# Patient Record
Sex: FEMALE | Race: WHITE | NOT HISPANIC OR LATINO | Employment: FULL TIME | URBAN - NONMETROPOLITAN AREA
[De-identification: names, ages, dates, MRNs, and addresses within clinical notes are randomized per-mention and may not be internally consistent; named-entity substitution may affect disease eponyms.]

---

## 2017-04-28 ENCOUNTER — HOSPITAL ENCOUNTER (EMERGENCY)
Facility: HOSPITAL | Age: 22
Discharge: HOME | End: 2017-04-28
Admitting: EMERGENCY MEDICINE
Payer: COMMERCIAL

## 2017-04-28 VITALS
RESPIRATION RATE: 18 BRPM | BODY MASS INDEX: 20.61 KG/M2 | HEART RATE: 79 BPM | OXYGEN SATURATION: 98 % | TEMPERATURE: 98 F | SYSTOLIC BLOOD PRESSURE: 113 MMHG | WEIGHT: 136 LBS | HEIGHT: 68 IN | DIASTOLIC BLOOD PRESSURE: 82 MMHG

## 2017-04-28 DIAGNOSIS — J06.9 URI (UPPER RESPIRATORY INFECTION): ICD-10-CM

## 2017-04-28 DIAGNOSIS — R19.7 DIARRHEA: ICD-10-CM

## 2017-04-28 DIAGNOSIS — J02.9 PHARYNGITIS: Primary | ICD-10-CM

## 2017-04-28 PROCEDURE — 99283 EMERGENCY DEPT VISIT LOW MDM: CPT

## 2017-04-28 RX ORDER — SERTRALINE HYDROCHLORIDE 25 MG/1
50 TABLET, FILM COATED ORAL DAILY
COMMUNITY
End: 2018-02-02

## 2017-04-28 RX ORDER — AMOXICILLIN 500 MG/1
500 TABLET, FILM COATED ORAL 2 TIMES DAILY
Qty: 14 TABLET | Refills: 0 | Status: SHIPPED | OUTPATIENT
Start: 2017-04-28 | End: 2017-05-05

## 2017-04-28 RX ORDER — GUAIFENESIN 600 MG
600 TABLET, EXTENDED RELEASE 12 HR ORAL 2 TIMES DAILY
Qty: 14 TABLET | Refills: 0 | Status: SHIPPED | OUTPATIENT
Start: 2017-04-28 | End: 2017-05-05

## 2017-05-14 ENCOUNTER — HOSPITAL ENCOUNTER (EMERGENCY)
Facility: HOSPITAL | Age: 22
End: 2017-05-15
Attending: EMERGENCY MEDICINE | Admitting: EMERGENCY MEDICINE
Payer: COMMERCIAL

## 2017-05-14 ENCOUNTER — APPOINTMENT (EMERGENCY)
Dept: RADIOLOGY | Facility: HOSPITAL | Age: 22
End: 2017-05-14
Payer: COMMERCIAL

## 2017-05-14 ENCOUNTER — GENERIC CONVERSION - ENCOUNTER (OUTPATIENT)
Dept: OTHER | Facility: OTHER | Age: 22
End: 2017-05-14

## 2017-05-14 ENCOUNTER — APPOINTMENT (EMERGENCY)
Dept: CT IMAGING | Facility: HOSPITAL | Age: 22
End: 2017-05-14
Payer: COMMERCIAL
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
## 2017-05-14 DIAGNOSIS — R20.2 PARESTHESIAS IN LEFT HAND: Primary | ICD-10-CM

## 2017-05-14 LAB
ANION GAP BLD CALC-SCNC: 21 MMOL/L (ref 4–13)
BUN BLD-MCNC: 14 MG/DL (ref 5–25)
CA-I BLD-SCNC: 1.16 MMOL/L (ref 1.12–1.32)
CHLORIDE BLD-SCNC: 104 MMOL/L (ref 100–108)
CREAT BLD-MCNC: 0.7 MG/DL (ref 0.6–1.3)
GFR SERPL CREATININE-BSD FRML MDRD: >60 ML/MIN/1.73SQ M
GLUCOSE SERPL-MCNC: 97 MG/DL (ref 65–140)
HCG UR QL: NEGATIVE
HCT VFR BLD CALC: 45 % (ref 34.8–46.1)
HGB BLDA-MCNC: 15.3 G/DL (ref 11.5–15.4)
PCO2 BLD: 24 MMOL/L (ref 21–32)
POTASSIUM BLD-SCNC: 3.7 MMOL/L (ref 3.5–5.3)
SODIUM BLD-SCNC: 143 MMOL/L (ref 136–145)
SPECIMEN SOURCE: ABNORMAL

## 2017-05-14 PROCEDURE — 72125 CT NECK SPINE W/O DYE: CPT

## 2017-05-14 PROCEDURE — 80047 BASIC METABLC PNL IONIZED CA: CPT

## 2017-05-14 PROCEDURE — 85014 HEMATOCRIT: CPT

## 2017-05-14 PROCEDURE — 96375 TX/PRO/DX INJ NEW DRUG ADDON: CPT

## 2017-05-14 PROCEDURE — 70450 CT HEAD/BRAIN W/O DYE: CPT

## 2017-05-14 PROCEDURE — 73110 X-RAY EXAM OF WRIST: CPT

## 2017-05-14 PROCEDURE — 74177 CT ABD & PELVIS W/CONTRAST: CPT

## 2017-05-14 PROCEDURE — 71260 CT THORAX DX C+: CPT

## 2017-05-14 PROCEDURE — 81025 URINE PREGNANCY TEST: CPT | Performed by: EMERGENCY MEDICINE

## 2017-05-14 PROCEDURE — 96374 THER/PROPH/DIAG INJ IV PUSH: CPT

## 2017-05-14 PROCEDURE — 73030 X-RAY EXAM OF SHOULDER: CPT

## 2017-05-14 RX ORDER — FENTANYL CITRATE 50 UG/ML
50 INJECTION, SOLUTION INTRAMUSCULAR; INTRAVENOUS ONCE
Status: COMPLETED | OUTPATIENT
Start: 2017-05-14 | End: 2017-05-14

## 2017-05-14 RX ORDER — GABAPENTIN 300 MG/1
300 CAPSULE ORAL ONCE
Status: COMPLETED | OUTPATIENT
Start: 2017-05-14 | End: 2017-05-14

## 2017-05-14 RX ORDER — KETOROLAC TROMETHAMINE 30 MG/ML
15 INJECTION, SOLUTION INTRAMUSCULAR; INTRAVENOUS ONCE
Status: COMPLETED | OUTPATIENT
Start: 2017-05-14 | End: 2017-05-14

## 2017-05-14 RX ADMIN — KETOROLAC TROMETHAMINE 15 MG: 30 INJECTION, SOLUTION INTRAMUSCULAR at 21:38

## 2017-05-14 RX ADMIN — IOHEXOL 100 ML: 350 INJECTION, SOLUTION INTRAVENOUS at 20:52

## 2017-05-14 RX ADMIN — DEXAMETHASONE SODIUM PHOSPHATE 10 MG: 10 INJECTION INTRAMUSCULAR; INTRAVENOUS at 23:22

## 2017-05-14 RX ADMIN — FENTANYL CITRATE 50 MCG: 50 INJECTION INTRAMUSCULAR; INTRAVENOUS at 21:16

## 2017-05-14 RX ADMIN — GABAPENTIN 300 MG: 300 CAPSULE ORAL at 23:35

## 2017-05-15 ENCOUNTER — HOSPITAL ENCOUNTER (OUTPATIENT)
Facility: HOSPITAL | Age: 22
Setting detail: OBSERVATION
Discharge: HOME | End: 2017-05-16
Attending: SURGERY | Admitting: SURGERY
Payer: COMMERCIAL

## 2017-05-15 ENCOUNTER — APPOINTMENT (OUTPATIENT)
Dept: RADIOLOGY | Facility: HOSPITAL | Age: 22
End: 2017-05-15
Payer: COMMERCIAL

## 2017-05-15 VITALS
HEIGHT: 68 IN | BODY MASS INDEX: 21.49 KG/M2 | HEART RATE: 67 BPM | WEIGHT: 141.8 LBS | DIASTOLIC BLOOD PRESSURE: 78 MMHG | RESPIRATION RATE: 16 BRPM | OXYGEN SATURATION: 98 % | TEMPERATURE: 97.9 F | SYSTOLIC BLOOD PRESSURE: 129 MMHG

## 2017-05-15 DIAGNOSIS — M79.602 PAIN OF LEFT UPPER EXTREMITY: Primary | ICD-10-CM
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
## 2017-05-15 LAB
PLATELET # BLD AUTO: 370 THOUSANDS/UL (ref 149–390)
PLATELET # BLD AUTO: 372 THOUSANDS/UL (ref 149–390)
PMV BLD AUTO: 9.5 FL (ref 8.9–12.7)
PMV BLD AUTO: 9.8 FL (ref 8.9–12.7)

## 2017-05-15 PROCEDURE — 72141 MRI NECK SPINE W/O DYE: CPT

## 2017-05-15 PROCEDURE — 97163 PT EVAL HIGH COMPLEX 45 MIN: CPT

## 2017-05-15 PROCEDURE — G8979 MOBILITY GOAL STATUS: HCPCS

## 2017-05-15 PROCEDURE — 85049 AUTOMATED PLATELET COUNT: CPT | Performed by: EMERGENCY MEDICINE

## 2017-05-15 PROCEDURE — 97166 OT EVAL MOD COMPLEX 45 MIN: CPT

## 2017-05-15 PROCEDURE — 96375 TX/PRO/DX INJ NEW DRUG ADDON: CPT

## 2017-05-15 PROCEDURE — 99285 EMERGENCY DEPT VISIT HI MDM: CPT

## 2017-05-15 PROCEDURE — G8980 MOBILITY D/C STATUS: HCPCS

## 2017-05-15 PROCEDURE — G8978 MOBILITY CURRENT STATUS: HCPCS

## 2017-05-15 PROCEDURE — G8987 SELF CARE CURRENT STATUS: HCPCS

## 2017-05-15 PROCEDURE — G8988 SELF CARE GOAL STATUS: HCPCS

## 2017-05-15 RX ORDER — OXYCODONE HYDROCHLORIDE 5 MG/1
5 TABLET ORAL ONCE
Status: COMPLETED | OUTPATIENT
Start: 2017-05-15 | End: 2017-05-15

## 2017-05-15 RX ORDER — METOCLOPRAMIDE 10 MG/1
10 TABLET ORAL
Status: DISCONTINUED | OUTPATIENT
Start: 2017-05-15 | End: 2017-05-16 | Stop reason: HOSPADM

## 2017-05-15 RX ORDER — LEVOTHYROXINE SODIUM 0.1 MG/1
100 TABLET ORAL
Status: DISCONTINUED | OUTPATIENT
Start: 2017-05-15 | End: 2017-05-16 | Stop reason: HOSPADM

## 2017-05-15 RX ORDER — ONDANSETRON 2 MG/ML
4 INJECTION INTRAMUSCULAR; INTRAVENOUS EVERY 6 HOURS PRN
Status: DISCONTINUED | OUTPATIENT
Start: 2017-05-15 | End: 2017-05-16 | Stop reason: HOSPADM

## 2017-05-15 RX ORDER — HEPARIN SODIUM 5000 [USP'U]/ML
5000 INJECTION, SOLUTION INTRAVENOUS; SUBCUTANEOUS EVERY 8 HOURS SCHEDULED
Status: DISCONTINUED | OUTPATIENT
Start: 2017-05-15 | End: 2017-05-16 | Stop reason: HOSPADM

## 2017-05-15 RX ORDER — MORPHINE SULFATE 4 MG/ML
4 INJECTION, SOLUTION INTRAMUSCULAR; INTRAVENOUS
Status: DISCONTINUED | OUTPATIENT
Start: 2017-05-15 | End: 2017-05-16 | Stop reason: HOSPADM

## 2017-05-15 RX ORDER — OXYCODONE HYDROCHLORIDE 10 MG/1
10 TABLET ORAL EVERY 4 HOURS PRN
Status: DISCONTINUED | OUTPATIENT
Start: 2017-05-15 | End: 2017-05-16 | Stop reason: HOSPADM

## 2017-05-15 RX ORDER — ACETAMINOPHEN 325 MG/1
975 TABLET ORAL EVERY 6 HOURS PRN
Status: DISCONTINUED | OUTPATIENT
Start: 2017-05-15 | End: 2017-05-16 | Stop reason: HOSPADM

## 2017-05-15 RX ORDER — FENTANYL CITRATE 50 UG/ML
50 INJECTION, SOLUTION INTRAMUSCULAR; INTRAVENOUS ONCE
Status: COMPLETED | OUTPATIENT
Start: 2017-05-15 | End: 2017-05-15

## 2017-05-15 RX ORDER — LORAZEPAM 0.5 MG/1
0.5 TABLET ORAL
Status: DISCONTINUED | OUTPATIENT
Start: 2017-05-15 | End: 2017-05-16 | Stop reason: HOSPADM

## 2017-05-15 RX ORDER — NICOTINE 21 MG/24HR
1 PATCH, TRANSDERMAL 24 HOURS TRANSDERMAL DAILY
Status: DISCONTINUED | OUTPATIENT
Start: 2017-05-15 | End: 2017-05-16 | Stop reason: HOSPADM

## 2017-05-15 RX ORDER — LORAZEPAM 0.5 MG/1
0.5 TABLET ORAL ONCE
Status: COMPLETED | OUTPATIENT
Start: 2017-05-15 | End: 2017-05-15

## 2017-05-15 RX ORDER — OXYCODONE HYDROCHLORIDE 5 MG/1
5 TABLET ORAL EVERY 4 HOURS PRN
Status: DISCONTINUED | OUTPATIENT
Start: 2017-05-15 | End: 2017-05-16 | Stop reason: HOSPADM

## 2017-05-15 RX ADMIN — ONDANSETRON 4 MG: 2 INJECTION INTRAMUSCULAR; INTRAVENOUS at 09:27

## 2017-05-15 RX ADMIN — OXYCODONE HYDROCHLORIDE 10 MG: 10 TABLET ORAL at 16:10

## 2017-05-15 RX ADMIN — OXYCODONE HYDROCHLORIDE 10 MG: 10 TABLET ORAL at 09:26

## 2017-05-15 RX ADMIN — METOCLOPRAMIDE 10 MG: 10 TABLET ORAL at 18:06

## 2017-05-15 RX ADMIN — ONDANSETRON 4 MG: 2 INJECTION INTRAMUSCULAR; INTRAVENOUS at 04:40

## 2017-05-15 RX ADMIN — MORPHINE SULFATE 4 MG: 4 INJECTION, SOLUTION INTRAMUSCULAR; INTRAVENOUS at 07:56

## 2017-05-15 RX ADMIN — MORPHINE SULFATE 4 MG: 4 INJECTION, SOLUTION INTRAMUSCULAR; INTRAVENOUS at 23:47

## 2017-05-15 RX ADMIN — LEVOTHYROXINE SODIUM 100 MCG: 100 TABLET ORAL at 05:46

## 2017-05-15 RX ADMIN — FENTANYL CITRATE 50 MCG: 50 INJECTION INTRAMUSCULAR; INTRAVENOUS at 00:32

## 2017-05-15 RX ADMIN — HEPARIN SODIUM 5000 UNITS: 5000 INJECTION, SOLUTION INTRAVENOUS; SUBCUTANEOUS at 16:11

## 2017-05-15 RX ADMIN — SERTRALINE HYDROCHLORIDE 50 MG: 50 TABLET ORAL at 09:25

## 2017-05-15 RX ADMIN — HEPARIN SODIUM 5000 UNITS: 5000 INJECTION, SOLUTION INTRAVENOUS; SUBCUTANEOUS at 05:47

## 2017-05-15 RX ADMIN — OXYCODONE HYDROCHLORIDE 5 MG: 5 TABLET ORAL at 20:54

## 2017-05-15 RX ADMIN — OXYCODONE HYDROCHLORIDE 5 MG: 5 TABLET ORAL at 02:17

## 2017-05-15 RX ADMIN — OXYCODONE HYDROCHLORIDE 10 MG: 10 TABLET ORAL at 04:39

## 2017-05-15 RX ADMIN — LORAZEPAM 0.5 MG: 0.5 TABLET ORAL at 12:24

## 2017-05-15 RX ADMIN — HEPARIN SODIUM 5000 UNITS: 5000 INJECTION, SOLUTION INTRAVENOUS; SUBCUTANEOUS at 21:17

## 2017-05-16 ENCOUNTER — APPOINTMENT (OUTPATIENT)
Dept: RADIOLOGY | Facility: HOSPITAL | Age: 22
End: 2017-05-16
Payer: COMMERCIAL

## 2017-05-16 VITALS
DIASTOLIC BLOOD PRESSURE: 50 MMHG | WEIGHT: 147 LBS | SYSTOLIC BLOOD PRESSURE: 93 MMHG | HEIGHT: 68 IN | OXYGEN SATURATION: 99 % | TEMPERATURE: 97.9 F | BODY MASS INDEX: 22.28 KG/M2 | RESPIRATION RATE: 16 BRPM | HEART RATE: 58 BPM

## 2017-05-16 PROCEDURE — 97532 HB COGNITIVE SKILLS DEVELOPMENT: CPT

## 2017-05-16 PROCEDURE — 97535 SELF CARE MNGMENT TRAINING: CPT

## 2017-05-16 RX ORDER — METHOCARBAMOL 500 MG/1
500 TABLET, FILM COATED ORAL 4 TIMES DAILY
Qty: 30 TABLET | Refills: 0 | Status: SHIPPED | OUTPATIENT
Start: 2017-05-16 | End: 2017-07-13 | Stop reason: ALTCHOICE

## 2017-05-16 RX ORDER — METHOCARBAMOL 500 MG/1
500 TABLET, FILM COATED ORAL EVERY 6 HOURS PRN
Status: DISCONTINUED | OUTPATIENT
Start: 2017-05-16 | End: 2017-05-16 | Stop reason: HOSPADM

## 2017-05-16 RX ADMIN — OXYCODONE HYDROCHLORIDE 10 MG: 10 TABLET ORAL at 06:16

## 2017-05-16 RX ADMIN — OXYCODONE HYDROCHLORIDE 10 MG: 10 TABLET ORAL at 12:01

## 2017-05-16 RX ADMIN — SERTRALINE HYDROCHLORIDE 50 MG: 50 TABLET ORAL at 09:45

## 2017-05-16 RX ADMIN — HEPARIN SODIUM 5000 UNITS: 5000 INJECTION, SOLUTION INTRAVENOUS; SUBCUTANEOUS at 06:14

## 2017-05-16 RX ADMIN — METOCLOPRAMIDE 10 MG: 10 TABLET ORAL at 06:15

## 2017-05-16 RX ADMIN — LEVOTHYROXINE SODIUM 100 MCG: 100 TABLET ORAL at 06:15

## 2017-05-16 RX ADMIN — METHOCARBAMOL 500 MG: 500 TABLET ORAL at 12:01

## 2017-05-16 RX ADMIN — HEPARIN SODIUM 5000 UNITS: 5000 INJECTION, SOLUTION INTRAVENOUS; SUBCUTANEOUS at 14:05

## 2017-05-16 RX ADMIN — METOCLOPRAMIDE 10 MG: 10 TABLET ORAL at 12:01

## 2017-05-30 ENCOUNTER — ALLSCRIPTS OFFICE VISIT (OUTPATIENT)
Dept: OTHER | Facility: OTHER | Age: 22
End: 2017-05-30

## 2017-06-19 ENCOUNTER — ALLSCRIPTS OFFICE VISIT (OUTPATIENT)
Dept: OTHER | Facility: OTHER | Age: 22
End: 2017-06-19

## 2017-07-13 ENCOUNTER — HOSPITAL ENCOUNTER (EMERGENCY)
Facility: HOSPITAL | Age: 22
Discharge: HOME | End: 2017-07-13

## 2017-07-13 VITALS
HEART RATE: 58 BPM | BODY MASS INDEX: 21.62 KG/M2 | HEIGHT: 69 IN | RESPIRATION RATE: 16 BRPM | SYSTOLIC BLOOD PRESSURE: 115 MMHG | DIASTOLIC BLOOD PRESSURE: 70 MMHG | OXYGEN SATURATION: 99 % | WEIGHT: 146 LBS | TEMPERATURE: 97.3 F

## 2017-07-13 DIAGNOSIS — T23.142A: ICD-10-CM

## 2017-07-13 DIAGNOSIS — T30.0 BURN DUE TO CONTACT WITH HOT WATER: ICD-10-CM
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
## 2017-07-13 DIAGNOSIS — T23.162A: Primary | ICD-10-CM

## 2017-07-13 PROCEDURE — 99283 EMERGENCY DEPT VISIT LOW MDM: CPT

## 2017-07-13 RX ORDER — IBUPROFEN 600 MG/1
600 TABLET ORAL ONCE
Status: COMPLETED | OUTPATIENT
Start: 2017-07-13 | End: 2017-07-13

## 2017-07-13 RX ORDER — IBUPROFEN 600 MG/1
600 TABLET ORAL 3 TIMES DAILY
Qty: 15 TABLET | Refills: 0 | Status: SHIPPED | OUTPATIENT
Start: 2017-07-13 | End: 2018-02-02

## 2017-07-13 RX ORDER — GAUZE BANDAGE 4"X3.5"
1 SPONGE TOPICAL DAILY
Qty: 5 EACH | Refills: 0 | Status: SHIPPED | OUTPATIENT
Start: 2017-07-13 | End: 2018-02-02

## 2017-07-13 RX ADMIN — IBUPROFEN 600 MG: 600 TABLET, FILM COATED ORAL at 10:27

## 2017-10-02 ENCOUNTER — GENERIC CONVERSION - ENCOUNTER (OUTPATIENT)
Dept: OTHER | Facility: OTHER | Age: 22
End: 2017-10-02

## 2017-10-02 ENCOUNTER — APPOINTMENT (OUTPATIENT)
Dept: LAB | Facility: HOSPITAL | Age: 22
End: 2017-10-02
Payer: COMMERCIAL

## 2017-10-02 ENCOUNTER — APPOINTMENT (OUTPATIENT)
Dept: FAMILY MEDICINE CLINIC | Facility: CLINIC | Age: 22
End: 2017-10-02
Payer: COMMERCIAL

## 2017-10-02 DIAGNOSIS — R05.9 COUGH: ICD-10-CM

## 2017-10-02 DIAGNOSIS — R19.7 DIARRHEA: ICD-10-CM

## 2017-10-02 DIAGNOSIS — R10.31 RIGHT LOWER QUADRANT PAIN: ICD-10-CM

## 2017-10-02 LAB
ALBUMIN SERPL BCP-MCNC: 4.7 G/DL (ref 3.5–5)
ALP SERPL-CCNC: 71 U/L (ref 46–116)
ALT SERPL W P-5'-P-CCNC: 21 U/L (ref 12–78)
ANION GAP SERPL CALCULATED.3IONS-SCNC: 4 MMOL/L (ref 4–13)
AST SERPL W P-5'-P-CCNC: 15 U/L (ref 5–45)
BACTERIA UR QL AUTO: NORMAL /HPF
BASOPHILS # BLD AUTO: 0.05 THOUSANDS/ΜL (ref 0–0.1)
BASOPHILS NFR BLD AUTO: 1 % (ref 0–1)
BILIRUB SERPL-MCNC: 0.31 MG/DL (ref 0.2–1)
BILIRUB UR QL STRIP: NEGATIVE
BUN SERPL-MCNC: 9 MG/DL (ref 5–25)
CALCIUM SERPL-MCNC: 9.9 MG/DL (ref 8.3–10.1)
CHLORIDE SERPL-SCNC: 105 MMOL/L (ref 100–108)
CLARITY UR: CLEAR
CO2 SERPL-SCNC: 27 MMOL/L (ref 21–32)
COLOR UR: YELLOW
CREAT SERPL-MCNC: 0.62 MG/DL (ref 0.6–1.3)
EOSINOPHIL # BLD AUTO: 0.36 THOUSAND/ΜL (ref 0–0.61)
EOSINOPHIL NFR BLD AUTO: 3 % (ref 0–6)
ERYTHROCYTE [DISTWIDTH] IN BLOOD BY AUTOMATED COUNT: 12.5 % (ref 11.6–15.1)
GFR SERPL CREATININE-BSD FRML MDRD: 129 ML/MIN/1.73SQ M
GLUCOSE P FAST SERPL-MCNC: 79 MG/DL (ref 65–99)
GLUCOSE UR STRIP-MCNC: NEGATIVE MG/DL
HCT VFR BLD AUTO: 44 % (ref 34.8–46.1)
HGB BLD-MCNC: 15 G/DL (ref 11.5–15.4)
HGB UR QL STRIP.AUTO: NEGATIVE
KETONES UR STRIP-MCNC: NEGATIVE MG/DL
LEUKOCYTE ESTERASE UR QL STRIP: ABNORMAL
LIPASE SERPL-CCNC: 125 U/L (ref 73–393)
LYMPHOCYTES # BLD AUTO: 3.03 THOUSANDS/ΜL (ref 0.6–4.47)
LYMPHOCYTES NFR BLD AUTO: 28 % (ref 14–44)
MCH RBC QN AUTO: 32.6 PG (ref 26.8–34.3)
MCHC RBC AUTO-ENTMCNC: 34.1 G/DL (ref 31.4–37.4)
MCV RBC AUTO: 96 FL (ref 82–98)
MONOCYTES # BLD AUTO: 0.97 THOUSAND/ΜL (ref 0.17–1.22)
MONOCYTES NFR BLD AUTO: 9 % (ref 4–12)
NEUTROPHILS # BLD AUTO: 6.22 THOUSANDS/ΜL (ref 1.85–7.62)
NEUTS SEG NFR BLD AUTO: 59 % (ref 43–75)
NITRITE UR QL STRIP: NEGATIVE
NON-SQ EPI CELLS URNS QL MICRO: NORMAL /HPF
NRBC BLD AUTO-RTO: 0 /100 WBCS
PH UR STRIP.AUTO: 7 [PH] (ref 4.5–8)
PLATELET # BLD AUTO: 363 THOUSANDS/UL (ref 149–390)
PMV BLD AUTO: 10 FL (ref 8.9–12.7)
POTASSIUM SERPL-SCNC: 4.4 MMOL/L (ref 3.5–5.3)
PROT SERPL-MCNC: 8.7 G/DL (ref 6.4–8.2)
PROT UR STRIP-MCNC: NEGATIVE MG/DL
RBC # BLD AUTO: 4.6 MILLION/UL (ref 3.81–5.12)
RBC #/AREA URNS AUTO: NORMAL /HPF
SODIUM SERPL-SCNC: 136 MMOL/L (ref 136–145)
SP GR UR STRIP.AUTO: 1 (ref 1–1.03)
UROBILINOGEN UR QL STRIP.AUTO: 0.2 E.U./DL
WBC # BLD AUTO: 10.66 THOUSAND/UL (ref 4.31–10.16)
WBC #/AREA URNS AUTO: NORMAL /HPF

## 2017-10-02 PROCEDURE — 81001 URINALYSIS AUTO W/SCOPE: CPT

## 2017-10-02 PROCEDURE — 83690 ASSAY OF LIPASE: CPT

## 2017-10-02 PROCEDURE — 36415 COLL VENOUS BLD VENIPUNCTURE: CPT

## 2017-10-02 PROCEDURE — T1015 CLINIC SERVICE: HCPCS | Performed by: FAMILY MEDICINE

## 2017-10-02 PROCEDURE — 80053 COMPREHEN METABOLIC PANEL: CPT

## 2017-10-02 PROCEDURE — 85025 COMPLETE CBC W/AUTO DIFF WBC: CPT

## 2017-10-20 ENCOUNTER — GENERIC CONVERSION - ENCOUNTER (OUTPATIENT)
Dept: OTHER | Facility: OTHER | Age: 22
End: 2017-10-20

## 2017-10-20 ENCOUNTER — APPOINTMENT (OUTPATIENT)
Dept: FAMILY MEDICINE CLINIC | Facility: CLINIC | Age: 22
End: 2017-10-20
Payer: COMMERCIAL

## 2017-10-20 PROCEDURE — T1015 CLINIC SERVICE: HCPCS | Performed by: FAMILY MEDICINE
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
## 2018-01-12 VITALS — SYSTOLIC BLOOD PRESSURE: 122 MMHG | RESPIRATION RATE: 18 BRPM | DIASTOLIC BLOOD PRESSURE: 64 MMHG | HEART RATE: 60 BPM

## 2018-01-16 NOTE — MISCELLANEOUS
Provider Comments  Provider Comments:   Jose Zhao did not show to her scheduled appointment on 06/19/2017  Our office attempted to contact her to reschedule  A message was left on her voicemail to contact our office        Signatures   Electronically signed by : Claudy Roberson DO; Jun 20 2017 12:46PM EST                       (Author)

## 2018-01-22 VITALS
RESPIRATION RATE: 20 BRPM | HEART RATE: 63 BPM | BODY MASS INDEX: 22.58 KG/M2 | OXYGEN SATURATION: 98 % | TEMPERATURE: 99.1 F | HEIGHT: 68 IN | WEIGHT: 149 LBS

## 2018-01-22 VITALS
BODY MASS INDEX: 22.28 KG/M2 | WEIGHT: 147 LBS | OXYGEN SATURATION: 98 % | SYSTOLIC BLOOD PRESSURE: 110 MMHG | HEART RATE: 97 BPM | RESPIRATION RATE: 20 BRPM | DIASTOLIC BLOOD PRESSURE: 62 MMHG | TEMPERATURE: 97.8 F | HEIGHT: 68 IN

## 2018-02-02 ENCOUNTER — HOSPITAL ENCOUNTER (EMERGENCY)
Facility: HOSPITAL | Age: 23
Discharge: HOME | End: 2018-02-02
Admitting: EMERGENCY MEDICINE
Payer: COMMERCIAL

## 2018-02-02 VITALS
OXYGEN SATURATION: 98 % | WEIGHT: 157.63 LBS | RESPIRATION RATE: 17 BRPM | DIASTOLIC BLOOD PRESSURE: 67 MMHG | HEART RATE: 87 BPM | TEMPERATURE: 98.3 F | BODY MASS INDEX: 23.97 KG/M2 | SYSTOLIC BLOOD PRESSURE: 126 MMHG

## 2018-02-02 DIAGNOSIS — J03.90 TONSILLITIS: Primary | ICD-10-CM

## 2018-02-02 LAB — S PYO AG THROAT QL: NEGATIVE

## 2018-02-02 PROCEDURE — 99283 EMERGENCY DEPT VISIT LOW MDM: CPT

## 2018-02-02 PROCEDURE — 87147 CULTURE TYPE IMMUNOLOGIC: CPT | Performed by: PHYSICIAN ASSISTANT

## 2018-02-02 PROCEDURE — 87070 CULTURE OTHR SPECIMN AEROBIC: CPT | Performed by: PHYSICIAN ASSISTANT

## 2018-02-02 PROCEDURE — 87430 STREP A AG IA: CPT | Performed by: PHYSICIAN ASSISTANT

## 2018-02-02 RX ORDER — AMOXICILLIN AND CLAVULANATE POTASSIUM 875; 125 MG/1; MG/1
1 TABLET, FILM COATED ORAL ONCE
Status: COMPLETED | OUTPATIENT
Start: 2018-02-02 | End: 2018-02-02

## 2018-02-02 RX ORDER — PREDNISONE 50 MG/1
50 TABLET ORAL DAILY
Qty: 5 TABLET | Refills: 0 | Status: SHIPPED | OUTPATIENT
Start: 2018-02-02 | End: 2018-12-31

## 2018-02-02 RX ORDER — IBUPROFEN 800 MG/1
800 TABLET ORAL ONCE
Status: COMPLETED | OUTPATIENT
Start: 2018-02-02 | End: 2018-02-02

## 2018-02-02 RX ORDER — AMOXICILLIN AND CLAVULANATE POTASSIUM 875; 125 MG/1; MG/1
1 TABLET, FILM COATED ORAL EVERY 12 HOURS SCHEDULED
Qty: 20 TABLET | Refills: 0 | Status: SHIPPED | OUTPATIENT
Start: 2018-02-02 | End: 2018-02-12

## 2018-02-02 RX ADMIN — PREDNISONE 50 MG: 20 TABLET ORAL at 15:51

## 2018-02-02 RX ADMIN — IBUPROFEN 800 MG: 800 TABLET, FILM COATED ORAL at 14:51

## 2018-02-02 RX ADMIN — AMOXICILLIN AND CLAVULANATE POTASSIUM 1 TABLET: 875; 125 TABLET, FILM COATED ORAL at 15:51

## 2018-02-02 NOTE — DISCHARGE INSTRUCTIONS
Tonsillitis, Ambulatory Care   GENERAL INFORMATION:   Tonsillitis  is an inflammation of the tonsils  Tonsils are 2 large lumps of tissue in the back of your throat  They help fight infection  Tonsillitis may be caused by a bacterial or a viral infection  Common symptoms include the following:   · Severe sore throat    · Red, swollen tonsils    · Painful swallowing    · Fever and chills    · Bad breath    · White spots on the tonsils  Seek immediate care  if you have trouble breathing because your tonsils are swollen  Treatment for tonsillitis  may include medicine to decrease throat pain  Antibiotic medicine may be given if your tonsillitis was caused by bacteria  You may also need surgery to remove your tonsils for chronic or recurrent tonsillitis  Prevent the spread of germs  by washing your hands often  Do not share food or drinks with anyone  Ask when you can return to work  Manage your symptoms:   · Drink plenty of liquids  to help prevent dehydration  Ask your healthcare provider how much you should drink  · Gargle with warm salt water  to help decrease throat pain  Mix 1 teaspoon of salt in 1 cup of warm water  Ask how often you should do this  Follow up with your healthcare provider as directed:  Write down your questions so you remember to ask them during your visits  CARE AGREEMENT:   You have the right to help plan your care  Learn about your health condition and how it may be treated  Discuss treatment options with your caregivers to decide what care you want to receive  You always have the right to refuse treatment  The above information is an  only  It is not intended as medical advice for individual conditions or treatments  Talk to your doctor, nurse or pharmacist before following any medical regimen to see if it is safe and effective for you    © 2014 [address] [employer] is for End User's use only and may not be sold, redistributed or otherwise used for commercial purposes  All illustrations and images included in CareNotes® are the copyrighted property of A D A M , [employer]  or [employer]

## 2018-02-02 NOTE — ED PROVIDER NOTES
History  Chief Complaint   Patient presents with    Flu Symptoms     fever 89f at home with sore throat coughing stiff neck vomiting body aches and diarrhea     Patient presents to the emergency department body aches chills  She denies cough  She does admit to some posterior neck pain  She also complains of some intermittent vomiting  She has been utilizing acetaminophen however has not had any since 0 900 hours this morning  Heart rate at bedside is 98 beats per minute with a temperature of 98 3° F  Blood pressure 123/73  There is no history of headache            None       Past Medical History:   Diagnosis Date    Depression     Disease of thyroid gland     Hypertension        Past Surgical History:   Procedure Laterality Date    DILATION AND CURETTAGE OF UTERUS         Family History   Problem Relation Age of Onset    Cancer Mother     Hypertension Mother     Cancer Father     Hypertension Father     Diabetes Father     Cancer Sister      I have reviewed and agree with the history as documented  Social History   Substance Use Topics    Smoking status: Current Every Day Smoker     Packs/day: 1 00    Smokeless tobacco: Never Used    Alcohol use No        Review of Systems   Constitutional: Positive for fever  Negative for activity change, appetite change, chills, diaphoresis, fatigue and unexpected weight change  HENT: Positive for sore throat  Negative for congestion, dental problem, drooling, ear discharge, ear pain, facial swelling, hearing loss, mouth sores, nosebleeds, postnasal drip, rhinorrhea, sinus pain, sinus pressure, sneezing, tinnitus, trouble swallowing and voice change  Eyes: Negative  Respiratory: Negative  Cardiovascular: Negative  Gastrointestinal: Positive for vomiting  Negative for abdominal distention, abdominal pain, anal bleeding, blood in stool, constipation, diarrhea, nausea and rectal pain  Endocrine: Negative  Genitourinary: Negative  Musculoskeletal: Positive for myalgias  Negative for arthralgias, back pain, gait problem, joint swelling, neck pain and neck stiffness  Skin: Negative  Allergic/Immunologic: Negative  Neurological: Negative  Hematological: Negative  Psychiatric/Behavioral: Negative  All other systems reviewed and are negative  Physical Exam  ED Triage Vitals [02/02/18 1346]   Temperature Pulse Respirations Blood Pressure SpO2   98 3 °F (36 8 °C) (!) 107 20 123/73 99 %      Temp Source Heart Rate Source Patient Position - Orthostatic VS BP Location FiO2 (%)   Temporal Right Sitting Right arm --      Pain Score       8           Orthostatic Vital Signs  Vitals:    02/02/18 1346   BP: 123/73   Pulse: (!) 107   Patient Position - Orthostatic VS: Sitting       Physical Exam   Constitutional: She is oriented to person, place, and time  She appears well-developed and well-nourished  No distress  HENT:   Head: Normocephalic and atraumatic  Right Ear: External ear normal    Left Ear: External ear normal    Nose: Nose normal    Mouth/Throat: Oropharyngeal exudate present  Patient has bilateral erythematous slightly enlarged tonsillar pillars without edema  There is extensive exudate however  No evidence of airway compromise  Eyes: EOM are normal  Pupils are equal, round, and reactive to light  Neck: No JVD present  No tracheal deviation present  No thyromegaly present  Patient experiences pain with range of motion of the neck however is able to touch her chin to her chest   Cardiovascular: Normal rate and regular rhythm  Exam reveals no gallop and no friction rub  No murmur heard  Pulmonary/Chest: Effort normal and breath sounds normal  No respiratory distress  She has no wheezes  She has no rales  She exhibits no tenderness  Abdominal: Soft  Musculoskeletal: Normal range of motion  Lymphadenopathy:     She has cervical adenopathy     Neurological: She is alert and oriented to person, place, and time  No cranial nerve deficit or sensory deficit  GCS eye subscore is 4  GCS verbal subscore is 5  GCS motor subscore is 6  Kernig's and Brudzinski's test both negative   Skin: Skin is warm  Capillary refill takes less than 2 seconds  She is not diaphoretic  Psychiatric: She has a normal mood and affect  Vitals reviewed  ED Medications  Medications   predniSONE tablet 50 mg (not administered)   amoxicillin-clavulanate (AUGMENTIN) 875-125 mg per tablet 1 tablet (not administered)   ibuprofen (MOTRIN) tablet 800 mg (800 mg Oral Given 2/2/18 1451)       Diagnostic Studies  Results Reviewed     Procedure Component Value Units Date/Time    Rapid Beta strep screen [[id number]]  (Normal) Collected:  02/02/18 1435    Lab Status:  Final result Specimen:  Throat from Throat Updated:  02/02/18 1453     Rapid Strep A Screen Negative    Throat culture [[id number]] Collected:  02/02/18 1435    Lab Status: In process Specimen:  Throat from Throat Updated:  02/02/18 1453                 No orders to display              Procedures  Procedures       Phone Contacts  ED Phone Contact    ED Course  ED Course as of Feb 02 1521   Fri Feb 02, 2018   1437 Heart rate at bedside is 97 beats per minute    1457 RAPID STREP A SCREEN: Negative   1520 Heart rate 90 beats/min at bedside upon discharge                                [facility]  CritCare Time    Disposition  Final diagnoses: Tonsillitis     Time reflects when diagnosis was documented in both MDM as applicable and the Disposition within this note     Time User Action Codes Description Comment    2/2/2018  3:18 PM Chapito Hernandez [J03 90] Tonsillitis       ED Disposition     ED Disposition Condition Comment    Discharge  [facility] 2177 discharge to home/self care      Condition at discharge: Good        Follow-up Information     Follow up With Specialties Details Why Contact Info Additional Information    Claudia Schaffer PA-C Physician Assistant Go to  [address] [place] [address] [address]  [facility], [place] [address] [facility] Emergency Department Emergency Medicine  If symptoms worsen, or you experience worsening fevers/neck symptoms Sonia Hernandezaubrey Guillaume 1946  [phone] [place] [facility], [address], [address]        Patient's Medications   Discharge Prescriptions    AMOXICILLIN-CLAVULANATE (AUGMENTIN) 875-125 MG PER TABLET    Take 1 tablet by mouth every 12 (twelve) hours for 10 days       Start Date: 2/2/2018  End Date: 2/12/2018       Order Dose: 1 tablet       Quantity: 20 tablet    Refills: 0    PREDNISONE 50 MG TABLET    Take 1 tablet (50 mg total) by mouth daily       Start Date: 2/2/2018  End Date: --       Order Dose: 50 mg       Quantity: 5 tablet    Refills: 0     No discharge procedures on file      ED Provider  Electronically Signed by           Beata Gonzalez PA-C  02/02/18 3014

## 2018-02-05 LAB — BACTERIA THROAT CULT: ABNORMAL

## 2018-10-30 ENCOUNTER — APPOINTMENT (EMERGENCY)
Dept: CT IMAGING | Facility: HOSPITAL | Age: 23
End: 2018-10-30
Payer: COMMERCIAL

## 2018-10-30 ENCOUNTER — APPOINTMENT (EMERGENCY)
Dept: ULTRASOUND IMAGING | Facility: HOSPITAL | Age: 23
End: 2018-10-30
Payer: COMMERCIAL

## 2018-10-30 ENCOUNTER — HOSPITAL ENCOUNTER (EMERGENCY)
Facility: HOSPITAL | Age: 23
Discharge: HOME | End: 2018-10-30
Attending: EMERGENCY MEDICINE | Admitting: EMERGENCY MEDICINE
Payer: COMMERCIAL

## 2018-10-30 VITALS
SYSTOLIC BLOOD PRESSURE: 138 MMHG | OXYGEN SATURATION: 97 % | HEART RATE: 56 BPM | WEIGHT: 153.88 LBS | HEIGHT: 69 IN | BODY MASS INDEX: 22.79 KG/M2 | DIASTOLIC BLOOD PRESSURE: 80 MMHG | TEMPERATURE: 98.2 F | RESPIRATION RATE: 19 BRPM

## 2018-10-30 DIAGNOSIS — Z87.898 H/O DIARRHEA: ICD-10-CM

## 2018-10-30 DIAGNOSIS — R11.10 VOMITING: ICD-10-CM

## 2018-10-30 DIAGNOSIS — R10.31 RLQ ABDOMINAL PAIN: Primary | ICD-10-CM

## 2018-10-30 LAB
ALBUMIN SERPL BCP-MCNC: 4.5 G/DL (ref 3.5–5)
ALP SERPL-CCNC: 61 U/L (ref 46–116)
ALT SERPL W P-5'-P-CCNC: 20 U/L (ref 12–78)
ANION GAP SERPL CALCULATED.3IONS-SCNC: 10 MMOL/L (ref 4–13)
AST SERPL W P-5'-P-CCNC: 18 U/L (ref 5–45)
BASOPHILS # BLD AUTO: 0.04 THOUSANDS/ΜL (ref 0–0.1)
BASOPHILS NFR BLD AUTO: 0 % (ref 0–1)
BILIRUB SERPL-MCNC: 0.6 MG/DL (ref 0.2–1)
BILIRUB UR QL STRIP: NEGATIVE
BUN SERPL-MCNC: 6 MG/DL (ref 5–25)
CALCIUM SERPL-MCNC: 9.1 MG/DL (ref 8.3–10.1)
CHLORIDE SERPL-SCNC: 101 MMOL/L (ref 100–108)
CLARITY UR: CLEAR
CO2 SERPL-SCNC: 27 MMOL/L (ref 21–32)
COLOR UR: YELLOW
CREAT SERPL-MCNC: 0.64 MG/DL (ref 0.6–1.3)
EOSINOPHIL # BLD AUTO: 0.25 THOUSAND/ΜL (ref 0–0.61)
EOSINOPHIL NFR BLD AUTO: 2 % (ref 0–6)
ERYTHROCYTE [DISTWIDTH] IN BLOOD BY AUTOMATED COUNT: 12 % (ref 11.6–15.1)
EXT PREG TEST URINE: NEGATIVE
GFR SERPL CREATININE-BSD FRML MDRD: 127 ML/MIN/1.73SQ M
GLUCOSE SERPL-MCNC: 95 MG/DL (ref 65–140)
GLUCOSE UR STRIP-MCNC: NEGATIVE MG/DL
HCT VFR BLD AUTO: 43 % (ref 34.8–46.1)
HGB BLD-MCNC: 14.8 G/DL (ref 11.5–15.4)
HGB UR QL STRIP.AUTO: NEGATIVE
IMM GRANULOCYTES # BLD AUTO: 0.03 THOUSAND/UL (ref 0–0.2)
IMM GRANULOCYTES NFR BLD AUTO: 0 % (ref 0–2)
KETONES UR STRIP-MCNC: NEGATIVE MG/DL
LEUKOCYTE ESTERASE UR QL STRIP: NEGATIVE
LIPASE SERPL-CCNC: 79 U/L (ref 73–393)
LYMPHOCYTES # BLD AUTO: 1.96 THOUSANDS/ΜL (ref 0.6–4.47)
LYMPHOCYTES NFR BLD AUTO: 18 % (ref 14–44)
MCH RBC QN AUTO: 32.5 PG (ref 26.8–34.3)
MCHC RBC AUTO-ENTMCNC: 34.4 G/DL (ref 31.4–37.4)
MCV RBC AUTO: 94 FL (ref 82–98)
MONOCYTES # BLD AUTO: 0.85 THOUSAND/ΜL (ref 0.17–1.22)
MONOCYTES NFR BLD AUTO: 8 % (ref 4–12)
NEUTROPHILS # BLD AUTO: 7.79 THOUSANDS/ΜL (ref 1.85–7.62)
NEUTS SEG NFR BLD AUTO: 72 % (ref 43–75)
NITRITE UR QL STRIP: NEGATIVE
NRBC BLD AUTO-RTO: 0 /100 WBCS
PH UR STRIP.AUTO: 7.5 [PH] (ref 4.5–8)
PLATELET # BLD AUTO: 325 THOUSANDS/UL (ref 149–390)
PMV BLD AUTO: 9.3 FL (ref 8.9–12.7)
POTASSIUM SERPL-SCNC: 4.4 MMOL/L (ref 3.5–5.3)
PROT SERPL-MCNC: 8.2 G/DL (ref 6.4–8.2)
PROT UR STRIP-MCNC: NEGATIVE MG/DL
RBC # BLD AUTO: 4.56 MILLION/UL (ref 3.81–5.12)
SODIUM SERPL-SCNC: 138 MMOL/L (ref 136–145)
SP GR UR STRIP.AUTO: <=1.005 (ref 1–1.03)
UROBILINOGEN UR QL STRIP.AUTO: 0.2 E.U./DL
WBC # BLD AUTO: 10.92 THOUSAND/UL (ref 4.31–10.16)

## 2018-10-30 PROCEDURE — 99284 EMERGENCY DEPT VISIT MOD MDM: CPT

## 2018-10-30 PROCEDURE — 96374 THER/PROPH/DIAG INJ IV PUSH: CPT

## 2018-10-30 PROCEDURE — 96375 TX/PRO/DX INJ NEW DRUG ADDON: CPT

## 2018-10-30 PROCEDURE — 83690 ASSAY OF LIPASE: CPT | Performed by: EMERGENCY MEDICINE

## 2018-10-30 PROCEDURE — 74177 CT ABD & PELVIS W/CONTRAST: CPT

## 2018-10-30 PROCEDURE — 76830 TRANSVAGINAL US NON-OB: CPT

## 2018-10-30 PROCEDURE — 36415 COLL VENOUS BLD VENIPUNCTURE: CPT | Performed by: EMERGENCY MEDICINE

## 2018-10-30 PROCEDURE — 96361 HYDRATE IV INFUSION ADD-ON: CPT

## 2018-10-30 PROCEDURE — 76856 US EXAM PELVIC COMPLETE: CPT

## 2018-10-30 PROCEDURE — 85025 COMPLETE CBC W/AUTO DIFF WBC: CPT | Performed by: EMERGENCY MEDICINE

## 2018-10-30 PROCEDURE — 80053 COMPREHEN METABOLIC PANEL: CPT | Performed by: EMERGENCY MEDICINE

## 2018-10-30 PROCEDURE — 81025 URINE PREGNANCY TEST: CPT | Performed by: EMERGENCY MEDICINE

## 2018-10-30 PROCEDURE — 81003 URINALYSIS AUTO W/O SCOPE: CPT | Performed by: EMERGENCY MEDICINE

## 2018-10-30 RX ORDER — SACCHAROMYCES BOULARDII 250 MG
250 CAPSULE ORAL 2 TIMES DAILY
Qty: 30 CAPSULE | Refills: 0 | Status: SHIPPED | OUTPATIENT
Start: 2018-10-30 | End: 2018-11-14

## 2018-10-30 RX ORDER — ONDANSETRON 2 MG/ML
4 INJECTION INTRAMUSCULAR; INTRAVENOUS ONCE
Status: COMPLETED | OUTPATIENT
Start: 2018-10-30 | End: 2018-10-30

## 2018-10-30 RX ORDER — MORPHINE SULFATE 4 MG/ML
4 INJECTION, SOLUTION INTRAMUSCULAR; INTRAVENOUS ONCE
Status: COMPLETED | OUTPATIENT
Start: 2018-10-30 | End: 2018-10-30

## 2018-10-30 RX ORDER — FENTANYL CITRATE 50 UG/ML
50 INJECTION, SOLUTION INTRAMUSCULAR; INTRAVENOUS ONCE
Status: DISCONTINUED | OUTPATIENT
Start: 2018-10-30 | End: 2018-10-30 | Stop reason: HOSPADM

## 2018-10-30 RX ORDER — KETOROLAC TROMETHAMINE 30 MG/ML
15 INJECTION, SOLUTION INTRAMUSCULAR; INTRAVENOUS ONCE
Status: COMPLETED | OUTPATIENT
Start: 2018-10-30 | End: 2018-10-30

## 2018-10-30 RX ORDER — ONDANSETRON 4 MG/1
4 TABLET, ORALLY DISINTEGRATING ORAL EVERY 8 HOURS PRN
Qty: 20 TABLET | Refills: 0 | Status: SHIPPED | OUTPATIENT
Start: 2018-10-30 | End: 2018-12-31

## 2018-10-30 RX ORDER — LEVOTHYROXINE SODIUM 0.1 MG/1
100 TABLET ORAL DAILY
COMMUNITY
End: 2018-12-31

## 2018-10-30 RX ADMIN — IOHEXOL 100 ML: 350 INJECTION, SOLUTION INTRAVENOUS at 08:18

## 2018-10-30 RX ADMIN — FAMOTIDINE 20 MG: 10 INJECTION INTRAVENOUS at 09:28

## 2018-10-30 RX ADMIN — ONDANSETRON HYDROCHLORIDE 4 MG: 2 SOLUTION INTRAMUSCULAR; INTRAVENOUS at 07:44

## 2018-10-30 RX ADMIN — MORPHINE SULFATE 4 MG: 4 INJECTION INTRAVENOUS at 09:17

## 2018-10-30 RX ADMIN — SODIUM CHLORIDE 1000 ML: 0.9 INJECTION, SOLUTION INTRAVENOUS at 07:53

## 2018-10-30 RX ADMIN — ONDANSETRON HYDROCHLORIDE 4 MG: 2 SOLUTION INTRAMUSCULAR; INTRAVENOUS at 09:13

## 2018-10-30 RX ADMIN — KETOROLAC TROMETHAMINE 15 MG: 30 INJECTION, SOLUTION INTRAMUSCULAR at 07:49

## 2018-10-30 NOTE — ED PROVIDER NOTES
History  Chief Complaint   Patient presents with    Abdominal Pain     right side abdominal pain, started sunday, vomited , no fever     76-year-old female presents with 2 day history of right lower quadrant abdominal pain  Has been accompanied by nausea vomiting (approx 6 times)  and diarrhea she is denying any history of fever  Pain is sharp will radiate directly through to the back  She is currently nauseated  She denies any falls or trauma  No history of dysuria or increased urinary frequency or urgency no gross hematuria the patient has had a prior history of ovarian cyst but this feels different; No vag bleeding or d/c  Anorexic ; no prior history of kidney stones; she has not yet taken anything for pain today  Last menstrual period was 10/5/2018  No prior abdominal surgeries she has had 2 spontaneous vaginal deliveries  Prior to Admission Medications   Prescriptions Last Dose Informant Patient Reported? Taking?   levothyroxine 100 mcg tablet 10/30/2018 at Unknown time  Yes Yes   Sig: Take 100 mcg by mouth daily   predniSONE 50 mg tablet Not Taking at Unknown time  No No   Sig: Take 1 tablet (50 mg total) by mouth daily   Patient not taking: Reported on 10/30/2018       Facility-Administered Medications: None       Past Medical History:   Diagnosis Date    Depression     Disease of thyroid gland     Hypertension        Past Surgical History:   Procedure Laterality Date    DILATION AND CURETTAGE OF UTERUS         Family History   Problem Relation Age of Onset    Cancer Mother     Hypertension Mother     Cancer Father     Hypertension Father     Diabetes Father     Cancer Sister      I have reviewed and agree with the history as documented      Social History   Substance Use Topics    Smoking status: Current Every Day Smoker     Packs/day: 0 25    Smokeless tobacco: Never Used    Alcohol use No        Review of Systems   Constitutional: Positive for activity change and appetite change  Negative for chills and fever  HENT: Negative for congestion, ear pain, rhinorrhea, sneezing and sore throat  Eyes: Negative for discharge  Respiratory: Negative for cough and shortness of breath  Cardiovascular: Negative for chest pain and leg swelling  Gastrointestinal: Positive for abdominal pain, diarrhea, nausea and vomiting  Negative for blood in stool  Endocrine: Negative for polyuria  Genitourinary: Negative for difficulty urinating, dysuria, flank pain, frequency and urgency  Musculoskeletal: Positive for back pain (radiates from RLQ)  Negative for myalgias  Skin: Negative for rash  Neurological: Negative for dizziness, weakness, light-headedness and numbness  Hematological: Negative for adenopathy  Psychiatric/Behavioral: Negative for confusion  All other systems reviewed and are negative  Physical Exam  Physical Exam   Constitutional: She is oriented to person, place, and time  She appears well-developed  No distress  HENT:   Head: Normocephalic and atraumatic  Right Ear: External ear normal    Left Ear: External ear normal    Nose: Nose normal    Mouth/Throat: Oropharynx is clear and moist  No oropharyngeal exudate  Eyes: Pupils are equal, round, and reactive to light  Conjunctivae and EOM are normal  Right eye exhibits no discharge  Left eye exhibits no discharge  Neck: Normal range of motion  Neck supple  No midline or paraspinous tenderness   Cardiovascular: Normal rate, regular rhythm, normal heart sounds and intact distal pulses  Pulmonary/Chest: Effort normal and breath sounds normal  No respiratory distress  Abdominal: Soft  Bowel sounds are normal  She exhibits no distension and no mass  There is tenderness (RLQ overlying McBurney's point)  There is guarding  There is no rebound     Back no midline or CVA tenderness   Genitourinary:   Genitourinary Comments: Bimanual exam chaperoned by Anali Ford RN no adnexal tenderness or masses bilaterally no cervical motion tenderness  Musculoskeletal: Normal range of motion  She exhibits no edema, tenderness or deformity  Lymphadenopathy:     She has no cervical adenopathy  Neurological: She is alert and oriented to person, place, and time  No cranial nerve deficit or sensory deficit  She exhibits normal muscle tone  Coordination normal    Gait steady   Skin: Skin is warm and dry  Capillary refill takes less than 2 seconds  She is not diaphoretic  Psychiatric: She has a normal mood and affect  Vitals reviewed        Vital Signs  ED Triage Vitals   Temperature Pulse Respirations Blood Pressure SpO2   10/30/18 0713 10/30/18 0713 10/30/18 0713 10/30/18 0713 10/30/18 0713   98 2 °F (36 8 °C) 66 18 147/91 99 %      Temp Source Heart Rate Source Patient Position - Orthostatic VS BP Location FiO2 (%)   10/30/18 0713 10/30/18 0930 10/30/18 0713 10/30/18 0713 --   Temporal Monitor Lying Right arm       Pain Score       10/30/18 0713       9           Vitals:    10/30/18 0930 10/30/18 0933 10/30/18 1132 10/30/18 1240   BP: 144/94 144/94 122/73 138/80   Pulse: 64 60 55 56   Patient Position - Orthostatic VS:  Lying Lying Lying       Visual Acuity      ED Medications  Medications   sodium chloride 0 9 % bolus 1,000 mL (0 mL Intravenous Stopped 10/30/18 1028)   ondansetron (ZOFRAN) injection 4 mg (4 mg Intravenous Given 10/30/18 0744)   ketorolac (TORADOL) injection 15 mg (15 mg Intravenous Given 10/30/18 0749)   iohexol (OMNIPAQUE) 350 MG/ML injection (MULTI-DOSE) 100 mL (100 mL Intravenous Given 10/30/18 0818)   morphine (PF) 4 mg/mL injection 4 mg (4 mg Intravenous Given 10/30/18 0917)   ondansetron (ZOFRAN) injection 4 mg (4 mg Intravenous Given 10/30/18 0913)   famotidine (PEPCID) injection 20 mg (20 mg Intravenous Given 10/30/18 8744)       Diagnostic Studies  Results Reviewed     Procedure Component Value Units Date/Time    UA w Reflex to Microscopic w Reflex to Culture [[id number]] Collected:  10/30/18 0801    Lab Status:  Final result Specimen:  Urine from Urine, Clean Catch Updated:  10/30/18 0809     Color, UA Yellow     Clarity, UA Clear     Specific Gravity, UA <=1 005     pH, UA 7 5     Leukocytes, UA Negative     Nitrite, UA Negative     Protein, UA Negative mg/dl      Glucose, UA Negative mg/dl      Ketones, UA Negative mg/dl      Urobilinogen, UA 0 2 E U /dl      Bilirubin, UA Negative     Blood, UA Negative    Comprehensive metabolic panel [[id number]] Collected:  10/30/18 0738    Lab Status:  Final result Specimen:  Blood from Arm, Right Updated:  10/30/18 0800     Sodium 138 mmol/L      Potassium 4 4 mmol/L      Chloride 101 mmol/L      CO2 27 mmol/L      ANION GAP 10 mmol/L      BUN 6 mg/dL      Creatinine 0 64 mg/dL      Glucose 95 mg/dL      Calcium 9 1 mg/dL      AST 18 U/L      ALT 20 U/L      Alkaline Phosphatase 61 U/L      Total Protein 8 2 g/dL      Albumin 4 5 g/dL      Total Bilirubin 0 60 mg/dL      eGFR 127 ml/min/1 73sq m     Narrative:         National Kidney Disease Education Program recommendations are as follows:  GFR calculation is accurate only with a steady state creatinine  Chronic Kidney disease less than 60 ml/min/1 73 sq  meters  Kidney failure less than 15 ml/min/1 73 sq  meters      Lipase [[id number]]  (Normal) Collected:  10/30/18 0738    Lab Status:  Final result Specimen:  Blood from Arm, Right Updated:  10/30/18 0800     Lipase 79 u/L     CBC and differential [[id number]]  (Abnormal) Collected:  10/30/18 0738    Lab Status:  Final result Specimen:  Blood from Arm, Right Updated:  10/30/18 0745     WBC 10 92 (H) Thousand/uL      RBC 4 56 Million/uL      Hemoglobin 14 8 g/dL      Hematocrit 43 0 %      MCV 94 fL      MCH 32 5 pg      MCHC 34 4 g/dL      RDW 12 0 %      MPV 9 3 fL      Platelets 156 Thousands/uL      nRBC 0 /100 WBCs      Neutrophils Relative 72 %      Immat GRANS % 0 %      Lymphocytes Relative 18 %      Monocytes Relative 8 %      Eosinophils Relative 2 %      Basophils Relative 0 %      Neutrophils Absolute 7 79 (H) Thousands/µL      Immature Grans Absolute 0 03 Thousand/uL      Lymphocytes Absolute 1 96 Thousands/µL      Monocytes Absolute 0 85 Thousand/µL      Eosinophils Absolute 0 25 Thousand/µL      Basophils Absolute 0 04 Thousands/µL     POCT pregnancy, urine [[id number]]  (Normal) Resulted:  10/30/18 0729    Lab Status:  Final result Updated:  10/30/18 0729     EXT PREG TEST UR (Ref: Negative) negative                 US pelvis complete w transvaginal   Final Result by AROLDO Brown MD (10/30 1102)       Normal study  Negative for ovarian torsion or free fluid detection  Workstation performed: [id number]         CT abdomen pelvis with contrast   Final Result by Hazel Wetzel MD (10/30 5785)      No acute intra-abdominal abnormality  Normal appendix  Workstation performed: [id number]                    Procedures  Procedures       Phone Contacts  ED Phone Contact    ED Course  ED Course as of Oct 30 1946   Tue Oct 30, 2018   1234 Pain improved patient tolerating p o  Fluids reviewed exact cause of the patient's right lower quadrant pain not determined no evidence of ovarian cyst torsion or appendicitis    No significant evidence of colitis on CT patient relates that a co-worker also had abdominal pain nausea vomiting and diarrhea will initiate symptomatic cares with new or worsening symptoms to return for evaluation patient is comfortable with plan abdm repalp soft NT no rebound guarding or masses                                MDM  Number of Diagnoses or Management Options  Diagnosis management comments: Mdm: appy, ovarian cyst, colitis, less likely kidney stone, diverticulitis    CritCare Time    Disposition  Final diagnoses:   RLQ abdominal pain   Vomiting   H/O diarrhea     Time reflects when diagnosis was documented in both MDM as applicable and the Disposition within this note     Time User Action Codes Description Comment 10/30/2018 12:37 PM Rip Fair Add [R10 31] RLQ abdominal pain     10/30/2018 12:37 PM Rip Fair Add [R11 10] Vomiting     10/30/2018 12:39 PM Rip Fair Add [M55 375] H/O diarrhea       ED Disposition     ED Disposition Condition Comment    Discharge  Shilpa Beil discharge to home/self care  Condition at discharge: Good        Follow-up Information     Follow up With Specialties Details Why Contact Info    Audrey Joseph PA-C Physician Assistant  if not improved in 2 days [address] [place] [address]  [phone]            Discharge Medication List as of 10/30/2018 12:42 PM      START taking these medications    Details   ondansetron (ZOFRAN-ODT) 4 mg disintegrating tablet Take 1 tablet (4 mg total) by mouth every 8 (eight) hours as needed for nausea or vomiting for up to 20 doses, Starting Tue 10/30/2018, Print      saccharomyces boulardii (FLORASTOR) 250 mg capsule Take 1 capsule (250 mg total) by mouth 2 (two) times a day for 30 doses, Starting Tue 10/30/2018, Until Wed 11/14/2018, Print         CONTINUE these medications which have NOT CHANGED    Details   levothyroxine 100 mcg tablet Take 100 mcg by mouth daily, Historical Med      predniSONE 50 mg tablet Take 1 tablet (50 mg total) by mouth daily, Starting Fri 2/2/2018, Print           No discharge procedures on file      ED Provider  Electronically Signed by           Trina Tomlin MD  10/30/18 3429

## 2018-10-30 NOTE — DISCHARGE INSTRUCTIONS
Abdominal Pain   WHAT YOU NEED TO KNOW:   Abdominal pain can be dull, achy, or sharp  You may have pain in one area of your abdomen, or in your entire abdomen  Your pain may be caused by a condition such as constipation, food sensitivity or poisoning, infection, or a blockage  Abdominal pain can also be from a hernia, appendicitis, or an ulcer  Liver, gallbladder, or kidney conditions can also cause abdominal pain  The cause of your abdominal pain may be unknown  DISCHARGE INSTRUCTIONS:   Return to the emergency department if:   · You have new chest pain or shortness of breath  · You have pulsing pain in your upper abdomen or lower back that suddenly becomes constant  · Your pain is in the right lower abdominal area and worsens with movement  · You have a fever over 100 4°F (38°C) or shaking chills  · You are vomiting and cannot keep food or liquids down  · Your pain does not improve or gets worse over the next 8 to 12 hours  · You see blood in your vomit or bowel movements, or they look black and tarry  · Your skin or the whites of your eyes turn yellow  · You are a woman and have a large amount of vaginal bleeding that is not your monthly period  Contact your healthcare provider if:   · You have pain in your lower back  · You are a man and have pain in your testicles  · You have pain when you urinate  · You have questions or concerns about your condition or care  Follow up with your healthcare provider within 24 hours or as directed:  Write down your questions so you remember to ask them during your visits  Medicines:   · Medicines  may be given to calm your stomach and prevent vomiting or to decrease pain  Ask how to take pain medicine safely  · Take your medicine as directed  Contact your healthcare provider if you think your medicine is not helping or if you have side effects  Tell him of her if you are allergic to any medicine   Keep a list of the medicines, vitamins, and herbs you take  Include the amounts, and when and why you take them  Bring the list or the pill bottles to follow-up visits  Carry your medicine list with you in case of an emergency  © 2017 [employer] Information is for End User's use only and may not be sold, redistributed or otherwise used for commercial purposes  All illustrations and images included in CareNotes® are the copyrighted property of [employer]  or [employer]  The above information is an  only  It is not intended as medical advice for individual conditions or treatments  Talk to your doctor, nurse or pharmacist before following any medical regimen to see if it is safe and effective for you  Acute Nausea and Vomiting, Ambulatory Care   GENERAL INFORMATION:   Acute nausea and vomiting  starts suddenly, gets worse quickly, and lasts a short time  Nausea and vomiting may be caused by pregnancy, alcohol, infection, or medicines  Common related symptoms include the following:   · Fever    · Abdominal swelling    · Pain, tenderness, or a lump in the abdomen    · Splashing sounds heard in your stomach when you move  Seek immediate care for the following symptoms:   · Blood in your vomit or bowel movements    · Sudden, severe pain in your chest and upper abdomen after hard vomiting    · Dizziness, dry mouth, and thirst    · Urinating very little or not at all    · Muscle weakness, leg cramps, and trouble breathing    · A heart beat that is faster than normal    · Vomiting for more than 48 hours  Treatment for acute nausea and vomiting  may include medicines to calm your stomach and stop the vomiting  You may need IV fluids if you are dehydrated  Manage your nausea and vomiting:   · Drink liquids as directed to prevent dehydration  Ask how much liquid to drink each day and which liquids are best for you  You may need to drink an oral rehydration solution (ORS)   ORS contains water, salts, and sugar that are needed to replace the lost body fluids  Ask what kind of ORS to use, how much to drink, and where to get it  · Eat smaller meals, more often  Eat small amounts of food every 2 to 3 hours, even if you are not hungry  Food in your stomach may help decrease your nausea  · Avoid stress  Find ways to relax and manage your stress  Headaches due to stress may cause nausea and vomiting  Get more rest and sleep  Follow up with your healthcare provider as directed:  Write down your questions so you remember to ask them during your visits  CARE AGREEMENT:   You have the right to help plan your care  Learn about your health condition and how it may be treated  Discuss treatment options with your caregivers to decide what care you want to receive  You always have the right to refuse treatment  The above information is an  only  It is not intended as medical advice for individual conditions or treatments  Talk to your doctor, nurse or pharmacist before following any medical regimen to see if it is safe and effective for you  © 2014 [address] is for End User's use only and may not be sold, redistributed or otherwise used for commercial purposes  All illustrations and images included in CareNotes® are the copyrighted property of [employer]  or [employer]  Acute Diarrhea   AMBULATORY CARE:   Acute diarrhea  starts quickly and lasts a short time, usually 1 to 3 days  It can last up to 2 weeks  Signs and symptoms that may happen with diarrhea:  You may have 3 or more episodes of diarrhea  It may be hard to control your diarrhea  You may also have any of the following:  · Fever and chills    · Headache or abdominal pain    · Nausea and vomiting    · Symptoms of dehydration such as thirst, decreased urination, dry skin, sunken eyes, or fast, pounding heartbeat  Seek care immediately if:   · You feel confused       · Your heartbeat is faster than normal      · Your eyes look deeply sunken, or you have no tears when you cry  · You urinate less than usual, or your urine is dark yellow  · You have blood or mucus in your stools  · You have severe abdominal pain  · You are unable to drink any liquids  Contact your healthcare provider if:  · Your symptoms do not get better with treatment  · You have a fever higher than 101 3°F (38 5°C)  · You have trouble eating and drinking because you are vomiting  · You are thirsty or have a dry mouth  · Your diarrhea does not get better in 7 days  · You have questions or concerns about your condition or care  Treatment for acute diarrhea  may include medicines to slow or stop your diarrhea  You may also need medicine to treat an infection  Self-care:   · Drink liquids as directed  Liquids will help prevent dehydration caused by diarrhea  Ask your healthcare provider how much liquid to drink each day and which liquids are best for you  You may need to drink an oral rehydration solution (ORS)  An ORS has the right amounts of water, salts, and sugar you need to replace body fluids  You can buy an ORS at most grocery stores and pharmacies  · Eat foods that are easy to digest   Examples include rice, lentils, cereal, bananas, potatoes, and bread  It also includes some fruits (bananas, melon), well-cooked vegetables, and lean meats  Avoid foods high in fiber, fat, and sugar  Also avoid caffeine, alcohol, dairy, and red meat until your diarrhea is gone  Prevent diarrhea:   · Wash your hands often  Use soap and water  Wash your hands before you eat or prepare food  Also wash your hands after you use the bathroom  Use an alcohol-based hand gel when soap and water are not available  · Keep bathroom surfaces clean  This helps prevent the spread of germs that cause acute diarrhea  · Wash fruits and vegetables well before you eat them    This can help remove germs that cause diarrhea  If possible, remove the skin from fruits and vegetables, or cook them well before you eat them  · Cook meat as directed  ¨ Cook ground meat  to 160°F      ¨ Cook ground poultry, whole poultry, or cuts of poultry  to at least 165°F  Remove the meat from heat  Let it stand for 3 minutes before you eat it  ¨ Cook whole cuts of meat other than poultry  to at least 145°F  Remove the meat from heat  Let it stand for 3 minutes before you eat it  · Wash dishes that have touched raw meat with hot water and soap  This includes cutting boards, utensils, dishes, and serving containers  · Place raw or cooked meat in the refrigerator as soon as possible  Bacteria can grow in meat that is left at room temperature too long  · Do not eat raw or undercooked oysters, clams, or mussels  These foods may be contaminated and cause infection  · Drink filtered or treated water only when you travel  Do not put ice in your drinks  Drink bottled water whenever possible  Follow up with your healthcare provider as directed:  Write down your questions so you remember to ask them during your visits  © 2017 [employer]  Information is for End User's use only and may not be sold, redistributed or otherwise used for commercial purposes  All illustrations and images included in CareNotes® are the copyrighted property of [employer]  or [employer]  The above information is an  only  It is not intended as medical advice for individual conditions or treatments  Talk to your doctor, nurse or pharmacist before following any medical regimen to see if it is safe and effective for you  zofran as needed for nausea  Drink 2 to 3 liters of fliuds daily - water, diluted apple juice, sports drinks,   Bannas rice applesauce toast initially then once stomach feels better/nausea improved can advance as tolerated  Avoid high fiber, raw veggies, corn, peas for 1 week  (harder for colon to digest)  Tylenol 650mg every 6 hours as needed for pain, fever (max 3000mg in 24 hours)  Aleve 2 tabs twice daily with food OR ibuprofen 200-800mg every 8 hours with food as needed for pain  Famotidine (pepcid) 20mg twice daily OR ranitidine (Zantac) 150mg twice daily to cut stomach acid  Take pro-biotic over the counter, or acidophilus tablet (in vitamin aisle) , or fill prescription for florastor shorten course of diarrhea    Avoid immodium or lomotil - recommend pro-biotics instead

## 2018-12-31 ENCOUNTER — OFFICE VISIT (OUTPATIENT)
Dept: FAMILY MEDICINE CLINIC | Facility: CLINIC | Age: 23
End: 2018-12-31
Payer: COMMERCIAL

## 2018-12-31 VITALS
HEART RATE: 85 BPM | DIASTOLIC BLOOD PRESSURE: 82 MMHG | WEIGHT: 153 LBS | BODY MASS INDEX: 22.66 KG/M2 | HEIGHT: 69 IN | RESPIRATION RATE: 16 BRPM | SYSTOLIC BLOOD PRESSURE: 116 MMHG | OXYGEN SATURATION: 98 % | TEMPERATURE: 97.6 F

## 2018-12-31 DIAGNOSIS — E03.9 HYPOTHYROIDISM, UNSPECIFIED TYPE: ICD-10-CM

## 2018-12-31 DIAGNOSIS — J01.00 ACUTE NON-RECURRENT MAXILLARY SINUSITIS: Primary | ICD-10-CM

## 2018-12-31 DIAGNOSIS — F17.200 TOBACCO USE DISORDER: ICD-10-CM

## 2018-12-31 LAB
ALBUMIN SERPL BCP-MCNC: 4.2 G/DL (ref 3.5–5)
ALP SERPL-CCNC: 61 U/L (ref 46–116)
ALT SERPL W P-5'-P-CCNC: 20 U/L (ref 12–78)
ANION GAP SERPL CALCULATED.3IONS-SCNC: 7 MMOL/L (ref 4–13)
AST SERPL W P-5'-P-CCNC: 16 U/L (ref 5–45)
BASOPHILS # BLD AUTO: 0.06 THOUSANDS/ΜL (ref 0–0.1)
BASOPHILS NFR BLD AUTO: 1 % (ref 0–1)
BILIRUB SERPL-MCNC: 0.53 MG/DL (ref 0.2–1)
BILIRUB UR QL STRIP: NEGATIVE
BUN SERPL-MCNC: 11 MG/DL (ref 5–25)
CALCIUM SERPL-MCNC: 9.1 MG/DL (ref 8.3–10.1)
CHLORIDE SERPL-SCNC: 107 MMOL/L (ref 100–108)
CHOLEST SERPL-MCNC: 215 MG/DL (ref 50–200)
CLARITY UR: NORMAL
CO2 SERPL-SCNC: 23 MMOL/L (ref 21–32)
COLOR UR: YELLOW
CREAT SERPL-MCNC: 0.64 MG/DL (ref 0.6–1.3)
EOSINOPHIL # BLD AUTO: 0.33 THOUSAND/ΜL (ref 0–0.61)
EOSINOPHIL NFR BLD AUTO: 3 % (ref 0–6)
ERYTHROCYTE [DISTWIDTH] IN BLOOD BY AUTOMATED COUNT: 12.1 % (ref 11.6–15.1)
GFR SERPL CREATININE-BSD FRML MDRD: 127 ML/MIN/1.73SQ M
GLUCOSE P FAST SERPL-MCNC: 67 MG/DL (ref 65–99)
GLUCOSE UR STRIP-MCNC: NEGATIVE MG/DL
HCT VFR BLD AUTO: 42.3 % (ref 34.8–46.1)
HDLC SERPL-MCNC: 42 MG/DL (ref 40–60)
HGB BLD-MCNC: 14.6 G/DL (ref 11.5–15.4)
HGB UR QL STRIP.AUTO: NEGATIVE
IMM GRANULOCYTES # BLD AUTO: 0.03 THOUSAND/UL (ref 0–0.2)
IMM GRANULOCYTES NFR BLD AUTO: 0 % (ref 0–2)
KETONES UR STRIP-MCNC: NEGATIVE MG/DL
LDLC SERPL CALC-MCNC: 138 MG/DL (ref 0–100)
LEUKOCYTE ESTERASE UR QL STRIP: NEGATIVE
LYMPHOCYTES # BLD AUTO: 2.35 THOUSANDS/ΜL (ref 0.6–4.47)
LYMPHOCYTES NFR BLD AUTO: 24 % (ref 14–44)
MCH RBC QN AUTO: 33 PG (ref 26.8–34.3)
MCHC RBC AUTO-ENTMCNC: 34.5 G/DL (ref 31.4–37.4)
MCV RBC AUTO: 96 FL (ref 82–98)
MONOCYTES # BLD AUTO: 0.88 THOUSAND/ΜL (ref 0.17–1.22)
MONOCYTES NFR BLD AUTO: 9 % (ref 4–12)
NEUTROPHILS # BLD AUTO: 6.23 THOUSANDS/ΜL (ref 1.85–7.62)
NEUTS SEG NFR BLD AUTO: 63 % (ref 43–75)
NITRITE UR QL STRIP: NEGATIVE
NRBC BLD AUTO-RTO: 0 /100 WBCS
PH UR STRIP.AUTO: 7 [PH] (ref 4.5–8)
PLATELET # BLD AUTO: 338 THOUSANDS/UL (ref 149–390)
PMV BLD AUTO: 9.9 FL (ref 8.9–12.7)
POTASSIUM SERPL-SCNC: 5.8 MMOL/L (ref 3.5–5.3)
PROT SERPL-MCNC: 7.7 G/DL (ref 6.4–8.2)
PROT UR STRIP-MCNC: NEGATIVE MG/DL
RBC # BLD AUTO: 4.42 MILLION/UL (ref 3.81–5.12)
SODIUM SERPL-SCNC: 137 MMOL/L (ref 136–145)
SP GR UR STRIP.AUTO: 1.02 (ref 1–1.03)
T4 FREE SERPL-MCNC: 0.82 NG/DL (ref 0.76–1.46)
TRIGL SERPL-MCNC: 173 MG/DL
TSH SERPL DL<=0.05 MIU/L-ACNC: 4.29 UIU/ML (ref 0.36–3.74)
UROBILINOGEN UR QL STRIP.AUTO: 0.2 E.U./DL
WBC # BLD AUTO: 9.88 THOUSAND/UL (ref 4.31–10.16)

## 2018-12-31 PROCEDURE — 85025 COMPLETE CBC W/AUTO DIFF WBC: CPT | Performed by: FAMILY MEDICINE

## 2018-12-31 PROCEDURE — 81003 URINALYSIS AUTO W/O SCOPE: CPT | Performed by: FAMILY MEDICINE

## 2018-12-31 PROCEDURE — 84439 ASSAY OF FREE THYROXINE: CPT

## 2018-12-31 PROCEDURE — T1015 CLINIC SERVICE: HCPCS | Performed by: FAMILY MEDICINE

## 2018-12-31 PROCEDURE — 84443 ASSAY THYROID STIM HORMONE: CPT | Performed by: FAMILY MEDICINE

## 2018-12-31 PROCEDURE — 80053 COMPREHEN METABOLIC PANEL: CPT | Performed by: FAMILY MEDICINE

## 2018-12-31 PROCEDURE — 80061 LIPID PANEL: CPT | Performed by: FAMILY MEDICINE

## 2018-12-31 RX ORDER — VARENICLINE TARTRATE 25 MG
KIT ORAL
Qty: 53 TABLET | Refills: 0 | Status: SHIPPED | OUTPATIENT
Start: 2018-12-31 | End: 2019-09-19 | Stop reason: SDUPTHER

## 2018-12-31 RX ORDER — FLUTICASONE PROPIONATE 50 MCG
1 SPRAY, SUSPENSION (ML) NASAL 2 TIMES DAILY
Qty: 16 G | Refills: 0 | Status: SHIPPED | OUTPATIENT
Start: 2018-12-31 | End: 2020-09-02

## 2018-12-31 RX ORDER — AMOXICILLIN 875 MG/1
875 TABLET, COATED ORAL 2 TIMES DAILY
Qty: 20 TABLET | Refills: 0 | Status: SHIPPED | OUTPATIENT
Start: 2018-12-31 | End: 2019-01-10
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
## 2019-01-02 DIAGNOSIS — E03.9 HYPOTHYROIDISM, UNSPECIFIED TYPE: Primary | ICD-10-CM

## 2019-01-02 DIAGNOSIS — E87.5 HYPERKALEMIA: ICD-10-CM

## 2019-01-02 PROBLEM — F17.200 TOBACCO USE DISORDER: Status: ACTIVE | Noted: 2019-01-02

## 2019-01-02 PROBLEM — E78.5 BORDERLINE HYPERLIPIDEMIA: Status: ACTIVE | Noted: 2019-01-02

## 2019-01-02 RX ORDER — LEVOTHYROXINE SODIUM 0.03 MG/1
25 TABLET ORAL
Qty: 30 TABLET | Refills: 1 | Status: SHIPPED | OUTPATIENT
Start: 2019-01-02 | End: 2019-05-28 | Stop reason: SDUPTHER

## 2019-05-24 ENCOUNTER — TRANSCRIBE ORDERS (OUTPATIENT)
Dept: ADMINISTRATIVE | Facility: HOSPITAL | Age: 24
End: 2019-05-24

## 2019-05-24 ENCOUNTER — APPOINTMENT (OUTPATIENT)
Dept: LAB | Facility: MEDICAL CENTER | Age: 24
End: 2019-05-24
Payer: COMMERCIAL

## 2019-05-24 DIAGNOSIS — N92.6 MENSES, IRREGULAR: ICD-10-CM

## 2019-05-24 DIAGNOSIS — N92.6 MENSES, IRREGULAR: Primary | ICD-10-CM

## 2019-05-24 DIAGNOSIS — E03.9 HYPOTHYROIDISM, UNSPECIFIED TYPE: ICD-10-CM

## 2019-05-24 DIAGNOSIS — E87.5 HYPERKALEMIA: ICD-10-CM

## 2019-05-24 LAB
ALBUMIN SERPL BCP-MCNC: 4.8 G/DL (ref 3.5–5)
ALP SERPL-CCNC: 66 U/L (ref 46–116)
ALT SERPL W P-5'-P-CCNC: 21 U/L (ref 12–78)
ANION GAP SERPL CALCULATED.3IONS-SCNC: 5 MMOL/L (ref 4–13)
APTT PPP: 32 SECONDS (ref 26–38)
AST SERPL W P-5'-P-CCNC: 18 U/L (ref 5–45)
BASOPHILS # BLD AUTO: 0.07 THOUSANDS/ΜL (ref 0–0.1)
BASOPHILS NFR BLD AUTO: 1 % (ref 0–1)
BILIRUB SERPL-MCNC: 0.23 MG/DL (ref 0.2–1)
BILIRUB UR QL STRIP: NEGATIVE
BUN SERPL-MCNC: 16 MG/DL (ref 5–25)
CALCIUM SERPL-MCNC: 9.3 MG/DL (ref 8.3–10.1)
CHLORIDE SERPL-SCNC: 104 MMOL/L (ref 100–108)
CHOLEST SERPL-MCNC: 219 MG/DL (ref 50–200)
CLARITY UR: CLEAR
CO2 SERPL-SCNC: 25 MMOL/L (ref 21–32)
COLOR UR: YELLOW
CREAT SERPL-MCNC: 0.92 MG/DL (ref 0.6–1.3)
EOSINOPHIL # BLD AUTO: 0.25 THOUSAND/ΜL (ref 0–0.61)
EOSINOPHIL NFR BLD AUTO: 2 % (ref 0–6)
ERYTHROCYTE [DISTWIDTH] IN BLOOD BY AUTOMATED COUNT: 11.9 % (ref 11.6–15.1)
FSH SERPL-ACNC: 6.9 MIU/ML
GFR SERPL CREATININE-BSD FRML MDRD: 88 ML/MIN/1.73SQ M
GLUCOSE SERPL-MCNC: 84 MG/DL (ref 65–140)
GLUCOSE UR STRIP-MCNC: NEGATIVE MG/DL
HCT VFR BLD AUTO: 40.9 % (ref 34.8–46.1)
HDLC SERPL-MCNC: 38 MG/DL (ref 40–60)
HGB BLD-MCNC: 14 G/DL (ref 11.5–15.4)
HGB UR QL STRIP.AUTO: NEGATIVE
IMM GRANULOCYTES # BLD AUTO: 0.04 THOUSAND/UL (ref 0–0.2)
IMM GRANULOCYTES NFR BLD AUTO: 0 % (ref 0–2)
INR PPP: 0.96 (ref 0.86–1.17)
KETONES UR STRIP-MCNC: NEGATIVE MG/DL
LDLC SERPL CALC-MCNC: 136 MG/DL (ref 0–100)
LEUKOCYTE ESTERASE UR QL STRIP: NEGATIVE
LYMPHOCYTES # BLD AUTO: 3.55 THOUSANDS/ΜL (ref 0.6–4.47)
LYMPHOCYTES NFR BLD AUTO: 29 % (ref 14–44)
MCH RBC QN AUTO: 32.3 PG (ref 26.8–34.3)
MCHC RBC AUTO-ENTMCNC: 34.2 G/DL (ref 31.4–37.4)
MCV RBC AUTO: 94 FL (ref 82–98)
MONOCYTES # BLD AUTO: 0.8 THOUSAND/ΜL (ref 0.17–1.22)
MONOCYTES NFR BLD AUTO: 6 % (ref 4–12)
NEUTROPHILS # BLD AUTO: 7.7 THOUSANDS/ΜL (ref 1.85–7.62)
NEUTS SEG NFR BLD AUTO: 62 % (ref 43–75)
NITRITE UR QL STRIP: NEGATIVE
NRBC BLD AUTO-RTO: 0 /100 WBCS
PH UR STRIP.AUTO: 6.5 [PH]
PLATELET # BLD AUTO: 355 THOUSANDS/UL (ref 149–390)
PMV BLD AUTO: 10 FL (ref 8.9–12.7)
POTASSIUM SERPL-SCNC: 3.8 MMOL/L (ref 3.5–5.3)
PROLACTIN SERPL-MCNC: 7.2 NG/ML
PROT SERPL-MCNC: 8.7 G/DL (ref 6.4–8.2)
PROT UR STRIP-MCNC: NEGATIVE MG/DL
PROTHROMBIN TIME: 12.9 SECONDS (ref 11.8–14.2)
RBC # BLD AUTO: 4.34 MILLION/UL (ref 3.81–5.12)
SODIUM SERPL-SCNC: 134 MMOL/L (ref 136–145)
SP GR UR STRIP.AUTO: 1.01 (ref 1–1.03)
T4 FREE SERPL-MCNC: 0.99 NG/DL (ref 0.76–1.46)
TRIGL SERPL-MCNC: 227 MG/DL
TSH SERPL DL<=0.05 MIU/L-ACNC: 4.83 UIU/ML (ref 0.36–3.74)
UROBILINOGEN UR QL STRIP.AUTO: 0.2 E.U./DL
WBC # BLD AUTO: 12.41 THOUSAND/UL (ref 4.31–10.16)

## 2019-05-24 PROCEDURE — 85025 COMPLETE CBC W/AUTO DIFF WBC: CPT

## 2019-05-24 PROCEDURE — 81003 URINALYSIS AUTO W/O SCOPE: CPT | Performed by: FAMILY MEDICINE

## 2019-05-24 PROCEDURE — 84146 ASSAY OF PROLACTIN: CPT

## 2019-05-24 PROCEDURE — 85730 THROMBOPLASTIN TIME PARTIAL: CPT

## 2019-05-24 PROCEDURE — 80061 LIPID PANEL: CPT

## 2019-05-24 PROCEDURE — 84439 ASSAY OF FREE THYROXINE: CPT

## 2019-05-24 PROCEDURE — 83001 ASSAY OF GONADOTROPIN (FSH): CPT

## 2019-05-24 PROCEDURE — 36415 COLL VENOUS BLD VENIPUNCTURE: CPT

## 2019-05-24 PROCEDURE — 84443 ASSAY THYROID STIM HORMONE: CPT

## 2019-05-24 PROCEDURE — 80053 COMPREHEN METABOLIC PANEL: CPT

## 2019-05-24 PROCEDURE — 85610 PROTHROMBIN TIME: CPT

## 2019-05-28 ENCOUNTER — TELEPHONE (OUTPATIENT)
Dept: FAMILY MEDICINE CLINIC | Facility: CLINIC | Age: 24
End: 2019-05-28

## 2019-05-28 DIAGNOSIS — E03.9 HYPOTHYROIDISM, UNSPECIFIED TYPE: ICD-10-CM

## 2019-05-28 RX ORDER — LEVOTHYROXINE SODIUM 0.05 MG/1
50 TABLET ORAL
Qty: 30 TABLET | Refills: 1 | Status: SHIPPED | OUTPATIENT
Start: 2019-05-28 | End: 2019-06-14 | Stop reason: SDUPTHER

## 2019-06-14 ENCOUNTER — TELEPHONE (OUTPATIENT)
Dept: FAMILY MEDICINE CLINIC | Facility: CLINIC | Age: 24
End: 2019-06-14

## 2019-06-14 DIAGNOSIS — E03.9 HYPOTHYROIDISM, UNSPECIFIED TYPE: ICD-10-CM

## 2019-06-14 RX ORDER — LEVOTHYROXINE SODIUM 0.05 MG/1
50 TABLET ORAL
Qty: 30 TABLET | Refills: 1 | Status: SHIPPED | OUTPATIENT
Start: 2019-06-14 | End: 2019-07-15 | Stop reason: SDUPTHER

## 2019-07-15 ENCOUNTER — OFFICE VISIT (OUTPATIENT)
Dept: FAMILY MEDICINE CLINIC | Facility: CLINIC | Age: 24
End: 2019-07-15
Payer: COMMERCIAL

## 2019-07-15 VITALS
DIASTOLIC BLOOD PRESSURE: 70 MMHG | OXYGEN SATURATION: 98 % | WEIGHT: 165 LBS | TEMPERATURE: 97.9 F | SYSTOLIC BLOOD PRESSURE: 118 MMHG | BODY MASS INDEX: 24.44 KG/M2 | HEART RATE: 78 BPM | RESPIRATION RATE: 18 BRPM | HEIGHT: 69 IN

## 2019-07-15 DIAGNOSIS — E03.9 HYPOTHYROIDISM, UNSPECIFIED TYPE: Primary | ICD-10-CM

## 2019-07-15 PROCEDURE — T1015 CLINIC SERVICE: HCPCS | Performed by: FAMILY MEDICINE

## 2019-07-15 RX ORDER — LEVOTHYROXINE SODIUM 0.07 MG/1
75 TABLET ORAL
Qty: 90 TABLET | Refills: 1 | Status: SHIPPED | OUTPATIENT
Start: 2019-07-15 | End: 2020-01-23

## 2019-07-15 NOTE — PROGRESS NOTES
History and Physical  Shilpa Sweeney 22 y o  female MRN: [id number]      Assessment:   Hypothyroid    Plan:  Increase Levothyroxine to 75 mcg daily  Recheck TSH in 3 months  Congratulated her on being smoke free  RTC 3 months    Chief Complaint   Patient presents with    Follow-up    Hypothyroidism        HPI:  Giuliano Gee is a 22 y o  female who presents for thyroid follow up  Recent labs reviewed  Today she reports she is always tired and her hair has been falling out  She perspires all the time also  She has gained 15 lbs in the past month  She quit smoking 1 months ago  Historical Information   Past Medical History:   Diagnosis Date    Depression     Disease of thyroid gland     Hypertension      Past Surgical History:   Procedure Laterality Date    DILATION AND CURETTAGE OF UTERUS       Social History   Social History     Substance and Sexual Activity   Alcohol Use No     Social History     Substance and Sexual Activity   Drug Use No     Social History     Tobacco Use   Smoking Status Current Every Day Smoker    Packs/day: 0 25   Smokeless Tobacco Never Used     Family History   Problem Relation Age of Onset    Cancer Mother     Hypertension Mother     Cancer Father     Hypertension Father     Diabetes Father     Cancer Sister        Meds/Allergies   No Known Allergies    Meds:    Current Outpatient Medications:     fluticasone (FLONASE) 50 mcg/act nasal spray, 1 spray into each nostril 2 (two) times a day, Disp: 16 g, Rfl: 0    levothyroxine 50 mcg tablet, Take 1 tablet (50 mcg total) by mouth daily in the early morning, Disp: 30 tablet, Rfl: 1    varenicline (CHANTIX VERONICA) 0 5 MG X 11 & 1 MG X 42 tablet, Take one 0 5mg tab by mouth 1x daily for 3 days, then increase to one 0 5mg tab 2x daily for 3 days, then increase to one 1mg tab 2x daily, Disp: 53 tablet, Rfl: 0      REVIEW OF SYSTEMS  Review of Systems   Constitutional: Positive for fatigue  HENT: Negative  Eyes: Negative  Respiratory: Negative  Cardiovascular: Negative  Psychiatric/Behavioral: Negative  Current Vitals:   Blood Pressure: 118/70 (07/15/19 1041)  Pulse: 78 (07/15/19 1041)  Temperature: 97 9 °F (36 6 °C) (07/15/19 1041)  Temp Source: Temporal (07/15/19 1041)  Respirations: 18 (07/15/19 1041)  Height: 5' 9" (175 3 cm) (07/15/19 1041)  Weight - Scale: 74 8 kg (165 lb) (07/15/19 1041)  SpO2: 98 % (07/15/19 1041)  Body mass index is 24 37 kg/m²  PHYSICAL EXAMS:  Physical Exam   Constitutional: She is oriented to person, place, and time  She appears well-developed and well-nourished  HENT:   Head: Normocephalic and atraumatic  Right Ear: External ear normal    Left Ear: External ear normal    Eyes: Pupils are equal, round, and reactive to light  Conjunctivae and EOM are normal    Neck: Normal range of motion  Neck supple  No thyromegaly present  Cardiovascular: Normal rate, regular rhythm and normal heart sounds  Pulmonary/Chest: Effort normal and breath sounds normal  She has no wheezes  She has no rales  Abdominal: Soft  Bowel sounds are normal  She exhibits no mass  There is no tenderness  Musculoskeletal: She exhibits no edema  Neurological: She is alert and oriented to person, place, and time  Skin: Skin is warm and dry  Multiple tattoos noted   Psychiatric: She has a normal mood and affect  Nursing note and vitals reviewed  Lab Results:          Mayela Tellez PA-C  7/15/2019, 10:55 AM

## 2019-07-22 ENCOUNTER — HOSPITAL ENCOUNTER (EMERGENCY)
Facility: HOSPITAL | Age: 24
Discharge: HOME | End: 2019-07-22
Attending: EMERGENCY MEDICINE
Payer: COMMERCIAL

## 2019-07-22 VITALS
HEART RATE: 89 BPM | BODY MASS INDEX: 24.09 KG/M2 | RESPIRATION RATE: 14 BRPM | OXYGEN SATURATION: 94 % | DIASTOLIC BLOOD PRESSURE: 93 MMHG | TEMPERATURE: 98.5 F | SYSTOLIC BLOOD PRESSURE: 160 MMHG | WEIGHT: 163.14 LBS

## 2019-07-22 DIAGNOSIS — K08.89 PAIN, DENTAL: Primary | ICD-10-CM

## 2019-07-22 PROCEDURE — 99282 EMERGENCY DEPT VISIT SF MDM: CPT

## 2019-07-22 PROCEDURE — 99283 EMERGENCY DEPT VISIT LOW MDM: CPT | Performed by: PHYSICIAN ASSISTANT

## 2019-07-22 RX ORDER — CLINDAMYCIN HYDROCHLORIDE 300 MG/1
300 CAPSULE ORAL 3 TIMES DAILY
Qty: 21 CAPSULE | Refills: 0 | Status: SHIPPED | OUTPATIENT
Start: 2019-07-22 | End: 2019-07-29

## 2019-07-22 RX ORDER — CLINDAMYCIN HYDROCHLORIDE 150 MG/1
300 CAPSULE ORAL ONCE
Status: COMPLETED | OUTPATIENT
Start: 2019-07-22 | End: 2019-07-22

## 2019-07-22 RX ADMIN — CLINDAMYCIN HYDROCHLORIDE 300 MG: 150 CAPSULE ORAL at 13:21

## 2019-07-22 NOTE — DISCHARGE INSTRUCTIONS
Take antibiotic as directed for the full duration  Continue to alternate OTC ibuprofen and tylenol as needed for pain relief  Call for follow up with oral surgeon for recheck as soon as possible

## 2019-07-22 NOTE — ED PROVIDER NOTES
History  Chief Complaint   Patient presents with    Dental Swelling     pt [facility]  was on 10 days of amoxicillin  started with right sided dental pain and swelling 1 5 weeks ago  called they told her nearest appointment was on 19 and to come to ED for evaluation     22year old female presents for evaluation of swelling and pain of the right jaw  She had 3 wisdom teeth removed on 19 at an oral surgeon in [facility]  She notes after surgery she completed a 10 day course of amoxicillin  She notes things were improved at that time  She notes about 1 5 weeks ago she noticed swelling of the right lower jaw in the area where she had her tooth extracted  She notes "a bubble" on the surface of where the tooth was extracted  Also notes dental pain in this area  She tells me she called them today and was offered an appointment, however could not get there due to work schedule and decided to come here instead  Denies fever, chills  She notes swelling seems worse when she eats  Denies discharge or drainage  Prior to Admission Medications   Prescriptions Last Dose Informant Patient Reported?  Taking?   fluticasone (FLONASE) 50 mcg/act nasal spray   No Yes   Si spray into each nostril 2 (two) times a day   levothyroxine 75 mcg tablet   No Yes   Sig: Take 1 tablet (75 mcg total) by mouth daily in the early morning   varenicline (CHANTIX VERONICA) 0 5 MG X 11 & 1 MG X 42 tablet   No Yes   Sig: Take one 0 5mg tab by mouth 1x daily for 3 days, then increase to one 0 5mg tab 2x daily for 3 days, then increase to one 1mg tab 2x daily      Facility-Administered Medications: None       Past Medical History:   Diagnosis Date    Depression     Disease of thyroid gland     Hypertension        Past Surgical History:   Procedure Laterality Date    DILATION AND CURETTAGE OF UTERUS      WISDOM TOOTH EXTRACTION         Family History   Problem Relation Age of Onset    Cancer Mother     Hypertension Mother     Cancer Father     Hypertension Father     Diabetes Father     Cancer Sister      I have reviewed and agree with the history as documented  Social History     Tobacco Use    Smoking status: Current Every Day Smoker     Packs/day: 0 25    Smokeless tobacco: Never Used   Substance Use Topics    Alcohol use: No    Drug use: No        Review of Systems   Constitutional: Negative  Negative for chills, fatigue and fever  HENT: Positive for dental problem and facial swelling  Negative for congestion, drooling, ear pain, postnasal drip, rhinorrhea, sore throat, trouble swallowing and voice change  Eyes: Negative  Negative for visual disturbance  Respiratory: Negative  Negative for cough, shortness of breath and wheezing  Cardiovascular: Negative  Negative for chest pain, palpitations and leg swelling  Gastrointestinal: Negative  Negative for abdominal pain, constipation, diarrhea, nausea and vomiting  Genitourinary: Negative  Negative for dysuria and hematuria  Musculoskeletal: Negative  Negative for back pain and myalgias  Skin: Negative  Negative for rash  Neurological: Negative  Negative for dizziness, light-headedness and headaches  Psychiatric/Behavioral: Negative  Negative for confusion  All other systems reviewed and are negative  Physical Exam  Physical Exam   Constitutional: She is oriented to person, place, and time  She appears well-developed and well-nourished  No distress  HENT:   Head: Normocephalic and atraumatic  Right Ear: Hearing, tympanic membrane, external ear and ear canal normal    Left Ear: Hearing, tympanic membrane, external ear and ear canal normal    Nose: Nose normal    Mouth/Throat: Uvula is midline, oropharynx is clear and moist and mucous membranes are normal  No trismus in the jaw  Normal dentition  No uvula swelling  No oropharyngeal exudate or posterior oropharyngeal edema         Normal phonation  Tolerating oral secretions  No swelling of the cheek, jaw or neck noted  Eyes: Pupils are equal, round, and reactive to light  Conjunctivae and EOM are normal  No scleral icterus  Neck: Trachea normal and normal range of motion  Neck supple  No tracheal deviation present  Cardiovascular: Normal rate, regular rhythm, normal heart sounds and normal pulses  No murmur heard  Pulmonary/Chest: Effort normal and breath sounds normal  No respiratory distress  She has no wheezes  She has no rhonchi  She has no rales  Abdominal: Soft  Bowel sounds are normal  There is no tenderness  Musculoskeletal: Normal range of motion  She exhibits no edema or tenderness  Neurological: She is alert and oriented to person, place, and time  No cranial nerve deficit or sensory deficit  She exhibits normal muscle tone  Skin: Skin is warm and dry  Capillary refill takes less than 2 seconds  No rash noted  Psychiatric: She has a normal mood and affect  Her speech is normal and behavior is normal    Nursing note and vitals reviewed  Vital Signs  ED Triage Vitals [07/22/19 1259]   Temperature Pulse Respirations Blood Pressure SpO2   98 5 °F (36 9 °C) 89 14 160/93 94 %      Temp Source Heart Rate Source Patient Position - Orthostatic VS BP Location FiO2 (%)   Temporal Monitor Lying Right arm --      Pain Score       Worst Possible Pain           Vitals:    07/22/19 1259   BP: 160/93   Pulse: 89   Patient Position - Orthostatic VS: Lying         Visual Acuity      ED Medications  Medications   clindamycin (CLEOCIN) capsule 300 mg (300 mg Oral Given 7/22/19 1321)       Diagnostic Studies  Results Reviewed     None                 No orders to display              Procedures  Procedures       ED Course     unremarkable  At this time, pt is over 1 month post op with reported swelling that started 1 5 weeks ago  No obvious abscess on exam   Pt placed on course of antibiotic  Reviewed OTC med dosing    Advised f/u with oral surgeon who performed her surgery - call ASAP to schedule follow up  Written discharge instructions provided and reviewed  Should f/u as outpatient or return as needed  Pt verbalized understanding and had no further questions  MDM  Number of Diagnoses or Management Options  Pain, dental: established and worsening  Patient Progress  Patient progress: stable      Disposition  Final diagnoses:   Pain, dental     Time reflects when diagnosis was documented in both MDM as applicable and the Disposition within this note     Time User Action Codes Description Comment    7/22/2019  1:20 PM Alexis Adams Add [K08 89] Pain, dental       ED Disposition     ED Disposition Condition Date/Time Comment    Discharge Stable Mon Jul 22, 2019  1:20 PM Shilpa Beil discharge to home/self care  Follow-up Information     Follow up With Specialties Details Why Contact Info    Your oral surgeon - call for a follow up appointment as soon as possible              Discharge Medication List as of 7/22/2019  1:22 PM      START taking these medications    Details   clindamycin (CLEOCIN) 300 MG capsule Take 1 capsule (300 mg total) by mouth 3 (three) times a day for 7 days, Starting Mon 7/22/2019, Until Mon 7/29/2019, Print         CONTINUE these medications which have NOT CHANGED    Details   fluticasone (FLONASE) 50 mcg/act nasal spray 1 spray into each nostril 2 (two) times a day, Starting Mon 12/31/2018, Normal      levothyroxine 75 mcg tablet Take 1 tablet (75 mcg total) by mouth daily in the early morning, Starting Mon 7/15/2019, Normal      varenicline (CHANTIX VERONICA) 0 5 MG X 11 & 1 MG X 42 tablet Take one 0 5mg tab by mouth 1x daily for 3 days, then increase to one 0 5mg tab 2x daily for 3 days, then increase to one 1mg tab 2x daily, Normal           No discharge procedures on file      ED Provider  Electronically Signed by           Jose Ortega PA-C  07/22/19 5441

## 2019-09-19 ENCOUNTER — OFFICE VISIT (OUTPATIENT)
Dept: FAMILY MEDICINE CLINIC | Facility: CLINIC | Age: 24
End: 2019-09-19
Payer: COMMERCIAL

## 2019-09-19 VITALS
SYSTOLIC BLOOD PRESSURE: 140 MMHG | HEART RATE: 73 BPM | TEMPERATURE: 97.8 F | OXYGEN SATURATION: 99 % | DIASTOLIC BLOOD PRESSURE: 76 MMHG | HEIGHT: 69 IN | BODY MASS INDEX: 23.7 KG/M2 | WEIGHT: 160 LBS

## 2019-09-19 DIAGNOSIS — R80.9 PROTEINURIA, UNSPECIFIED TYPE: Primary | ICD-10-CM

## 2019-09-19 DIAGNOSIS — Z02.4 DRIVER'S PERMIT PE (PHYSICAL EXAMINATION): ICD-10-CM

## 2019-09-19 DIAGNOSIS — F17.200 TOBACCO USE DISORDER: ICD-10-CM

## 2019-09-19 LAB
SL AMB  POCT GLUCOSE, UA: ABNORMAL
SL AMB LEUKOCYTE ESTERASE,UA: ABNORMAL
SL AMB POCT BILIRUBIN,UA: ABNORMAL
SL AMB POCT BLOOD,UA: ABNORMAL
SL AMB POCT CLARITY,UA: CLEAR
SL AMB POCT COLOR,UA: ABNORMAL
SL AMB POCT KETONES,UA: ABNORMAL
SL AMB POCT NITRITE,UA: ABNORMAL
SL AMB POCT PH,UA: 5
SL AMB POCT SPECIFIC GRAVITY,UA: 1.01
SL AMB POCT URINE PROTEIN: 30
SL AMB POCT UROBILINOGEN: ABNORMAL

## 2019-09-19 PROCEDURE — T1015 CLINIC SERVICE: HCPCS | Performed by: FAMILY MEDICINE

## 2019-09-19 PROCEDURE — 81001 URINALYSIS AUTO W/SCOPE: CPT | Performed by: PHYSICIAN ASSISTANT

## 2019-09-19 RX ORDER — VARENICLINE TARTRATE 25 MG
KIT ORAL
Qty: 53 TABLET | Refills: 0 | Status: SHIPPED | OUTPATIENT
Start: 2019-09-19 | End: 2020-04-30 | Stop reason: DRUGHIGH
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
## 2019-09-20 LAB
AMORPH URATE CRY URNS QL MICRO: ABNORMAL /HPF
BACTERIA UR QL AUTO: ABNORMAL /HPF
BILIRUB UR QL STRIP: NEGATIVE
CLARITY UR: ABNORMAL
COLOR UR: ABNORMAL
GLUCOSE UR STRIP-MCNC: NEGATIVE MG/DL
HGB UR QL STRIP.AUTO: NEGATIVE
KETONES UR STRIP-MCNC: NEGATIVE MG/DL
LEUKOCYTE ESTERASE UR QL STRIP: ABNORMAL
NITRITE UR QL STRIP: NEGATIVE
NON-SQ EPI CELLS URNS QL MICRO: ABNORMAL /HPF
PH UR STRIP.AUTO: 6 [PH]
PROT UR STRIP-MCNC: ABNORMAL MG/DL
RBC #/AREA URNS AUTO: ABNORMAL /HPF
SP GR UR STRIP.AUTO: 1.03 (ref 1–1.03)
UROBILINOGEN UR QL STRIP.AUTO: 0.2 E.U./DL
WBC #/AREA URNS AUTO: ABNORMAL /HPF

## 2019-09-23 ENCOUNTER — CLINICAL SUPPORT (OUTPATIENT)
Dept: FAMILY MEDICINE CLINIC | Facility: CLINIC | Age: 24
End: 2019-09-23

## 2019-09-23 VITALS — DIASTOLIC BLOOD PRESSURE: 62 MMHG | SYSTOLIC BLOOD PRESSURE: 110 MMHG

## 2019-09-23 DIAGNOSIS — Z01.30 BP CHECK: Primary | ICD-10-CM

## 2019-10-31 ENCOUNTER — OFFICE VISIT (OUTPATIENT)
Dept: FAMILY MEDICINE CLINIC | Facility: CLINIC | Age: 24
End: 2019-10-31
Payer: COMMERCIAL

## 2019-10-31 VITALS
HEART RATE: 83 BPM | WEIGHT: 159 LBS | SYSTOLIC BLOOD PRESSURE: 112 MMHG | BODY MASS INDEX: 23.55 KG/M2 | DIASTOLIC BLOOD PRESSURE: 78 MMHG | RESPIRATION RATE: 18 BRPM | OXYGEN SATURATION: 98 % | HEIGHT: 69 IN | TEMPERATURE: 97.4 F

## 2019-10-31 DIAGNOSIS — E03.9 ACQUIRED HYPOTHYROIDISM: ICD-10-CM

## 2019-10-31 DIAGNOSIS — I83.92 VARICOSE VEINS OF LEFT LOWER EXTREMITY, UNSPECIFIED WHETHER COMPLICATED: Primary | ICD-10-CM

## 2019-10-31 DIAGNOSIS — R80.9 PROTEINURIA, UNSPECIFIED TYPE: ICD-10-CM

## 2019-10-31 PROCEDURE — T1015 CLINIC SERVICE: HCPCS | Performed by: FAMILY MEDICINE

## 2019-10-31 RX ORDER — MULTIVITAMIN
1 CAPSULE ORAL DAILY
COMMUNITY
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
## 2019-11-20 ENCOUNTER — CONSULT (OUTPATIENT)
Dept: VASCULAR SURGERY | Facility: HOSPITAL | Age: 24
End: 2019-11-20
Payer: COMMERCIAL

## 2019-11-20 VITALS
SYSTOLIC BLOOD PRESSURE: 114 MMHG | WEIGHT: 158.73 LBS | BODY MASS INDEX: 23.51 KG/M2 | DIASTOLIC BLOOD PRESSURE: 74 MMHG | HEART RATE: 79 BPM | TEMPERATURE: 99.1 F | HEIGHT: 69 IN

## 2019-11-20 DIAGNOSIS — F17.200 TOBACCO USE DISORDER: Primary | ICD-10-CM

## 2019-11-20 DIAGNOSIS — I83.92 VARICOSE VEINS OF LEFT LOWER EXTREMITY, UNSPECIFIED WHETHER COMPLICATED: ICD-10-CM

## 2019-11-20 PROCEDURE — 99244 OFF/OP CNSLTJ NEW/EST MOD 40: CPT | Performed by: NURSE PRACTITIONER
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
## 2019-11-20 NOTE — ASSESSMENT & PLAN NOTE
+smoker half pack times 5 years  -we discussed smoking cessation and effects of nicotine on peripheral artery disease and vascular complications  Advised patient to quit smoking    -f/u with PCP

## 2019-11-20 NOTE — PATIENT INSTRUCTIONS
We discussed the pathophysiology of varicose veins and venous insufficiency  Patient expressed understanding  Will start with conservative measures to aid in symptom relief and progression of disease  PLAN:  -compression stockings 20-30mmHg Rx given, to be worn during waking hours and removed at bedtime  -elevate legs throughout the day as able  -exercise daily as tolerated  -continue low-fat low-chol, low-sodium diet  -LEVDR (ultrasound)  -return for f/u with Vascular Surgeon after testing to discuss treatment plan  -if you have any questions or concerns, please call our office to discuss      Varicose Veins   AMBULATORY CARE:   Varicose veins  are veins that become large, twisted, and swollen  They are common on the back of the calves, knees, and thighs  Varicose veins are caused by valves in your veins that do not work properly  This causes blood to collect and increase pressure in the veins of your legs  The increased pressure causes your veins to stretch, get larger, swell, and twist        Common symptoms include the following: Your symptoms may be worse after you stand or sit for long periods of time  You may have any of the following:  · Blue, purple, or bulging veins in your legs     · Pain, swelling, or muscle cramps in your legs    · Feeling of fatigue or heaviness in your legs    · Cramping in your legs  Seek care immediately if:   · You have a wound that does not heal or is infected  · You have an injury that has broken your skin and caused your varicose veins to bleed  · Your leg is swollen and hard  · You notice that your legs or feet are turning blue or black  · Your leg feels warm, tender, and painful  It may look swollen and red  Contact your healthcare provider if:   · You have pain in your leg that does not go away or gets worse  · You notice sudden large bruising on your legs  · You have a rash on your leg       · Your symptoms keep you from doing your daily activities  · You have questions or concerns about your condition or care  Treatment of varicose veins  aims to decrease symptoms, improve appearance, and prevent further problems  Treatment will depend on which veins are affected and how severe your condition is  You may need procedures to treat or remove your varicose veins  For example, your healthcare provider may inject a solution or use a laser to close the varicose veins  Surgery to remove long veins may also be done  Ask your healthcare provider for more information about procedures used to treat varicose veins  Manage varicose veins:   · Do not sit or stand for long periods of time  This can cause the blood to collect in your legs and make your symptoms worse  Bend or rotate your ankles several times every hour  Walk around for a few minutes every hour to get blood moving in your legs  · Do not cross your legs when you sit  This decreases blood flow to your feet and can make your symptoms worse  · Do not wear tight clothing or shoes  Do not wear high-heeled shoes  Do not wear clothes that are tight around the waist or knees  · Maintain a healthy weight  Being overweight or obese can make your varicose veins worse  Ask your healthcare provider how much you should weigh  Ask him or her to help you create a weight loss plan if you are overweight  · Wear pressure stockings as directed  The stockings are tight and put pressure on your legs  They improve blood flow and help prevent clots  · Elevate your legs  Keep them above the level of your heart for 15 to 30 minutes several times a day  You can also prop the end of your bed up slightly to elevate your legs while you sleep  This will help blood to flow back to your heart  · Get regular exercise  Talk to your healthcare provider about the best exercise plan for you  Exercise can improve blood flow to your legs and feet    Follow up with your healthcare provider as directed: Write down your questions so you remember to ask them during your visits  © 2017 [employer] Information is for End User's use only and may not be sold, redistributed or otherwise used for commercial purposes  All illustrations and images included in CareNotes® are the copyrighted property of [employer]  or [employer]  The above information is an  only  It is not intended as medical advice for individual conditions or treatments  Talk to your doctor, nurse or pharmacist before following any medical regimen to see if it is safe and effective for you

## 2019-11-20 NOTE — ASSESSMENT & PLAN NOTE
24yo F with PMH hypothyroidism, tobacco use, presents to the Vascular office for evaluation of her left leg varicose veins  +pain, heaviness, tiredness, aching, cramping, burning, squeezing feeling to the left upper leg/knee area with associated swelling  -she has been wearing compression stockings since age 17 and elevates regularly    We discussed the pathophysiology of varicose veins and venous insufficiency  Patient expressed understanding  Will start with conservative measures to aid in symptom relief and progression of disease      PLAN:  -compression stockings 20-30mmHg Rx given, to be worn during waking hours and removed at bedtime; we will trial thigh high compression stockings  -elevate legs throughout the day as able  -exercise daily as tolerated  -continue low-fat low-chol, low-sodium diet  -LEVDR   -return for f/u with Vascular Surgeon after testing to discuss treatment plan  -if you have any questions or concerns, please call our office to discuss

## 2020-01-07 ENCOUNTER — TELEPHONE (OUTPATIENT)
Dept: ADMINISTRATIVE | Facility: HOSPITAL | Age: 25
End: 2020-01-07

## 2020-01-16 ENCOUNTER — OFFICE VISIT (OUTPATIENT)
Dept: FAMILY MEDICINE CLINIC | Facility: CLINIC | Age: 25
End: 2020-01-16
Payer: COMMERCIAL

## 2020-01-16 VITALS
HEART RATE: 100 BPM | RESPIRATION RATE: 18 BRPM | HEIGHT: 69 IN | BODY MASS INDEX: 23.05 KG/M2 | TEMPERATURE: 97.5 F | SYSTOLIC BLOOD PRESSURE: 142 MMHG | OXYGEN SATURATION: 99 % | DIASTOLIC BLOOD PRESSURE: 88 MMHG | WEIGHT: 155.6 LBS

## 2020-01-16 DIAGNOSIS — J01.10 ACUTE NON-RECURRENT FRONTAL SINUSITIS: Primary | ICD-10-CM

## 2020-01-16 PROCEDURE — T1015 CLINIC SERVICE: HCPCS | Performed by: FAMILY MEDICINE

## 2020-01-16 RX ORDER — PSEUDOEPHEDRINE HCL 60 MG/1
60 TABLET ORAL EVERY 8 HOURS PRN
Qty: 30 TABLET | Refills: 0 | Status: SHIPPED | OUTPATIENT
Start: 2020-01-16 | End: 2020-09-02

## 2020-01-16 RX ORDER — AZITHROMYCIN 250 MG/1
250 TABLET, FILM COATED ORAL DAILY
Qty: 6 TABLET | Refills: 0 | Status: SHIPPED | OUTPATIENT
Start: 2020-01-16 | End: 2020-01-21

## 2020-01-23 DIAGNOSIS — E03.9 HYPOTHYROIDISM, UNSPECIFIED TYPE: ICD-10-CM

## 2020-01-23 RX ORDER — LEVOTHYROXINE SODIUM 0.07 MG/1
TABLET ORAL
Qty: 90 TABLET | Refills: 1 | Status: SHIPPED | OUTPATIENT
Start: 2020-01-23 | End: 2020-03-03 | Stop reason: SDUPTHER

## 2020-03-03 DIAGNOSIS — E03.9 HYPOTHYROIDISM, UNSPECIFIED TYPE: ICD-10-CM

## 2020-03-03 RX ORDER — LEVOTHYROXINE SODIUM 0.07 MG/1
75 TABLET ORAL
Qty: 90 TABLET | Refills: 1 | Status: SHIPPED | OUTPATIENT
Start: 2020-03-03 | End: 2020-08-28 | Stop reason: SDUPTHER

## 2020-03-11 ENCOUNTER — HOSPITAL ENCOUNTER (OUTPATIENT)
Dept: NON INVASIVE DIAGNOSTICS | Facility: HOSPITAL | Age: 25
Discharge: HOME | End: 2020-03-11
Payer: COMMERCIAL

## 2020-03-11 DIAGNOSIS — I83.92 VARICOSE VEINS OF LEFT LOWER EXTREMITY, UNSPECIFIED WHETHER COMPLICATED: ICD-10-CM

## 2020-03-11 PROCEDURE — 93971 EXTREMITY STUDY: CPT

## 2020-03-12 ENCOUNTER — APPOINTMENT (OUTPATIENT)
Dept: LAB | Facility: MEDICAL CENTER | Age: 25
End: 2020-03-12
Payer: COMMERCIAL

## 2020-03-12 DIAGNOSIS — E03.9 HYPOTHYROIDISM, UNSPECIFIED TYPE: ICD-10-CM

## 2020-03-12 LAB — TSH SERPL DL<=0.05 MIU/L-ACNC: 1.78 UIU/ML (ref 0.36–3.74)

## 2020-03-12 PROCEDURE — 84443 ASSAY THYROID STIM HORMONE: CPT

## 2020-03-12 PROCEDURE — 93971 EXTREMITY STUDY: CPT | Performed by: SURGERY

## 2020-03-12 PROCEDURE — 36415 COLL VENOUS BLD VENIPUNCTURE: CPT

## 2020-03-13 ENCOUNTER — TELEPHONE (OUTPATIENT)
Dept: FAMILY MEDICINE CLINIC | Facility: CLINIC | Age: 25
End: 2020-03-13

## 2020-03-13 NOTE — TELEPHONE ENCOUNTER
----- Message from Leah Woodruff PA-C sent at 3/13/2020  8:35 AM EDT -----  Call patient with normal results

## 2020-04-13 ENCOUNTER — TELEMEDICINE (OUTPATIENT)
Dept: VASCULAR SURGERY | Facility: HOSPITAL | Age: 25
End: 2020-04-13
Payer: COMMERCIAL

## 2020-04-13 ENCOUNTER — TELEPHONE (OUTPATIENT)
Dept: ADMINISTRATIVE | Facility: HOSPITAL | Age: 25
End: 2020-04-13

## 2020-04-13 VITALS
DIASTOLIC BLOOD PRESSURE: 95 MMHG | SYSTOLIC BLOOD PRESSURE: 134 MMHG | WEIGHT: 162 LBS | HEIGHT: 69 IN | HEART RATE: 100 BPM | BODY MASS INDEX: 23.99 KG/M2

## 2020-04-13 DIAGNOSIS — I83.813 VARICOSE VEINS OF BOTH LOWER EXTREMITIES WITH PAIN: Primary | ICD-10-CM

## 2020-04-13 PROCEDURE — 99214 OFFICE O/P EST MOD 30 MIN: CPT | Performed by: SURGERY

## 2020-04-13 PROCEDURE — 3008F BODY MASS INDEX DOCD: CPT | Performed by: SURGERY

## 2020-04-13 RX ORDER — CHLORHEXIDINE GLUCONATE 0.12 MG/ML
15 RINSE ORAL EVERY 12 HOURS SCHEDULED
Status: CANCELLED | OUTPATIENT
Start: 2020-04-13

## 2020-04-13 RX ORDER — CHLORHEXIDINE GLUCONATE 0.12 MG/ML
15 RINSE ORAL ONCE
Status: CANCELLED | OUTPATIENT
Start: 2020-04-13 | End: 2020-04-13

## 2020-04-30 ENCOUNTER — TELEMEDICINE (OUTPATIENT)
Dept: FAMILY MEDICINE CLINIC | Facility: CLINIC | Age: 25
End: 2020-04-30
Payer: COMMERCIAL

## 2020-04-30 DIAGNOSIS — F17.200 TOBACCO USE DISORDER: Primary | ICD-10-CM

## 2020-04-30 DIAGNOSIS — E03.9 HYPOTHYROIDISM, UNSPECIFIED TYPE: ICD-10-CM

## 2020-04-30 PROCEDURE — 99213 OFFICE O/P EST LOW 20 MIN: CPT | Performed by: FAMILY MEDICINE

## 2020-04-30 RX ORDER — VARENICLINE TARTRATE 1 MG/1
1 TABLET, FILM COATED ORAL 2 TIMES DAILY
Qty: 60 TABLET | Refills: 1 | Status: SHIPPED | OUTPATIENT
Start: 2020-04-30 | End: 2020-09-08 | Stop reason: ALTCHOICE

## 2020-05-11 ENCOUNTER — TELEPHONE (OUTPATIENT)
Dept: VASCULAR SURGERY | Facility: CLINIC | Age: 25
End: 2020-05-11

## 2020-06-23 DIAGNOSIS — I83.813 VARICOSE VEINS OF BOTH LOWER EXTREMITIES WITH PAIN: ICD-10-CM

## 2020-06-23 DIAGNOSIS — Z11.59 SCREENING FOR VIRAL DISEASE: Primary | ICD-10-CM

## 2020-08-25 ENCOUNTER — ANESTHESIA EVENT (OUTPATIENT)
Dept: PERIOP | Facility: AMBULARY SURGERY CENTER | Age: 25
End: 2020-08-25
Payer: COMMERCIAL

## 2020-08-28 DIAGNOSIS — E03.9 HYPOTHYROIDISM, UNSPECIFIED TYPE: ICD-10-CM

## 2020-08-28 RX ORDER — LEVOTHYROXINE SODIUM 0.07 MG/1
75 TABLET ORAL
Qty: 90 TABLET | Refills: 1 | Status: SHIPPED | OUTPATIENT
Start: 2020-08-28 | End: 2021-02-10 | Stop reason: SDUPTHER

## 2020-08-31 DIAGNOSIS — Z11.59 SCREENING FOR VIRAL DISEASE: ICD-10-CM

## 2020-08-31 PROCEDURE — U0003 INFECTIOUS AGENT DETECTION BY NUCLEIC ACID (DNA OR RNA); SEVERE ACUTE RESPIRATORY SYNDROME CORONAVIRUS 2 (SARS-COV-2) (CORONAVIRUS DISEASE [COVID-19]), AMPLIFIED PROBE TECHNIQUE, MAKING USE OF HIGH THROUGHPUT TECHNOLOGIES AS DESCRIBED BY CMS-2020-01-R: HCPCS

## 2020-09-01 LAB — SARS-COV-2 RNA SPEC QL NAA+PROBE: NOT DETECTED

## 2020-09-02 NOTE — PRE-PROCEDURE INSTRUCTIONS
Pre-Surgery Instructions:   Medication Instructions    levothyroxine 75 mcg tablet Instructed patient per Anesthesia Guidelines   Multiple Vitamin (MULTIVITAMIN) capsule Instructed patient per Anesthesia Guidelines      Pre op,medications and showering instructions reviewed-Patient has hibiclens

## 2020-09-04 ENCOUNTER — ANESTHESIA (OUTPATIENT)
Dept: PERIOP | Facility: AMBULARY SURGERY CENTER | Age: 25
End: 2020-09-04
Payer: COMMERCIAL

## 2020-09-04 ENCOUNTER — HOSPITAL ENCOUNTER (OUTPATIENT)
Facility: AMBULARY SURGERY CENTER | Age: 25
Setting detail: OUTPATIENT SURGERY
Discharge: HOME | End: 2020-09-04
Attending: SURGERY | Admitting: SURGERY
Payer: COMMERCIAL

## 2020-09-04 ENCOUNTER — HOSPITAL ENCOUNTER (OUTPATIENT)
Dept: ULTRASOUND IMAGING | Facility: AMBULARY SURGERY CENTER | Age: 25
Discharge: HOME | End: 2020-09-04
Payer: COMMERCIAL

## 2020-09-04 VITALS
HEART RATE: 100 BPM | BODY MASS INDEX: 23.4 KG/M2 | OXYGEN SATURATION: 98 % | TEMPERATURE: 97.6 F | DIASTOLIC BLOOD PRESSURE: 72 MMHG | HEIGHT: 69 IN | WEIGHT: 158 LBS | RESPIRATION RATE: 18 BRPM | SYSTOLIC BLOOD PRESSURE: 117 MMHG

## 2020-09-04 VITALS — HEART RATE: 83 BPM

## 2020-09-04 DIAGNOSIS — I83.813 VARICOSE VEINS OF BOTH LOWER EXTREMITIES WITH PAIN: Primary | ICD-10-CM

## 2020-09-04 DIAGNOSIS — I83.899 VARICOSE VEINS OF LOWER EXTREMITIES WITH COMPLICATIONS: ICD-10-CM

## 2020-09-04 LAB
EXT PREGNANCY TEST URINE: NEGATIVE
EXT. CONTROL: NORMAL

## 2020-09-04 PROCEDURE — 81025 URINE PREGNANCY TEST: CPT | Performed by: ANESTHESIOLOGY

## 2020-09-04 PROCEDURE — NC001 PR NO CHARGE: Performed by: SURGERY

## 2020-09-04 PROCEDURE — 93971 EXTREMITY STUDY: CPT

## 2020-09-04 PROCEDURE — 99024 POSTOP FOLLOW-UP VISIT: CPT | Performed by: SURGERY

## 2020-09-04 PROCEDURE — 36478 ENDOVENOUS LASER 1ST VEIN: CPT | Performed by: SURGERY

## 2020-09-04 PROCEDURE — 37765 STAB PHLEB VEINS XTR 10-20: CPT | Performed by: SURGERY

## 2020-09-04 RX ORDER — DEXAMETHASONE SODIUM PHOSPHATE 4 MG/ML
INJECTION, SOLUTION INTRA-ARTICULAR; INTRALESIONAL; INTRAMUSCULAR; INTRAVENOUS; SOFT TISSUE AS NEEDED
Status: DISCONTINUED | OUTPATIENT
Start: 2020-09-04 | End: 2020-09-04

## 2020-09-04 RX ORDER — EPHEDRINE SULFATE 50 MG/ML
INJECTION INTRAVENOUS AS NEEDED
Status: DISCONTINUED | OUTPATIENT
Start: 2020-09-04 | End: 2020-09-04

## 2020-09-04 RX ORDER — OXYCODONE HYDROCHLORIDE AND ACETAMINOPHEN 5; 325 MG/1; MG/1
1 TABLET ORAL EVERY 4 HOURS PRN
Status: DISCONTINUED | OUTPATIENT
Start: 2020-09-04 | End: 2020-09-04 | Stop reason: HOSPADM

## 2020-09-04 RX ORDER — FENTANYL CITRATE/PF 50 MCG/ML
25 SYRINGE (ML) INJECTION
Status: COMPLETED | OUTPATIENT
Start: 2020-09-04 | End: 2020-09-04

## 2020-09-04 RX ORDER — FENTANYL CITRATE 50 UG/ML
INJECTION, SOLUTION INTRAMUSCULAR; INTRAVENOUS AS NEEDED
Status: DISCONTINUED | OUTPATIENT
Start: 2020-09-04 | End: 2020-09-04

## 2020-09-04 RX ORDER — CEFAZOLIN SODIUM 1 G/3ML
INJECTION, POWDER, FOR SOLUTION INTRAMUSCULAR; INTRAVENOUS AS NEEDED
Status: DISCONTINUED | OUTPATIENT
Start: 2020-09-04 | End: 2020-09-04

## 2020-09-04 RX ORDER — MAGNESIUM HYDROXIDE 1200 MG/15ML
LIQUID ORAL AS NEEDED
Status: DISCONTINUED | OUTPATIENT
Start: 2020-09-04 | End: 2020-09-04 | Stop reason: HOSPADM

## 2020-09-04 RX ORDER — ONDANSETRON 2 MG/ML
INJECTION INTRAMUSCULAR; INTRAVENOUS AS NEEDED
Status: DISCONTINUED | OUTPATIENT
Start: 2020-09-04 | End: 2020-09-04

## 2020-09-04 RX ORDER — OXYCODONE HYDROCHLORIDE AND ACETAMINOPHEN 5; 325 MG/1; MG/1
1 TABLET ORAL EVERY 4 HOURS PRN
Qty: 20 TABLET | Refills: 0 | Status: SHIPPED | OUTPATIENT
Start: 2020-09-04 | End: 2020-09-14

## 2020-09-04 RX ORDER — LIDOCAINE HYDROCHLORIDE 10 MG/ML
0.5 INJECTION, SOLUTION EPIDURAL; INFILTRATION; INTRACAUDAL; PERINEURAL ONCE AS NEEDED
Status: DISCONTINUED | OUTPATIENT
Start: 2020-09-04 | End: 2020-09-04 | Stop reason: HOSPADM

## 2020-09-04 RX ORDER — MEPERIDINE HYDROCHLORIDE 25 MG/ML
25 INJECTION INTRAMUSCULAR; INTRAVENOUS; SUBCUTANEOUS ONCE AS NEEDED
Status: DISCONTINUED | OUTPATIENT
Start: 2020-09-04 | End: 2020-09-04 | Stop reason: HOSPADM

## 2020-09-04 RX ORDER — LIDOCAINE HYDROCHLORIDE 10 MG/ML
INJECTION, SOLUTION EPIDURAL; INFILTRATION; INTRACAUDAL; PERINEURAL AS NEEDED
Status: DISCONTINUED | OUTPATIENT
Start: 2020-09-04 | End: 2020-09-04

## 2020-09-04 RX ORDER — PROPOFOL 10 MG/ML
INJECTION, EMULSION INTRAVENOUS AS NEEDED
Status: DISCONTINUED | OUTPATIENT
Start: 2020-09-04 | End: 2020-09-04

## 2020-09-04 RX ORDER — SODIUM CHLORIDE, SODIUM LACTATE, POTASSIUM CHLORIDE, CALCIUM CHLORIDE 600; 310; 30; 20 MG/100ML; MG/100ML; MG/100ML; MG/100ML
125 INJECTION, SOLUTION INTRAVENOUS CONTINUOUS
Status: DISCONTINUED | OUTPATIENT
Start: 2020-09-04 | End: 2020-09-04 | Stop reason: HOSPADM

## 2020-09-04 RX ORDER — MIDAZOLAM HYDROCHLORIDE 2 MG/2ML
INJECTION, SOLUTION INTRAMUSCULAR; INTRAVENOUS AS NEEDED
Status: DISCONTINUED | OUTPATIENT
Start: 2020-09-04 | End: 2020-09-04

## 2020-09-04 RX ORDER — SODIUM CHLORIDE 9 MG/ML
INJECTION, SOLUTION INTRAVENOUS AS NEEDED
Status: DISCONTINUED | OUTPATIENT
Start: 2020-09-04 | End: 2020-09-04 | Stop reason: HOSPADM

## 2020-09-04 RX ORDER — CHLORHEXIDINE GLUCONATE 0.12 MG/ML
15 RINSE ORAL ONCE
Status: COMPLETED | OUTPATIENT
Start: 2020-09-04 | End: 2020-09-04

## 2020-09-04 RX ADMIN — FENTANYL CITRATE 25 MCG: 50 INJECTION INTRAMUSCULAR; INTRAVENOUS at 10:25

## 2020-09-04 RX ADMIN — CEFAZOLIN SODIUM 1000 MG: 1 INJECTION, POWDER, FOR SOLUTION INTRAMUSCULAR; INTRAVENOUS at 09:10

## 2020-09-04 RX ADMIN — FENTANYL CITRATE 50 MCG: 50 INJECTION, SOLUTION INTRAMUSCULAR; INTRAVENOUS at 09:26

## 2020-09-04 RX ADMIN — ONDANSETRON 4 MG: 2 INJECTION INTRAMUSCULAR; INTRAVENOUS at 09:29

## 2020-09-04 RX ADMIN — FENTANYL CITRATE 50 MCG: 50 INJECTION, SOLUTION INTRAMUSCULAR; INTRAVENOUS at 09:21

## 2020-09-04 RX ADMIN — CHLORHEXIDINE GLUCONATE 0.12% ORAL RINSE 15 ML: 1.2 LIQUID ORAL at 07:47

## 2020-09-04 RX ADMIN — MIDAZOLAM HYDROCHLORIDE 2 MG: 1 INJECTION, SOLUTION INTRAMUSCULAR; INTRAVENOUS at 09:08

## 2020-09-04 RX ADMIN — SODIUM CHLORIDE, SODIUM LACTATE, POTASSIUM CHLORIDE, AND CALCIUM CHLORIDE: .6; .31; .03; .02 INJECTION, SOLUTION INTRAVENOUS at 08:59

## 2020-09-04 RX ADMIN — SODIUM CHLORIDE, SODIUM LACTATE, POTASSIUM CHLORIDE, AND CALCIUM CHLORIDE: .6; .31; .03; .02 INJECTION, SOLUTION INTRAVENOUS at 09:08

## 2020-09-04 RX ADMIN — OXYCODONE HYDROCHLORIDE AND ACETAMINOPHEN 1 TABLET: 5; 325 TABLET ORAL at 11:17

## 2020-09-04 RX ADMIN — PROPOFOL 200 MG: 10 INJECTION, EMULSION INTRAVENOUS at 09:15

## 2020-09-04 RX ADMIN — FENTANYL CITRATE 25 MCG: 50 INJECTION INTRAMUSCULAR; INTRAVENOUS at 10:37

## 2020-09-04 RX ADMIN — DEXAMETHASONE SODIUM PHOSPHATE 4 MG: 4 INJECTION, SOLUTION INTRAMUSCULAR; INTRAVENOUS at 09:29

## 2020-09-04 RX ADMIN — FENTANYL CITRATE 25 MCG: 50 INJECTION INTRAMUSCULAR; INTRAVENOUS at 10:42

## 2020-09-04 RX ADMIN — PROPOFOL 50 MG: 10 INJECTION, EMULSION INTRAVENOUS at 09:16

## 2020-09-04 RX ADMIN — FENTANYL CITRATE 25 MCG: 50 INJECTION INTRAMUSCULAR; INTRAVENOUS at 10:20

## 2020-09-04 RX ADMIN — LIDOCAINE HYDROCHLORIDE 50 MG: 10 INJECTION, SOLUTION EPIDURAL; INFILTRATION; INTRACAUDAL; PERINEURAL at 09:15

## 2020-09-04 RX ADMIN — EPHEDRINE SULFATE 10 MG: 50 INJECTION, SOLUTION INTRAVENOUS at 09:31

## 2020-09-04 NOTE — DISCHARGE INSTRUCTIONS
DISCHARGE INSTRUCTIONS  VARICOSE VEIN SURGERY    1) When released from the hospital, you should have a compression bandage in place on the operated leg  This bandage should feel snug but not too tight  If the bandage becomes blood soaked or painfully tight, elevate your leg and call your surgeon immediately  2) If the operated leg becomes increasingly painful or swollen, or if there is increasing redness or pain around your incision, contact our office  3) On the day of your operation, take it easy and elevate your leg as much as possible  You can take short walks around the house  When sitting, the leg should be elevated  The preferred position is to have the leg at or above the level of the heart  Starting on the first day after surgery, light walking is strongly encouraged as tolerated  After your ultrasound test, you can resume your normal activity, but no heavy lifting or strenuous exercise for 2 weeks  4) Some bruising of the skin is common after varicose vein surgery  This can be lessened by strict elevation of the leg  Many patients will notice some numbness of the shin, ankle, calf, or the top of the foot  This usually fades with time, but may be persistent  After surgery you can expect bruising, swelling and hard knots on your leg  As your body heals the bruising will fade and the swelling and knots will subside  5) Observe incisions daily  Report to our office any of the following:  a) Any areas that are red and angry in appearance  b) Any drainage that is milky or cloudy in appearance or that has a foul odor  c) Elevated temperature of 100 5 degrees F or greater  6) Apply sunscreen with SPF 30 to incisions while sun bathing for up to one year after surgery to reduce the chances of your incisions darkening  7) Your first post-operative appointment will be 2-3 days after your surgery   At this appointment your bandages will be removed and you will be seen by a Vascular Surgeon, Nurse Practicioner, or Physician Assistant  An appointment for a follow up Doppler study will be scheduled 5-7 days after surgery  8)         If have any questions, please call our office at ([phone])

## 2020-09-04 NOTE — H&P
Assessment/Plan:       /80   Pulse 88   Temp 98 1 °F (36 7 °C) (Temporal)   Resp 18   Ht 5' 9" (1 753 m)   Wt 71 7 kg (158 lb)   SpO2 99%   BMI 23 33 kg/m²     Varicose veins of left lower extremity  18yo F with PMH hypothyroidism, tobacco use, presents to the Vascular office for evaluatoin of her left leg varicose veins      +pain, heaviness, tiredness, aching, cramping, burning, squeezing feeling to the left upper leg/knee area with associated swelling  -she has been wearing compression stockings since age 17 and elevates regularly  Pain has persisted despite trial of compression stockings     We discussed the pathophysiology of varicose veins and venous insufficiency  Patient expressed understanding          PLAN:  Left GSV EVLT and stab phlebectomies      Tobacco use disorder  +smoker half pack times 5 years  -we discussed smoking cessation and effects of nicotine on peripheral artery disease and vascular complications  Advised patient to quit smoking  -f/u with PCP        Diagnoses and all orders for this visit:     Tobacco use disorder     Varicose veins of left lower extremity, unspecified whether complicated  -     Ambulatory referral to Vascular Surgery  -     Compression Stocking  -     VAS reflux lower limb venous duplex study with reflux assesment, unilateral; Future            Subjective:       Patient ID: Sae Kahn is a 22 y o  female      Pt is new to our office  She was referred here by Justo Mcqueen PA-C for varicose veins of the left lower extremity  She has had no testing  She first noticed that vein when she was 17years old and after both pregnancies they got worse  She has a varicosity that runs across the left knee that is giving her the most pain  She c/o bulging veins and recurrent phlebitis  She c/o aching, tiredness, heaviness, cramping, burning and swelling in her leg  She has been wear compression stockings since she is 16 yo   She also elevates her legs and exercises  She denies any bleeding veins or ulcerations  Pt is a current daily smoker  Heike Wright is a 24yo F with PMH hypothyroidism, tobacco use, who presents to the Vascular office for evaluation of her left leg varicose veins      She reports pain, heaviness, tiredness, aching, cramping, burning, and a "squeezing feeling" to the left upper leg/knee area with associated swelling which has been occurring since age 17  Symptoms have been getting worse over the past year or so  She wears compression stockings on a regular basis and elevates frequently  She has an active job and lifestyle  She also reports a history of phlebitis and family history of varicose veins, her mother  She denies any DVT, hemorrhage, leg injury or dermatitis to lower extremities  She is a smoker            The following portions of the patient's history were reviewed and updated as appropriate: allergies, current medications, past family history, past medical history, past social history, past surgical history and problem list      Review of Systems   Constitutional: Negative  HENT: Negative  Eyes: Negative  Respiratory: Negative  Cardiovascular: Positive for leg swelling  Painful veins   Gastrointestinal: Negative  Endocrine: Positive for cold intolerance and heat intolerance  Genitourinary: Negative  Musculoskeletal: Negative  Skin: Negative  Allergic/Immunologic: Positive for food allergies (tomatoes)  Neurological: Positive for headaches  Hematological: Bruises/bleeds easily  Psychiatric/Behavioral: Negative            Objective:        /74 (BP Location: Left arm, Patient Position: Sitting, Cuff Size: Standard)   Pulse 79   Temp 99 1 °F (37 3 °C) (Tympanic)   Ht 5' 9" (1 753 m)   Wt 72 kg (158 lb 11 7 oz)   BMI 23 44 kg/m²             Physical Exam   Constitutional: She is oriented to person, place, and time  She appears well-developed and well-nourished     HENT:   Head: Normocephalic and atraumatic  Eyes: Pupils are equal, round, and reactive to light  EOM are normal  No scleral icterus  Neck: Normal range of motion  Cardiovascular: Normal rate, regular rhythm, normal heart sounds and intact distal pulses  Pulmonary/Chest: Effort normal and breath sounds normal    Abdominal: Soft  Normal appearance and bowel sounds are normal    Musculoskeletal: Normal range of motion  Neurological: She is alert and oriented to person, place, and time  She has normal strength  Skin: Skin is warm, dry and intact  Capillary refill takes less than 2 seconds  Psychiatric: She has a normal mood and affect   Her speech is normal and behavior is normal  Judgment and thought content normal  Cognition and memory are normal    Nursing note and

## 2020-09-04 NOTE — PROGRESS NOTES
Dr Russell Singer to bedside from anesthesia , ordered more fentanyl doses for pain management, given as ordered

## 2020-09-04 NOTE — OP NOTE
OPERATIVE REPORT  PATIENT NAME: Sally Kidd    :  1995  MRN: [id number]  Pt Location: [facility]  OR ROOM 05    SURGERY DATE: 2020    Surgeon(s) and Role:     * Colin Black DO - Primary    Preop Diagnosis:  Varicose veins of both lower extremities with pain [I83 813]    Post-Op Diagnosis Codes: * Varicose veins of both lower extremities with pain [I83 813]    Procedure(s) (LRB):  ENDOVASCULAR LASER THERAPY (EVLT) + stab phlebectomies (Left)    Specimen(s):  * No specimens in log *  IV fluids:  500 mL  Estimated Blood Loss:   Minimal    Drains:  * No LDAs found *    Anesthesia Type:   General    Operative Indications:  Varicose veins of both lower extremities with pain [I83 813]  Patient is a 23-year-old woman with painful left lower extremity varicosities since the age of 17  Over time her pain has worsened  Symptoms have persisted despite a trial of compression stockings  Her venous reflux study did demonstrate superficial venous incompetence  Left greater saphenous vein EVLT and stab phlebectomies recommended  The procedure, benefits, risks, alternatives, and anticipated postop course discussed in detail  All questions were answered to her satisfaction  Patient was agreeable to proceed  Written consent was obtained  Patient brought to [facility] for the above-mentioned procedure  Operative Findings:  successful closure of left GSV post ablation  Following laser ablation of the greater saphenous vein the left common femoral vein is easily compressible and free of thrombus  7W/195sec/1368J  Total stabs 4    Complications:   None    Procedure and Technique:  The patient was properly identified in the preop holding area  Patient's name, laterality, and nature procedure verified  The operative site as well as truncal varicosities were marked  Patient was brought to the operating area which positioned supine on the OR table    After adequate induction general anesthesia patient is left lower extremity circumferentially prepped and draped in usual sterile fashion  Preop dose of antibiotics was administered  A formal time-out was conducted and all were in agreement  The ultrasound was brought onto field and greater saphenous vein map from the proximal catheter saphenofemoral junction  The greater saphenous vein was accessed in the proximal calf under ultrasound guidance using a micropuncture needle  A micro sheath was inserted  A J guidewire was next advanced under ultrasound guidance into the iliac veins  The micro sheath was exchanged out for the laser sheath  The inner dilator and wire were removed  The laser fiber was inserted through the laser sheath and tip positioned approximately 3 cm from the saphenofemoral junction  Next abhijeet venous tumescent anesthesia was infiltrated to saphenous compartment under ultrasound guidance  Following tumescent anesthesia the laser fiber was activated at 7 sandoval and slow pullback over 195 seconds carried out  Following laser ablation the ultrasound was used to confirm successful closure of the greater saphenous vein  The left common femoral vein was easily compressible and free of thrombus  Attention was next turned to the phlebectomy portion of the procedure  Multiple stab phlebectomies were carried out using a 11  Scalpel along the anterior knee coursing over to the distal medial thigh  The varicosities were avulsed using combination of mosquito clamps and vein hooks  Hemostasis was obtained with digital compression  Steri-Strips were applied to the incisions  The leg was dressed using 4 x 4 gauze, Kerlix, Ace wrap, and Coban  All needles, instruments, sponge counts were reported correct  The patient tolerated procedure well  She was awakened, extubated and transferred to the recovery room in stable condition     I was present for the entire procedure    Patient Disposition:  PACU     SIGNATURE: Colin DO [facility]  DATE: September 4, 2020  TIME: 11:49 AM

## 2020-09-04 NOTE — INTERIM OP NOTE
ENDOVASCULAR LASER THERAPY (EVLT) + stab phlebectomies  Postoperative Note  PATIENT NAME: Marine Brock  : 1995  MRN: [id number]  [facility] OR ROOM 05    Surgery Date: 2020    Preop Diagnosis:  Varicose veins of both lower extremities with pain [I83 813]    Post-Op Diagnosis Codes:      * Varicose veins of both lower extremities with pain [I83 813]    Procedure(s) (LRB):  ENDOVASCULAR LASER THERAPY (EVLT) + stab phlebectomies (Left)    Surgeon(s) and Role:     * Colin Black DO - Primary    Specimens:  * No specimens in log *    Estimated Blood Loss:   Minimal  IVf: 500ml  Anesthesia Type:   General     Findings:    successful closure of left GSV post ablation  7W/195sec/1368J  Total stabs 4  Complications:   None    SIGNATURE: Colin Black DO   DATE: 2020   TIME: 10:08 AM

## 2020-09-04 NOTE — ANESTHESIA PREPROCEDURE EVALUATION
Procedure:  ENDOVASCULAR LASER THERAPY (EVLT) + stab phlebectomies (Left Leg Upper)    Relevant Problems   ANESTHESIA (within normal limits)      CARDIO   (+) Borderline hyperlipidemia   (+) Varicose veins of both lower extremities with pain   (+) Varicose veins of left lower extremity      ENDO   (+) Hypothyroidism      GI/HEPATIC (within normal limits)      /RENAL (within normal limits)      GYN   (-) Currently pregnant      HEMATOLOGY (within normal limits)      MUSCULOSKELETAL (within normal limits)      NEURO/PSYCH (within normal limits)      PULMONARY (within normal limits)        Physical Exam    Airway    Mallampati score: I  TM Distance: >3 FB  Neck ROM: full     Dental   No notable dental hx     Cardiovascular      Pulmonary      Other Findings        Anesthesia Plan  ASA Score- 1     Anesthesia Type- general with ASA Monitors  Additional Monitors:   Airway Plan: LMA  Plan Factors-Exercise tolerance (METS): >4 METS  Chart reviewed  Existing labs reviewed  Patient summary reviewed  Patient is not a current smoker  Patient did not smoke on day of surgery  Induction- intravenous  Postoperative Plan-     Informed Consent- Anesthetic plan and risks discussed with patient  I personally reviewed this patient with the CRNA  Discussed and agreed on the Anesthesia Plan with the CRNA  Clint Lewis

## 2020-09-04 NOTE — ANESTHESIA POSTPROCEDURE EVALUATION
Post-Op Assessment Note    CV Status:  Stable  Pain Score: 0    Pain management: adequate     Mental Status:  Alert and awake   Hydration Status:  Stable   PONV Controlled:  Controlled   Airway Patency:  Patent and adequate      Post Op Vitals Reviewed: Yes      Staff: CRNA         No complications documented      BP      Temp      Pulse     Resp      SpO2

## 2020-09-08 ENCOUNTER — OFFICE VISIT (OUTPATIENT)
Dept: VASCULAR SURGERY | Facility: CLINIC | Age: 25
End: 2020-09-08

## 2020-09-08 ENCOUNTER — HOSPITAL ENCOUNTER (OUTPATIENT)
Dept: NON INVASIVE DIAGNOSTICS | Facility: CLINIC | Age: 25
Discharge: HOME | End: 2020-09-08
Payer: COMMERCIAL

## 2020-09-08 VITALS
WEIGHT: 159 LBS | HEART RATE: 70 BPM | SYSTOLIC BLOOD PRESSURE: 130 MMHG | HEIGHT: 69 IN | DIASTOLIC BLOOD PRESSURE: 100 MMHG | BODY MASS INDEX: 23.55 KG/M2 | TEMPERATURE: 98.8 F

## 2020-09-08 DIAGNOSIS — I83.813 VARICOSE VEINS OF BOTH LOWER EXTREMITIES WITH PAIN: ICD-10-CM

## 2020-09-08 DIAGNOSIS — F17.200 TOBACCO USE DISORDER: ICD-10-CM

## 2020-09-08 DIAGNOSIS — I83.813 VARICOSE VEINS OF BOTH LOWER EXTREMITIES WITH PAIN: Primary | ICD-10-CM

## 2020-09-08 PROCEDURE — 93971 EXTREMITY STUDY: CPT | Performed by: SURGERY

## 2020-09-08 PROCEDURE — 99024 POSTOP FOLLOW-UP VISIT: CPT | Performed by: PHYSICIAN ASSISTANT

## 2020-09-08 PROCEDURE — 93971 EXTREMITY STUDY: CPT

## 2020-09-08 NOTE — PATIENT INSTRUCTIONS
S/P LEFT LEG EVLT with stab phebectomy ([facility], 9/4/2020)        Procedure site and stab sites look good  Leg is healing as expected  Walk as tolerated  Use ice as needed for discomfort  Call if any questions or problems          -Post-EVLT duplex scheduled today after this visit   -graded compression when comfortable and able to tolerate  -Elevate leg as often as you can  -Activity as tolerated  -Continue healthy lifestyle changes; heart-healthy diet, low sodium and regular walking   -Tylenol or ice for mild soreness  -Patient education for venous insufficiency  -may shower; avoid lotions, creams to leg until everything is healed    -Call right away, if you feel ill, develop fever, chest pain, shortness of breath, increased leg pain,  redness at procedure sites, bleeding, numbness of the leg or pain/discoloration of feet  -Follow up 4-6 weeks with AP/NP            Varicose Veins, Ambulatory Care   GENERAL INFORMATION:   Varicose veins  are veins that become large, twisted, and swollen  They are common on the back of your calves, knees, and thighs  Common symptoms include the following:   · Blue, purple, or bulging veins in your legs     · Pain, swelling, or muscle cramps in your legs    · Feeling of heaviness in your legs  Seek immediate care for the following symptoms:   · A wound that does not heal or is infected    · An injury that has broken your skin and caused your varicose veins to bleed    · Swollen and hard leg    · Pain in your leg that does not go away or gets worse    · Legs or feet turn blue or black    · Warmth, tenderness, and pain in your leg (may also look swollen and red)  Treatment of varicose veins  aims to decrease symptoms, improve appearance, and prevent further problems  Treatment will depend on which veins are affected and how severe your condition is  Prescription pain medicine may be given  Ask how to take this medicine safely   Procedures may be done to remove your varicose veins  Your healthcare provider may inject a solution or use a laser to close the varicose veins  Surgery to remove long veins may also be done  Ask your healthcare provider for more information about procedures used in treating varicose veins  Manage varicose veins:   · Wear pressure stockings  The stockings are tight and put pressure on your legs  They improve blood flow and help prevent clots  · Elevate  your legs above the level of your heart for 15 to 30 minutes several times a day  This will help blood to flow back to your heart  · Avoid sitting or standing for long periods of time  This can cause the blood to collect in your legs and make your symptoms worse  Walk around for a few minutes every hour to get blood moving in your legs  · Avoid wearing tight clothing and shoes  Avoid wearing high-heeled shoes  Do not wear clothes that are tight around the waist     · Get plenty of exercise  Talk to your healthcare provider about the best exercise plan for you  Exercise can decrease your blood pressure and improve your health  Bend or rotate your ankles several times every hour  This will help blood to flow back to the heart  · Maintain a healthy weight  Your heart works harder when you are overweight and this can make varicose vein worse  Ask how much you should weigh  Ask him to help you create a weight loss plan if you are overweight  · Do not smoke  If you smoke, it is never too late to quit  Ask for information if you need help quitting  Follow up with your healthcare provider as directed:  Write down your questions so you remember to ask them during your visits  CARE AGREEMENT:   You have the right to help plan your care  Learn about your health condition and how it may be treated  Discuss treatment options with your caregivers to decide what care you want to receive  You always have the right to refuse treatment  The above information is an  only   It is not intended as medical advice for individual conditions or treatments  Talk to your doctor, nurse or pharmacist before following any medical regimen to see if it is safe and effective for you  © 2014 [address] is for End User's use only and may not be sold, redistributed or otherwise used for commercial purposes  All illustrations and images included in CareNotes® are the copyrighted property of [employer]  or [employer]
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
## 2020-09-08 NOTE — ASSESSMENT & PLAN NOTE
POD #4 LEFT LEG EVLT with stab phebectomy ([facility], 9/4/2020)         The patient reports that she does have some difficulty walking postoperatively but this appears to be as expected  The left medial thigh has mild ecchymoses and is a bit tender but not painful  She reports that she feels her feet have been cold and her left toes tingling  She is concerned about an area of pain, itchiness and redness to the anterior L ankle which appears to be contact related and does not look infected  No chest pain, pressure, SOB or fevers  Dressing was removed in the office today  There are no open areas, drainage, significant swelling  The stab/ procedure sites are stable  There are no palpable cords  L LE exam:  Very mild ecchymosis and mild edema as expected after surgery  4 Stab sites and procedure sites intact  No s/s of infection  No bleeding  No redness, warmth or bleeding  There is mild redness at the anterior ankle which appears to be like a contact dermatitis perhaps from the bandage  Plan:  Doing well and as expected after EVLT  The surgery sites looked very good and as expected with just mild ecchymoses  We will check post -EVLT duplex today  She may advance activities as tolerated and may use ice to help with pain and swelling   We will see her back in about 4 weeks or sooner if problems        -Post-EVLT duplex scheduled today after this visit     -We discussed venous insufficiency, post EVLT instructions and follow up      -graded compression when comfortable and able to tolerate  -Activity as tolerated    -We discussed concerning sx/sx of infection and blood cts for which patient should call to be seen sooner    -Follow up 4-6 weeks [facility]/VS

## 2020-09-08 NOTE — LETTER
September 8, 2020     Makenna Monterroso PA-C  [address] [place] [phone]    Patient: Melissa Bolden   YOB: 1995   Date of Visit: 9/8/2020     Dear Dr Esperanza Oliver      Thank you for referring Melissa Bolden to me for evaluation  Below are the relevant portions of my assessment and plan of care  If you have questions, please do not hesitate to call me  I look forward to following Shilpa along with you  Sincerely,        Fred Blankenship PA-C        CC: No Recipients    Progress Notes:      Assessment/Plan:    Varicose veins of both lower extremities with pain    POD #4 LEFT LEG EVLT with stab phebectomy ([facility], 9/4/2020)         She has been having some spasms at the patella  She complains of itchiness and redness to the anterior L ankle which appears to be contact related and does not look infected  here is mild ecchymoses and tenderness in the medial left thigh  The bandage was removed in the office  She does have a bit of difficulty walking  No chest pain, pressure, SOB or fevers  The patient reports that she does have some difficulty walking postoperatively but this appears to be as expected  The left medial thigh has mild ecchymoses and is a bit tender but not painful  She reports that she feels her feet have been cold and her left toes tingling  She is concerned about an area of pain, itchiness and redness to the anterior L ankle which appears to be contact related and does not look infected  Dressing was removed in the office today  There are no open areas, drainage, significant swelling  The stab/ procedure sites are stable  There are no palpable cords  L LE exam:  Very mild ecchymosis and mild edema as expected after surgery  4 Stab sites and procedure sites intact  No s/s of infection  No bleeding  No redness, warmth or bleeding       There is mild redness at the anterior ankle which appears to be like a contact dermatitis perhaps from the bandage  Plan:  Doing well and as expected after EVLT  The surgery sites looked very good and as expected with just mild ecchymoses  We will check post -EVLT duplex today  She may advance activities as tolerated  We will see her back in about 4 weeks or sooner if problems        -Post-EVLT duplex scheduled today after this visit     -We discussed venous insufficiency, post EVLT instructions and follow up      -graded compression when comfortable and able to tolerate  -Activity as tolerated    -We discussed concerning sx/sx of infection and blood cts for which patient should call to be seen sooner    -Follow up 4-6 weeks [facility]/VS         ADDENDUM 9/8/2020 3:30 PM  Prelim duplex negative for EHIT

## 2020-10-20 ENCOUNTER — TRANSCRIBE ORDERS (OUTPATIENT)
Dept: VASCULAR SURGERY | Facility: CLINIC | Age: 25
End: 2020-10-20

## 2020-10-26 ENCOUNTER — TELEMEDICINE (OUTPATIENT)
Dept: VASCULAR SURGERY | Facility: CLINIC | Age: 25
End: 2020-10-26

## 2020-10-26 DIAGNOSIS — I87.2 VENOUS INSUFFICIENCY: ICD-10-CM

## 2020-10-26 DIAGNOSIS — I83.892 VARICOSE VEINS OF LEFT LOWER EXTREMITY WITH OTHER COMPLICATIONS: Primary | ICD-10-CM

## 2020-10-26 PROCEDURE — 99024 POSTOP FOLLOW-UP VISIT: CPT | Performed by: NURSE PRACTITIONER

## 2020-11-30 ENCOUNTER — TELEMEDICINE (OUTPATIENT)
Dept: FAMILY MEDICINE CLINIC | Facility: CLINIC | Age: 25
End: 2020-11-30
Payer: COMMERCIAL

## 2020-11-30 DIAGNOSIS — J02.9 PHARYNGITIS, UNSPECIFIED ETIOLOGY: Primary | ICD-10-CM

## 2020-11-30 PROCEDURE — 99213 OFFICE O/P EST LOW 20 MIN: CPT | Performed by: FAMILY MEDICINE

## 2020-11-30 RX ORDER — AMOXICILLIN 500 MG/1
500 TABLET, FILM COATED ORAL 2 TIMES DAILY
Qty: 14 TABLET | Refills: 0 | Status: SHIPPED | OUTPATIENT
Start: 2020-11-30 | End: 2020-12-07

## 2020-12-15 ENCOUNTER — APPOINTMENT (EMERGENCY)
Dept: ULTRASOUND IMAGING | Facility: HOSPITAL | Age: 25
End: 2020-12-15
Payer: COMMERCIAL

## 2020-12-15 ENCOUNTER — APPOINTMENT (EMERGENCY)
Dept: CT IMAGING | Facility: HOSPITAL | Age: 25
End: 2020-12-15
Payer: COMMERCIAL

## 2020-12-15 ENCOUNTER — HOSPITAL ENCOUNTER (EMERGENCY)
Facility: HOSPITAL | Age: 25
Discharge: HOME | End: 2020-12-15
Attending: EMERGENCY MEDICINE
Payer: COMMERCIAL

## 2020-12-15 VITALS
WEIGHT: 162.92 LBS | DIASTOLIC BLOOD PRESSURE: 60 MMHG | TEMPERATURE: 98.5 F | HEART RATE: 72 BPM | BODY MASS INDEX: 24.13 KG/M2 | SYSTOLIC BLOOD PRESSURE: 120 MMHG | HEIGHT: 69 IN | RESPIRATION RATE: 14 BRPM | OXYGEN SATURATION: 98 %

## 2020-12-15 DIAGNOSIS — R10.9 ABDOMINAL PAIN: Primary | ICD-10-CM

## 2020-12-15 LAB
ALBUMIN SERPL BCP-MCNC: 4.6 G/DL (ref 3.5–5)
ALP SERPL-CCNC: 66 U/L (ref 46–116)
ALT SERPL W P-5'-P-CCNC: 25 U/L (ref 12–78)
ANION GAP SERPL CALCULATED.3IONS-SCNC: 11 MMOL/L (ref 4–13)
AST SERPL W P-5'-P-CCNC: 19 U/L (ref 5–45)
BASOPHILS # BLD AUTO: 0.08 THOUSANDS/ΜL (ref 0–0.1)
BASOPHILS NFR BLD AUTO: 1 % (ref 0–1)
BILIRUB SERPL-MCNC: 0.3 MG/DL (ref 0.2–1)
BILIRUB UR QL STRIP: NEGATIVE
BUN SERPL-MCNC: 11 MG/DL (ref 5–25)
CALCIUM SERPL-MCNC: 9.1 MG/DL (ref 8.3–10.1)
CHLORIDE SERPL-SCNC: 101 MMOL/L (ref 100–108)
CLARITY UR: CLEAR
CO2 SERPL-SCNC: 26 MMOL/L (ref 21–32)
COLOR UR: YELLOW
CREAT SERPL-MCNC: 0.75 MG/DL (ref 0.6–1.3)
EOSINOPHIL # BLD AUTO: 0.27 THOUSAND/ΜL (ref 0–0.61)
EOSINOPHIL NFR BLD AUTO: 3 % (ref 0–6)
ERYTHROCYTE [DISTWIDTH] IN BLOOD BY AUTOMATED COUNT: 11.6 % (ref 11.6–15.1)
EXT PREG TEST URINE: NEGATIVE
EXT. CONTROL ED NAV: NORMAL
GFR SERPL CREATININE-BSD FRML MDRD: 112 ML/MIN/1.73SQ M
GLUCOSE SERPL-MCNC: 97 MG/DL (ref 65–140)
GLUCOSE UR STRIP-MCNC: NEGATIVE MG/DL
HCT VFR BLD AUTO: 42.5 % (ref 34.8–46.1)
HGB BLD-MCNC: 14.4 G/DL (ref 11.5–15.4)
HGB UR QL STRIP.AUTO: NEGATIVE
HOLD SPECIMEN: NORMAL
IMM GRANULOCYTES # BLD AUTO: 0.02 THOUSAND/UL (ref 0–0.2)
IMM GRANULOCYTES NFR BLD AUTO: 0 % (ref 0–2)
KETONES UR STRIP-MCNC: NEGATIVE MG/DL
LEUKOCYTE ESTERASE UR QL STRIP: NEGATIVE
LIPASE SERPL-CCNC: 78 U/L (ref 73–393)
LYMPHOCYTES # BLD AUTO: 2.99 THOUSANDS/ΜL (ref 0.6–4.47)
LYMPHOCYTES NFR BLD AUTO: 33 % (ref 14–44)
MAGNESIUM SERPL-MCNC: 2.1 MG/DL (ref 1.6–2.6)
MCH RBC QN AUTO: 32.4 PG (ref 26.8–34.3)
MCHC RBC AUTO-ENTMCNC: 33.9 G/DL (ref 31.4–37.4)
MCV RBC AUTO: 96 FL (ref 82–98)
MONOCYTES # BLD AUTO: 0.86 THOUSAND/ΜL (ref 0.17–1.22)
MONOCYTES NFR BLD AUTO: 10 % (ref 4–12)
NEUTROPHILS # BLD AUTO: 4.79 THOUSANDS/ΜL (ref 1.85–7.62)
NEUTS SEG NFR BLD AUTO: 53 % (ref 43–75)
NITRITE UR QL STRIP: NEGATIVE
NRBC BLD AUTO-RTO: 0 /100 WBCS
PH UR STRIP.AUTO: 6 [PH]
PLATELET # BLD AUTO: 367 THOUSANDS/UL (ref 149–390)
PMV BLD AUTO: 8.9 FL (ref 8.9–12.7)
POTASSIUM SERPL-SCNC: 3.9 MMOL/L (ref 3.5–5.3)
PROT SERPL-MCNC: 8.4 G/DL (ref 6.4–8.2)
PROT UR STRIP-MCNC: NEGATIVE MG/DL
RBC # BLD AUTO: 4.45 MILLION/UL (ref 3.81–5.12)
SODIUM SERPL-SCNC: 138 MMOL/L (ref 136–145)
SP GR UR STRIP.AUTO: 1 (ref 1–1.03)
UROBILINOGEN UR QL STRIP.AUTO: 0.2 E.U./DL
WBC # BLD AUTO: 9.01 THOUSAND/UL (ref 4.31–10.16)

## 2020-12-15 PROCEDURE — 83735 ASSAY OF MAGNESIUM: CPT

## 2020-12-15 PROCEDURE — 99284 EMERGENCY DEPT VISIT MOD MDM: CPT | Performed by: EMERGENCY MEDICINE

## 2020-12-15 PROCEDURE — 80053 COMPREHEN METABOLIC PANEL: CPT

## 2020-12-15 PROCEDURE — 76856 US EXAM PELVIC COMPLETE: CPT

## 2020-12-15 PROCEDURE — 99284 EMERGENCY DEPT VISIT MOD MDM: CPT

## 2020-12-15 PROCEDURE — 76830 TRANSVAGINAL US NON-OB: CPT

## 2020-12-15 PROCEDURE — 83690 ASSAY OF LIPASE: CPT

## 2020-12-15 PROCEDURE — 81003 URINALYSIS AUTO W/O SCOPE: CPT

## 2020-12-15 PROCEDURE — 81025 URINE PREGNANCY TEST: CPT

## 2020-12-15 PROCEDURE — 96374 THER/PROPH/DIAG INJ IV PUSH: CPT

## 2020-12-15 PROCEDURE — 36415 COLL VENOUS BLD VENIPUNCTURE: CPT

## 2020-12-15 PROCEDURE — 85025 COMPLETE CBC W/AUTO DIFF WBC: CPT

## 2020-12-15 PROCEDURE — 74177 CT ABD & PELVIS W/CONTRAST: CPT

## 2020-12-15 RX ORDER — KETOROLAC TROMETHAMINE 30 MG/ML
15 INJECTION, SOLUTION INTRAMUSCULAR; INTRAVENOUS ONCE
Status: COMPLETED | OUTPATIENT
Start: 2020-12-15 | End: 2020-12-15

## 2020-12-15 RX ORDER — ACETAMINOPHEN 325 MG/1
650 TABLET ORAL ONCE
Status: COMPLETED | OUTPATIENT
Start: 2020-12-15 | End: 2020-12-15

## 2020-12-15 RX ADMIN — KETOROLAC TROMETHAMINE 15 MG: 30 INJECTION, SOLUTION INTRAMUSCULAR at 22:32

## 2020-12-15 RX ADMIN — IOHEXOL 100 ML: 350 INJECTION, SOLUTION INTRAVENOUS at 19:58

## 2020-12-15 RX ADMIN — ACETAMINOPHEN 650 MG: 325 TABLET ORAL at 22:35

## 2021-02-10 ENCOUNTER — TRANSCRIBE ORDERS (OUTPATIENT)
Dept: LAB | Facility: MEDICAL CENTER | Age: 26
End: 2021-02-10

## 2021-02-10 ENCOUNTER — LAB (OUTPATIENT)
Dept: LAB | Facility: MEDICAL CENTER | Age: 26
End: 2021-02-10
Payer: COMMERCIAL

## 2021-02-10 DIAGNOSIS — N92.1 MENORRHAGIA WITH IRREGULAR CYCLE: ICD-10-CM

## 2021-02-10 DIAGNOSIS — E03.9 HYPOTHYROIDISM, UNSPECIFIED TYPE: ICD-10-CM

## 2021-02-10 DIAGNOSIS — I83.813 VARICOSE VEINS OF BOTH LOWER EXTREMITIES WITH PAIN: ICD-10-CM

## 2021-02-10 DIAGNOSIS — Z30.2 STERILIZATION: ICD-10-CM

## 2021-02-10 DIAGNOSIS — Z30.2 STERILIZATION: Primary | ICD-10-CM

## 2021-02-10 LAB
ALBUMIN SERPL BCP-MCNC: 4.6 G/DL (ref 3.5–5)
ALP SERPL-CCNC: 61 U/L (ref 46–116)
ALT SERPL W P-5'-P-CCNC: 32 U/L (ref 12–78)
ANION GAP SERPL CALCULATED.3IONS-SCNC: 8 MMOL/L (ref 4–13)
APTT PPP: 32 SECONDS (ref 23–37)
AST SERPL W P-5'-P-CCNC: 27 U/L (ref 5–45)
B-HCG SERPL-ACNC: <2 MIU/ML
BASOPHILS # BLD AUTO: 0.06 THOUSANDS/ΜL (ref 0–0.1)
BASOPHILS NFR BLD AUTO: 1 % (ref 0–1)
BILIRUB SERPL-MCNC: 0.27 MG/DL (ref 0.2–1)
BILIRUB UR QL STRIP: NEGATIVE
BUN SERPL-MCNC: 11 MG/DL (ref 5–25)
CALCIUM SERPL-MCNC: 9.2 MG/DL (ref 8.3–10.1)
CHLORIDE SERPL-SCNC: 105 MMOL/L (ref 100–108)
CLARITY UR: CLEAR
CO2 SERPL-SCNC: 26 MMOL/L (ref 21–32)
COLOR UR: YELLOW
CREAT SERPL-MCNC: 0.86 MG/DL (ref 0.6–1.3)
EOSINOPHIL # BLD AUTO: 0.2 THOUSAND/ΜL (ref 0–0.61)
EOSINOPHIL NFR BLD AUTO: 2 % (ref 0–6)
ERYTHROCYTE [DISTWIDTH] IN BLOOD BY AUTOMATED COUNT: 11.9 % (ref 11.6–15.1)
GFR SERPL CREATININE-BSD FRML MDRD: 95 ML/MIN/1.73SQ M
GLUCOSE SERPL-MCNC: 101 MG/DL (ref 65–140)
GLUCOSE UR STRIP-MCNC: NEGATIVE MG/DL
HCT VFR BLD AUTO: 40.3 % (ref 34.8–46.1)
HGB BLD-MCNC: 13.6 G/DL (ref 11.5–15.4)
HGB UR QL STRIP.AUTO: NEGATIVE
IMM GRANULOCYTES # BLD AUTO: 0.02 THOUSAND/UL (ref 0–0.2)
IMM GRANULOCYTES NFR BLD AUTO: 0 % (ref 0–2)
INR PPP: 0.97 (ref 0.84–1.19)
KETONES UR STRIP-MCNC: NEGATIVE MG/DL
LEUKOCYTE ESTERASE UR QL STRIP: NEGATIVE
LYMPHOCYTES # BLD AUTO: 2.76 THOUSANDS/ΜL (ref 0.6–4.47)
LYMPHOCYTES NFR BLD AUTO: 33 % (ref 14–44)
MCH RBC QN AUTO: 32.5 PG (ref 26.8–34.3)
MCHC RBC AUTO-ENTMCNC: 33.7 G/DL (ref 31.4–37.4)
MCV RBC AUTO: 96 FL (ref 82–98)
MONOCYTES # BLD AUTO: 0.78 THOUSAND/ΜL (ref 0.17–1.22)
MONOCYTES NFR BLD AUTO: 9 % (ref 4–12)
NEUTROPHILS # BLD AUTO: 4.63 THOUSANDS/ΜL (ref 1.85–7.62)
NEUTS SEG NFR BLD AUTO: 55 % (ref 43–75)
NITRITE UR QL STRIP: NEGATIVE
NRBC BLD AUTO-RTO: 0 /100 WBCS
PH UR STRIP.AUTO: 7 [PH]
PLATELET # BLD AUTO: 382 THOUSANDS/UL (ref 149–390)
PMV BLD AUTO: 9.4 FL (ref 8.9–12.7)
POTASSIUM SERPL-SCNC: 3.7 MMOL/L (ref 3.5–5.3)
PROT SERPL-MCNC: 8.1 G/DL (ref 6.4–8.2)
PROT UR STRIP-MCNC: NEGATIVE MG/DL
PROTHROMBIN TIME: 12.9 SECONDS (ref 11.6–14.5)
RBC # BLD AUTO: 4.19 MILLION/UL (ref 3.81–5.12)
SODIUM SERPL-SCNC: 139 MMOL/L (ref 136–145)
SP GR UR STRIP.AUTO: 1.03 (ref 1–1.03)
UROBILINOGEN UR QL STRIP.AUTO: 0.2 E.U./DL
WBC # BLD AUTO: 8.45 THOUSAND/UL (ref 4.31–10.16)

## 2021-02-10 PROCEDURE — 85610 PROTHROMBIN TIME: CPT

## 2021-02-10 PROCEDURE — 80053 COMPREHEN METABOLIC PANEL: CPT

## 2021-02-10 PROCEDURE — 81003 URINALYSIS AUTO W/O SCOPE: CPT

## 2021-02-10 PROCEDURE — 36415 COLL VENOUS BLD VENIPUNCTURE: CPT

## 2021-02-10 PROCEDURE — 84702 CHORIONIC GONADOTROPIN TEST: CPT

## 2021-02-10 PROCEDURE — 85025 COMPLETE CBC W/AUTO DIFF WBC: CPT

## 2021-02-10 PROCEDURE — 85730 THROMBOPLASTIN TIME PARTIAL: CPT

## 2021-02-11 RX ORDER — LEVOTHYROXINE SODIUM 0.07 MG/1
75 TABLET ORAL
Qty: 90 TABLET | Refills: 0 | Status: SHIPPED | OUTPATIENT
Start: 2021-02-11 | End: 2021-05-18 | Stop reason: SDUPTHER

## 2021-02-19 ENCOUNTER — HOSPITAL ENCOUNTER (EMERGENCY)
Facility: HOSPITAL | Age: 26
Discharge: HOME | End: 2021-02-19
Attending: EMERGENCY MEDICINE
Payer: COMMERCIAL

## 2021-02-19 ENCOUNTER — APPOINTMENT (EMERGENCY)
Dept: CT IMAGING | Facility: HOSPITAL | Age: 26
End: 2021-02-19
Payer: COMMERCIAL

## 2021-02-19 VITALS
BODY MASS INDEX: 25.44 KG/M2 | HEIGHT: 69 IN | DIASTOLIC BLOOD PRESSURE: 63 MMHG | HEART RATE: 72 BPM | OXYGEN SATURATION: 96 % | WEIGHT: 171.74 LBS | SYSTOLIC BLOOD PRESSURE: 142 MMHG | RESPIRATION RATE: 16 BRPM | TEMPERATURE: 99 F

## 2021-02-19 DIAGNOSIS — G89.18 POSTOPERATIVE PAIN: Primary | ICD-10-CM

## 2021-02-19 LAB
ALBUMIN SERPL BCP-MCNC: 4 G/DL (ref 3.5–5)
ALP SERPL-CCNC: 59 U/L (ref 46–116)
ALT SERPL W P-5'-P-CCNC: 46 U/L (ref 12–78)
ANION GAP SERPL CALCULATED.3IONS-SCNC: 10 MMOL/L (ref 4–13)
AST SERPL W P-5'-P-CCNC: 24 U/L (ref 5–45)
BASOPHILS # BLD AUTO: 0.05 THOUSANDS/ΜL (ref 0–0.1)
BASOPHILS NFR BLD AUTO: 0 % (ref 0–1)
BILIRUB SERPL-MCNC: 0.2 MG/DL (ref 0.2–1)
BILIRUB UR QL STRIP: NEGATIVE
BUN SERPL-MCNC: 12 MG/DL (ref 5–25)
CALCIUM SERPL-MCNC: 9.2 MG/DL (ref 8.3–10.1)
CHLORIDE SERPL-SCNC: 103 MMOL/L (ref 100–108)
CLARITY UR: CLEAR
CO2 SERPL-SCNC: 27 MMOL/L (ref 21–32)
COLOR UR: YELLOW
CREAT SERPL-MCNC: 0.7 MG/DL (ref 0.6–1.3)
EOSINOPHIL # BLD AUTO: 0.21 THOUSAND/ΜL (ref 0–0.61)
EOSINOPHIL NFR BLD AUTO: 2 % (ref 0–6)
ERYTHROCYTE [DISTWIDTH] IN BLOOD BY AUTOMATED COUNT: 11.6 % (ref 11.6–15.1)
GFR SERPL CREATININE-BSD FRML MDRD: 122 ML/MIN/1.73SQ M
GLUCOSE SERPL-MCNC: 119 MG/DL (ref 65–140)
GLUCOSE UR STRIP-MCNC: NEGATIVE MG/DL
HCT VFR BLD AUTO: 38.9 % (ref 34.8–46.1)
HGB BLD-MCNC: 13.6 G/DL (ref 11.5–15.4)
HGB UR QL STRIP.AUTO: NEGATIVE
IMM GRANULOCYTES # BLD AUTO: 0.05 THOUSAND/UL (ref 0–0.2)
IMM GRANULOCYTES NFR BLD AUTO: 0 % (ref 0–2)
KETONES UR STRIP-MCNC: NEGATIVE MG/DL
LACTATE SERPL-SCNC: 1.9 MMOL/L (ref 0.5–2)
LEUKOCYTE ESTERASE UR QL STRIP: NEGATIVE
LIPASE SERPL-CCNC: 71 U/L (ref 73–393)
LYMPHOCYTES # BLD AUTO: 2.83 THOUSANDS/ΜL (ref 0.6–4.47)
LYMPHOCYTES NFR BLD AUTO: 20 % (ref 14–44)
MCH RBC QN AUTO: 32.9 PG (ref 26.8–34.3)
MCHC RBC AUTO-ENTMCNC: 35 G/DL (ref 31.4–37.4)
MCV RBC AUTO: 94 FL (ref 82–98)
MONOCYTES # BLD AUTO: 0.82 THOUSAND/ΜL (ref 0.17–1.22)
MONOCYTES NFR BLD AUTO: 6 % (ref 4–12)
NEUTROPHILS # BLD AUTO: 10.22 THOUSANDS/ΜL (ref 1.85–7.62)
NEUTS SEG NFR BLD AUTO: 72 % (ref 43–75)
NITRITE UR QL STRIP: NEGATIVE
NRBC BLD AUTO-RTO: 0 /100 WBCS
PH UR STRIP.AUTO: 6 [PH]
PLATELET # BLD AUTO: 328 THOUSANDS/UL (ref 149–390)
PMV BLD AUTO: 9 FL (ref 8.9–12.7)
POTASSIUM SERPL-SCNC: 3.8 MMOL/L (ref 3.5–5.3)
PROT SERPL-MCNC: 7.7 G/DL (ref 6.4–8.2)
PROT UR STRIP-MCNC: NEGATIVE MG/DL
RBC # BLD AUTO: 4.13 MILLION/UL (ref 3.81–5.12)
SODIUM SERPL-SCNC: 140 MMOL/L (ref 136–145)
SP GR UR STRIP.AUTO: 1.02 (ref 1–1.03)
UROBILINOGEN UR QL STRIP.AUTO: 0.2 E.U./DL
WBC # BLD AUTO: 14.18 THOUSAND/UL (ref 4.31–10.16)

## 2021-02-19 PROCEDURE — 96361 HYDRATE IV INFUSION ADD-ON: CPT

## 2021-02-19 PROCEDURE — 99284 EMERGENCY DEPT VISIT MOD MDM: CPT | Performed by: PHYSICIAN ASSISTANT

## 2021-02-19 PROCEDURE — 81003 URINALYSIS AUTO W/O SCOPE: CPT | Performed by: PHYSICIAN ASSISTANT

## 2021-02-19 PROCEDURE — 87040 BLOOD CULTURE FOR BACTERIA: CPT | Performed by: PHYSICIAN ASSISTANT

## 2021-02-19 PROCEDURE — 74177 CT ABD & PELVIS W/CONTRAST: CPT

## 2021-02-19 PROCEDURE — 96365 THER/PROPH/DIAG IV INF INIT: CPT

## 2021-02-19 PROCEDURE — 80053 COMPREHEN METABOLIC PANEL: CPT | Performed by: PHYSICIAN ASSISTANT

## 2021-02-19 PROCEDURE — 83605 ASSAY OF LACTIC ACID: CPT | Performed by: PHYSICIAN ASSISTANT

## 2021-02-19 PROCEDURE — 85025 COMPLETE CBC W/AUTO DIFF WBC: CPT | Performed by: PHYSICIAN ASSISTANT

## 2021-02-19 PROCEDURE — 36415 COLL VENOUS BLD VENIPUNCTURE: CPT | Performed by: PHYSICIAN ASSISTANT

## 2021-02-19 PROCEDURE — 83690 ASSAY OF LIPASE: CPT | Performed by: PHYSICIAN ASSISTANT

## 2021-02-19 PROCEDURE — G1004 CDSM NDSC: HCPCS

## 2021-02-19 PROCEDURE — 99284 EMERGENCY DEPT VISIT MOD MDM: CPT

## 2021-02-19 RX ORDER — CEFEPIME HYDROCHLORIDE 2 G/50ML
2000 INJECTION, SOLUTION INTRAVENOUS ONCE
Status: COMPLETED | OUTPATIENT
Start: 2021-02-19 | End: 2021-02-19

## 2021-02-19 RX ORDER — IBUPROFEN 600 MG/1
600 TABLET ORAL ONCE
Status: COMPLETED | OUTPATIENT
Start: 2021-02-19 | End: 2021-02-19

## 2021-02-19 RX ORDER — CEPHALEXIN 500 MG/1
500 CAPSULE ORAL EVERY 6 HOURS SCHEDULED
Qty: 28 CAPSULE | Refills: 0 | Status: SHIPPED | OUTPATIENT
Start: 2021-02-19 | End: 2021-02-26

## 2021-02-19 RX ORDER — HYDROCODONE BITARTRATE AND ACETAMINOPHEN 5; 325 MG/1; MG/1
2 TABLET ORAL EVERY 6 HOURS PRN
COMMUNITY
Start: 2021-02-16

## 2021-02-19 RX ORDER — IBUPROFEN 800 MG/1
800 TABLET ORAL EVERY 6 HOURS PRN
COMMUNITY
Start: 2021-02-16 | End: 2021-10-01 | Stop reason: SDUPTHER

## 2021-02-19 RX ADMIN — CEFEPIME HYDROCHLORIDE 2000 MG: 2 INJECTION, SOLUTION INTRAVENOUS at 17:59

## 2021-02-19 RX ADMIN — SODIUM CHLORIDE 1000 ML: 0.9 INJECTION, SOLUTION INTRAVENOUS at 17:59

## 2021-02-19 RX ADMIN — IBUPROFEN 600 MG: 600 TABLET, FILM COATED ORAL at 18:28

## 2021-02-19 RX ADMIN — IOHEXOL 100 ML: 350 INJECTION, SOLUTION INTRAVENOUS at 18:44

## 2021-02-19 NOTE — ED PROVIDER NOTES
History  Chief Complaint   Patient presents with    Post-op Problem     Patient has hx of endometriosis and had a lap ablasion D&C on Tuesday  Today when she was in the shower through the center incision she was having "green pus" coming out of it and pain  Unsure of fever because she has been taking ibuprofen and Norco q6hr  Last dose was 12:00     Patient presents to the emergency department today for evaluation of abdominal pain with foul-smelling discharge from an umbilical laparoscopic incision site  She is currently postop day 3 from an endometrial ablation that was completed by Dr Leiva through [facility] in [facility]  She states she noted the discharged in pain this morning, she states that is foul-smelling  She states she is nauseous however she has not vomited, denies problems producing stool  She states discharge is green in color  She has been self treating with Norco as well as ibuprofen last dose of each was around noon today  Prior to Admission Medications   Prescriptions Last Dose Informant Patient Reported? Taking?    Cranberry 500 MG TABS 2/19/2021 at Unknown time  Yes Yes   Sig: Take by mouth   HYDROcodone-acetaminophen (NORCO) 5-325 mg per tablet 2/19/2021 at Unknown time  Yes Yes   Sig: Take 2 tablets by mouth every 6 (six) hours as needed   Multiple Vitamin (MULTIVITAMIN) capsule 2/19/2021 at Unknown time Self Yes Yes   Sig: Take 1 capsule by mouth daily   ibuprofen (MOTRIN) 800 mg tablet 2/19/2021 at Unknown time  Yes Yes   Sig: Take 800 mg by mouth every 6 (six) hours as needed   levothyroxine 75 mcg tablet 2/19/2021 at Unknown time  No Yes   Sig: Take 1 tablet (75 mcg total) by mouth daily in the early morning      Facility-Administered Medications: None       Past Medical History:   Diagnosis Date    Depression     Disease of thyroid gland     Endometriosis        Past Surgical History:   Procedure Laterality Date    DILATION AND CURETTAGE OF UTERUS      SC ENDOVENOUS LASER, 1ST VEIN Left 2020    Procedure: ENDOVASCULAR LASER THERAPY (EVLT) + stab phlebectomies;  Surgeon: Carlo Krishnamurthy DO;  Location: [facility];  Service: Vascular    WISDOM TOOTH EXTRACTION         Family History   Problem Relation Age of Onset    Cancer Mother     Hypertension Mother     Cancer Father     Hypertension Father     Diabetes Father     Cancer Sister      I have reviewed and agree with the history as documented  E-Cigarette/Vaping    E-Cigarette Use Never User      E-Cigarette/Vaping Substances    Nicotine No     THC No     CBD No     Flavoring No     Other No     Unknown No      Social History     Tobacco Use    Smoking status: Former Smoker     Packs/day: 0 00     Quit date: 2020     Years since quittin 8    Smokeless tobacco: Never Used    Tobacco comment: 3 cigarettes per day   Substance Use Topics    Alcohol use: No    Drug use: No       Review of Systems   Constitutional: Negative for chills and fever  HENT: Negative for ear pain and sore throat  Eyes: Negative for pain and visual disturbance  Respiratory: Negative for cough and shortness of breath  Cardiovascular: Negative for chest pain and palpitations  Gastrointestinal: Positive for abdominal pain  Negative for vomiting  Genitourinary: Negative for dysuria and hematuria  Musculoskeletal: Negative for arthralgias and back pain  Skin: Negative for color change and rash  Discharge from surgical site   Neurological: Negative for seizures and syncope  All other systems reviewed and are negative  Physical Exam  Physical Exam  Vitals signs reviewed  Constitutional:       General: She is not in acute distress  Appearance: She is normal weight  She is not ill-appearing, toxic-appearing or diaphoretic  HENT:      Head: Normocephalic  Cardiovascular:      Rate and Rhythm: Normal rate  Pulses: Normal pulses     Pulmonary:      Effort: Pulmonary effort is normal    Abdominal:      Tenderness: There is abdominal tenderness  Comments: Periumbilical tenderness   Musculoskeletal: Normal range of motion  Skin:     Comments: Incident site noted in the umbilicus  No purulent discharge noted at this point  Neurological:      General: No focal deficit present  Mental Status: She is alert and oriented to person, place, and time     Psychiatric:         Mood and Affect: Mood normal          Vital Signs  ED Triage Vitals [02/19/21 1719]   Temperature Pulse Respirations Blood Pressure SpO2   99 °F (37 2 °C) 82 14 131/86 97 %      Temp Source Heart Rate Source Patient Position - Orthostatic VS BP Location FiO2 (%)   Temporal Monitor Sitting Left arm --      Pain Score       8           Vitals:    02/19/21 1730 02/19/21 1815 02/19/21 1900 02/19/21 1930   BP: 121/76 120/72 125/75 141/68   Pulse: 83 77 80 72   Patient Position - Orthostatic VS: Sitting Sitting Sitting Lying         Visual Acuity      ED Medications  Medications   sodium chloride 0 9 % bolus 1,000 mL (1,000 mL Intravenous New Bag 2/19/21 1759)   cefepime (MAXIPIME) IVPB (premix in dextrose) 2,000 mg 50 mL (0 mg Intravenous Stopped 2/19/21 1918)   ibuprofen (MOTRIN) tablet 600 mg (600 mg Oral Given 2/19/21 1828)   iohexol (OMNIPAQUE) 350 MG/ML injection (MULTI-DOSE) 100 mL (100 mL Intravenous Given 2/19/21 1844)       Diagnostic Studies  Results Reviewed     Procedure Component Value Units Date/Time    UA (URINE) with reflex to Scope [[id number]] Collected: 02/19/21 1820    Lab Status: Final result Specimen: Urine, Clean Catch Updated: 02/19/21 1826     Color, UA Yellow     Clarity, UA Clear     Specific [place], UA 1 020     pH, UA 6 0     Leukocytes, UA Negative     Nitrite, UA Negative     Protein, UA Negative mg/dl      Glucose, UA Negative mg/dl      Ketones, UA Negative mg/dl      Urobilinogen, UA 0 2 E U /dl      Bilirubin, UA Negative     Blood, UA Negative    Lactic acid [[id number]]  (Normal) Collected: 02/19/21 1746    Lab Status: Final result Specimen: Blood from Arm, Right Updated: 02/19/21 1812     LACTIC ACID 1 9 mmol/L     Narrative:      Result may be elevated if tourniquet was used during collection  Blood culture #2 [[id number]] Collected: 02/19/21 1758    Lab Status:  In process Specimen: Blood from Arm, Right Updated: 02/19/21 1809    Comprehensive metabolic panel [[id number]] Collected: 02/19/21 1746    Lab Status: Final result Specimen: Blood from Arm, Right Updated: 02/19/21 1809     Sodium 140 mmol/L      Potassium 3 8 mmol/L      Chloride 103 mmol/L      CO2 27 mmol/L      ANION GAP 10 mmol/L      BUN 12 mg/dL      Creatinine 0 70 mg/dL      Glucose 119 mg/dL      Calcium 9 2 mg/dL      AST 24 U/L      ALT 46 U/L      Alkaline Phosphatase 59 U/L      Total Protein 7 7 g/dL      Albumin 4 0 g/dL      Total Bilirubin 0 20 mg/dL      eGFR 122 ml/min/1 73sq m     Narrative:      [place] guidelines for Chronic Kidney Disease (CKD):     Stage 1 with normal or high GFR (GFR > 90 mL/min/1 73 square meters)    Stage 2 Mild CKD (GFR = 60-89 mL/min/1 73 square meters)    Stage 3A Moderate CKD (GFR = 45-59 mL/min/1 73 square meters)    Stage 3B Moderate CKD (GFR = 30-44 mL/min/1 73 square meters)    Stage 4 Severe CKD (GFR = 15-29 mL/min/1 73 square meters)    Stage 5 End Stage CKD (GFR <15 mL/min/1 73 square meters)  Note: GFR calculation is accurate only with a steady state creatinine    Lipase [[id number]]  (Abnormal) Collected: 02/19/21 1746    Lab Status: Final result Specimen: Blood from Arm, Right Updated: 02/19/21 1804     Lipase 71 u/L     CBC and differential [[id number]]  (Abnormal) Collected: 02/19/21 1746    Lab Status: Final result Specimen: Blood from Arm, Right Updated: 02/19/21 1753     WBC 14 18 Thousand/uL      RBC 4 13 Million/uL      Hemoglobin 13 6 g/dL      Hematocrit 38 9 %      MCV 94 fL      MCH 32 9 pg      MCHC 35 0 g/dL RDW 11 6 %      MPV 9 0 fL      Platelets 497 Thousands/uL      nRBC 0 /100 WBCs      Neutrophils Relative 72 %      Immat GRANS % 0 %      Lymphocytes Relative 20 %      Monocytes Relative 6 %      Eosinophils Relative 2 %      Basophils Relative 0 %      Neutrophils Absolute 10 22 Thousands/µL      Immature Grans Absolute 0 05 Thousand/uL      Lymphocytes Absolute 2 83 Thousands/µL      Monocytes Absolute 0 82 Thousand/µL      Eosinophils Absolute 0 21 Thousand/µL      Basophils Absolute 0 05 Thousands/µL     Blood culture #1 [[id number]] Collected: 02/19/21 1746    Lab Status: In process Specimen: Blood from Arm, Right Updated: 02/19/21 1751                 CT abdomen pelvis with contrast   Final Result by Agus Wakefield MD (02/19 1927)   1  Small amount of pelvic ascites  Trace pneumoperitoneum most likely related to laparoscopic surgery  2  Prominent endometrial canal measuring 3 cm  Right adnexal cyst measuring 2 7 cm      3  Tiny air pocket at the umbilicus  No fluid collection  Minimal inflammatory changes  Workstation performed: [id number]                    Procedures  Procedures         ED Course  ED Course as of Feb 19 2005 Fri Feb 19, 2021   1821 LACTIC ACID: 1 9   1821 Lipase(!): 71   1821 WBC(!): 14 18   1821 Hemoglobin: 13 6   1821 Chloride: 103   1821 eGFR: 122   1821 LACTIC ACID: 1 9   1833 Blood, UA: Negative   1833 Clarity, UA: Clear   1833 Color, UA: Yellow   1833 Awaiting CT      1853 Awaiting CT read      1934 IMPRESSION:  1  Small amount of pelvic ascites  Trace pneumoperitoneum most likely related to laparoscopic surgery      2  Prominent endometrial canal measuring 3 cm  Right adnexal cyst measuring 2 7 cm     3 Tiny air pocket at the umbilicus  No fluid collection  Minimal inflammatory changes        12 I spoke with on call Dr Jeremy Ram from [facility], we went over the physical examination, lab work, and CT findings, she agrees with starting some skin floor antibiotics, she can call the office on Monday morning  SBIRT 20yo+      Most Recent Value   SBIRT (22 yo +)   In order to provide better care to our patients, we are screening all of our patients for alcohol and drug use  Would it be okay to ask you these screening questions? Yes Filed at: 02/19/2021 1722   Initial Alcohol Screen: US AUDIT-C    1  How often do you have a drink containing alcohol?  0 Filed at: 02/19/2021 1722   2  How many drinks containing alcohol do you have on a typical day you are drinking? 0 Filed at: 02/19/2021 1722   3a  Male UNDER 65: How often do you have five or more drinks on one occasion? 0 Filed at: 02/19/2021 1722   3b  FEMALE Any Age, or MALE 65+: How often do you have 4 or more drinks on one occassion? 0 Filed at: 02/19/2021 1722   Audit-C Score  0 Filed at: 02/19/2021 1722   GAUTAM: How many times in the past year have you    Used an illegal drug or used a prescription medication for non-medical reasons? Never Filed at: 02/19/2021 1722                    MDM    Disposition  Final diagnoses:   Postoperative pain     Time reflects when diagnosis was documented in both MDM as applicable and the Disposition within this note     Time User Action Codes Description Comment    2/19/2021  7:52 PM Ulysses Dage Add [H94 70] Postoperative pain       ED Disposition     ED Disposition Condition Date/Time Comment    Discharge Stable Fri Feb 19, 2021  7:52 PM Shilpa Beil discharge to home/self care              Follow-up Information     Follow up With Specialties Details Why Contact Info    Sherrill Barrettr, NAEEM Family Medicine, Physician Assistant Schedule an appointment as soon as possible for a visit  As needed [address] [place] [address]  [phone]      OBGYN  Call in 2 days            Patient's Medications   Discharge Prescriptions    CEPHALEXIN (KEFLEX) 500 MG CAPSULE    Take 1 capsule (500 mg total) by mouth every 6 (six) hours for 7 days       Start Date: 2/19/2021 End Date: 2/26/2021       Order Dose: 500 mg       Quantity: 28 capsule    Refills: 0     No discharge procedures on file      PDMP Review     None          ED Provider  Electronically Signed by           Lenora Cardona PA-C  02/19/21 2005

## 2021-02-19 NOTE — ED NOTES
Patient ambulated to the bathroom  She was steady on her feet, but hand on abdomen in pain        Сергей Mejia RN  02/19/21 2250

## 2021-02-25 LAB
BACTERIA BLD CULT: NORMAL
BACTERIA BLD CULT: NORMAL

## 2021-04-01 ENCOUNTER — APPOINTMENT (EMERGENCY)
Dept: CT IMAGING | Facility: HOSPITAL | Age: 26
End: 2021-04-01
Payer: COMMERCIAL

## 2021-04-01 ENCOUNTER — HOSPITAL ENCOUNTER (EMERGENCY)
Facility: HOSPITAL | Age: 26
Discharge: HOME | End: 2021-04-01
Attending: EMERGENCY MEDICINE | Admitting: EMERGENCY MEDICINE
Payer: COMMERCIAL

## 2021-04-01 VITALS
HEART RATE: 88 BPM | BODY MASS INDEX: 25.23 KG/M2 | SYSTOLIC BLOOD PRESSURE: 115 MMHG | TEMPERATURE: 99 F | OXYGEN SATURATION: 100 % | DIASTOLIC BLOOD PRESSURE: 67 MMHG | WEIGHT: 170.86 LBS | RESPIRATION RATE: 18 BRPM

## 2021-04-01 DIAGNOSIS — R19.8 UMBILICUS DISCHARGE: Primary | ICD-10-CM

## 2021-04-01 LAB
ALBUMIN SERPL BCP-MCNC: 4.5 G/DL (ref 3.5–5)
ALP SERPL-CCNC: 72 U/L (ref 46–116)
ALT SERPL W P-5'-P-CCNC: 41 U/L (ref 12–78)
ANION GAP SERPL CALCULATED.3IONS-SCNC: 12 MMOL/L (ref 4–13)
APTT PPP: 31 SECONDS (ref 23–37)
AST SERPL W P-5'-P-CCNC: 22 U/L (ref 5–45)
BASOPHILS # BLD AUTO: 0.03 THOUSANDS/ΜL (ref 0–0.1)
BASOPHILS NFR BLD AUTO: 0 % (ref 0–1)
BILIRUB SERPL-MCNC: 0.25 MG/DL (ref 0.2–1)
BILIRUB UR QL STRIP: NEGATIVE
BUN SERPL-MCNC: 10 MG/DL (ref 5–25)
CALCIUM SERPL-MCNC: 9.2 MG/DL (ref 8.3–10.1)
CHLORIDE SERPL-SCNC: 103 MMOL/L (ref 100–108)
CLARITY UR: CLEAR
CO2 SERPL-SCNC: 25 MMOL/L (ref 21–32)
COLOR UR: YELLOW
CREAT SERPL-MCNC: 0.75 MG/DL (ref 0.6–1.3)
EOSINOPHIL # BLD AUTO: 0.24 THOUSAND/ΜL (ref 0–0.61)
EOSINOPHIL NFR BLD AUTO: 2 % (ref 0–6)
ERYTHROCYTE [DISTWIDTH] IN BLOOD BY AUTOMATED COUNT: 12.1 % (ref 11.6–15.1)
EXT PREG TEST URINE: NEGATIVE
EXT. CONTROL ED NAV: NORMAL
GFR SERPL CREATININE-BSD FRML MDRD: 112 ML/MIN/1.73SQ M
GLUCOSE SERPL-MCNC: 103 MG/DL (ref 65–140)
GLUCOSE UR STRIP-MCNC: NEGATIVE MG/DL
HCT VFR BLD AUTO: 41.2 % (ref 34.8–46.1)
HGB BLD-MCNC: 14.2 G/DL (ref 11.5–15.4)
HGB UR QL STRIP.AUTO: NEGATIVE
INR PPP: 0.96 (ref 0.84–1.19)
KETONES UR STRIP-MCNC: NEGATIVE MG/DL
LACTATE SERPL-SCNC: 1.3 MMOL/L (ref 0.5–2)
LEUKOCYTE ESTERASE UR QL STRIP: NEGATIVE
LIPASE SERPL-CCNC: 88 U/L (ref 73–393)
LYMPHOCYTES # BLD AUTO: 2.72 THOUSANDS/ΜL (ref 0.6–4.47)
LYMPHOCYTES NFR BLD AUTO: 24 % (ref 14–44)
MCH RBC QN AUTO: 33.3 PG (ref 26.8–34.3)
MCHC RBC AUTO-ENTMCNC: 34.5 G/DL (ref 31.4–37.4)
MCV RBC AUTO: 97 FL (ref 82–98)
MONOCYTES # BLD AUTO: 0.93 THOUSAND/ΜL (ref 0.17–1.22)
MONOCYTES NFR BLD AUTO: 8 % (ref 4–12)
NEUTROPHILS # BLD AUTO: 7.31 THOUSANDS/ΜL (ref 1.85–7.62)
NEUTS SEG NFR BLD AUTO: 66 % (ref 43–75)
NITRITE UR QL STRIP: NEGATIVE
PH UR STRIP.AUTO: 6.5 [PH]
PLATELET # BLD AUTO: 369 THOUSANDS/UL (ref 149–390)
PMV BLD AUTO: 9.2 FL (ref 8.9–12.7)
POTASSIUM SERPL-SCNC: 3.9 MMOL/L (ref 3.5–5.3)
PROT SERPL-MCNC: 8.6 G/DL (ref 6.4–8.2)
PROT UR STRIP-MCNC: NEGATIVE MG/DL
PROTHROMBIN TIME: 12.6 SECONDS (ref 11.6–14.5)
RBC # BLD AUTO: 4.27 MILLION/UL (ref 3.81–5.12)
SODIUM SERPL-SCNC: 140 MMOL/L (ref 136–145)
SP GR UR STRIP.AUTO: 1.01 (ref 1–1.03)
UROBILINOGEN UR QL STRIP.AUTO: 0.2 E.U./DL
WBC # BLD AUTO: 11.23 THOUSAND/UL (ref 4.31–10.16)

## 2021-04-01 PROCEDURE — 87040 BLOOD CULTURE FOR BACTERIA: CPT | Performed by: PHYSICIAN ASSISTANT

## 2021-04-01 PROCEDURE — 83605 ASSAY OF LACTIC ACID: CPT | Performed by: PHYSICIAN ASSISTANT

## 2021-04-01 PROCEDURE — 80053 COMPREHEN METABOLIC PANEL: CPT | Performed by: PHYSICIAN ASSISTANT

## 2021-04-01 PROCEDURE — 74177 CT ABD & PELVIS W/CONTRAST: CPT

## 2021-04-01 PROCEDURE — 96361 HYDRATE IV INFUSION ADD-ON: CPT

## 2021-04-01 PROCEDURE — 81003 URINALYSIS AUTO W/O SCOPE: CPT | Performed by: PHYSICIAN ASSISTANT

## 2021-04-01 PROCEDURE — 85025 COMPLETE CBC W/AUTO DIFF WBC: CPT | Performed by: PHYSICIAN ASSISTANT

## 2021-04-01 PROCEDURE — 36415 COLL VENOUS BLD VENIPUNCTURE: CPT | Performed by: PHYSICIAN ASSISTANT

## 2021-04-01 PROCEDURE — 99284 EMERGENCY DEPT VISIT MOD MDM: CPT | Performed by: PHYSICIAN ASSISTANT

## 2021-04-01 PROCEDURE — G1004 CDSM NDSC: HCPCS

## 2021-04-01 PROCEDURE — 96360 HYDRATION IV INFUSION INIT: CPT

## 2021-04-01 PROCEDURE — 85730 THROMBOPLASTIN TIME PARTIAL: CPT | Performed by: PHYSICIAN ASSISTANT

## 2021-04-01 PROCEDURE — 99284 EMERGENCY DEPT VISIT MOD MDM: CPT

## 2021-04-01 PROCEDURE — 81025 URINE PREGNANCY TEST: CPT | Performed by: PHYSICIAN ASSISTANT

## 2021-04-01 PROCEDURE — 85610 PROTHROMBIN TIME: CPT | Performed by: PHYSICIAN ASSISTANT

## 2021-04-01 PROCEDURE — 83690 ASSAY OF LIPASE: CPT | Performed by: PHYSICIAN ASSISTANT

## 2021-04-01 RX ORDER — CEPHALEXIN 500 MG/1
500 CAPSULE ORAL EVERY 6 HOURS SCHEDULED
Qty: 20 CAPSULE | Refills: 0 | Status: SHIPPED | OUTPATIENT
Start: 2021-04-01 | End: 2021-04-06

## 2021-04-01 RX ORDER — CEPHALEXIN 250 MG/1
500 CAPSULE ORAL ONCE
Status: COMPLETED | OUTPATIENT
Start: 2021-04-01 | End: 2021-04-01

## 2021-04-01 RX ADMIN — CEPHALEXIN 500 MG: 250 CAPSULE ORAL at 15:46

## 2021-04-01 RX ADMIN — IOHEXOL 100 ML: 350 INJECTION, SOLUTION INTRAVENOUS at 14:39

## 2021-04-01 RX ADMIN — SODIUM CHLORIDE 1000 ML: 0.9 INJECTION, SOLUTION INTRAVENOUS at 14:05

## 2021-04-01 NOTE — ED PROVIDER NOTES
History  Chief Complaint   Patient presents with    Post-op Problem     pt states she had an ablasion 5 weeks ago and now has drainage from incision site starting yesterday  pt also c/o chills and fever     26year old female presents for evaluation of drainage from her belly button  Pt reports she had surgery performed by Dr Chelsey Luque from [facility] in [facility] in February  She notes she had some drainage from her belly button incision site and was previously evaluated here for the same  She notes she took the antibiotic as prescribed and her symptoms had improved  She reports drainage started yesterday and noticed today at work  She reports mild belly pain around the umbilicus  She also reports intermittent fever and chills  Temperature not measured at home  She notes the symptoms are similar to her prior ED visit  She is concerned about an infection  She tried calling her OB/GYN but wasn't able to get an appointment  Denies N/V  Reports some diarrhea  Denies dysuria, hematuria, flank pain  Reports urinary frequency  No reported aggravating or alleviating factors  Tried applying topical antibiotic ointment without relief  Reviewed prior records (had LAPAROSCOPY, DIAGNOSTIC, Laparoscopic tubal sterilization 59010/Hysteroscopy D&C/Rashida ablation, paracervical block) performed on 2/17/21  History provided by:  Patient and medical records   used: No        Prior to Admission Medications   Prescriptions Last Dose Informant Patient Reported? Taking?    Cranberry 500 MG TABS Not Taking at Unknown time  Yes No   Sig: Take by mouth   HYDROcodone-acetaminophen (NORCO) 5-325 mg per tablet Not Taking at Unknown time  Yes No   Sig: Take 2 tablets by mouth every 6 (six) hours as needed   Multiple Vitamin (MULTIVITAMIN) capsule 4/1/2021 at Unknown time Self Yes Yes   Sig: Take 1 capsule by mouth daily   ibuprofen (MOTRIN) 800 mg tablet Not Taking at Unknown time  Yes No   Sig: Take 800 mg by mouth every 6 (six) hours as needed   levothyroxine 75 mcg tablet 2021 at Unknown time  No Yes   Sig: Take 1 tablet (75 mcg total) by mouth daily in the early morning      Facility-Administered Medications: None       Past Medical History:   Diagnosis Date    Depression     Disease of thyroid gland     Endometriosis        Past Surgical History:   Procedure Laterality Date    DILATION AND CURETTAGE OF UTERUS      DC ENDOVENOUS LASER, 1ST VEIN Left 2020    Procedure: ENDOVASCULAR LASER THERAPY (EVLT) + stab phlebectomies;  Surgeon: Christopher Roa DO;  Location: [facility];  Service: Vascular    WISDOM TOOTH EXTRACTION         Family History   Problem Relation Age of Onset    Cancer Mother     Hypertension Mother     Cancer Father     Hypertension Father     Diabetes Father     Cancer Sister      I have reviewed and agree with the history as documented  E-Cigarette/Vaping    E-Cigarette Use Never User      E-Cigarette/Vaping Substances    Nicotine No     THC No     CBD No     Flavoring No     Other No     Unknown No      Social History     Tobacco Use    Smoking status: Former Smoker     Packs/day: 0 00     Quit date: 2020     Years since quittin 9    Smokeless tobacco: Never Used    Tobacco comment: 3 cigarettes per day   Substance Use Topics    Alcohol use: No    Drug use: No       Review of Systems   Constitutional: Positive for chills and fever  Negative for fatigue  HENT: Negative  Negative for congestion, rhinorrhea and sore throat  Eyes: Negative  Negative for visual disturbance  Respiratory: Negative  Negative for cough, shortness of breath and wheezing  Cardiovascular: Negative  Negative for chest pain, palpitations and leg swelling  Gastrointestinal: Positive for abdominal pain and diarrhea  Negative for constipation, nausea and vomiting  Genitourinary: Positive for frequency   Negative for dysuria, flank pain, hematuria, vaginal bleeding and vaginal discharge  Musculoskeletal: Negative  Negative for back pain and myalgias  Skin: Positive for wound  Negative for rash  Neurological: Negative  Negative for dizziness, light-headedness and headaches  Psychiatric/Behavioral: Negative  Negative for confusion  All other systems reviewed and are negative  Physical Exam  Physical Exam  Vitals signs and nursing note reviewed  Constitutional:       General: She is not in acute distress  Appearance: She is well-developed  She is not toxic-appearing  HENT:      Head: Normocephalic and atraumatic  Right Ear: Hearing and external ear normal       Left Ear: Hearing and external ear normal       Nose: Nose normal       Mouth/Throat:      Pharynx: Oropharynx is clear  Uvula midline  No oropharyngeal exudate  Eyes:      General: No scleral icterus  Conjunctiva/sclera: Conjunctivae normal       Pupils: Pupils are equal, round, and reactive to light  Neck:      Musculoskeletal: Neck supple  Trachea: Trachea and phonation normal  No tracheal deviation  Cardiovascular:      Rate and Rhythm: Normal rate and regular rhythm  Pulses: Normal pulses  Heart sounds: Normal heart sounds, S1 normal and S2 normal  No murmur  Pulmonary:      Effort: Pulmonary effort is normal  No respiratory distress  Breath sounds: Normal breath sounds  No wheezing, rhonchi or rales  Abdominal:      General: Bowel sounds are normal  There is no distension  Palpations: Abdomen is soft  Tenderness: There is abdominal tenderness (mild) in the periumbilical area  There is no right CVA tenderness, left CVA tenderness, guarding or rebound  Hernia: No hernia is present  Comments: There is a scant amount of drainage noted from umbilicus  No ulceration or significant open wounds  Musculoskeletal: Normal range of motion  General: No tenderness  Skin:     General: Skin is warm and dry  Capillary Refill: Capillary refill takes less than 2 seconds  Findings: No erythema or rash  Neurological:      General: No focal deficit present  Mental Status: She is alert and oriented to person, place, and time  GCS: GCS eye subscore is 4  GCS verbal subscore is 5  GCS motor subscore is 6  Cranial Nerves: No cranial nerve deficit  Sensory: No sensory deficit  Motor: No abnormal muscle tone  Gait: Gait normal    Psychiatric:         Mood and Affect: Mood normal          Speech: Speech normal          Behavior: Behavior normal          Vital Signs  ED Triage Vitals   Temperature Pulse Respirations Blood Pressure SpO2   04/01/21 1334 04/01/21 1325 04/01/21 1322 04/01/21 1325 04/01/21 1325   99 °F (37 2 °C) 104 16 146/75 98 %      Temp Source Heart Rate Source Patient Position - Orthostatic VS BP Location FiO2 (%)   04/01/21 1334 04/01/21 1322 04/01/21 1322 04/01/21 1322 --   Temporal Monitor Sitting Right arm       Pain Score       --                  Vitals:    04/01/21 1445 04/01/21 1500 04/01/21 1515 04/01/21 1530   BP: 121/71 113/56  115/67   Pulse: 96 85 79 88   Patient Position - Orthostatic VS:             Visual Acuity      ED Medications  Medications   sodium chloride 0 9 % bolus 1,000 mL (0 mL Intravenous Stopped 4/1/21 1600)   iohexol (OMNIPAQUE) 350 MG/ML injection (SINGLE-DOSE) 100 mL (100 mL Intravenous Given 4/1/21 1439)   cephalexin (KEFLEX) capsule 500 mg (500 mg Oral Given 4/1/21 1546)       Diagnostic Studies  Results Reviewed     Procedure Component Value Units Date/Time    Lactic acid [[id number]]  (Normal) Collected: 04/01/21 1352    Lab Status: Final result Specimen: Blood from Arm, Right Updated: 04/01/21 1424     LACTIC ACID 1 3 mmol/L     Narrative:      Result may be elevated if tourniquet was used during collection      Comprehensive metabolic panel [[id number]]  (Abnormal) Collected: 04/01/21 1352    Lab Status: Final result Specimen: Blood from Arm, Right Updated: 04/01/21 1419     Sodium 140 mmol/L      Potassium 3 9 mmol/L      Chloride 103 mmol/L      CO2 25 mmol/L      ANION GAP 12 mmol/L      BUN 10 mg/dL      Creatinine 0 75 mg/dL      Glucose 103 mg/dL      Calcium 9 2 mg/dL      AST 22 U/L      ALT 41 U/L      Alkaline Phosphatase 72 U/L      Total Protein 8 6 g/dL      Albumin 4 5 g/dL      Total Bilirubin 0 25 mg/dL      eGFR 112 ml/min/1 73sq m     Narrative:      [place] guidelines for Chronic Kidney Disease (CKD):     Stage 1 with normal or high GFR (GFR > 90 mL/min/1 73 square meters)    Stage 2 Mild CKD (GFR = 60-89 mL/min/1 73 square meters)    Stage 3A Moderate CKD (GFR = 45-59 mL/min/1 73 square meters)    Stage 3B Moderate CKD (GFR = 30-44 mL/min/1 73 square meters)    Stage 4 Severe CKD (GFR = 15-29 mL/min/1 73 square meters)    Stage 5 End Stage CKD (GFR <15 mL/min/1 73 square meters)  Note: GFR calculation is accurate only with a steady state creatinine    Lipase [[id number]]  (Normal) Collected: 04/01/21 1352    Lab Status: Final result Specimen: Blood from Arm, Right Updated: 04/01/21 1417     Lipase 88 u/L     Protime-INR [[id number]]  (Normal) Collected: 04/01/21 1352    Lab Status: Final result Specimen: Blood from Arm, Right Updated: 04/01/21 1416     Protime 12 6 seconds      INR 0 96    APTT [[id number]]  (Normal) Collected: 04/01/21 1352    Lab Status: Final result Specimen: Blood from Arm, Right Updated: 04/01/21 1416     PTT 31 seconds     UA (URINE) with reflex to Scope [[id number]] Collected: 04/01/21 1352    Lab Status: Final result Specimen: Urine, Clean Catch Updated: 04/01/21 1408     Color, UA Yellow     Clarity, UA Clear     Specific Gravity, UA 1 015     pH, UA 6 5     Leukocytes, UA Negative     Nitrite, UA Negative     Protein, UA Negative mg/dl      Glucose, UA Negative mg/dl      Ketones, UA Negative mg/dl      Urobilinogen, UA 0 2 E U /dl      Bilirubin, UA Negative     Blood, UA Negative    CBC and differential [[id number]]  (Abnormal) Collected: 04/01/21 1352    Lab Status: Final result Specimen: Blood from Arm, Right Updated: 04/01/21 1404     WBC 11 23 Thousand/uL      RBC 4 27 Million/uL      Hemoglobin 14 2 g/dL      Hematocrit 41 2 %      MCV 97 fL      MCH 33 3 pg      MCHC 34 5 g/dL      RDW 12 1 %      MPV 9 2 fL      Platelets 433 Thousands/uL      Neutrophils Relative 66 %      Lymphocytes Relative 24 %      Monocytes Relative 8 %      Eosinophils Relative 2 %      Basophils Relative 0 %      Neutrophils Absolute 7 31 Thousands/µL      Lymphocytes Absolute 2 72 Thousands/µL      Monocytes Absolute 0 93 Thousand/µL      Eosinophils Absolute 0 24 Thousand/µL      Basophils Absolute 0 03 Thousands/µL     Blood culture #2 [[id number]] Collected: 04/01/21 1352    Lab Status: In process Specimen: Blood from Arm, Right Updated: 04/01/21 1400    Blood culture #1 [[id number]] Collected: 04/01/21 1356    Lab Status: In process Specimen: Blood from Arm, Left Updated: 04/01/21 1400    POCT pregnancy, urine [[id number]]  (Normal) Resulted: 04/01/21 1351    Lab Status: Final result Updated: 04/01/21 1351     EXT PREG TEST UR (Ref: Negative) Negative     Control Valid                 CT abdomen pelvis with contrast   Final Result by Rebekah Lanza DO (04/01 1529)      No acute inflammatory process identified  Specifically, there is no abnormal fluid collection in the abdominal wall/umbilicus  Workstation performed: [id number]                    Procedures  Procedures         ED Course  ED Course as of Apr 01 1608   u Apr 01, 2021   1320 Reviewed ED visit from 2/19/21  Was discharged on keflex at that time  1356 PREGNANCY TEST URINE: Negative   1405 Improved from 14 18 one month ago   WBC(!): 11 23   1405 Hemoglobin: 14 2   1405 Platelet Count: 525   1409 Unremarkable     UA (URINE) with reflex to Scope   1416 INR: 0 96   1416 Protime: 12 6   1416 PTT: 31   1418 Lipase: 88 1430 Glucose, Random: 103   1430 Creatinine: 0 75   1430 BUN: 10   1430 Sodium: 140   1430 Potassium: 3 9   1430 Chloride: 103   1430 CO2: 25   1430 Anion Gap: 12   1430 Calcium: 9 2   1430 AST: 22   1430 ALT: 41   1430 Alkaline Phosphatase: 72   1430 Total Protein(!): 8 6   1430 Albumin: 4 5   1430 TOTAL BILIRUBIN: 0 25   1430 eGFR: 112   1430 LACTIC ACID: 1 3   1531 IMPRESSION:     No acute inflammatory process identified      Specifically, there is no abnormal fluid collection in the abdominal wall/umbilicus  CT abdomen pelvis with contrast   1534 Pt updated on results  Will have pt follow up with her OB/GYN Dr Joslyn Stovall for recheck  There is concern for a localized infection and responded previously to PO antibiotics  Will discharge pt and have her follow up to ensure resolution  No worrisome features on labs and no acute findings noted on CT imaging  Pt has some localized irritated around the umbilicus  No significant cellulitis  No open wounds  Will have pt follow up with her OB/GYN  Reviewed continued local wound care  Advised to keep clean and dry  Will discharge on keflex  Pt requesting medication for vaginal yeast infection as she notes this usually happens when she takes an antibiotic  Will also Rx monistat as needed  Strict return precautions outlined  Advised outpatient follow up with Dr Joslyn Stovall for recheck or return to ER for change in condition as outlined  Pt verbalized understanding and had no further questions  SBIRT 22yo+      Most Recent Value   SBIRT (22 yo +)   In order to provide better care to our patients, we are screening all of our patients for alcohol and drug use  Would it be okay to ask you these screening questions?   No Filed at: 04/01/2021 1450                    MDM  Number of Diagnoses or Management Options  Umbilicus discharge: new and requires workup     Amount and/or Complexity of Data Reviewed  Clinical lab tests: ordered and reviewed  Tests in the radiology section of CPT®: ordered and reviewed  Decide to obtain previous medical records or to obtain history from someone other than the patient: yes  Obtain history from someone other than the patient: yes  Review and summarize past medical records: yes  Independent visualization of images, tracings, or specimens: yes    Patient Progress  Patient progress: improved      Disposition  Final diagnoses:   Umbilicus discharge     Time reflects when diagnosis was documented in both MDM as applicable and the Disposition within this note     Time User Action Codes Description Comment    4/1/2021  3:41 PM Esperanza Wheat Add [S84 1] [address] discharge       ED Disposition     ED Disposition Condition Date/Time Comment    Discharge Stable Thu Apr 1, 2021  3:42 PM Shilpa Beil discharge to home/self care              Follow-up Information     Follow up With Specialties Details Why Contact Info Additional Information    Jennie Delgado DO Obstetrics and Gynecology, Obstetrics, Gynecology Schedule an appointment as soon as possible for a visit   [address] [facility] [place] [address]  [address] [facility] Emergency Department Emergency Medicine  As needed [facility] [address] [phone]  [address] [facility] Emergency Department[address], [address]          Discharge Medication List as of 4/1/2021  3:44 PM      START taking these medications    Details   cephalexin (KEFLEX) 500 mg capsule Take 1 capsule (500 mg total) by mouth every 6 (six) hours for 5 days, Starting Thu 4/1/2021, Until Tue 4/6/2021, Normal         CONTINUE these medications which have NOT CHANGED    Details   levothyroxine 75 mcg tablet Take 1 tablet (75 mcg total) by mouth daily in the early morning, Starting Thu 2/11/2021, Normal      Multiple Vitamin (MULTIVITAMIN) capsule Take 1 capsule by mouth daily, Historical Med      Cranberry 500 MG TABS Take by mouth, Historical Med      HYDROcodone-acetaminophen (NORCO) 5-325 mg per tablet Take 2 tablets by mouth every 6 (six) hours as needed, Starting Tue 2/16/2021, Historical Med      ibuprofen (MOTRIN) 800 mg tablet Take 800 mg by mouth every 6 (six) hours as needed, Starting Tue 2/16/2021, Until Wed 2/16/2022, Historical Med           No discharge procedures on file      PDMP Review     None          ED Provider  Electronically Signed by           Julito Cedeño PA-C  04/01/21 6754

## 2021-04-01 NOTE — DISCHARGE INSTRUCTIONS
Continue local wound care  Wash daily with warm water and soap  Keep clean and dry  Take antibiotic as directed for the full duration  Continue to alternate OTC tylenol and ibuprofen as needed for discomfort  Follow up with Dr Anabel Guerra for recheck of the area or return to ER as needed

## 2021-04-06 ENCOUNTER — TELEPHONE (OUTPATIENT)
Dept: INTERNAL MEDICINE CLINIC | Facility: CLINIC | Age: 26
End: 2021-04-06

## 2021-04-06 LAB
BACTERIA BLD CULT: NORMAL
BACTERIA BLD CULT: NORMAL

## 2021-05-18 ENCOUNTER — OFFICE VISIT (OUTPATIENT)
Dept: FAMILY MEDICINE CLINIC | Facility: CLINIC | Age: 26
End: 2021-05-18
Payer: COMMERCIAL

## 2021-05-18 VITALS
BODY MASS INDEX: 24.88 KG/M2 | DIASTOLIC BLOOD PRESSURE: 72 MMHG | HEART RATE: 86 BPM | WEIGHT: 168 LBS | OXYGEN SATURATION: 97 % | HEIGHT: 69 IN | RESPIRATION RATE: 18 BRPM | TEMPERATURE: 98.3 F | SYSTOLIC BLOOD PRESSURE: 122 MMHG

## 2021-05-18 DIAGNOSIS — R19.7 DIARRHEA, UNSPECIFIED TYPE: ICD-10-CM

## 2021-05-18 DIAGNOSIS — E03.9 ACQUIRED HYPOTHYROIDISM: ICD-10-CM

## 2021-05-18 DIAGNOSIS — E03.9 HYPOTHYROIDISM, UNSPECIFIED TYPE: Primary | ICD-10-CM

## 2021-05-18 DIAGNOSIS — R10.9 ABDOMINAL PAIN, UNSPECIFIED ABDOMINAL LOCATION: ICD-10-CM

## 2021-05-18 PROCEDURE — T1015 CLINIC SERVICE: HCPCS | Performed by: FAMILY MEDICINE

## 2021-05-18 RX ORDER — LEVOTHYROXINE SODIUM 0.07 MG/1
75 TABLET ORAL
Qty: 90 TABLET | Refills: 0 | Status: SHIPPED | OUTPATIENT
Start: 2021-05-18 | End: 2021-08-23 | Stop reason: SDUPTHER

## 2021-05-18 NOTE — PROGRESS NOTES
Assessment/Plan:       Problem List Items Addressed This Visit        Endocrine    Hypothyroidism - Primary    Relevant Medications    levothyroxine 75 mcg tablet    Other Relevant Orders    TSH, 3rd generation with Free T4 reflex      Other Visit Diagnoses     Abdominal pain, unspecified abdominal location        Relevant Orders    Ambulatory referral to Gastroenterology    Diarrhea, unspecified type        Relevant Orders    Ambulatory referral to Gastroenterology            22 yo female with significant past medical history of hypothyroidism presents to office for thyroid check and general checkup  Patient will be continued on her thyroid medication and and she is to get her TSH checked  Patient also get an ambulatory referral to see Gastroenterology due to symptoms of diarrhea when eating certain foods specifically fatty foods and high carb foods  Patient also has a family history of Crohn's disease in mother  She is otherwise hemodynamically stable in no acute distress  Subjective:      Patient ID: Colonel Bell is a 23 y o  female a past medical history significant of hypothyroidism presents to the office for follow-up  Patient denies any chest pain shortness of breath nausea vomiting but admits to diarrhea  Patient states that diarrhea like symptoms has been evident for a while  She indicates that she typically gets diarrhea like symptoms when she eats fatty foods or high carb foods  She typically has 5-6 bouts of diarrhea when she eats these types of foods  There is an associated abdominal pain she with diarrhea  Pain is achy type presentation and can be a 10/10 on pain scale  Patient denies any systemic symptoms like fevers or chills  She does indicate that she has a family history of Crohn's with her mother and is concerned that she might have similar presentation  Patient denies any oral ulcers, recent infections and indicates that her diarrhea is nonbloody    It is dark in presentation and is no mucus present  Patient quit smoking last year  She denies alcohol use  She and denies recreational drug use  The following portions of the patient's history were reviewed and updated as appropriate: allergies, current medications, past family history, past medical history, past social history, past surgical history and problem list     Review of Systems   Constitutional: Negative for chills and fever  HENT: Negative for ear pain and sore throat  Eyes: Negative for pain and visual disturbance  Respiratory: Negative for cough and shortness of breath  Cardiovascular: Negative for chest pain and palpitations  Gastrointestinal: Positive for abdominal pain and diarrhea  Negative for anal bleeding, blood in stool, nausea and vomiting  Genitourinary: Negative for dysuria and hematuria  Musculoskeletal: Negative for arthralgias and back pain  Skin: Negative for color change and rash  Neurological: Negative for seizures, syncope and headaches  Hematological: Negative  Psychiatric/Behavioral: Negative  All other systems reviewed and are negative  Objective:    /72   Pulse 86   Temp 98 3 °F (36 8 °C)   Resp 18   Ht 5' 9" (1 753 m)   Wt 76 2 kg (168 lb)   SpO2 97%   BMI 24 81 kg/m²        Physical Exam  Constitutional:       Appearance: Normal appearance  HENT:      Head: Normocephalic and atraumatic  Cardiovascular:      Rate and Rhythm: Normal rate and regular rhythm  Pulses: Normal pulses  Heart sounds: Normal heart sounds  Pulmonary:      Effort: Pulmonary effort is normal       Breath sounds: Normal breath sounds  Abdominal:      General: Abdomen is flat  Bowel sounds are normal  There is no distension  Palpations: Abdomen is soft  There is no mass  Tenderness: There is abdominal tenderness  There is no guarding or rebound  Hernia: No hernia is present  Comments: Tenderness in the right lower quadrant    Nonspecific Musculoskeletal: Normal range of motion  General: No swelling or tenderness  Skin:     General: Skin is warm  Capillary Refill: Capillary refill takes less than 2 seconds  Neurological:      General: No focal deficit present  Mental Status: She is alert and oriented to person, place, and time     Psychiatric:         Mood and Affect: Mood normal          Behavior: Behavior normal

## 2021-05-20 ENCOUNTER — APPOINTMENT (OUTPATIENT)
Dept: LAB | Facility: MEDICAL CENTER | Age: 26
End: 2021-05-20
Payer: COMMERCIAL

## 2021-05-20 DIAGNOSIS — E03.9 HYPOTHYROIDISM, UNSPECIFIED TYPE: ICD-10-CM

## 2021-05-20 LAB — TSH SERPL DL<=0.05 MIU/L-ACNC: 2.98 UIU/ML (ref 0.36–3.74)

## 2021-05-20 PROCEDURE — 84443 ASSAY THYROID STIM HORMONE: CPT

## 2021-05-20 PROCEDURE — 36415 COLL VENOUS BLD VENIPUNCTURE: CPT

## 2021-08-10 ENCOUNTER — CONSULT (OUTPATIENT)
Dept: GASTROENTEROLOGY | Facility: CLINIC | Age: 26
End: 2021-08-10
Payer: COMMERCIAL

## 2021-08-10 VITALS
HEIGHT: 69 IN | BODY MASS INDEX: 25.39 KG/M2 | DIASTOLIC BLOOD PRESSURE: 87 MMHG | TEMPERATURE: 97.9 F | SYSTOLIC BLOOD PRESSURE: 132 MMHG | WEIGHT: 171.4 LBS | HEART RATE: 81 BPM

## 2021-08-10 DIAGNOSIS — R10.9 ABDOMINAL PAIN, UNSPECIFIED ABDOMINAL LOCATION: ICD-10-CM

## 2021-08-10 DIAGNOSIS — R19.7 DIARRHEA, UNSPECIFIED TYPE: ICD-10-CM

## 2021-08-10 PROCEDURE — 99204 OFFICE O/P NEW MOD 45 MIN: CPT | Performed by: PHYSICIAN ASSISTANT

## 2021-08-10 RX ORDER — DICYCLOMINE HCL 20 MG
20 TABLET ORAL EVERY 6 HOURS
Qty: 60 TABLET | Refills: 1 | Status: SHIPPED | OUTPATIENT
Start: 2021-08-10 | End: 2021-09-30 | Stop reason: SDUPTHER

## 2021-08-10 RX ORDER — MULTIVIT WITH MINERALS/LUTEIN
1000 TABLET ORAL DAILY
COMMUNITY

## 2021-08-10 RX ORDER — DIPHENOXYLATE HYDROCHLORIDE AND ATROPINE SULFATE 2.5; .025 MG/1; MG/1
1 TABLET ORAL 4 TIMES DAILY PRN
Qty: 60 TABLET | Refills: 0 | Status: SHIPPED | OUTPATIENT
Start: 2021-08-10 | End: 2021-09-30 | Stop reason: SDUPTHER

## 2021-08-10 NOTE — PROGRESS NOTES
[facility] - Outpatient Consultation  Era Cuevas 23 y o  female MRN: [id number]  Encounter: [id number]          ASSESSMENT AND PLAN:      1  Abdominal pain, unspecified abdominal location  2  Diarrhea, unspecified type: she admits to 2 year hx of worsening abdominal pain and diarrhea  She admits to diarrhea immediately or 30 minutes after meals as well as rRLQ and periumbilical abdominal pain  She admits to dark stools but denies hematochezia  She does have a family hx of crohn's diease in her mother and colon cancer in her fathers side of the family  Admits to nausea without vomiting  This could be IBS, biliary etiology but also consider celiac, h pylori or crohn's disease  - Ambulatory referral to Gastroenterology  - Colonoscopy; Future  - dicyclomine (BENTYL) 20 mg tablet; Take 1 tablet (20 mg total) by mouth every 6 (six) hours  Dispense: 60 tablet; Refill: 1  - US right upper quadrant; Future  - EGD; Future with gastric and duodenal biopsies   -f/u after procedures    ______________________________________________________________________    HPI:  Era Cuevas is a 24 yo female pmh endometriosis, depression who is here for abdominal pain and diarrhea  She admits to a 2 years hx of worsening symptoms with diarrhea immediately or 30 minutes after eating associated with periumbilical and rlq abdominal pain  She had a recent CT with no source of her pain found  She has tried multiple over the counter anti diarrheals without relief  She does sometimes get "backed up" and her stool is formed but she states this is rare  She admits to occasional dark stools but denies hematochezia  Denies blood thinners or NSAIDs  She has never had EGD or colonoscopy  Admits to family hx of colon cancer in her fathers side of the family but unsure who  Admits to crohn's disease in her mother  REVIEW OF SYSTEMS:    CONSTITUTIONAL: Denies any fever, chills, rigors, and weight loss    HEENT: No earache or tinnitus  Denies hearing loss or visual disturbances  CARDIOVASCULAR: No chest pain or palpitations  RESPIRATORY: Denies any cough, hemoptysis, shortness of breath or dyspnea on exertion  GASTROINTESTINAL: As noted in the History of Present Illness  GENITOURINARY: No problems with urination  Denies any hematuria or dysuria  NEUROLOGIC: No dizziness or vertigo, denies headaches  MUSCULOSKELETAL: Denies any muscle or joint pain  SKIN: Denies skin rashes or itching  ENDOCRINE: Denies excessive thirst  Denies intolerance to heat or cold  PSYCHOSOCIAL: Denies depression or anxiety  Denies any recent memory loss         Historical Information   Past Medical History:   Diagnosis Date    Depression     Disease of thyroid gland     Endometriosis      Past Surgical History:   Procedure Laterality Date    DILATION AND CURETTAGE OF UTERUS      [place] ENDOVENOUS LASER, 1ST VEIN Left 2020    Procedure: ENDOVASCULAR LASER THERAPY (EVLT) + stab phlebectomies;  Surgeon: Manuel Burgos DO;  Location: [facility];  Service: Vascular    WISDOM TOOTH EXTRACTION       Social History   Social History     Substance and Sexual Activity   Alcohol Use No     Social History     Substance and Sexual Activity   Drug Use No     Social History     Tobacco Use   Smoking Status Former Smoker    Packs/day: 0 00    Quit date: 2020    Years since quittin 2   Smokeless Tobacco Never Used   Tobacco Comment    3 cigarettes per day     Family History   Problem Relation Age of Onset    Cancer Mother     Hypertension Mother     Cancer Father     Hypertension Father     Diabetes Father     Cancer Sister        Meds/Allergies       Current Outpatient Medications:     Ascorbic Acid (vitamin C) 1000 MG tablet    Cranberry 500 MG TABS    levothyroxine 75 mcg tablet    Multiple Vitamin (MULTIVITAMIN) capsule    dicyclomine (BENTYL) 20 mg tablet    HYDROcodone-acetaminophen (NORCO) 5-325 mg per tablet    ibuprofen (MOTRIN) 800 mg tablet    No Known Allergies        Objective     Blood pressure 132/87, pulse 81, temperature 97 9 °F (36 6 °C), temperature source Tympanic, height 5' 9" (1 753 m), weight 77 7 kg (171 lb 6 4 oz), not currently breastfeeding  Body mass index is 25 31 kg/m²  PHYSICAL EXAM:      General Appearance:   Alert, cooperative, no distress   HEENT:   Normocephalic, atraumatic, anicteric      Neck:  Supple, symmetrical, trachea midline   Lungs:   Clear to auscultation bilaterally; no rales, rhonchi or wheezing; respirations unlabored    Heart[de-identified]   Regular rate and rhythm; no murmur, rub, or gallop  Abdomen:   Soft, diffusely tender, non-distended; normal bowel sounds; no masses, no organomegaly    Genitalia:   Deferred    Rectal:   Deferred    Extremities:  No cyanosis, clubbing or edema    Pulses:  2+ and symmetric    Skin:  No jaundice, rashes, or lesions    Lymph nodes:  No palpable cervical lymphadenopathy        Lab Results:   No visits with results within 1 Day(s) from this visit  Latest known visit with results is:   Appointment on 05/20/2021   Component Date Value    TSH 3RD Render Maiers 05/20/2021 2 980          Radiology Results:   No results found

## 2021-08-10 NOTE — PATIENT INSTRUCTIONS
Scheduled patient for a EGD/Colonoscopy on 9/22/2021 with Dr Erum Yanes  Gave Miralax/Dulcolax instructionsFollow up appointment  on 9/22/2021 with Bashir Mensah PA-C  Patient expressed understanding

## 2021-08-19 DIAGNOSIS — E03.9 HYPOTHYROIDISM, UNSPECIFIED TYPE: ICD-10-CM

## 2021-08-23 DIAGNOSIS — E03.9 HYPOTHYROIDISM, UNSPECIFIED TYPE: ICD-10-CM

## 2021-08-23 RX ORDER — LEVOTHYROXINE SODIUM 0.07 MG/1
75 TABLET ORAL
Qty: 90 TABLET | Refills: 0 | Status: SHIPPED | OUTPATIENT
Start: 2021-08-23 | End: 2021-10-04 | Stop reason: SDUPTHER

## 2021-08-23 RX ORDER — LEVOTHYROXINE SODIUM 0.07 MG/1
75 TABLET ORAL
Qty: 90 TABLET | Refills: 0 | Status: SHIPPED | OUTPATIENT
Start: 2021-08-23 | End: 2022-04-12

## 2021-09-08 ENCOUNTER — HOSPITAL ENCOUNTER (EMERGENCY)
Facility: HOSPITAL | Age: 26
Discharge: HOME | End: 2021-09-08
Attending: EMERGENCY MEDICINE | Admitting: EMERGENCY MEDICINE
Payer: COMMERCIAL

## 2021-09-08 VITALS
HEART RATE: 69 BPM | OXYGEN SATURATION: 100 % | WEIGHT: 170.19 LBS | RESPIRATION RATE: 16 BRPM | DIASTOLIC BLOOD PRESSURE: 85 MMHG | SYSTOLIC BLOOD PRESSURE: 136 MMHG | TEMPERATURE: 98.9 F | BODY MASS INDEX: 25.13 KG/M2

## 2021-09-08 DIAGNOSIS — H60.90 OTITIS EXTERNA: Primary | ICD-10-CM

## 2021-09-08 PROCEDURE — 99284 EMERGENCY DEPT VISIT MOD MDM: CPT | Performed by: PHYSICIAN ASSISTANT

## 2021-09-08 PROCEDURE — 99282 EMERGENCY DEPT VISIT SF MDM: CPT

## 2021-09-08 RX ORDER — OFLOXACIN 3 MG/ML
10 SOLUTION AURICULAR (OTIC) 2 TIMES DAILY
Qty: 5 ML | Refills: 0 | Status: SHIPPED | OUTPATIENT
Start: 2021-09-08 | End: 2022-04-12

## 2021-09-08 NOTE — Clinical Note
Hallie Manning was seen and treated in our emergency department on 9/8/2021  Diagnosis: Otitis externa    Shilpa    She may return on this date: 09/09/2021    Patient is excused from not wearing a headset until the ear infection has cleared, this should take approximately 5 days  If you have any questions or concerns, please don't hesitate to call        Selena Arias PA-C    ______________________________           _______________          _______________  Hospital Representative                              Date                                Time

## 2021-09-08 NOTE — ED PROVIDER NOTES
History  Chief Complaint   Patient presents with    Earache     pt c/o sinus congestion and bilateral ear pain x2 days     Patient presents to the emergency department today for evaluation of right greater than left ear pain with facial pressure nasal congestion sore throat and cough  No fever  Symptoms have been present for the last 2 days, she has been swimming  Prior to Admission Medications   Prescriptions Last Dose Informant Patient Reported? Taking?    Ascorbic Acid (vitamin C) 1000 MG tablet   Yes No   Sig: Take 1,000 mg by mouth daily   Cranberry 500 MG TABS  Self Yes No   Sig: Take by mouth   HYDROcodone-acetaminophen (NORCO) 5-325 mg per tablet  Self Yes No   Sig: Take 2 tablets by mouth every 6 (six) hours as needed   Multiple Vitamin (MULTIVITAMIN) capsule  Self Yes No   Sig: Take 1 capsule by mouth daily   dicyclomine (BENTYL) 20 mg tablet   No No   Sig: Take 1 tablet (20 mg total) by mouth every 6 (six) hours   diphenoxylate-atropine (LOMOTIL) 2 5-0 025 mg per tablet   No No   Sig: Take 1 tablet by mouth 4 (four) times a day as needed for diarrhea   ibuprofen (MOTRIN) 800 mg tablet  Self Yes No   Sig: Take 800 mg by mouth every 6 (six) hours as needed   levothyroxine 75 mcg tablet   No No   Sig: Take 1 tablet (75 mcg total) by mouth daily in the early morning   levothyroxine 75 mcg tablet   No No   Sig: Take 1 tablet (75 mcg total) by mouth daily in the early morning      Facility-Administered Medications: None       Past Medical History:   Diagnosis Date    Depression     Disease of thyroid gland     Endometriosis        Past Surgical History:   Procedure Laterality Date    DILATION AND CURETTAGE OF UTERUS      [place] ENDOVENOUS LASER, 1ST VEIN Left 9/4/2020    Procedure: ENDOVASCULAR LASER THERAPY (EVLT) + stab phlebectomies;  Surgeon: Lamont Dasilva DO;  [facility];  Service: Vascular    WISDOM TOOTH EXTRACTION         Family History   Problem Relation Age of Onset    Cancer Mother     Hypertension Mother     Cancer Father     Hypertension Father     Diabetes Father     Cancer Sister      I have reviewed and agree with the history as documented  E-Cigarette/Vaping    E-Cigarette Use Never User      E-Cigarette/Vaping Substances    Nicotine No     THC No     CBD No     Flavoring No     Other No     Unknown No      Social History     Tobacco Use    Smoking status: Former Smoker     Packs/day: 0 00     Quit date: 2020     Years since quittin 3    Smokeless tobacco: Never Used    Tobacco comment: 3 cigarettes per day   Vaping Use    Vaping Use: Never used   Substance Use Topics    Alcohol use: No    Drug use: No       Review of Systems   Constitutional: Negative  Negative for chills and fever  HENT: Positive for congestion, ear pain, sinus pressure, sinus pain and sore throat  Negative for trouble swallowing  Eyes: Negative  Respiratory: Positive for cough  Negative for shortness of breath and wheezing  Cardiovascular: Negative  Negative for chest pain and leg swelling  Gastrointestinal: Negative  Negative for abdominal pain, blood in stool and vomiting  Endocrine: Negative  Genitourinary: Negative  Musculoskeletal: Negative  Negative for neck stiffness  Skin: Negative  Allergic/Immunologic: Negative  Neurological: Negative  Negative for dizziness, seizures, speech difficulty, weakness, light-headedness, numbness and headaches  Hematological: Negative  Psychiatric/Behavioral: Negative  All other systems reviewed and are negative  Physical Exam  Physical Exam  Vitals reviewed  Constitutional:       General: She is not in acute distress  Appearance: She is normal weight  She is not ill-appearing, toxic-appearing or diaphoretic  HENT:      Head: Normocephalic and atraumatic        Ears:      Comments: Right-sided ear examination reveals tenderness to manipulation of the right external ear with exudate of an inflammatory changes noted on otoscopic visualization, tympanic membrane appears intact without erythema or bulging, left-sided examination normal      Nose: Congestion present  Mouth/Throat:      Mouth: Mucous membranes are moist       Pharynx: No oropharyngeal exudate or posterior oropharyngeal erythema  Eyes:      General: No scleral icterus  Right eye: No discharge  Left eye: No discharge  Extraocular Movements: Extraocular movements intact  Conjunctiva/sclera: Conjunctivae normal    Cardiovascular:      Rate and Rhythm: Normal rate  Pulses: Normal pulses  Heart sounds: No murmur heard  No friction rub  No gallop  Pulmonary:      Effort: Pulmonary effort is normal  No respiratory distress  Breath sounds: Normal breath sounds  No stridor  No wheezing, rhonchi or rales  Chest:      Chest wall: No tenderness  Musculoskeletal:         General: No swelling, tenderness, deformity or signs of injury  Normal range of motion  Cervical back: No rigidity or tenderness  Right lower leg: No edema  Left lower leg: No edema  Lymphadenopathy:      Cervical: No cervical adenopathy  Skin:     General: Skin is warm  Capillary Refill: Capillary refill takes less than 2 seconds  Neurological:      General: No focal deficit present  Mental Status: She is alert and oriented to person, place, and time     Psychiatric:         Mood and Affect: Mood normal          Vital Signs  ED Triage Vitals [09/08/21 1435]   Temperature Pulse Respirations Blood Pressure SpO2   98 9 °F (37 2 °C) 69 16 136/85 100 %      Temp Source Heart Rate Source Patient Position - Orthostatic VS BP Location FiO2 (%)   Tympanic Monitor Sitting Right arm --      Pain Score       8           Vitals:    09/08/21 1435   BP: 136/85   Pulse: 69   Patient Position - Orthostatic VS: Sitting         Visual Acuity      ED Medications  Medications - No data to display    Diagnostic Studies  Results Reviewed     None                 No orders to display              Procedures  Procedures         ED Course                                           MDM    Disposition  Final diagnoses:   Otitis externa     Time reflects when diagnosis was documented in both MDM as applicable and the Disposition within this note     Time User Action Codes Description Comment    9/8/2021  2:47 PM Diogenes PHELPS Add [H60 90] Otitis externa       ED Disposition     ED Disposition Condition Date/Time Comment    Discharge Stable Wed Sep 8, 2021  2:47 PM Pato Montiel discharge to home/self care  Follow-up Information     Follow up With Specialties Details Why [address] [place], [place] [facility] Schedule an appointment as soon as possible for a visit  As needed [address] [facility]  [address]  [phone]            Patient's Medications   Discharge Prescriptions    OFLOXACIN (FLOXIN) 0 3 % OTIC SOLUTION    Administer 10 drops to the right ear 2 (two) times a day       Start Date: 9/8/2021  End Date: --       Order Dose: 10 drops       Quantity: 5 mL    Refills: 0     No discharge procedures on file      PDMP Review     None          ED Provider  Electronically Signed by           Gen Bowles PA-C  09/08/21 7329

## 2021-09-09 ENCOUNTER — APPOINTMENT (OUTPATIENT)
Dept: FAMILY MEDICINE CLINIC | Facility: CLINIC | Age: 26
End: 2021-09-09
Payer: COMMERCIAL

## 2021-09-09 ENCOUNTER — OFFICE VISIT (OUTPATIENT)
Dept: FAMILY MEDICINE CLINIC | Facility: CLINIC | Age: 26
End: 2021-09-09
Payer: COMMERCIAL

## 2021-09-09 VITALS
TEMPERATURE: 97.3 F | RESPIRATION RATE: 18 BRPM | DIASTOLIC BLOOD PRESSURE: 78 MMHG | BODY MASS INDEX: 25.18 KG/M2 | SYSTOLIC BLOOD PRESSURE: 120 MMHG | HEART RATE: 63 BPM | WEIGHT: 170 LBS | OXYGEN SATURATION: 98 % | HEIGHT: 69 IN

## 2021-09-09 DIAGNOSIS — H60.311 ACUTE DIFFUSE OTITIS EXTERNA OF RIGHT EAR: Primary | ICD-10-CM

## 2021-09-09 DIAGNOSIS — J03.90 TONSILLITIS: ICD-10-CM

## 2021-09-09 PROCEDURE — T1015 CLINIC SERVICE: HCPCS | Performed by: FAMILY MEDICINE

## 2021-09-09 RX ORDER — PREDNISONE 20 MG/1
40 TABLET ORAL DAILY
Qty: 10 TABLET | Refills: 0 | Status: SHIPPED | OUTPATIENT
Start: 2021-09-09 | End: 2021-09-14

## 2021-09-09 RX ORDER — CIPROFLOXACIN 500 MG/1
500 TABLET, FILM COATED ORAL EVERY 12 HOURS SCHEDULED
Qty: 14 TABLET | Refills: 0 | Status: SHIPPED | OUTPATIENT
Start: 2021-09-09 | End: 2021-09-16

## 2021-09-09 NOTE — PROGRESS NOTES
Assessment/Plan:    No problem-specific Assessment & Plan notes found for this encounter  Diagnoses and all orders for this visit:    Acute diffuse otitis externa of right ear  -     ciprofloxacin (CIPRO) 500 mg tablet; Take 1 tablet (500 mg total) by mouth every 12 (twelve) hours for 7 days  -     predniSONE 20 mg tablet; Take 2 tablets (40 mg total) by mouth daily for 5 days    Tonsillitis  -     ciprofloxacin (CIPRO) 500 mg tablet; Take 1 tablet (500 mg total) by mouth every 12 (twelve) hours for 7 days  -     predniSONE 20 mg tablet; Take 2 tablets (40 mg total) by mouth daily for 5 days          Subjective:      Patient ID: Celestnio Woo is a 23 y o  female who presents for evaluation of right ear pain  She was evaluated in the ED yesterday and prescribed drops, however she notes significant increase in pain and swelling on her right ear and neck  It is both pressure and pain  She notes throat pain which as been unresponsible to tylenol and ibuprofen  She notes this morning she has a fever off 100 9  no cough, SOB, myalgias  No sick contacts  HPI    The following portions of the patient's history were reviewed and updated as appropriate: allergies, current medications, past family history, past medical history, past social history, past surgical history and problem list     Review of Systems   Constitutional: Positive for activity change and fever  Negative for fatigue  HENT: Positive for congestion, ear discharge, ear pain, facial swelling, hearing loss, rhinorrhea and sore throat  Negative for postnasal drip  Respiratory: Negative for chest tightness and shortness of breath  Cardiovascular: Negative for chest pain and palpitations  Gastrointestinal: Negative for constipation, diarrhea, nausea and vomiting  Musculoskeletal: Negative for arthralgias and myalgias  Neurological: Negative for dizziness           Objective:      /78   Pulse 63   Temp (!) 97 3 °F (36 3 °C)   Resp 18  5' 9" (1 753 m)   Wt 77 1 kg (170 lb)   SpO2 98%   BMI 25 10 kg/m²          Physical Exam  Constitutional:       General: She is not in acute distress  Appearance: Normal appearance  She is well-developed  She is ill-appearing  HENT:      Head: Normocephalic and atraumatic  Jaw: Tenderness and swelling present  Right Ear: Drainage, swelling and tenderness present  No mastoid tenderness  Tympanic membrane is not perforated, erythematous, retracted or bulging  Tympanic membrane has normal mobility  Left Ear: No drainage, swelling or tenderness  Tympanic membrane is not perforated, erythematous, retracted or bulging  Tympanic membrane has normal mobility  Nose: No mucosal edema or rhinorrhea  Mouth/Throat:      Pharynx: Pharyngeal swelling and posterior oropharyngeal erythema present  No oropharyngeal exudate  Tonsils: No tonsillar exudate or tonsillar abscesses  3+ on the right  1+ on the left  Comments: Tonsillar swelling with no exudate  Eyes:      Pupils: Pupils are equal, round, and reactive to light  Cardiovascular:      Rate and Rhythm: Normal rate and regular rhythm  Heart sounds: Normal heart sounds  No murmur heard  Pulmonary:      Effort: Pulmonary effort is normal  No respiratory distress  Breath sounds: Normal breath sounds  No wheezing  Abdominal:      General: Bowel sounds are normal  There is no distension  Palpations: Abdomen is soft  Tenderness: There is no abdominal tenderness  Skin:     General: Skin is warm and dry  Capillary Refill: Capillary refill takes less than 2 seconds  Neurological:      Mental Status: She is alert and oriented to person, place, and time  Psychiatric:         Mood and Affect: Mood normal          Behavior: Behavior normal          Thought Content:  Thought content normal          Judgment: Judgment normal

## 2021-09-13 ENCOUNTER — CLINICAL SUPPORT (OUTPATIENT)
Dept: FAMILY MEDICINE CLINIC | Facility: CLINIC | Age: 26
End: 2021-09-13
Payer: COMMERCIAL

## 2021-09-13 ENCOUNTER — TELEPHONE (OUTPATIENT)
Dept: SURGERY | Facility: CLINIC | Age: 26
End: 2021-09-13

## 2021-09-13 DIAGNOSIS — Z20.822 EXPOSURE TO COVID-19 VIRUS: ICD-10-CM

## 2021-09-13 DIAGNOSIS — Z20.822 EXPOSURE TO COVID-19 VIRUS: Primary | ICD-10-CM

## 2021-09-13 PROCEDURE — U0005 INFEC AGEN DETEC AMPLI PROBE: HCPCS

## 2021-09-13 PROCEDURE — U0003 INFECTIOUS AGENT DETECTION BY NUCLEIC ACID (DNA OR RNA); SEVERE ACUTE RESPIRATORY SYNDROME CORONAVIRUS 2 (SARS-COV-2) (CORONAVIRUS DISEASE [COVID-19]), AMPLIFIED PROBE TECHNIQUE, MAKING USE OF HIGH THROUGHPUT TECHNOLOGIES AS DESCRIBED BY CMS-2020-01-R: HCPCS

## 2021-09-14 LAB — SARS-COV-2 RNA RESP QL NAA+PROBE: NEGATIVE

## 2021-09-20 ENCOUNTER — OFFICE VISIT (OUTPATIENT)
Dept: FAMILY MEDICINE CLINIC | Facility: CLINIC | Age: 26
End: 2021-09-20
Payer: COMMERCIAL

## 2021-09-20 VITALS
SYSTOLIC BLOOD PRESSURE: 128 MMHG | TEMPERATURE: 97.1 F | HEIGHT: 69 IN | WEIGHT: 171.4 LBS | DIASTOLIC BLOOD PRESSURE: 74 MMHG | OXYGEN SATURATION: 98 % | HEART RATE: 68 BPM | RESPIRATION RATE: 18 BRPM | BODY MASS INDEX: 25.39 KG/M2

## 2021-09-20 DIAGNOSIS — J02.9 SORE THROAT: ICD-10-CM

## 2021-09-20 DIAGNOSIS — J03.90 TONSILLITIS: Primary | ICD-10-CM

## 2021-09-20 PROCEDURE — T1015 CLINIC SERVICE: HCPCS | Performed by: FAMILY MEDICINE

## 2021-09-20 PROCEDURE — 3008F BODY MASS INDEX DOCD: CPT | Performed by: PHYSICIAN ASSISTANT

## 2021-09-20 RX ORDER — LIDOCAINE HYDROCHLORIDE 20 MG/ML
15 SOLUTION OROPHARYNGEAL 4 TIMES DAILY PRN
Qty: 100 ML | Refills: 0 | Status: SHIPPED | OUTPATIENT
Start: 2021-09-20 | End: 2022-04-12

## 2021-09-20 RX ORDER — FLUTICASONE PROPIONATE 50 MCG
1 SPRAY, SUSPENSION (ML) NASAL DAILY
Qty: 18.2 ML | Refills: 0 | Status: SHIPPED | OUTPATIENT
Start: 2021-09-20

## 2021-09-20 NOTE — PROGRESS NOTES
Assessment/Plan/Follow up information       Diagnosis ICD-10-CM Associated Orders   1  Tonsillitis  J03 90 Lidocaine Viscous HCl (XYLOCAINE) 2 % mucosal solution     fluticasone (FLONASE) 50 mcg/act nasal spray   2  Sore throat  J02 9 Lidocaine Viscous HCl (XYLOCAINE) 2 % mucosal solution     fluticasone (FLONASE) 50 mcg/act nasal spray       will trial viscous lidocaine, Flonase for symptomatic treatment patient sore throat  She will follow up in 1 week if no alleviation of symptoms      Recent lab work, imagining, and imaging reports reviewed on today's visit with patient, appropriate follow up was initiated if needed  Patient was counseled/educated regarding their diagnosis, and the associated plan  They agreed with the plan, all questions and concerns were answered/addressed  Advised to contact me or the office with any concerns or questions  In the event of an emergency, and unable to contact a provider they are to go to the emergency room  Subjective    HPI:  21-year-old female presents to the office for re-evaluation of tonsillitis/right  Otitis media - externa  Patient previously prescribed course of p o  Cipro as well as p o  steroids  She reports good symptomatic improvement her symptoms however continues to endorse a mild sore throat  She denies any systemic symptoms no fevers no chills  Her main complaint today is that she continues to have a sore throat which is affecting her ability to eat      Review of Systems   Constitutional: Negative for activity change, appetite change, chills, fatigue and fever  HENT: Positive for sore throat  Negative for congestion, dental problem, drooling, ear discharge, ear pain, facial swelling, postnasal drip, rhinorrhea and sinus pain  Eyes: Negative for photophobia, pain, discharge and itching  Respiratory: Negative for apnea, cough, chest tightness and shortness of breath  Cardiovascular: Negative for chest pain and leg swelling  Gastrointestinal: Negative for abdominal distention, abdominal pain, anal bleeding, constipation, diarrhea and nausea  Endocrine: Negative for cold intolerance, heat intolerance and polydipsia  Genitourinary: Negative for difficulty urinating  Musculoskeletal: Negative for arthralgias, gait problem, joint swelling and myalgias  Skin: Negative for color change and pallor  Allergic/Immunologic: Negative for immunocompromised state  Neurological: Negative for dizziness, seizures, facial asymmetry, weakness, light-headedness, numbness and headaches  Psychiatric/Behavioral: Negative for agitation, behavioral problems, confusion, decreased concentration and dysphoric mood  Objective    Vitals:    09/20/21 0903   BP: 128/74   Pulse: 68   Resp: 18   Temp: (!) 97 1 °F (36 2 °C)   SpO2: 98%         Physical Exam  Constitutional:       General: She is not in acute distress  Appearance: She is well-developed  HENT:      Head: Normocephalic and atraumatic  Ears:      Comments: Normal appearing right tympanic membrane, no erythema, no signs of infection, no bulging     Mouth/Throat:      Comments: Postnasal drip erythematous pharynx  Eyes:      Conjunctiva/sclera: Conjunctivae normal       Pupils: Pupils are equal, round, and reactive to light  Cardiovascular:      Rate and Rhythm: Normal rate and regular rhythm  Heart sounds: Normal heart sounds  No murmur heard  No friction rub  Pulmonary:      Effort: Pulmonary effort is normal       Breath sounds: Normal breath sounds  Abdominal:      General: Bowel sounds are normal       Palpations: Abdomen is soft  Musculoskeletal:         General: Normal range of motion  Cervical back: Normal range of motion and neck supple  Skin:     General: Skin is warm  Capillary Refill: Capillary refill takes less than 2 seconds  Neurological:      Mental Status: She is alert and oriented to person, place, and time        Motor: No abnormal muscle tone  Coordination: Coordination normal    Psychiatric:         Behavior: Behavior normal          Thought Content: Thought content normal             Portions of the record may have been created with voice recognition software  Occasional wrong word or "sound a like" substitutions may have occurred due to the inherent limitations of voice recognition software  Read the chart carefully and recognize, using context, where substitutions have occurred  Contact me with any questions         Rocio Torres MD 09/20/21

## 2021-09-21 ENCOUNTER — TELEPHONE (OUTPATIENT)
Dept: GASTROENTEROLOGY | Facility: CLINIC | Age: 26
End: 2021-09-21

## 2021-09-21 NOTE — TELEPHONE ENCOUNTER
Patients GI provider:  Dr Yogesh Elliott    Number to return call: [phone]    Reason for call: Pt called wanting to resched colonoscopy  She can be reached at the num above      Scheduled procedure/appointment date if applicable: Procedure 8/36/49

## 2021-09-21 NOTE — TELEPHONE ENCOUNTER
Colon/egd rescheduled to 10/27/21 with Dr Baron Koroma at [facility]  Patient still has prep and instructions

## 2021-09-21 NOTE — TELEPHONE ENCOUNTER
Patients GI provider:  Saloni Stewart    Number to return call: [phone]    Reason for call: Pt calling to reschedule colonoscopy  Prefers Weds       Scheduled procedure/appointment date if applicable:N/A

## 2021-09-26 ENCOUNTER — HOSPITAL ENCOUNTER (EMERGENCY)
Facility: HOSPITAL | Age: 26
Discharge: HOME | End: 2021-09-26
Attending: EMERGENCY MEDICINE
Payer: COMMERCIAL

## 2021-09-26 VITALS
DIASTOLIC BLOOD PRESSURE: 70 MMHG | SYSTOLIC BLOOD PRESSURE: 136 MMHG | HEART RATE: 82 BPM | OXYGEN SATURATION: 97 % | TEMPERATURE: 98.2 F | RESPIRATION RATE: 20 BRPM

## 2021-09-26 DIAGNOSIS — S61.411A LACERATION OF RIGHT HAND WITHOUT FOREIGN BODY, INITIAL ENCOUNTER: Primary | ICD-10-CM

## 2021-09-26 PROCEDURE — 99282 EMERGENCY DEPT VISIT SF MDM: CPT

## 2021-09-26 PROCEDURE — 99282 EMERGENCY DEPT VISIT SF MDM: CPT | Performed by: EMERGENCY MEDICINE

## 2021-09-26 PROCEDURE — 12001 RPR S/N/AX/GEN/TRNK 2.5CM/<: CPT | Performed by: EMERGENCY MEDICINE

## 2021-09-26 RX ORDER — GINSENG 100 MG
1 CAPSULE ORAL ONCE
Status: COMPLETED | OUTPATIENT
Start: 2021-09-26 | End: 2021-09-26

## 2021-09-26 RX ADMIN — BACITRACIN 1 SMALL APPLICATION: 500 OINTMENT TOPICAL at 07:26

## 2021-09-27 ENCOUNTER — HOSPITAL ENCOUNTER (OUTPATIENT)
Dept: ULTRASOUND IMAGING | Facility: HOSPITAL | Age: 26
Discharge: HOME | End: 2021-09-27
Payer: COMMERCIAL

## 2021-09-27 DIAGNOSIS — R10.9 ABDOMINAL PAIN, UNSPECIFIED ABDOMINAL LOCATION: ICD-10-CM

## 2021-09-27 PROCEDURE — 76705 ECHO EXAM OF ABDOMEN: CPT

## 2021-09-28 ENCOUNTER — HOSPITAL ENCOUNTER (EMERGENCY)
Facility: HOSPITAL | Age: 26
Discharge: HOME | End: 2021-09-28
Attending: EMERGENCY MEDICINE | Admitting: EMERGENCY MEDICINE
Payer: COMMERCIAL

## 2021-09-28 VITALS
HEART RATE: 64 BPM | WEIGHT: 175 LBS | OXYGEN SATURATION: 98 % | BODY MASS INDEX: 25.92 KG/M2 | DIASTOLIC BLOOD PRESSURE: 78 MMHG | SYSTOLIC BLOOD PRESSURE: 112 MMHG | HEIGHT: 69 IN | RESPIRATION RATE: 18 BRPM | TEMPERATURE: 98.9 F

## 2021-09-28 DIAGNOSIS — S61.212D LACERATION OF RIGHT MIDDLE FINGER WITHOUT FOREIGN BODY WITHOUT DAMAGE TO NAIL, SUBSEQUENT ENCOUNTER: Primary | ICD-10-CM

## 2021-09-28 PROCEDURE — 99282 EMERGENCY DEPT VISIT SF MDM: CPT

## 2021-09-28 PROCEDURE — 99282 EMERGENCY DEPT VISIT SF MDM: CPT | Performed by: EMERGENCY MEDICINE

## 2021-09-29 ENCOUNTER — TELEPHONE (OUTPATIENT)
Dept: GASTROENTEROLOGY | Facility: CLINIC | Age: 26
End: 2021-09-29

## 2021-09-29 DIAGNOSIS — R10.9 ABDOMINAL PAIN, UNSPECIFIED ABDOMINAL LOCATION: ICD-10-CM

## 2021-09-29 DIAGNOSIS — R19.7 DIARRHEA, UNSPECIFIED TYPE: ICD-10-CM

## 2021-09-29 NOTE — TELEPHONE ENCOUNTER
GI Physician: Chapin    Refill Request for Medication: Diphenoxylate    Dose: 1 tablet    Quantity: 60          Refill Request for Medication: Dicyclomine    Dose: 20 mg    Quantity: 60      Pharmacy and Location: [place] [address], [address] [place] [id number]   Phone:  [phone]

## 2021-09-29 NOTE — TELEPHONE ENCOUNTER
Patients GI provider:  Braulio Range    Number to return call: [phone]    Reason for call: Pt called to cancel colonoscopy      Scheduled procedure/appointment date if applicable: Procedure 78/53/54

## 2021-09-30 RX ORDER — DICYCLOMINE HCL 20 MG
20 TABLET ORAL EVERY 6 HOURS
Qty: 60 TABLET | Refills: 1 | Status: SHIPPED | OUTPATIENT
Start: 2021-09-30 | End: 2021-10-28

## 2021-09-30 RX ORDER — DIPHENOXYLATE HYDROCHLORIDE AND ATROPINE SULFATE 2.5; .025 MG/1; MG/1
1 TABLET ORAL 4 TIMES DAILY PRN
Qty: 60 TABLET | Refills: 0 | Status: SHIPPED | OUTPATIENT
Start: 2021-09-30 | End: 2021-11-15 | Stop reason: SDUPTHER

## 2021-09-30 NOTE — TELEPHONE ENCOUNTER
Pt of Jose M Marcos PA-C last seen 8/10/2021 in office for consult     HX: diarrhea    Requesting refill of lomotil and bentyl

## 2021-10-01 ENCOUNTER — OFFICE VISIT (OUTPATIENT)
Dept: FAMILY MEDICINE CLINIC | Facility: CLINIC | Age: 26
End: 2021-10-01
Payer: COMMERCIAL

## 2021-10-01 VITALS
DIASTOLIC BLOOD PRESSURE: 84 MMHG | TEMPERATURE: 97.6 F | BODY MASS INDEX: 25.48 KG/M2 | SYSTOLIC BLOOD PRESSURE: 132 MMHG | HEART RATE: 86 BPM | WEIGHT: 172 LBS | OXYGEN SATURATION: 99 % | RESPIRATION RATE: 18 BRPM | HEIGHT: 69 IN

## 2021-10-01 DIAGNOSIS — S61.212D LACERATION OF RIGHT MIDDLE FINGER WITHOUT FOREIGN BODY WITHOUT DAMAGE TO NAIL, SUBSEQUENT ENCOUNTER: Primary | ICD-10-CM

## 2021-10-01 DIAGNOSIS — J02.9 SORE THROAT: ICD-10-CM

## 2021-10-01 PROCEDURE — T1015 CLINIC SERVICE: HCPCS | Performed by: FAMILY MEDICINE

## 2021-10-01 RX ORDER — IBUPROFEN 800 MG/1
800 TABLET ORAL EVERY 8 HOURS PRN
Qty: 30 TABLET | Refills: 0 | Status: SHIPPED | OUTPATIENT
Start: 2021-10-01 | End: 2022-05-09 | Stop reason: ALTCHOICE

## 2021-10-06 ENCOUNTER — PATIENT OUTREACH (OUTPATIENT)
Dept: CASE MANAGEMENT | Facility: HOSPITAL | Age: 26
End: 2021-10-06

## 2021-10-06 ENCOUNTER — OFFICE VISIT (OUTPATIENT)
Dept: FAMILY MEDICINE CLINIC | Facility: CLINIC | Age: 26
End: 2021-10-06
Payer: COMMERCIAL

## 2021-10-06 VITALS
HEART RATE: 74 BPM | SYSTOLIC BLOOD PRESSURE: 126 MMHG | TEMPERATURE: 98.1 F | OXYGEN SATURATION: 97 % | WEIGHT: 171 LBS | RESPIRATION RATE: 18 BRPM | HEIGHT: 69 IN | DIASTOLIC BLOOD PRESSURE: 74 MMHG | BODY MASS INDEX: 25.33 KG/M2

## 2021-10-06 DIAGNOSIS — J02.9 SORE THROAT: ICD-10-CM

## 2021-10-06 DIAGNOSIS — Z48.02 ENCOUNTER FOR REMOVAL OF SUTURES: Primary | ICD-10-CM

## 2021-10-06 DIAGNOSIS — S61.212D LACERATION OF RIGHT MIDDLE FINGER WITHOUT FOREIGN BODY WITHOUT DAMAGE TO NAIL, SUBSEQUENT ENCOUNTER: ICD-10-CM

## 2021-10-06 PROBLEM — S61.212A LACERATION OF RIGHT MIDDLE FINGER WITHOUT FOREIGN BODY WITHOUT DAMAGE TO NAIL: Status: ACTIVE | Noted: 2021-10-06

## 2021-10-06 LAB — S PYO AG THROAT QL: NEGATIVE

## 2021-10-06 PROCEDURE — T1015 CLINIC SERVICE: HCPCS | Performed by: FAMILY MEDICINE

## 2021-10-08 ENCOUNTER — APPOINTMENT (OUTPATIENT)
Dept: LAB | Facility: MEDICAL CENTER | Age: 26
End: 2021-10-08
Payer: COMMERCIAL

## 2021-10-08 DIAGNOSIS — Z13.220 LIPID SCREENING: ICD-10-CM

## 2021-10-08 DIAGNOSIS — Z13.220 LIPID SCREENING: Primary | ICD-10-CM

## 2021-10-08 DIAGNOSIS — Z11.59 NEED FOR HEPATITIS C SCREENING TEST: ICD-10-CM

## 2021-10-08 LAB
CHOLEST SERPL-MCNC: 241 MG/DL (ref 50–200)
HCV AB SER QL: NORMAL
HDLC SERPL-MCNC: 48 MG/DL
LDLC SERPL CALC-MCNC: 166 MG/DL (ref 0–100)
NONHDLC SERPL-MCNC: 193 MG/DL
TRIGL SERPL-MCNC: 136 MG/DL

## 2021-10-08 PROCEDURE — 80061 LIPID PANEL: CPT

## 2021-10-08 PROCEDURE — 36415 COLL VENOUS BLD VENIPUNCTURE: CPT

## 2021-10-08 PROCEDURE — 86803 HEPATITIS C AB TEST: CPT

## 2021-10-11 ENCOUNTER — TELEPHONE (OUTPATIENT)
Dept: GASTROENTEROLOGY | Facility: CLINIC | Age: 26
End: 2021-10-11

## 2021-10-13 ENCOUNTER — PATIENT OUTREACH (OUTPATIENT)
Dept: CASE MANAGEMENT | Facility: OTHER | Age: 26
End: 2021-10-13

## 2021-10-28 DIAGNOSIS — R10.9 ABDOMINAL PAIN, UNSPECIFIED ABDOMINAL LOCATION: ICD-10-CM

## 2021-10-28 RX ORDER — DICYCLOMINE HCL 20 MG
TABLET ORAL
Qty: 60 TABLET | Refills: 1 | Status: SHIPPED | OUTPATIENT
Start: 2021-10-28 | End: 2022-01-07 | Stop reason: SDUPTHER

## 2021-11-15 DIAGNOSIS — Z20.822 EXPOSURE TO COVID-19 VIRUS: Primary | ICD-10-CM

## 2021-11-15 PROCEDURE — U0003 INFECTIOUS AGENT DETECTION BY NUCLEIC ACID (DNA OR RNA); SEVERE ACUTE RESPIRATORY SYNDROME CORONAVIRUS 2 (SARS-COV-2) (CORONAVIRUS DISEASE [COVID-19]), AMPLIFIED PROBE TECHNIQUE, MAKING USE OF HIGH THROUGHPUT TECHNOLOGIES AS DESCRIBED BY CMS-2020-01-R: HCPCS | Performed by: FAMILY MEDICINE

## 2021-11-15 PROCEDURE — U0005 INFEC AGEN DETEC AMPLI PROBE: HCPCS | Performed by: FAMILY MEDICINE

## 2021-11-18 DIAGNOSIS — Z20.822 EXPOSURE TO COVID-19 VIRUS: Primary | ICD-10-CM

## 2021-11-22 PROCEDURE — U0005 INFEC AGEN DETEC AMPLI PROBE: HCPCS | Performed by: NURSE PRACTITIONER

## 2021-11-22 PROCEDURE — U0003 INFECTIOUS AGENT DETECTION BY NUCLEIC ACID (DNA OR RNA); SEVERE ACUTE RESPIRATORY SYNDROME CORONAVIRUS 2 (SARS-COV-2) (CORONAVIRUS DISEASE [COVID-19]), AMPLIFIED PROBE TECHNIQUE, MAKING USE OF HIGH THROUGHPUT TECHNOLOGIES AS DESCRIBED BY CMS-2020-01-R: HCPCS | Performed by: NURSE PRACTITIONER

## 2021-11-24 ENCOUNTER — TELEPHONE (OUTPATIENT)
Dept: FAMILY MEDICINE CLINIC | Facility: CLINIC | Age: 26
End: 2021-11-24

## 2021-12-03 DIAGNOSIS — U07.1 COVID: Primary | ICD-10-CM

## 2021-12-03 PROCEDURE — U0003 INFECTIOUS AGENT DETECTION BY NUCLEIC ACID (DNA OR RNA); SEVERE ACUTE RESPIRATORY SYNDROME CORONAVIRUS 2 (SARS-COV-2) (CORONAVIRUS DISEASE [COVID-19]), AMPLIFIED PROBE TECHNIQUE, MAKING USE OF HIGH THROUGHPUT TECHNOLOGIES AS DESCRIBED BY CMS-2020-01-R: HCPCS | Performed by: NURSE PRACTITIONER

## 2021-12-03 PROCEDURE — U0005 INFEC AGEN DETEC AMPLI PROBE: HCPCS | Performed by: NURSE PRACTITIONER

## 2022-01-07 DIAGNOSIS — R10.9 ABDOMINAL PAIN, UNSPECIFIED ABDOMINAL LOCATION: ICD-10-CM

## 2022-01-07 RX ORDER — DICYCLOMINE HCL 20 MG
20 TABLET ORAL EVERY 6 HOURS
Qty: 60 TABLET | Refills: 0 | Status: SHIPPED | OUTPATIENT
Start: 2022-01-07 | End: 2022-05-10

## 2022-01-14 ENCOUNTER — TELEPHONE (OUTPATIENT)
Dept: GASTROENTEROLOGY | Facility: CLINIC | Age: 27
End: 2022-01-14

## 2022-01-14 ENCOUNTER — HOSPITAL ENCOUNTER (OUTPATIENT)
Dept: ULTRASOUND IMAGING | Facility: HOSPITAL | Age: 27
Discharge: HOME | End: 2022-01-14
Payer: COMMERCIAL

## 2022-01-14 DIAGNOSIS — R93.2 ABNORMAL GALLBLADDER ULTRASOUND: ICD-10-CM

## 2022-01-14 PROCEDURE — 76705 ECHO EXAM OF ABDOMEN: CPT

## 2022-01-14 NOTE — TELEPHONE ENCOUNTER
I spoke with patient and reassured her about ultrasound findings, no evidence of gallbladder stones or sludge or any biliary ductal obstruction, liver looks normal   There was just a small benign hemangioma which I explained to the patient, in some mildly fatty echotexture which does not to appear to be of any clinical consequence at this time given her normal liver enzymes, which we can continue to monitor  She says she will call Monday to schedule her EGD/colonoscopy  [facility] since you ordered this study    Thank you

## 2022-01-14 NOTE — TELEPHONE ENCOUNTER
Patients GI provider:  ELVER Garcia    Number to return call: [phone]    Reason for call: Pt calling stating she received her US results through [employer] and would like to speak to someon    Scheduled procedure/appointment date if applicable: NA

## 2022-01-17 ENCOUNTER — TELEPHONE (OUTPATIENT)
Dept: GASTROENTEROLOGY | Facility: CLINIC | Age: 27
End: 2022-01-17

## 2022-01-17 NOTE — TELEPHONE ENCOUNTER
Patients GI provider:  ELVER Garcia    Number to return call: [phone]     Reason for call: Pt calling to schedule colonoscopy and EGD  Pt states orders were placed for her to receive procedures  Pt asking to be scheduled on a Wednesday      Scheduled procedure/appointment date if applicable: N/A

## 2022-01-20 NOTE — TELEPHONE ENCOUNTER
Scheduled date of EGD/colonoscopy (as of today): 4/12/22  Physician performing EGD/colonoscopy: Dr Khan  Location of EGD/colonoscopy: [facility]  Desired bowel prep reviewed with patient: Miralax/Dulcolax  Instructions reviewed with patient by: Yolanda Melara  Clearances: N/A

## 2022-04-04 ENCOUNTER — TELEPHONE (OUTPATIENT)
Dept: GASTROENTEROLOGY | Facility: CLINIC | Age: 27
End: 2022-04-04

## 2022-04-04 ENCOUNTER — APPOINTMENT (EMERGENCY)
Dept: CT IMAGING | Facility: HOSPITAL | Age: 27
End: 2022-04-04
Payer: COMMERCIAL

## 2022-04-04 ENCOUNTER — HOSPITAL ENCOUNTER (EMERGENCY)
Facility: HOSPITAL | Age: 27
Discharge: HOME | End: 2022-04-04
Attending: EMERGENCY MEDICINE | Admitting: EMERGENCY MEDICINE
Payer: COMMERCIAL

## 2022-04-04 VITALS
RESPIRATION RATE: 18 BRPM | SYSTOLIC BLOOD PRESSURE: 120 MMHG | OXYGEN SATURATION: 98 % | DIASTOLIC BLOOD PRESSURE: 72 MMHG | WEIGHT: 165 LBS | HEIGHT: 69 IN | BODY MASS INDEX: 24.44 KG/M2 | TEMPERATURE: 97.1 F | HEART RATE: 87 BPM

## 2022-04-04 DIAGNOSIS — R10.31 RIGHT LOWER QUADRANT ABDOMINAL PAIN: Primary | ICD-10-CM

## 2022-04-04 DIAGNOSIS — N83.201 RIGHT OVARIAN CYST: ICD-10-CM

## 2022-04-04 LAB
ALBUMIN SERPL BCP-MCNC: 5.1 G/DL (ref 3.5–5)
ALP SERPL-CCNC: 70 U/L (ref 46–116)
ALT SERPL W P-5'-P-CCNC: 54 U/L (ref 12–78)
ANION GAP SERPL CALCULATED.3IONS-SCNC: 10 MMOL/L (ref 4–13)
AST SERPL W P-5'-P-CCNC: 39 U/L (ref 5–45)
BASOPHILS # BLD AUTO: 0.06 THOUSANDS/ΜL (ref 0–0.1)
BASOPHILS NFR BLD AUTO: 1 % (ref 0–1)
BILIRUB SERPL-MCNC: 0.52 MG/DL (ref 0.2–1)
BILIRUB UR QL STRIP: NEGATIVE
BUN SERPL-MCNC: 9 MG/DL (ref 5–25)
CALCIUM SERPL-MCNC: 9.8 MG/DL (ref 8.3–10.1)
CHLORIDE SERPL-SCNC: 100 MMOL/L (ref 100–108)
CLARITY UR: CLEAR
CO2 SERPL-SCNC: 28 MMOL/L (ref 21–32)
COLOR UR: YELLOW
CREAT SERPL-MCNC: 0.8 MG/DL (ref 0.6–1.3)
EOSINOPHIL # BLD AUTO: 0.18 THOUSAND/ΜL (ref 0–0.61)
EOSINOPHIL NFR BLD AUTO: 2 % (ref 0–6)
ERYTHROCYTE [DISTWIDTH] IN BLOOD BY AUTOMATED COUNT: 11.9 % (ref 11.6–15.1)
EXT PREG TEST URINE: NEGATIVE
EXT. CONTROL ED NAV: NORMAL
FLUAV RNA RESP QL NAA+PROBE: NEGATIVE
FLUBV RNA RESP QL NAA+PROBE: NEGATIVE
GFR SERPL CREATININE-BSD FRML MDRD: 102 ML/MIN/1.73SQ M
GLUCOSE SERPL-MCNC: 88 MG/DL (ref 65–140)
GLUCOSE UR STRIP-MCNC: NEGATIVE MG/DL
HCT VFR BLD AUTO: 42.6 % (ref 34.8–46.1)
HGB BLD-MCNC: 15.2 G/DL (ref 11.5–15.4)
HGB UR QL STRIP.AUTO: NEGATIVE
IMM GRANULOCYTES # BLD AUTO: 0.04 THOUSAND/UL (ref 0–0.2)
IMM GRANULOCYTES NFR BLD AUTO: 0 % (ref 0–2)
KETONES UR STRIP-MCNC: NEGATIVE MG/DL
LACTATE SERPL-SCNC: 0.6 MMOL/L (ref 0.5–2)
LEUKOCYTE ESTERASE UR QL STRIP: NEGATIVE
LYMPHOCYTES # BLD AUTO: 3.19 THOUSANDS/ΜL (ref 0.6–4.47)
LYMPHOCYTES NFR BLD AUTO: 26 % (ref 14–44)
MCH RBC QN AUTO: 32.3 PG (ref 26.8–34.3)
MCHC RBC AUTO-ENTMCNC: 35.7 G/DL (ref 31.4–37.4)
MCV RBC AUTO: 91 FL (ref 82–98)
MONOCYTES # BLD AUTO: 0.81 THOUSAND/ΜL (ref 0.17–1.22)
MONOCYTES NFR BLD AUTO: 7 % (ref 4–12)
NEUTROPHILS # BLD AUTO: 7.96 THOUSANDS/ΜL (ref 1.85–7.62)
NEUTS SEG NFR BLD AUTO: 64 % (ref 43–75)
NITRITE UR QL STRIP: NEGATIVE
NRBC BLD AUTO-RTO: 0 /100 WBCS
PH UR STRIP.AUTO: 7 [PH]
PLATELET # BLD AUTO: 354 THOUSANDS/UL (ref 149–390)
PMV BLD AUTO: 9.1 FL (ref 8.9–12.7)
POTASSIUM SERPL-SCNC: 3.7 MMOL/L (ref 3.5–5.3)
PROT SERPL-MCNC: 9.6 G/DL (ref 6.4–8.2)
PROT UR STRIP-MCNC: NEGATIVE MG/DL
RBC # BLD AUTO: 4.7 MILLION/UL (ref 3.81–5.12)
RSV RNA RESP QL NAA+PROBE: NEGATIVE
SARS-COV-2 RNA RESP QL NAA+PROBE: NEGATIVE
SODIUM SERPL-SCNC: 138 MMOL/L (ref 136–145)
SP GR UR STRIP.AUTO: 1.01 (ref 1–1.03)
UROBILINOGEN UR QL STRIP.AUTO: 0.2 E.U./DL
WBC # BLD AUTO: 12.24 THOUSAND/UL (ref 4.31–10.16)

## 2022-04-04 PROCEDURE — 81025 URINE PREGNANCY TEST: CPT | Performed by: EMERGENCY MEDICINE

## 2022-04-04 PROCEDURE — 81003 URINALYSIS AUTO W/O SCOPE: CPT | Performed by: EMERGENCY MEDICINE

## 2022-04-04 PROCEDURE — 96374 THER/PROPH/DIAG INJ IV PUSH: CPT

## 2022-04-04 PROCEDURE — 96361 HYDRATE IV INFUSION ADD-ON: CPT

## 2022-04-04 PROCEDURE — 99285 EMERGENCY DEPT VISIT HI MDM: CPT | Performed by: EMERGENCY MEDICINE

## 2022-04-04 PROCEDURE — 85025 COMPLETE CBC W/AUTO DIFF WBC: CPT | Performed by: EMERGENCY MEDICINE

## 2022-04-04 PROCEDURE — G1004 CDSM NDSC: HCPCS

## 2022-04-04 PROCEDURE — 96375 TX/PRO/DX INJ NEW DRUG ADDON: CPT

## 2022-04-04 PROCEDURE — 0241U HB NFCT DS VIR RESP RNA 4 TRGT: CPT | Performed by: EMERGENCY MEDICINE

## 2022-04-04 PROCEDURE — 36415 COLL VENOUS BLD VENIPUNCTURE: CPT | Performed by: EMERGENCY MEDICINE

## 2022-04-04 PROCEDURE — 99284 EMERGENCY DEPT VISIT MOD MDM: CPT

## 2022-04-04 PROCEDURE — 80053 COMPREHEN METABOLIC PANEL: CPT | Performed by: EMERGENCY MEDICINE

## 2022-04-04 PROCEDURE — 74177 CT ABD & PELVIS W/CONTRAST: CPT

## 2022-04-04 PROCEDURE — 83605 ASSAY OF LACTIC ACID: CPT | Performed by: EMERGENCY MEDICINE

## 2022-04-04 RX ORDER — ONDANSETRON 4 MG/1
4 TABLET, ORALLY DISINTEGRATING ORAL EVERY 6 HOURS PRN
Qty: 20 TABLET | Refills: 0 | Status: SHIPPED | OUTPATIENT
Start: 2022-04-04

## 2022-04-04 RX ORDER — MORPHINE SULFATE 4 MG/ML
4 INJECTION, SOLUTION INTRAMUSCULAR; INTRAVENOUS ONCE
Status: COMPLETED | OUTPATIENT
Start: 2022-04-04 | End: 2022-04-04

## 2022-04-04 RX ORDER — KETOROLAC TROMETHAMINE 30 MG/ML
15 INJECTION, SOLUTION INTRAMUSCULAR; INTRAVENOUS ONCE
Status: COMPLETED | OUTPATIENT
Start: 2022-04-04 | End: 2022-04-04

## 2022-04-04 RX ORDER — NAPROXEN 500 MG/1
500 TABLET ORAL
Qty: 30 TABLET | Refills: 0 | Status: SHIPPED | OUTPATIENT
Start: 2022-04-04 | End: 2022-05-09 | Stop reason: SDUPTHER

## 2022-04-04 RX ORDER — ONDANSETRON 2 MG/ML
4 INJECTION INTRAMUSCULAR; INTRAVENOUS ONCE
Status: COMPLETED | OUTPATIENT
Start: 2022-04-04 | End: 2022-04-04

## 2022-04-04 RX ORDER — ONDANSETRON 4 MG/1
4 TABLET, ORALLY DISINTEGRATING ORAL ONCE
Status: COMPLETED | OUTPATIENT
Start: 2022-04-04 | End: 2022-04-04

## 2022-04-04 RX ADMIN — MORPHINE SULFATE 4 MG: 4 INJECTION, SOLUTION INTRAMUSCULAR; INTRAVENOUS at 16:53

## 2022-04-04 RX ADMIN — SODIUM CHLORIDE 1000 ML: 0.9 INJECTION, SOLUTION INTRAVENOUS at 16:56

## 2022-04-04 RX ADMIN — IOHEXOL 100 ML: 350 INJECTION, SOLUTION INTRAVENOUS at 18:36

## 2022-04-04 RX ADMIN — KETOROLAC TROMETHAMINE 15 MG: 30 INJECTION, SOLUTION INTRAMUSCULAR at 16:51

## 2022-04-04 RX ADMIN — ONDANSETRON 4 MG: 4 TABLET, ORALLY DISINTEGRATING ORAL at 20:11

## 2022-04-04 RX ADMIN — ONDANSETRON 4 MG: 2 INJECTION INTRAMUSCULAR; INTRAVENOUS at 16:49

## 2022-04-04 NOTE — Clinical Note
Yessi Bonilla was seen and treated in our emergency department on 4/4/2022  Diagnosis:     Shilpa    She may return on this date: 04/06/2022    Please excuse for absence on 04/04/2022 and 04/05/2022 patient seen and examined in the ER on 04/04/2022  Excuse due to symptoms related to visit  If you have any questions or concerns, please don't hesitate to call        Ang Garner, DO    ______________________________           _______________          _______________  Hospital Representative                              Date                                Time

## 2022-04-04 NOTE — ED PROVIDER NOTES
History  Chief Complaint   Patient presents with    Abdominal Pain     Patient c/o right lower abdominal pain that radiates to umbilicus area starting this afternoon  Denies CP/SOB  Patient c/o diziness and nausea  History provided by:  Patient  Abdominal Pain  Pain location:  RLQ  Pain quality: aching and dull    Pain radiates to:  Does not radiate  Pain severity:  Moderate  Onset quality:  Gradual  Duration:  5 hours  Timing:  Constant  Progression:  Worsening  Chronicity:  New  Context: previous surgery    Context: not alcohol use, not awakening from sleep, not diet changes, not eating, not medication withdrawal, not recent illness, not recent sexual activity, not recent travel, not retching, not sick contacts and not suspicious food intake    Relieved by:  Nothing  Worsened by:  Palpation and movement  Ineffective treatments:  OTC medications, lying down and not moving  Associated symptoms: anorexia and nausea    Associated symptoms: no belching, no chest pain, no chills, no constipation, no cough, no diarrhea, no dysuria, no fatigue, no fever, no flatus, no hematemesis, no hematochezia, no hematuria, no melena, no shortness of breath, no sore throat, no vaginal bleeding, no vaginal discharge and no vomiting    Risk factors: multiple surgeries and NSAID use    Risk factors: no alcohol abuse, no aspirin use, not elderly, not obese, not pregnant and no recent hospitalization    81-year-old female past medical history significant for history of right-sided ovarian cyst, history of endometriosis, history of D & C, history of endovenous laser therapy (EVLT) (endometrial ablation ) about 1 year ago presents to the ER for evaluation of acute onset right lower quadrant pain  She reports pain began periumbilical region and radiate to the right lower quadrant  She appears uncomfortable on exam   She in notes nausea and dizziness denies vomiting  Denies pregnancy    Reports a history of tubal ablation she is concerned about appendicitis  Prior to Admission Medications   Prescriptions Last Dose Informant Patient Reported? Taking?    Ascorbic Acid (vitamin C) 1000 MG tablet   Yes No   Sig: Take 1,000 mg by mouth daily   Cranberry 500 MG TABS  Self Yes No   Sig: Take by mouth   HYDROcodone-acetaminophen (NORCO) 5-325 mg per tablet  Self Yes No   Sig: Take 2 tablets by mouth every 6 (six) hours as needed   Patient not taking: Reported on 2021   Lidocaine Viscous HCl (XYLOCAINE) 2 % mucosal solution   No No   Sig: Swish and spit 15 mL 4 (four) times a day as needed for mouth pain or discomfort   Multiple Vitamin (MULTIVITAMIN) capsule  Self Yes No   Sig: Take 1 capsule by mouth daily   dicyclomine (BENTYL) 20 mg tablet   No No   Sig: Take 1 tablet (20 mg total) by mouth every 6 (six) hours   diphenoxylate-atropine (LOMOTIL) 2 5-0 025 mg per tablet   No No   Sig: Take 1 tablet by mouth 4 (four) times a day as needed for diarrhea   fluticasone (FLONASE) 50 mcg/act nasal spray   No No   Si spray into each nostril daily   ibuprofen (MOTRIN) 800 mg tablet   No No   Sig: Take 1 tablet (800 mg total) by mouth every 8 (eight) hours as needed for moderate pain for up to 10 days   levothyroxine 75 mcg tablet   No No   Sig: Take 1 tablet (75 mcg total) by mouth daily in the early morning   Patient not taking: Reported on 10/1/2021   levothyroxine 75 mcg tablet   No No   Sig: Take 1 tablet (75 mcg total) by mouth daily in the early morning   mupirocin (BACTROBAN) 2 % ointment   No No   Sig: Apply topically 3 (three) times a day   ofloxacin (FLOXIN) 0 3 % otic solution   No No   Sig: Administer 10 drops to the right ear 2 (two) times a day      Facility-Administered Medications: None       Past Medical History:   Diagnosis Date    Depression     Disease of thyroid gland     Endometriosis     Ovarian cyst        Past Surgical History:   Procedure Laterality Date    DILATION AND CURETTAGE OF UTERUS      [place] ENDOVENOUS LASER, 1ST VEIN Left 2020    Procedure: ENDOVASCULAR LASER THERAPY (EVLT) + stab phlebectomies;  Surgeon: William Cooley DO;  [facility];  Service: Vascular    WISDOM TOOTH EXTRACTION         Family History   Problem Relation Age of Onset    Cancer Mother     Hypertension Mother     Cancer Father     Hypertension Father     Diabetes Father     Cancer Sister      I have reviewed and agree with the history as documented  E-Cigarette/Vaping    E-Cigarette Use Never User      E-Cigarette/Vaping Substances    Nicotine No     THC No     CBD No     Flavoring No     Other No     Unknown No      Social History     Tobacco Use    Smoking status: Former Smoker     Packs/day: 0 00     Quit date: 2020     Years since quittin 9    Smokeless tobacco: Never Used    Tobacco comment: 3 cigarettes per day   Vaping Use    Vaping Use: Never used   Substance Use Topics    Alcohol use: No    Drug use: No       Review of Systems   Constitutional: Negative for chills, fatigue and fever  HENT: Negative for ear pain and sore throat  Eyes: Negative for pain and visual disturbance  Respiratory: Negative for cough and shortness of breath  Cardiovascular: Negative for chest pain and palpitations  Gastrointestinal: Positive for abdominal pain, anorexia and nausea  Negative for constipation, diarrhea, flatus, hematemesis, hematochezia, melena and vomiting  Genitourinary: Negative for dysuria, hematuria, vaginal bleeding and vaginal discharge  Musculoskeletal: Negative for arthralgias and back pain  Skin: Negative for color change and rash  Neurological: Negative for seizures and syncope  All other systems reviewed and are negative  Physical Exam  Physical Exam  Vitals and nursing note reviewed  Exam conducted with a chaperone present  Constitutional:       Appearance: She is well-developed        Comments: Uncomfortable appearing   HENT:      Head: Normocephalic and atraumatic  Mouth/Throat:      Mouth: Mucous membranes are moist       Pharynx: Oropharynx is clear  Eyes:      General: No scleral icterus  Extraocular Movements: Extraocular movements intact  Cardiovascular:      Rate and Rhythm: Normal rate and regular rhythm  Heart sounds: Normal heart sounds  Pulmonary:      Effort: Pulmonary effort is normal       Breath sounds: Normal breath sounds  Abdominal:      General: Abdomen is flat  A surgical scar is present  There is no distension  Palpations: Abdomen is soft  Tenderness: There is abdominal tenderness in the right lower quadrant and periumbilical area  There is guarding and rebound  There is no right CVA tenderness or left CVA tenderness  Positive signs include McBurney's sign  Hernia: No hernia is present  Skin:     General: Skin is warm and dry  Capillary Refill: Capillary refill takes less than 2 seconds  Coloration: Skin is not jaundiced  Neurological:      General: No focal deficit present  Mental Status: She is alert           Vital Signs  ED Triage Vitals [04/04/22 1536]   Temperature Pulse Respirations Blood Pressure SpO2   (!) 97 1 °F (36 2 °C) 77 16 150/95 98 %      Temp Source Heart Rate Source Patient Position - Orthostatic VS BP Location FiO2 (%)   Temporal Monitor Lying Right arm --      Pain Score       9           Vitals:    04/04/22 1536 04/04/22 1730 04/04/22 1800   BP: 150/95 119/60 105/64   Pulse: 77 63 67   Patient Position - Orthostatic VS: Lying           Visual Acuity      ED Medications  Medications   ketorolac (TORADOL) injection 15 mg (15 mg Intravenous Given 4/4/22 1651)   morphine (PF) 4 mg/mL injection 4 mg (4 mg Intravenous Given 4/4/22 1653)   ondansetron (ZOFRAN) injection 4 mg (4 mg Intravenous Given 4/4/22 1649)   sodium chloride 0 9 % bolus 1,000 mL (1,000 mL Intravenous New Bag 4/4/22 1656)   iohexol (OMNIPAQUE) 350 MG/ML injection (SINGLE-DOSE) 100 mL (100 mL Intravenous Given 4/4/22 1836)       Diagnostic Studies  Results Reviewed     Procedure Component Value Units Date/Time    COVID/FLU/RSV - 2 hour TAT [[id number]]  (Normal) Collected: 04/04/22 1639    Lab Status: Final result Specimen: Nasopharyngeal Swab Updated: 04/04/22 1733     SARS-CoV-2 Negative     INFLUENZA A PCR Negative     INFLUENZA B PCR Negative     RSV PCR Negative    Narrative:      FOR PEDIATRIC PATIENTS - copy/paste COVID Guidelines URL to browser: [email or web address]  Generaytor    SARS-CoV-2 assay is a Nucleic Acid Amplification assay intended for the  qualitative detection of nucleic acid from SARS-CoV-2 in nasopharyngeal  swabs  Results are for the presumptive identification of SARS-CoV-2 RNA  Positive results are indicative of infection with SARS-CoV-2, the virus  causing COVID-19, but do not rule out bacterial infection or co-infection  with other viruses  Laboratories within the [place] and its  territories are required to report all positive results to the appropriate  public health authorities  Negative results do not preclude SARS-CoV-2  infection and should not be used as the sole basis for treatment or other  patient management decisions  Negative results must be combined with  clinical observations, patient history, and epidemiological information  This test has not been FDA cleared or approved  This test has been authorized by FDA under an Emergency Use Authorization  (EUA)  This test is only authorized for the duration of time the  declaration that circumstances exist justifying the authorization of the  emergency use of an in vitro diagnostic tests for detection of SARS-CoV-2  virus and/or diagnosis of COVID-19 infection under section 564(b)(1) of  the Act, 21 U  S C  919REG-5(J)(5), unless the authorization is terminated  or revoked sooner  The test has been validated but independent review by FDA  and CLIA is pending      Test performed using [employer] Gene[employer]pert: This RT-PCR assay targets N2,  a region unique to SARS-CoV-2  A conserved region in the E-gene was chosen  for pan-Sarbecovirus detection which includes SARS-CoV-2  UA w Reflex to Microscopic w Reflex to Culture [[id number]] Collected: 04/04/22 1649    Lab Status: Final result Specimen: Urine, Clean Catch Updated: 04/04/22 1712     Color, UA Yellow     Clarity, UA Clear     Specific Gravity, UA 1 015     pH, UA 7 0     Leukocytes, UA Negative     Nitrite, UA Negative     Protein, UA Negative mg/dl      Glucose, UA Negative mg/dl      Ketones, UA Negative mg/dl      Urobilinogen, UA 0 2 E U /dl      Bilirubin, UA Negative     Blood, UA Negative    Lactic acid [[id number]]  (Normal) Collected: 04/04/22 1639    Lab Status: Final result Specimen: Blood from Arm, Right Updated: 04/04/22 1705     LACTIC ACID 0 6 mmol/L     Narrative:      Result may be elevated if tourniquet was used during collection      Comprehensive metabolic panel [[id number]]  (Abnormal) Collected: 04/04/22 1639    Lab Status: Final result Specimen: Blood from Arm, Right Updated: 04/04/22 1703     Sodium 138 mmol/L      Potassium 3 7 mmol/L      Chloride 100 mmol/L      CO2 28 mmol/L      ANION GAP 10 mmol/L      BUN 9 mg/dL      Creatinine 0 80 mg/dL      Glucose 88 mg/dL      Calcium 9 8 mg/dL      AST 39 U/L      ALT 54 U/L      Alkaline Phosphatase 70 U/L      Total Protein 9 6 g/dL      Albumin 5 1 g/dL      Total Bilirubin 0 52 mg/dL      eGFR 102 ml/min/1 73sq m     Narrative:      Krysta guidelines for Chronic Kidney Disease (CKD):     Stage 1 with normal or high GFR (GFR > 90 mL/min/1 73 square meters)    Stage 2 Mild CKD (GFR = 60-89 mL/min/1 73 square meters)    Stage 3A Moderate CKD (GFR = 45-59 mL/min/1 73 square meters)    Stage 3B Moderate CKD (GFR = 30-44 mL/min/1 73 square meters)    Stage 4 Severe CKD (GFR = 15-29 mL/min/1 73 square meters)    Stage 5 End Stage CKD (GFR <15 mL/min/1 73 square meters)  Note: GFR calculation is accurate only with a steady state creatinine    CBC and differential [[id number]]  (Abnormal) Collected: 04/04/22 1639    Lab Status: Final result Specimen: Blood from Arm, Right Updated: 04/04/22 1650     WBC 12 24 Thousand/uL      RBC 4 70 Million/uL      Hemoglobin 15 2 g/dL      Hematocrit 42 6 %      MCV 91 fL      MCH 32 3 pg      MCHC 35 7 g/dL      RDW 11 9 %      MPV 9 1 fL      Platelets 454 Thousands/uL      nRBC 0 /100 WBCs      Neutrophils Relative 64 %      Immat GRANS % 0 %      Lymphocytes Relative 26 %      Monocytes Relative 7 %      Eosinophils Relative 2 %      Basophils Relative 1 %      Neutrophils Absolute 7 96 Thousands/µL      Immature Grans Absolute 0 04 Thousand/uL      Lymphocytes Absolute 3 19 Thousands/µL      Monocytes Absolute 0 81 Thousand/µL      Eosinophils Absolute 0 18 Thousand/µL      Basophils Absolute 0 06 Thousands/µL     POCT pregnancy, urine [[id number]]  (Normal) Resulted: 04/04/22 1638    Lab Status: Final result Updated: 04/04/22 1638     EXT PREG TEST UR (Ref: Negative) negative     Control valid                 CT abdomen pelvis with contrast   Final Result by Saad Love MD (04/04 1930)      Normal caliber appendix  No evidence for hydronephrosis  Involuting right adnexal cyst with a small amount of pelvic free fluid  Workstation performed: [id number]                    Procedures  Procedures         ED Course  ED Course as of 04/04/22 2004 Mon Apr 04, 2022   1613 PREGNANCY TEST URINE: negative   1656 WBC(!): 12 24 1957 Findings consistent with right ovarian cyst pathology  No evidence of torsion  Patient counseled on limited utility of CT scan to evaluate for this however no significant edematous changes are seen and the patient is in symptomatic for the greater part of the day    She has had no return of vomiting and her pain has been much improved with the initial dose of pain medications on arrival   She was offered transvaginal ultrasound with Doppler and she declines preferring to follow-up with her OBGYN return if symptoms worsen  This is further motivated by her prior gynecologic procedures and her understanding that she is not expected to bear children in her future anyway  Discussed symptomatic management will provide naproxen and Zofran prescriptions  OBGYN follow-up instructed  Return precautions discussed  Will discharge  Work note provided  SBIRT 22yo+      Most Recent Value   SBIRT (24 yo +)    In order to provide better care to our patients, we are screening all of our patients for alcohol and drug use  Would it be okay to ask you these screening questions? Yes Filed at: 04/04/2022 1702   Initial Alcohol Screen: US AUDIT-C     1  How often do you have a drink containing alcohol? 0 Filed at: 04/04/2022 1702   2  How many drinks containing alcohol do you have on a typical day you are drinking? 0 Filed at: 04/04/2022 1702   3b  FEMALE Any Age, or MALE 65+: How often do you have 4 or more drinks on one occassion? 0 Filed at: 04/04/2022 1702   Audit-C Score 0 Filed at: 04/04/2022 1702   GAUTAM: How many times in the past year have you    Used an illegal drug or used a prescription medication for non-medical reasons?  Never Filed at: 04/04/2022 1702                    MDM  Number of Diagnoses or Management Options  Right lower quadrant abdominal pain: new and requires workup     Amount and/or Complexity of Data Reviewed  Clinical lab tests: ordered and reviewed  Tests in the radiology section of CPT®: ordered and reviewed  Tests in the medicine section of CPT®: ordered and reviewed  Discussion of test results with the performing providers: yes  Decide to obtain previous medical records or to obtain history from someone other than the patient: yes  Review and summarize past medical records: yes  Discuss the patient with other providers: yes  Independent visualization of images, tracings, or specimens: yes    Risk of Complications, Morbidity, and/or Mortality  Presenting problems: moderate  Diagnostic procedures: moderate  Management options: moderate    Patient Progress  Patient progress: stable   80-year-old female presents to the ER for evaluation of acute onset right lower quadrant pain  Describes pain starting in the periumbilical region now radiating and present in the right lower quadrant  High clinical suspicion for appendicitis at this time  Other considerations include endometriosis, ovarian pathology  Disposition  Final diagnoses:   Right lower quadrant abdominal pain   Right ovarian cyst     Time reflects when diagnosis was documented in both MDM as applicable and the Disposition within this note     Time User Action Codes Description Comment    4/4/2022  4:12 PM Harvey Hernandez [R10 [id number]] Right lower quadrant abdominal pain     4/4/2022  8:02 PM Harvey Hernandez [K84 [id number]] Right ovarian cyst       ED Disposition     ED Disposition Condition Date/Time Comment    Discharge Stable Mon Apr 4, 2022  8:02 PM Shilpa Beil discharge to home/self care              Follow-up Information     Follow up With Specialties Details Why Contact Info Additional [address] [place] [address],[address], [address] [place] [address] Medicine Schedule an appointment as soon as possible for a visit   [address]       [facility] Emergency Department Emergency Medicine Go to  If symptoms worsen [phone]  [address] [place] [address] Emergency Department, [address], [address]          Patient's Medications   Discharge Prescriptions    NAPROXEN (NAPROSYN) 500 MG TABLET    Take 1 tablet (500 mg total) by mouth 3 (three) times a day with meals       Start Date: 4/4/2022  End Date: --       Order Dose: 500 mg       Quantity: 30 tablet    Refills: 0 ONDANSETRON (ZOFRAN ODT) 4 MG DISINTEGRATING TABLET    Take 1 tablet (4 mg total) by mouth every 6 (six) hours as needed for nausea or vomiting       Start Date: 4/4/2022  End Date: --       Order Dose: 4 mg       Quantity: 20 tablet    Refills: 0       No discharge procedures on file      PDMP Review     None          ED Provider  Electronically Signed by           Olga Cha DO  04/04/22 2005

## 2022-04-05 NOTE — DISCHARGE INSTRUCTIONS
Right ovarian cyst   Follow-up with OBGYN  Naproxen and Zofran for symptoms  Return if symptoms worsen or other concerns

## 2022-04-06 ENCOUNTER — TELEPHONE (OUTPATIENT)
Dept: GASTROENTEROLOGY | Facility: AMBULARY SURGERY CENTER | Age: 27
End: 2022-04-06

## 2022-04-06 NOTE — TELEPHONE ENCOUNTER
Patients GI provider:  Dr Eligio Washburn     Number to return call: [phone]     Reason for call: Pt calling stated she was in the ER yesterday due to ovarian cyst pain   Would like to speak to someone to know if she should proceed with her procedure scheduled on 4/12/22     Scheduled procedure/appointment date if applicable: Apt/procedure 4/12/22

## 2022-04-06 NOTE — TELEPHONE ENCOUNTER
Called pt and gave her OK to continue with procedures as long as she was agreeable  Pt stated she was

## 2022-04-11 ENCOUNTER — ANESTHESIA EVENT (OUTPATIENT)
Dept: ANESTHESIOLOGY | Facility: HOSPITAL | Age: 27
End: 2022-04-11

## 2022-04-11 ENCOUNTER — ANESTHESIA (OUTPATIENT)
Dept: ANESTHESIOLOGY | Facility: HOSPITAL | Age: 27
End: 2022-04-11

## 2022-04-11 NOTE — ANESTHESIA PREPROCEDURE EVALUATION
Procedure:  PRE-OP ONLY    EGD for gastric and duodenal biopsy  Colonoscopy     Relevant Problems   CARDIO   (+) Borderline hyperlipidemia      ENDO   (+) Hypothyroidism             Anesthesia Plan  ASA Score- 1     Anesthesia Type- IV sedation with anesthesia with ASA Monitors  Additional Monitors:   Airway Plan:           Plan Factors-    Chart reviewed  Existing labs reviewed  Patient summary reviewed              Induction-     Postoperative Plan-     Informed Consent-

## 2022-04-12 ENCOUNTER — ANESTHESIA EVENT (OUTPATIENT)
Dept: PERIOP | Facility: HOSPITAL | Age: 27
End: 2022-04-12

## 2022-04-12 ENCOUNTER — HOSPITAL ENCOUNTER (OUTPATIENT)
Dept: PERIOP | Facility: HOSPITAL | Age: 27
Setting detail: OUTPATIENT SURGERY
Discharge: HOME | End: 2022-04-12
Admitting: INTERNAL MEDICINE
Payer: COMMERCIAL

## 2022-04-12 ENCOUNTER — ANESTHESIA (OUTPATIENT)
Dept: PERIOP | Facility: HOSPITAL | Age: 27
End: 2022-04-12

## 2022-04-12 VITALS
BODY MASS INDEX: 24.42 KG/M2 | TEMPERATURE: 97.8 F | WEIGHT: 164.9 LBS | DIASTOLIC BLOOD PRESSURE: 74 MMHG | SYSTOLIC BLOOD PRESSURE: 111 MMHG | RESPIRATION RATE: 20 BRPM | OXYGEN SATURATION: 97 % | HEIGHT: 69 IN | HEART RATE: 77 BPM

## 2022-04-12 DIAGNOSIS — R19.7 DIARRHEA, UNSPECIFIED TYPE: ICD-10-CM

## 2022-04-12 DIAGNOSIS — R10.9 ABDOMINAL PAIN, UNSPECIFIED ABDOMINAL LOCATION: ICD-10-CM

## 2022-04-12 PROCEDURE — 88305 TISSUE EXAM BY PATHOLOGIST: CPT | Performed by: PATHOLOGY

## 2022-04-12 PROCEDURE — 43239 EGD BIOPSY SINGLE/MULTIPLE: CPT | Performed by: INTERNAL MEDICINE

## 2022-04-12 PROCEDURE — 45380 COLONOSCOPY AND BIOPSY: CPT | Performed by: INTERNAL MEDICINE

## 2022-04-12 RX ORDER — PROPOFOL 10 MG/ML
INJECTION, EMULSION INTRAVENOUS CONTINUOUS PRN
Status: DISCONTINUED | OUTPATIENT
Start: 2022-04-12 | End: 2022-04-12

## 2022-04-12 RX ORDER — PROPOFOL 10 MG/ML
INJECTION, EMULSION INTRAVENOUS AS NEEDED
Status: DISCONTINUED | OUTPATIENT
Start: 2022-04-12 | End: 2022-04-12

## 2022-04-12 RX ORDER — ONDANSETRON 2 MG/ML
4 INJECTION INTRAMUSCULAR; INTRAVENOUS ONCE AS NEEDED
Status: DISCONTINUED | OUTPATIENT
Start: 2022-04-12 | End: 2022-04-16 | Stop reason: HOSPADM

## 2022-04-12 RX ORDER — LIDOCAINE HYDROCHLORIDE 20 MG/ML
INJECTION, SOLUTION EPIDURAL; INFILTRATION; INTRACAUDAL; PERINEURAL AS NEEDED
Status: DISCONTINUED | OUTPATIENT
Start: 2022-04-12 | End: 2022-04-12

## 2022-04-12 RX ORDER — ALBUTEROL SULFATE 2.5 MG/3ML
2.5 SOLUTION RESPIRATORY (INHALATION) ONCE AS NEEDED
Status: DISCONTINUED | OUTPATIENT
Start: 2022-04-12 | End: 2022-04-16 | Stop reason: HOSPADM

## 2022-04-12 RX ORDER — FENTANYL CITRATE/PF 50 MCG/ML
25 SYRINGE (ML) INJECTION
Status: DISCONTINUED | OUTPATIENT
Start: 2022-04-12 | End: 2022-04-16 | Stop reason: HOSPADM

## 2022-04-12 RX ORDER — SODIUM CHLORIDE, SODIUM LACTATE, POTASSIUM CHLORIDE, CALCIUM CHLORIDE 600; 310; 30; 20 MG/100ML; MG/100ML; MG/100ML; MG/100ML
INJECTION, SOLUTION INTRAVENOUS CONTINUOUS PRN
Status: DISCONTINUED | OUTPATIENT
Start: 2022-04-12 | End: 2022-04-12

## 2022-04-12 RX ADMIN — PROPOFOL 50 MG: 10 INJECTION, EMULSION INTRAVENOUS at 11:56

## 2022-04-12 RX ADMIN — PROPOFOL 50 MG: 10 INJECTION, EMULSION INTRAVENOUS at 11:51

## 2022-04-12 RX ADMIN — SODIUM CHLORIDE, SODIUM LACTATE, POTASSIUM CHLORIDE, AND CALCIUM CHLORIDE: .6; .31; .03; .02 INJECTION, SOLUTION INTRAVENOUS at 11:30

## 2022-04-12 RX ADMIN — PROPOFOL 160 MCG/KG/MIN: 10 INJECTION, EMULSION INTRAVENOUS at 11:53

## 2022-04-12 RX ADMIN — PROPOFOL 50 MG: 10 INJECTION, EMULSION INTRAVENOUS at 11:54

## 2022-04-12 RX ADMIN — PROPOFOL 50 MG: 10 INJECTION, EMULSION INTRAVENOUS at 11:47

## 2022-04-12 RX ADMIN — LIDOCAINE HYDROCHLORIDE 100 MG: 20 INJECTION, SOLUTION EPIDURAL; INFILTRATION; INTRACAUDAL at 11:43

## 2022-04-12 RX ADMIN — PROPOFOL 70 MG: 10 INJECTION, EMULSION INTRAVENOUS at 11:45

## 2022-04-12 RX ADMIN — PROPOFOL 110 MG: 10 INJECTION, EMULSION INTRAVENOUS at 11:43

## 2022-04-12 NOTE — ANESTHESIA PREPROCEDURE EVALUATION
Procedure:  COLONOSCOPY  EGD  EGD for gastric and duodenal biopsy  Colonoscopy   Denies the following: CP/SOB with exertion, asthma, COPD, BRUCE, stroke/TIA, seizure    Relevant Problems   CARDIO   (+) Borderline hyperlipidemia      ENDO   (+) Hypothyroidism        Physical Exam    Airway    Mallampati score: II  TM Distance: >3 FB  Neck ROM: full     Dental   No notable dental hx     Cardiovascular      Pulmonary      Other Findings        Anesthesia Plan  ASA Score- 1     Anesthesia Type- IV sedation with anesthesia with ASA Monitors  Additional Monitors:   Airway Plan:           Plan Factors-Exercise tolerance (METS): >4 METS  Chart reviewed  Patient summary reviewed  Patient is not a current smoker  Obstructive sleep apnea risk education given perioperatively  Induction-     Postoperative Plan-     Informed Consent- Anesthetic plan and risks discussed with patient  I personally reviewed this patient with the CRNA  Discussed and agreed on the Anesthesia Plan with the CRNA  Kathrine Console

## 2022-04-12 NOTE — H&P
History and Physical - [facility]  Coleman Jaime 23 y o  female MRN: [id number]                  HPI: Coleman Jaime is a 23y o  year old female who presents for abdominal pain and diarrhea      REVIEW OF SYSTEMS: Per the HPI, and otherwise unremarkable  Historical Information   Past Medical History:   Diagnosis Date    Depression     Disease of thyroid gland     Endometriosis     Ovarian cyst      Past Surgical History:   Procedure Laterality Date    DILATION AND CURETTAGE OF UTERUS      [place] ENDOVENOUS LASER, 1ST VEIN Left 2020    Procedure: ENDOVASCULAR LASER THERAPY (EVLT) + stab phlebectomies;  Surgeon: Cassidy Hernadez DO;  Location: [facility];  Service: Vascular    WISDOM TOOTH EXTRACTION       Social History   Social History     Substance and Sexual Activity   Alcohol Use No     Social History     Substance and Sexual Activity   Drug Use No     Social History     Tobacco Use   Smoking Status Former Smoker    Packs/day: 0 00    Quit date: 2020    Years since quittin 9   Smokeless Tobacco Never Used   Tobacco Comment    3 cigarettes per day     Family History   Problem Relation Age of Onset    Cancer Mother     Hypertension Mother     Cancer Father     Hypertension Father     Diabetes Father     Cancer Sister        Meds/Allergies     (Not in a hospital admission)      No Known Allergies    Objective     not currently breastfeeding  PHYSICAL EXAMINATION:    General Appearance:   Alert, cooperative, no distress   HEENT:  Normocephalic, atraumatic, anicteric  Neck supple, symmetrical, trachea midline  Lungs:   Equal chest rise and unlabored breathing, normal effort, no coughing  Cardiovascular:   No visualized JVD  Abdomen:   No abdominal distension  Skin:   No jaundice, rashes, or lesions  Musculoskeletal:   Normal range of motion visualized  Psych:  Normal affect and normal insight  Neuro:  Alert and appropriate             ASSESSMENT/PLAN: This is a 23y o  year old female here for EGD and colonoscopy, and she is stable and optimized for her procedure

## 2022-04-12 NOTE — ANESTHESIA POSTPROCEDURE EVALUATION
Post-Op Assessment Note    CV Status:  Stable    Pain management: adequate     Mental Status:  Sleepy   Hydration Status:  Euvolemic   PONV Controlled:  Controlled   Airway Patency:  Patent      Post Op Vitals Reviewed: Yes      Staff: CRNA         No complications documented      BP   115/68   Temp     Pulse  89   Resp   18   SpO2   98%

## 2022-04-18 ENCOUNTER — OFFICE VISIT (OUTPATIENT)
Dept: URGENT CARE | Facility: MEDICAL CENTER | Age: 27
End: 2022-04-18
Payer: COMMERCIAL

## 2022-04-18 VITALS
WEIGHT: 170 LBS | DIASTOLIC BLOOD PRESSURE: 82 MMHG | RESPIRATION RATE: 18 BRPM | SYSTOLIC BLOOD PRESSURE: 125 MMHG | HEIGHT: 69 IN | OXYGEN SATURATION: 99 % | HEART RATE: 80 BPM | BODY MASS INDEX: 25.18 KG/M2 | TEMPERATURE: 98.6 F

## 2022-04-18 DIAGNOSIS — H10.33 ACUTE BACTERIAL CONJUNCTIVITIS OF BOTH EYES: Primary | ICD-10-CM

## 2022-04-18 PROCEDURE — S9088 SERVICES PROVIDED IN URGENT: HCPCS | Performed by: PHYSICIAN ASSISTANT

## 2022-04-18 PROCEDURE — 99214 OFFICE O/P EST MOD 30 MIN: CPT | Performed by: PHYSICIAN ASSISTANT

## 2022-04-18 RX ORDER — KETOTIFEN FUMARATE 0.35 MG/ML
1 SOLUTION/ DROPS OPHTHALMIC 2 TIMES DAILY PRN
Qty: 5 ML | Refills: 0 | Status: SHIPPED | OUTPATIENT
Start: 2022-04-18

## 2022-04-18 RX ORDER — POLYMYXIN B SULFATE AND TRIMETHOPRIM 1; 10000 MG/ML; [USP'U]/ML
1 SOLUTION OPHTHALMIC EVERY 6 HOURS
Qty: 10 ML | Refills: 0 | Status: SHIPPED | OUTPATIENT
Start: 2022-04-18 | End: 2022-04-25

## 2022-04-18 NOTE — PROGRESS NOTES
[facility]        NAME: Joann Galaviz is a 23 y o  female  : 1995    MRN: [id number]  DATE: 2022  TIME: 5:41 PM    Assessment and Plan   Acute bacterial conjunctivitis of both eyes [H10 33]  1  Acute bacterial conjunctivitis of both eyes  polymyxin b-trimethoprim (POLYTRIM) ophthalmic solution    ketotifen (ZADITOR) 0 025 % ophthalmic solution         Patient Instructions     Use Polytrim and Zaditor drops as prescribed   Apply cold compresses  Throw out old eye makeup and do not wear makeup during treatment  Avoid touching eyes  Wash hands frequently  Change pillowcases daily  Follow up with PCP in 3-5 days  Proceed to  ER if symptoms worsen  Chief Complaint     Chief Complaint   Patient presents with    Eye Problem     left eye burning and irriated started today          History of Present Illness       Eye Problem   Both eyes are affected  This is a new problem  The current episode started today  There is known exposure to pink eye  She does not wear contacts  Associated symptoms include an eye discharge (lacrimation) and itching  Pertinent negatives include no eye redness, fever or photophobia  She has tried nothing for the symptoms  Review of Systems   Review of Systems   Constitutional: Negative for chills and fever  HENT: Negative  Eyes: Positive for pain (burning/aching), discharge (lacrimation) and itching  Negative for photophobia, redness and visual disturbance           Current Medications       Current Outpatient Medications:     Ascorbic Acid (vitamin C) 1000 MG tablet, Take 1,000 mg by mouth daily, Disp: , Rfl:     Cranberry 500 MG TABS, Take by mouth, Disp: , Rfl:     dicyclomine (BENTYL) 20 mg tablet, Take 1 tablet (20 mg total) by mouth every 6 (six) hours, Disp: 60 tablet, Rfl: 0    diphenoxylate-atropine (LOMOTIL) 2 5-0 025 mg per tablet, Take 1 tablet by mouth 4 (four) times a day as needed for diarrhea, Disp: 120 tablet, Rfl: 5    levothyroxine 75 mcg tablet, Take 1 tablet (75 mcg total) by mouth daily in the early morning, Disp: 90 tablet, Rfl: 1    Multiple Vitamin (MULTIVITAMIN) capsule, Take 1 capsule by mouth daily, Disp: , Rfl:     naproxen (Naprosyn) 500 mg tablet, Take 1 tablet (500 mg total) by mouth 3 (three) times a day with meals, Disp: 30 tablet, Rfl: 0    ondansetron (Zofran ODT) 4 mg disintegrating tablet, Take 1 tablet (4 mg total) by mouth every 6 (six) hours as needed for nausea or vomiting, Disp: 20 tablet, Rfl: 0    fluticasone (FLONASE) 50 mcg/act nasal spray, 1 spray into each nostril daily, Disp: 18 2 mL, Rfl: 0    HYDROcodone-acetaminophen (NORCO) 5-325 mg per tablet, Take 2 tablets by mouth every 6 (six) hours as needed (Patient not taking: Reported on 9/9/2021), Disp: , Rfl:     ibuprofen (MOTRIN) 800 mg tablet, Take 1 tablet (800 mg total) by mouth every 8 (eight) hours as needed for moderate pain for up to 10 days, Disp: 30 tablet, Rfl: 0    ketotifen (ZADITOR) 0 025 % ophthalmic solution, Administer 1 drop to both eyes 2 (two) times a day as needed (allergies), Disp: 5 mL, Rfl: 0    polymyxin b-trimethoprim (POLYTRIM) ophthalmic solution, Administer 1 drop to both eyes every 6 (six) hours for 7 days, Disp: 10 mL, Rfl: 0    Current Allergies     Allergies as of 04/18/2022    (No Known Allergies)            The following portions of the patient's history were reviewed and updated as appropriate: allergies, current medications, past family history, past medical history, past social history, past surgical history and problem list      Past Medical History:   Diagnosis Date    Disease of thyroid gland     Endometriosis     Ovarian cyst        Past Surgical History:   Procedure Laterality Date    DILATION AND CURETTAGE OF UTERUS      [place] ENDOVENOUS LASER, 1ST VEIN Left 9/4/2020    Procedure: ENDOVASCULAR LASER THERAPY (EVLT) + stab phlebectomies;  Surgeon: DO Robina;  [facility]: [facility];  Service: Vascular  WISDOM TOOTH EXTRACTION         Family History   Problem Relation Age of Onset    Cancer Mother     Hypertension Mother     Cancer Father     Hypertension Father     Diabetes Father     Cancer Sister          Medications have been verified  Objective   /82   Pulse 80   Temp 98 6 °F (37 °C)   Resp 18   Ht 5' 9" (1 753 m)   Wt 77 1 kg (170 lb)   SpO2 99%   BMI 25 10 kg/m²   No LMP recorded  Patient has had an ablation  Physical Exam     Physical Exam  Vitals reviewed  Constitutional:       General: She is not in acute distress  Appearance: She is well-developed  She is not diaphoretic  HENT:      Right Ear: Tympanic membrane and external ear normal       Left Ear: Tympanic membrane and external ear normal       Mouth/Throat:      Mouth: Mucous membranes are moist       Pharynx: No oropharyngeal exudate or posterior oropharyngeal erythema  Eyes:      General:         Right eye: No discharge  Left eye: No discharge  Pupils: Pupils are equal, round, and reactive to light  Comments: B/L conjunctiva mildly erythematous   Cardiovascular:      Rate and Rhythm: Normal rate and regular rhythm  Heart sounds: Normal heart sounds  No murmur heard  No friction rub  No gallop  Pulmonary:      Effort: Pulmonary effort is normal  No respiratory distress  Breath sounds: Normal breath sounds  No wheezing or rales  Chest:      Chest wall: No tenderness  Lymphadenopathy:      Cervical: No cervical adenopathy  Skin:     General: Skin is warm  Neurological:      Mental Status: She is alert  Psychiatric:         Behavior: Behavior normal          Thought Content:  Thought content normal          Judgment: Judgment normal

## 2022-04-18 NOTE — PATIENT INSTRUCTIONS
Use Polytrim and Zaditor drops as prescribed   Apply cold compresses  Throw out old eye makeup and do not wear makeup during treatment  Avoid touching eyes  Wash hands frequently  Change pillowcases daily  Follow up with PCP in 3-5 days  Proceed to  ER if symptoms worsen  Conjunctivitis   WHAT YOU SHOULD KNOW:   Conjunctivitis, or pink eye, is inflammation of your conjunctiva  The conjunctiva is a thin tissue that covers the front of your eye and the back of your eyelids  The conjunctiva helps protect your eye and keep it moist         INSTRUCTIONS:   Medicines:   · Allergy medicine: This medicine helps decrease itchy, red, swollen eyes caused by allergies  It may be given as a pill, eye drops, or nasal spray  · Antibiotics:  You will need antibiotics if your conjunctivitis is caused by bacteria  This medicine may be given as eye drops or eye ointment  · Steroid medicine: This medicine helps decrease inflammation  It may be given as a pill, eye drops, or nasal spray  · Take your medicine as directed  Call your healthcare provider if you think your medicine is not helping or if you have side effects  Tell him if you are allergic to any medicine  Keep a list of the medicines, vitamins, and herbs you take  Include the amounts, and when and why you take them  Bring the list or the pill bottles to follow-up visits  Carry your medicine list with you in case of an emergency  Follow up with your primary healthcare provider as directed: You may need to return for more tests on your eyes  These will help your primary healthcare provider check for eye damage  Write down your questions so you remember to ask them during your visits  Avoid the spread of conjunctivitis:   · Wash your hands often:  Wash your hands before you touch your eyes  Also wash your hands before you prepare or eat food and after you use the bathroom or change a diaper      · Avoid allergens:  Try to avoid the things that cause your allergies, such as pets, dust, or grass  · Avoid contact:  Do not share towels or washcloths  Try to stay away from others as much as possible  Ask when you can return to work or school  · Throw away eye makeup:  Throw away mascara and other eye makeup  Manage your symptoms:  · Apply a cool compress:  Wet a washcloth with cold water and place it on your eye  This will help decrease swelling  · Use eye drops:  Eye drops, or artificial tears, can be bought without a doctor's order  They help keep your eye moist     · Do not wear contact lenses: They can irritate your eye  Throw away the pair you are using and ask when you can wear them again  Use a new pair of lenses when your primary healthcare provider says it is okay  · Flush your eye:  You may need to flush your eye with saline to help decrease your symptoms  Ask for more information on how to flush your eye  Contact your primary healthcare provider if:   · Your eyesight becomes blurry  · You have tiny bumps or spots of blood on your eye  · You have questions or concerns about your condition or care  Return to the emergency department if:   · The swelling in your eye gets worse, even after treatment  · Your vision suddenly becomes worse or you cannot see at all  · Your eye begins to bleed  © 2014 [address] is for End User's use only and may not be sold, redistributed or otherwise used for commercial purposes  All illustrations and images included in CareNotes® are the copyrighted property of [employer]  or [employer]  The above information is an  only  It is not intended as medical advice for individual conditions or treatments  Talk to your doctor, nurse or pharmacist before following any medical regimen to see if it is safe and effective for you

## 2022-05-09 ENCOUNTER — OFFICE VISIT (OUTPATIENT)
Dept: GASTROENTEROLOGY | Facility: CLINIC | Age: 27
End: 2022-05-09
Payer: COMMERCIAL

## 2022-05-09 ENCOUNTER — OFFICE VISIT (OUTPATIENT)
Dept: FAMILY MEDICINE CLINIC | Facility: CLINIC | Age: 27
End: 2022-05-09
Payer: COMMERCIAL

## 2022-05-09 VITALS
DIASTOLIC BLOOD PRESSURE: 77 MMHG | HEIGHT: 69 IN | HEART RATE: 73 BPM | BODY MASS INDEX: 25.33 KG/M2 | SYSTOLIC BLOOD PRESSURE: 133 MMHG | WEIGHT: 171 LBS | TEMPERATURE: 98.5 F

## 2022-05-09 VITALS
WEIGHT: 171 LBS | DIASTOLIC BLOOD PRESSURE: 70 MMHG | SYSTOLIC BLOOD PRESSURE: 128 MMHG | OXYGEN SATURATION: 98 % | HEIGHT: 69 IN | BODY MASS INDEX: 25.33 KG/M2 | TEMPERATURE: 98.8 F | HEART RATE: 78 BPM

## 2022-05-09 DIAGNOSIS — J02.9 SORE THROAT: Primary | ICD-10-CM

## 2022-05-09 DIAGNOSIS — D18.00 HEMANGIOMA, UNSPECIFIED SITE: ICD-10-CM

## 2022-05-09 DIAGNOSIS — E03.9 HYPOTHYROIDISM, UNSPECIFIED TYPE: ICD-10-CM

## 2022-05-09 DIAGNOSIS — M43.06 SPONDYLOLYSIS OF LUMBAR REGION: ICD-10-CM

## 2022-05-09 DIAGNOSIS — L81.9 HYPERPIGMENTATION OF SKIN: ICD-10-CM

## 2022-05-09 DIAGNOSIS — K76.0 HEPATIC STEATOSIS: ICD-10-CM

## 2022-05-09 DIAGNOSIS — K21.9 GASTROESOPHAGEAL REFLUX DISEASE WITHOUT ESOPHAGITIS: ICD-10-CM

## 2022-05-09 DIAGNOSIS — R19.7 DIARRHEA, UNSPECIFIED TYPE: Primary | ICD-10-CM

## 2022-05-09 DIAGNOSIS — R10.9 ABDOMINAL PAIN, UNSPECIFIED ABDOMINAL LOCATION: ICD-10-CM

## 2022-05-09 PROCEDURE — 99214 OFFICE O/P EST MOD 30 MIN: CPT | Performed by: NURSE PRACTITIONER

## 2022-05-09 PROCEDURE — 3008F BODY MASS INDEX DOCD: CPT | Performed by: NURSE PRACTITIONER

## 2022-05-09 PROCEDURE — T1015 CLINIC SERVICE: HCPCS | Performed by: FAMILY MEDICINE

## 2022-05-09 PROCEDURE — 1036F TOBACCO NON-USER: CPT | Performed by: NURSE PRACTITIONER

## 2022-05-09 RX ORDER — NAPROXEN 500 MG/1
500 TABLET ORAL 2 TIMES DAILY WITH MEALS
Qty: 28 TABLET | Refills: 0 | Status: SHIPPED | OUTPATIENT
Start: 2022-05-09

## 2022-05-09 RX ORDER — OMEPRAZOLE 40 MG/1
40 CAPSULE, DELAYED RELEASE ORAL DAILY
Qty: 30 CAPSULE | Refills: 3 | Status: SHIPPED | OUTPATIENT
Start: 2022-05-09

## 2022-05-09 RX ORDER — CETIRIZINE HYDROCHLORIDE 10 MG/1
10 TABLET, CHEWABLE ORAL DAILY
Qty: 30 TABLET | Refills: 2 | Status: SHIPPED | OUTPATIENT
Start: 2022-05-09

## 2022-05-09 NOTE — PROGRESS NOTES
[facility] - Outpatient Follow-up Note  Alfonso Torres 33 y o  female MRN: [id number]  Encounter: [id number]          ASSESSMENT AND PLAN:      1  Diarrhea, unspecified type    Patient reports that she continues with diarrhea almost every time she eats despite taking Bentyl and Lomotil  Ultrasound in September of 2021 did reveal some gallstones and sludge however most recent ultrasound in January revealed no gallbladder findings  Patient denies black or bloody stools and colonoscopy revealed normal in appearance with normal biopsies  Discussed the possibility of bile salt diarrhea verses pancreatic insufficiency versus IBS  Patient does report that sometimes her stool is oily  Handout on low FODMAP diet given and discussed  Patient verbalizes understanding  Will order pancreatic elastase to evaluate for pancreatic insufficiency  If no improvement in symptoms and pancreatic elastase negative, could consider bile acid sequestrant     - Pancreatic elastase, fecal; Future  - low FODMAP diet  - continue Bentyl or Lomotil as needed    2  Hemangioma, unspecified site  3  Hepatic steatosis    Patient had undergone ultrasound of the abdomen in January that revealed hepatic mild steatosis with a probable hepatic hemangioma  Patient's liver function tests have been stable  Discussed a low-fat diet with slow sustained weight loss and alcohol limitation  Will also order repeat ultrasound to evaluate for stability of probable hemangioma seen on ultrasound  - US abdomen complete; Future    4  Gastroesophageal reflux disease without esophagitis    Patient recently underwent EGD that revealed a small sliding hiatal hernia but otherwise normal with normal biopsies  She reports that she has heartburn almost any time she eats and has tried Tums or milk with little relief  Discussed the use of omeprazole daily for acid reflux  Patient is agreeable to a trial of this      - omeprazole (PriLOSEC) 40 MG capsule; Take 1 capsule (40 mg total) by mouth daily  Dispense: 30 capsule; Refill: 3    Will see patient back in 3 months or sooner if needed  ______________________________________________________________________    SUBJECTIVE:  Silva Candelario is a 60-year-old female that presents today for follow-up of abdominal pain and diarrhea  Patient recently underwent EGD and colonoscopy  Her EGD revealed a small sliding hiatal hernia but otherwise normal with normal biopsies  Her colonoscopy revealed normal in appearance with normal biopsy  She was recommended to repeat at screening age  Patient had undergone an ultrasound in January that revealed hepatic mild steatosis with a probable hepatic hemangioma and normal liver function tests  She also underwent CT scan recently that revealed a normal appendix with an involuting right adnexal cyst with a small amount of pelvic free fluid  She was recommended to follow with Ob/gyn  Patient reports that she has an appointment with Ob/gyn on the 17th  Patient reports that she continues with diarrhea almost any time she eats as well as heartburn  She denies any hematochezia or melena  Patient reports that she takes Bentyl and Lomotil with little relief of her diarrhea  She also reports taking Tums and milk with little relief of heartburn  REVIEW OF SYSTEMS:  Review of Systems   Gastrointestinal: Positive for abdominal pain (Generalized and right lower quadrant), diarrhea and nausea  Negative for abdominal distention, anal bleeding, blood in stool, constipation, rectal pain and vomiting  Musculoskeletal: Negative for arthralgias  Neurological: Positive for headaches  All other systems reviewed and are negative          Historical Information   Past Medical History:   Diagnosis Date    Disease of thyroid gland     Endometriosis     Ovarian cyst      Past Surgical History:   Procedure Laterality Date    DILATION AND CURETTAGE OF UTERUS      [place] ENDOVENOUS LASER, 1ST VEIN Left 2020    Procedure: ENDOVASCULAR LASER THERAPY (EVLT) + stab phlebectomies;  Surgeon: Bertin Billingsley DO;  [facility];  Service: Vascular    WISDOM TOOTH EXTRACTION       Social History   Social History     Substance and Sexual Activity   Alcohol Use No     Social History     Substance and Sexual Activity   Drug Use No     Social History     Tobacco Use   Smoking Status Former Smoker    Packs/day: 0 00    Quit date: 2020    Years since quittin 0   Smokeless Tobacco Never Used   Tobacco Comment    3 cigarettes per day     Family History   Problem Relation Age of Onset    Cancer Mother     Hypertension Mother     Cancer Father     Hypertension Father     Diabetes Father     Cancer Sister        Meds/Allergies       Current Outpatient Medications:     Ascorbic Acid (vitamin C) 1000 MG tablet    Cranberry 500 MG TABS    dicyclomine (BENTYL) 20 mg tablet    diphenoxylate-atropine (LOMOTIL) 2 5-0 025 mg per tablet    ketotifen (ZADITOR) 0 025 % ophthalmic solution    levothyroxine 75 mcg tablet    Multiple Vitamin (MULTIVITAMIN) capsule    fluticasone (FLONASE) 50 mcg/act nasal spray    HYDROcodone-acetaminophen (NORCO) 5-325 mg per tablet    ibuprofen (MOTRIN) 800 mg tablet    naproxen (Naprosyn) 500 mg tablet    omeprazole (PriLOSEC) 40 MG capsule    ondansetron (Zofran ODT) 4 mg disintegrating tablet    No Known Allergies        Objective     Blood pressure 133/77, pulse 73, temperature 98 5 °F (36 9 °C), height 5' 9" (1 753 m), weight 77 6 kg (171 lb), not currently breastfeeding  Body mass index is 25 25 kg/m²  PHYSICAL EXAM:      General Appearance:   Alert, cooperative, no distress   HEENT:   Normocephalic, atraumatic, anicteric  Neck:  Supple, symmetrical, trachea midline   Lungs:   Clear to auscultation bilaterally; no rales, rhonchi or wheezing; respirations unlabored    Heart[de-identified]   Regular rate and rhythm; no murmur, rub, or gallop  Abdomen:   Soft, generalized tenderness, non-distended; normal bowel sounds; no masses, no organomegaly    Genitalia:   Deferred    Rectal:   Deferred    Extremities:  No cyanosis, clubbing or edema    Skin:  No jaundice, rashes, or lesions             Lab Results:   No visits with results within 1 Day(s) from this visit  Latest known visit with results is:   Hospital Outpatient Visit on 04/12/2022   Component Date Value    Case Report 04/12/2022                      Value:Surgical Pathology Report                         Case: [id number]                                   Authorizing Provider:  Abdulkadir Powell MD    Collected:           04/12/2022 1145              [facility]     [facility] Received:            04/12/2022 1352                                     Operating Room                                                               Pathologist:           Analia Mendoza MD                                                                 Specimens:   A) - Duodenum, bx cold r o celiac                                                                   B) - Stomach, bx cold ro h pylori                                                                   C) - Terminal Ileum, bx cold r o enteritis                                                          D) - Colon, random colon r o microscopic colitis                                           Final Diagnosis 04/12/2022                      Value: This result contains rich text formatting which cannot be displayed here   Additional Information 04/12/2022                      Value: This result contains rich text formatting which cannot be displayed here  Beto Forde Gross Description 04/12/2022                      Value: This result contains rich text formatting which cannot be displayed here           Radiology Results:   EGD    Result Date: 4/12/2022  Narrative: Dominik Maradiaga [address] DATE OF SERVICE: 4/12/22 PHYSICIAN(S): Attending: Gaurang Londono MD Fellow: No Staff Documented INDICATION: Diarrhea, unspecified type POST-OP DIAGNOSIS: See the impression below  PREPROCEDURE: Informed consent was obtained for the procedure, including sedation  Risks of perforation, hemorrhage, adverse drug reaction and aspiration were discussed  The patient was placed in the left lateral decubitus position  Patient was explained about the risks and benefits of the procedure  Risks including but not limited to bleeding, infection, and perforation were explained in detail  Also explained about less than 100% sensitivity with the exam and other alternatives  DETAILS OF PROCEDURE: Patient was taken to the procedure room where a time out was performed to confirm correct patient and correct procedure  The patient underwent monitored anesthesia care, which was administered by an anesthesia professional  The patient's blood pressure, heart rate, level of consciousness, respirations, oxygen and ETCO2 were monitored throughout the procedure  The scope was introduced through the mouth and advanced to the second part of the duodenum  Retroflexion was performed in the fundus  The patient experienced no blood loss  The procedure was not difficult  The patient tolerated the procedure well  There were no apparent complications   ANESTHESIA INFORMATION: ASA: I Anesthesia Type: IV Sedation with Anesthesia MEDICATIONS: No administrations occurring from 1136 to 1149 on 04/12/22 FINDINGS: The esophagus appeared normal  Z-line 42 cm from the incisors Small sliding hiatal hernia (type I hiatal hernia) The stomach and duodenum appeared normal  Performed biopsies to rule out celiac disease and H  pylori in the stomach and duodenum SPECIMENS: ID Type Source Tests Collected by Time Destination 1 : bx cold r o celiac Tissue Duodenum TISSUE EXAM Rober Hernandez MD 4/12/2022 11:45 AM  2 : bx cold ro h pylori Tissue Stomach TISSUE EXAM Rober Hernandez MD 4/12/2022 11:45 AM      Impression: The esophagus appeared normal  Z-line 42 cm from the incisors Small sliding hiatal hernia (type I hiatal hernia) The stomach and duodenum appeared normal  Performed biopsies to rule out celiac disease and H  pylori in the stomach and duodenum RECOMMENDATION: Await pathology results   Rober Hernandez MD     Colonoscopy    Result Date: 4/12/2022  Narrative: [facility] Operating Room Kevin White [phone] DATE OF SERVICE: 4/12/22 PHYSICIAN(S): Attending: Rober Hernandez MD Fellow: No Staff Documented INDICATION: Abdominal pain, unspecified abdominal location, Diarrhea, unspecified type POST-OP DIAGNOSIS: See the impression below  HISTORY: Prior colonoscopy: No prior colonoscopy  BOWEL PREPARATION: Miralax/Dulcolax PREPROCEDURE: Informed consent was obtained for the procedure, including sedation  Risks including but not limited to bleeding, infection, perforation, adverse drug reaction and aspiration were explained in detail  Also explained about less than 100% sensitivity with the exam and other alternatives  The patient was placed in the left lateral decubitus position  DETAILS OF PROCEDURE: Patient was taken to the procedure room where a time out was performed to confirm correct patient and correct procedure  The patient underwent monitored anesthesia care, which was administered by an anesthesia professional  The patient's blood pressure, heart rate, level of consciousness, oxygen, respirations and ETCO2 were monitored throughout the procedure  A digital rectal exam was performed  A perianal exam was performed  The scope was introduced through the anus and advanced to the terminal ileum, 20 cm from the ileocecal valve  Retroflexion was performed in the rectum   The quality of bowel preparation was evaluated using the [facility] Bowel Preparation Scale with scores of: right colon = 2, transverse colon = 2, left colon = 2  The total BBPS score was 6  Bowel prep was adequate  The patient experienced no blood loss  The procedure was not difficult  The patient tolerated the procedure well  There were no apparent complications  ANESTHESIA INFORMATION: ASA: I Anesthesia Type: IV Sedation with Anesthesia MEDICATIONS: No administrations occurring from 1136 to 1204 on 04/12/22 FINDINGS: The terminal ileum and entire colon appeared normal  Performed random biopsy  EVENTS: Procedure Events Event Event Time ENDO SCOPE OUT TIME 4/12/2022 11:49 AM ENDO CECUM REACHED 4/12/2022 11:57 AM ENDO SCOPE OUT TIME 4/12/2022 12:03 PM SPECIMENS: ID Type Source Tests Collected by Time Destination 1 : bx cold r o celiac Tissue Duodenum TISSUE EXAM Ang Bay MD 4/12/2022 11:45 AM  2 : bx cold ro h pylori Tissue Stomach TISSUE EXAM Ang Bay MD 4/12/2022 11:45 AM  3 : bx cold r o enteritis Tissue Terminal Ileum TISSUE EXAM Ang Bay MD 4/12/2022 11:58 AM  4 : random colon r o microscopic colitis Tissue Colon TISSUE EXAM Ang Bay MD 4/12/2022 12:00 PM  EQUIPMENT: Colonoscope -     Impression: The terminal ileum and entire colon appeared normal  Performed random biopsy   RECOMMENDATION: Await pathology results Schedule repeat colonoscopy at age 40 due to routine colorectal cancer screening Follow-up in the office for ongoing care  Ang Bay MD

## 2022-05-09 NOTE — PROGRESS NOTES
Assessment/Plan     Diagnoses and all orders for this visit:    Sore throat  Comments:  allergies vs viral  add zyrtec, continue nsaids  cont other symptomatic treatment - salt water gargles, honey, throat lozenges  Orders:  -     cetirizine (ZyrTEC) 10 MG chewable tablet; Chew 1 tablet (10 mg total) daily    Spondylolysis of lumbar region  Comments:  Naproxen BID  refer to PT for eval and treatment  Orders:  -     Ambulatory Referral to Physical Therapy; Future  -     naproxen (Naprosyn) 500 mg tablet; Take 1 tablet (500 mg total) by mouth 2 (two) times a day with meals    Hyperpigmentation of skin  Comments:  benign appearing, reassured patient  rx for topical kenalog for itching   rto if changing or worsening  Orders:  -     triamcinolone (KENALOG) 0 1 % ointment; Apply topically 2 (two) times a day    Hypothyroidism, unspecified type  Comments:  recheck TSH  adjust levothyroxine pending results  Orders:  -     TSH, 3rd generation with Free T4 reflex; Future      The patient indicates understanding of these issues and agrees with the plan  Return in about 6 weeks (around 6/20/2022) for Recheck  Subjective     Patient Id: Meg Diego is a 33 y o  female    Patient here today to f/u on recent CT abdomen/pelvis results  She also reports a sore throat that started yesterday  Patient notes she had a CT a/p performed at [facility] around 1 month ago for an ovarian cyst  She noticed on the report it read "left L5 spondylolysis " She has a hx of low back pain on the left side  Not significantly changes at this time  Denies any radicular symptoms, urinary/bladder incontinence or retention or saddle anesthesia  Sore throat started yesterday  No fever  Coughing some  No nasal congestion or drainage  No swallowing difficulty  No n/v/d  She also has an area of hyperpigmentation on her mid upper back that she is concerned about  Itchy at times  No pain  Not changing but knows it was not there at birth         Review of Systems   Constitutional: Negative for chills and fever  HENT: Positive for postnasal drip and sore throat  Negative for congestion, ear discharge, ear pain, rhinorrhea, sinus pressure, sinus pain and trouble swallowing  Respiratory: Positive for cough  Negative for chest tightness and shortness of breath  Cardiovascular: Negative for chest pain  Gastrointestinal: Negative for abdominal pain, constipation, diarrhea, nausea and vomiting  Genitourinary: Negative for dysuria  Musculoskeletal: Positive for back pain  Negative for arthralgias and myalgias  Skin: Positive for color change  Negative for rash  Neurological: Negative for headaches  Psychiatric/Behavioral: Negative for dysphoric mood         Past Medical History:   Diagnosis Date    Disease of thyroid gland     Endometriosis     Ovarian cyst        Past Surgical History:   Procedure Laterality Date    DILATION AND CURETTAGE OF UTERUS      HI ENDOVENOUS LASER, 1ST VEIN Left 9/4/2020    Procedure: ENDOVASCULAR LASER THERAPY (EVLT) + stab phlebectomies;  Surgeon: Sloane Posada DO;  [facility]: [facility];  Service: Vascular    WISDOM TOOTH EXTRACTION         Family History   Problem Relation Age of Onset   Beau Burnet Cancer Mother     Hypertension Mother     Cancer Father     Hypertension Father     Diabetes Father     Cancer Sister        No Known Allergies      Current Outpatient Medications:     Ascorbic Acid (vitamin C) 1000 MG tablet, Take 1,000 mg by mouth daily, Disp: , Rfl:     Cranberry 500 MG TABS, Take by mouth, Disp: , Rfl:     dicyclomine (BENTYL) 20 mg tablet, Take 1 tablet (20 mg total) by mouth every 6 (six) hours, Disp: 60 tablet, Rfl: 0    diphenoxylate-atropine (LOMOTIL) 2 5-0 025 mg per tablet, Take 1 tablet by mouth 4 (four) times a day as needed for diarrhea, Disp: 120 tablet, Rfl: 5    ketotifen (ZADITOR) 0 025 % ophthalmic solution, Administer 1 drop to both eyes 2 (two) times a day as needed (allergies), Disp: 5 mL, Rfl: 0    levothyroxine 75 mcg tablet, Take 1 tablet (75 mcg total) by mouth daily in the early morning, Disp: 90 tablet, Rfl: 1    Multiple Vitamin (MULTIVITAMIN) capsule, Take 1 capsule by mouth daily, Disp: , Rfl:     omeprazole (PriLOSEC) 40 MG capsule, Take 1 capsule (40 mg total) by mouth daily, Disp: 30 capsule, Rfl: 3    cetirizine (ZyrTEC) 10 MG chewable tablet, Chew 1 tablet (10 mg total) daily, Disp: 30 tablet, Rfl: 2    fluticasone (FLONASE) 50 mcg/act nasal spray, 1 spray into each nostril daily, Disp: 18 2 mL, Rfl: 0    HYDROcodone-acetaminophen (NORCO) 5-325 mg per tablet, Take 2 tablets by mouth every 6 (six) hours as needed (Patient not taking: Reported on 9/9/2021), Disp: , Rfl:     naproxen (Naprosyn) 500 mg tablet, Take 1 tablet (500 mg total) by mouth 2 (two) times a day with meals, Disp: 28 tablet, Rfl: 0    ondansetron (Zofran ODT) 4 mg disintegrating tablet, Take 1 tablet (4 mg total) by mouth every 6 (six) hours as needed for nausea or vomiting, Disp: 20 tablet, Rfl: 0    triamcinolone (KENALOG) 0 1 % ointment, Apply topically 2 (two) times a day, Disp: 80 g, Rfl: 0    Objective     Vitals:    05/09/22 1253   BP: 128/70   BP Location: Left arm   Patient Position: Sitting   Pulse: 78   Temp: 98 8 °F (37 1 °C)   SpO2: 98%   Weight: 77 6 kg (171 lb)   Height: 5' 9" (1 753 m)       Physical Exam  Vitals and nursing note reviewed  Constitutional:       General: She is not in acute distress  Appearance: She is well-developed  HENT:      Head: Normocephalic and atraumatic  Right Ear: Tympanic membrane and ear canal normal       Left Ear: Tympanic membrane and ear canal normal       Nose: Congestion present  No rhinorrhea  Mouth/Throat:      Mouth: Mucous membranes are moist       Pharynx: Pharyngeal swelling and posterior oropharyngeal erythema present  No oropharyngeal exudate or uvula swelling        Tonsils: No tonsillar exudate or tonsillar abscesses  Eyes:      Conjunctiva/sclera: Conjunctivae normal       Pupils: Pupils are equal, round, and reactive to light  Neck:      Thyroid: No thyromegaly  Cardiovascular:      Rate and Rhythm: Normal rate and regular rhythm  Heart sounds: Normal heart sounds  No murmur heard  Pulmonary:      Effort: Pulmonary effort is normal  No respiratory distress  Breath sounds: Normal breath sounds  No wheezing  Abdominal:      Palpations: Abdomen is soft  Tenderness: There is no abdominal tenderness  Genitourinary:     Comments: Exam deferred  Musculoskeletal:      Cervical back: Neck supple  Lymphadenopathy:      Cervical: No cervical adenopathy  Skin:     General: Skin is warm  Comments: 3 x 3 cm irregularly shaped area of hyperpigmentation located in the mid-upper back  Non-tender  No open wound, scale, bleeding or drainage  Neurological:      General: No focal deficit present  Mental Status: She is alert and oriented to person, place, and time     Psychiatric:         Behavior: Behavior normal          Erick Mccall

## 2022-05-10 ENCOUNTER — HOSPITAL ENCOUNTER (OUTPATIENT)
Dept: ULTRASOUND IMAGING | Facility: HOSPITAL | Age: 27
Discharge: HOME | End: 2022-05-10
Payer: COMMERCIAL

## 2022-05-10 ENCOUNTER — TELEPHONE (OUTPATIENT)
Dept: FAMILY MEDICINE CLINIC | Facility: CLINIC | Age: 27
End: 2022-05-10

## 2022-05-10 ENCOUNTER — APPOINTMENT (OUTPATIENT)
Dept: LAB | Facility: MEDICAL CENTER | Age: 27
End: 2022-05-10
Payer: COMMERCIAL

## 2022-05-10 DIAGNOSIS — D18.00 HEMANGIOMA, UNSPECIFIED SITE: ICD-10-CM

## 2022-05-10 DIAGNOSIS — R19.7 DIARRHEA, UNSPECIFIED TYPE: ICD-10-CM

## 2022-05-10 DIAGNOSIS — E03.9 HYPOTHYROIDISM, UNSPECIFIED TYPE: ICD-10-CM

## 2022-05-10 LAB
T4 FREE SERPL-MCNC: 0.83 NG/DL (ref 0.76–1.46)
TSH SERPL DL<=0.05 MIU/L-ACNC: 5.34 UIU/ML (ref 0.45–4.5)

## 2022-05-10 PROCEDURE — 36415 COLL VENOUS BLD VENIPUNCTURE: CPT

## 2022-05-10 PROCEDURE — 84443 ASSAY THYROID STIM HORMONE: CPT

## 2022-05-10 PROCEDURE — 82653 EL-1 FECAL QUANTITATIVE: CPT

## 2022-05-10 PROCEDURE — 84439 ASSAY OF FREE THYROXINE: CPT

## 2022-05-10 PROCEDURE — 76700 US EXAM ABDOM COMPLETE: CPT

## 2022-05-10 RX ORDER — DICYCLOMINE HCL 20 MG
TABLET ORAL
Qty: 60 TABLET | Refills: 0 | Status: SHIPPED | OUTPATIENT
Start: 2022-05-10 | End: 2022-05-21

## 2022-05-11 DIAGNOSIS — E03.9 HYPOTHYROIDISM, UNSPECIFIED TYPE: ICD-10-CM

## 2022-05-11 RX ORDER — LEVOTHYROXINE SODIUM 88 UG/1
88 TABLET ORAL
Qty: 30 TABLET | Refills: 2 | Status: SHIPPED | OUTPATIENT
Start: 2022-05-11 | End: 2022-07-06 | Stop reason: SDUPTHER

## 2022-05-14 ENCOUNTER — OFFICE VISIT (OUTPATIENT)
Dept: URGENT CARE | Facility: MEDICAL CENTER | Age: 27
End: 2022-05-14
Payer: COMMERCIAL

## 2022-05-14 VITALS
HEART RATE: 97 BPM | HEIGHT: 69 IN | OXYGEN SATURATION: 99 % | BODY MASS INDEX: 25.48 KG/M2 | TEMPERATURE: 98.1 F | DIASTOLIC BLOOD PRESSURE: 78 MMHG | SYSTOLIC BLOOD PRESSURE: 120 MMHG | WEIGHT: 172 LBS | RESPIRATION RATE: 18 BRPM

## 2022-05-14 DIAGNOSIS — B34.9 VIRAL SYNDROME: Primary | ICD-10-CM

## 2022-05-14 PROCEDURE — 99214 OFFICE O/P EST MOD 30 MIN: CPT | Performed by: PHYSICIAN ASSISTANT

## 2022-05-14 PROCEDURE — S9088 SERVICES PROVIDED IN URGENT: HCPCS | Performed by: PHYSICIAN ASSISTANT

## 2022-05-14 RX ORDER — BENZONATATE 200 MG/1
200 CAPSULE ORAL 3 TIMES DAILY PRN
Qty: 20 CAPSULE | Refills: 0 | Status: SHIPPED | OUTPATIENT
Start: 2022-05-14

## 2022-05-14 RX ORDER — ALBUTEROL SULFATE 90 UG/1
2 AEROSOL, METERED RESPIRATORY (INHALATION) EVERY 6 HOURS PRN
Qty: 8.5 G | Refills: 0 | Status: SHIPPED | OUTPATIENT
Start: 2022-05-14

## 2022-05-14 NOTE — PATIENT INSTRUCTIONS
Albuterol and tessalon as prescribed  Afrin nasal spray for congestion (do not use for longer than 3 days)   Flonase nasal spray   Vitamin D3 2000 IU daily  Vitamin C 1000mg twice per day  Multivitamin daily  Fluids and rest  Vocal rest for 3-5 days  Follow up with PCP in 3-5 days  Proceed to  ER if symptoms worsen

## 2022-05-14 NOTE — PROGRESS NOTES
[email or web address]        NAME: Nathaly Harrison is a 33 y o  female  : 1995    MRN: [id number]  DATE: May 14, 2022  TIME: 2:04 PM    Assessment and Plan   Viral syndrome [B34 9]  1  Viral syndrome  albuterol (ProAir HFA) 90 mcg/act inhaler    benzonatate (TESSALON) 200 MG capsule         Patient Instructions     Albuterol and tessalon as prescribed  Afrin nasal spray for congestion (do not use for longer than 3 days)   Flonase nasal spray   Vitamin D3 2000 IU daily  Vitamin C 1000mg twice per day  Multivitamin daily  Fluids and rest  Vocal rest for 3-5 days  Follow up with PCP in 3-5 days  Proceed to  ER if symptoms worsen  Chief Complaint     Chief Complaint   Patient presents with    Cold Like Symptoms     Pt  Daughter tested + flu A a week ago, pt  Started with flu like symptoms on Monday, taking zyrtec and naproxen otc, hoarse voice, bodyaches and fever, took home covid test which was negative, difficulty with breathing with humidity at work today         History of Present Illness       Daughter tested + for influenza 1 week ago  Patient had negative home COVID test      URI   This is a new problem  Episode onset: Monday  The maximum temperature recorded prior to her arrival was 100 4 - 100 9 F  Associated symptoms include congestion, coughing and a sore throat  Pertinent negatives include no abdominal pain, diarrhea, ear pain, headaches, nausea, rash, rhinorrhea, sinus pain, sneezing, vomiting or wheezing  Treatments tried: zyrtec and naproxen  Review of Systems   Review of Systems   Constitutional: Positive for fever  Negative for chills and fatigue  HENT: Positive for congestion, sinus pressure, sore throat and voice change  Negative for ear discharge, ear pain, postnasal drip, rhinorrhea, sinus pain, sneezing and trouble swallowing  Respiratory: Positive for cough and shortness of breath  Negative for chest tightness and wheezing      Gastrointestinal: Negative for abdominal pain, diarrhea, nausea and vomiting  Musculoskeletal: Positive for myalgias  Skin: Negative for rash  Neurological: Negative for light-headedness and headaches  Current Medications       Current Outpatient Medications:     albuterol (ProAir HFA) 90 mcg/act inhaler, Inhale 2 puffs every 6 (six) hours as needed for shortness of breath, Disp: 8 5 g, Rfl: 0    benzonatate (TESSALON) 200 MG capsule, Take 1 capsule (200 mg total) by mouth as needed in the morning and 1 capsule (200 mg total) as needed at noon and 1 capsule (200 mg total) as needed in the evening for cough  , Disp: 20 capsule, Rfl: 0    Ascorbic Acid (vitamin C) 1000 MG tablet, Take 1,000 mg by mouth daily, Disp: , Rfl:     cetirizine (ZyrTEC) 10 MG chewable tablet, Chew 1 tablet (10 mg total) daily, Disp: 30 tablet, Rfl: 2    Cranberry 500 MG TABS, Take by mouth, Disp: , Rfl:     dicyclomine (BENTYL) 20 mg tablet, take 1 tablet by mouth every 6 hours, Disp: 60 tablet, Rfl: 0    diphenoxylate-atropine (LOMOTIL) 2 5-0 025 mg per tablet, Take 1 tablet by mouth 4 (four) times a day as needed for diarrhea, Disp: 120 tablet, Rfl: 5    fluticasone (FLONASE) 50 mcg/act nasal spray, 1 spray into each nostril daily, Disp: 18 2 mL, Rfl: 0    HYDROcodone-acetaminophen (NORCO) 5-325 mg per tablet, Take 2 tablets by mouth every 6 (six) hours as needed (Patient not taking: Reported on 9/9/2021), Disp: , Rfl:     ketotifen (ZADITOR) 0 025 % ophthalmic solution, Administer 1 drop to both eyes 2 (two) times a day as needed (allergies), Disp: 5 mL, Rfl: 0    levothyroxine 88 mcg tablet, Take 1 tablet (88 mcg total) by mouth daily in the early morning, Disp: 30 tablet, Rfl: 2    Multiple Vitamin (MULTIVITAMIN) capsule, Take 1 capsule by mouth daily, Disp: , Rfl:     naproxen (Naprosyn) 500 mg tablet, Take 1 tablet (500 mg total) by mouth 2 (two) times a day with meals, Disp: 28 tablet, Rfl: 0    omeprazole (PriLOSEC) 40 MG capsule, Take 1 capsule (40 mg total) by mouth daily, Disp: 30 capsule, Rfl: 3    ondansetron (Zofran ODT) 4 mg disintegrating tablet, Take 1 tablet (4 mg total) by mouth every 6 (six) hours as needed for nausea or vomiting, Disp: 20 tablet, Rfl: 0    triamcinolone (KENALOG) 0 1 % ointment, Apply topically 2 (two) times a day, Disp: 80 g, Rfl: 0    Current Allergies     Allergies as of 05/14/2022    (No Known Allergies)            The following portions of the patient's history were reviewed and updated as appropriate: allergies, current medications, past family history, past medical history, past social history, past surgical history and problem list      Past Medical History:   Diagnosis Date    Disease of thyroid gland     Endometriosis     Ovarian cyst        Past Surgical History:   Procedure Laterality Date    DILATION AND CURETTAGE OF UTERUS      MI ENDOVENOUS LASER, 1ST VEIN Left 9/4/2020    Procedure: ENDOVASCULAR LASER THERAPY (EVLT) + stab phlebectomies;  Surgeon: Fanny Angelo DO;  Location[facility];  Service: Vascular    WISDOM TOOTH EXTRACTION         Family History   Problem Relation Age of Onset    Cancer Mother     Hypertension Mother     Cancer Father     Hypertension Father     Diabetes Father     Cancer Sister          Medications have been verified  Objective   /78   Pulse 97   Temp 98 1 °F (36 7 °C)   Resp 18   Ht 5' 9" (1 753 m)   Wt 78 kg (172 lb)   SpO2 99%   BMI 25 40 kg/m²   No LMP recorded  Patient has had an ablation  Physical Exam     Physical Exam  Vitals reviewed  Constitutional:       General: She is not in acute distress  Appearance: She is well-developed  She is not diaphoretic  HENT:      Head: Normocephalic  Right Ear: Tympanic membrane and external ear normal       Left Ear: Tympanic membrane and external ear normal       Nose: Nose normal       Mouth/Throat:      Pharynx: No oropharyngeal exudate or posterior oropharyngeal erythema     Eyes: Conjunctiva/sclera: Conjunctivae normal    Cardiovascular:      Rate and Rhythm: Normal rate and regular rhythm  Heart sounds: Normal heart sounds  No murmur heard  No friction rub  No gallop  Pulmonary:      Effort: Pulmonary effort is normal  No respiratory distress  Breath sounds: Normal breath sounds  No wheezing or rales  Chest:      Chest wall: No tenderness  Lymphadenopathy:      Cervical: No cervical adenopathy  Skin:     General: Skin is warm  Neurological:      Mental Status: She is alert and oriented to person, place, and time  Psychiatric:         Behavior: Behavior normal          Thought Content:  Thought content normal          Judgment: Judgment normal

## 2022-05-17 ENCOUNTER — TELEPHONE (OUTPATIENT)
Dept: GASTROENTEROLOGY | Facility: CLINIC | Age: 27
End: 2022-05-17

## 2022-05-17 DIAGNOSIS — K86.89 PANCREATIC INSUFFICIENCY: Primary | ICD-10-CM

## 2022-05-17 LAB — ELASTASE PANC STL-MCNT: 136 UG ELAST./G

## 2022-05-17 NOTE — TELEPHONE ENCOUNTER
Went over [address] results but pt actually wanted to discuss pancreatic elastase     The results shows moderate pancreatic insufficiency

## 2022-05-17 NOTE — TELEPHONE ENCOUNTER
Patients GI provider:  Claudia Bright    Number to return call: [phone]    Reason for call: Pt calling to speak to someone about her US of the abdomen results  Above is her number       Scheduled procedure/appointment date if applicable: Appt 9/03/26

## 2022-05-18 ENCOUNTER — APPOINTMENT (OUTPATIENT)
Dept: LAB | Facility: HOSPITAL | Age: 27
End: 2022-05-18
Payer: COMMERCIAL

## 2022-05-18 DIAGNOSIS — K86.89 PANCREATIC INSUFFICIENCY: ICD-10-CM

## 2022-05-18 LAB
EST. AVERAGE GLUCOSE BLD GHB EST-MCNC: 100 MG/DL
HBA1C MFR BLD: 5.1 %

## 2022-05-18 PROCEDURE — 36415 COLL VENOUS BLD VENIPUNCTURE: CPT

## 2022-05-18 PROCEDURE — 83036 HEMOGLOBIN GLYCOSYLATED A1C: CPT

## 2022-05-21 DIAGNOSIS — R10.9 ABDOMINAL PAIN, UNSPECIFIED ABDOMINAL LOCATION: ICD-10-CM

## 2022-05-21 RX ORDER — DICYCLOMINE HCL 20 MG
TABLET ORAL
Qty: 60 TABLET | Refills: 0 | Status: SHIPPED | OUTPATIENT
Start: 2022-05-21 | End: 2022-06-05

## 2022-05-23 ENCOUNTER — EVALUATION (OUTPATIENT)
Dept: PHYSICAL THERAPY | Facility: CLINIC | Age: 27
End: 2022-05-23
Payer: COMMERCIAL

## 2022-05-23 DIAGNOSIS — M43.06 SPONDYLOLYSIS OF LUMBAR REGION: Primary | ICD-10-CM

## 2022-05-23 PROCEDURE — 97140 MANUAL THERAPY 1/> REGIONS: CPT | Performed by: PHYSICAL THERAPIST

## 2022-05-23 PROCEDURE — 97535 SELF CARE MNGMENT TRAINING: CPT | Performed by: PHYSICAL THERAPIST

## 2022-05-23 PROCEDURE — 97161 PT EVAL LOW COMPLEX 20 MIN: CPT | Performed by: PHYSICAL THERAPIST

## 2022-05-23 PROCEDURE — 97110 THERAPEUTIC EXERCISES: CPT | Performed by: PHYSICAL THERAPIST

## 2022-05-23 NOTE — PROGRESS NOTES
PT Evaluation     Today's date: 2022  Patient name: Radha Pappas  : 1995  MRN: [id number]  Referring provider: Adry Ambriz DO  Dx:   Encounter Diagnosis     ICD-10-CM    1   Spondylolysis of lumbar region  M43 06 Ambulatory Referral to Physical Therapy    Naproxen BID  refer to PT for eval and treatment                  Assessment  Understanding of Dx/Px/POC: good   Prognosis: good    Goals  STGs: To be complete within 4 weeks  - Able to sit for any extended amount of time without pain or limitation for increased safety and functional capacity with ADLs and work-related duty  - Able to repetitively bend over at trunk without pain or limitation for increased safety and functional capacity with ADLs and and work-related duty  - Able to complete all lifting/carrying activity without pain or limitation for increased safety and functional capacity with ADLs and work-related duty  - Able to complete transfers repetitively without pain or limitation for increased safety and functional capacity with ADLs and work-related duty    LTGs: To be complete within 6 weeks  - Able to sit for any extended amount of time without pain or limitation for increased safety and functional capacity with ADLs and work-related duty  - Able to repetitively bend over at trunk without pain or limitation for increased safety and functional capacity with ADLs and and work-related duty  - Able to complete all lifting/carrying activity without pain or limitation for increased safety and functional capacity with ADLs and work-related duty  - Able to complete transfers repetitively without pain or limitation for increased safety and functional capacity with ADLs and work-related duty    Plan  Planned therapy interventions: manual therapy, neuromuscular re-education, patient education, postural training, self care, therapeutic activities, therapeutic exercise and home exercise program  Frequency: 2x week  Duration in weeks: 6       Pt is a very pleasant 33 y o  female with chronic LBP who presents with functional deficits including decreased capacity with sitting, lifting/carrying, transfers, and bending over at the trunk  Upon completion of today's initial evaluation, Shilpa's sx remain consistent with posterior lumbar derangement secondary to postural dysfunction and improper functional movement mechanics  Pt will benefit from skilled physical therapy to address current deficits  Subjective Evaluation    Pain  Current pain ratin  At best pain ratin  At worst pain ratin  Location: Lumbosacral    Patient Goals  Patient goals for therapy: increased strength, decreased pain and increased motion         Pt reports chronic LBP that has continued to worsen over the past month to point where it is negatively effecting her overall safety and functional capacity with ADLs and work-related duty          Objective Pain level ranges 2-8/10  AROM: Lumbar extension 75% decreased; Bilateral Hip ext and IR 50% decreased  Strength: Core stability and post/lat LE chain strength 3+/5  Postural Awareness: Poor (Ant pelvic tilt, flexed trunk)  Repeated movement testing: (+) for posterior lumbar derangement  Hip flexor flexibility: (+) Robbie Test bilaterally  FOTO: 54; GOAL: 66  Unable sit without pain and limitation  Unable to bend over at trunk without pain and limitation  Unable to complete lifting/carrying activity without pain and limitation  Unable to complete transfers without pain and limitation             Precautions: Pascual principle for post lumbar derangement    Daily Treatment Diary    HEP: Handout provided and discussed      Manuals 22            ART x15' Bilateral Hips/Pelvis            PROM/Stretch             IASTM             STM/Triggerpoint             JM Prone P to A grade I, II Lumbosacral x10'                         Neuro Re-Ed             JAYDEN x10'            PPU  Next session           Supine Piriformis 2 way 4x20'' ea            Supine Hip Flexor Stretch with PT assist  Next session           PPT  Next session                                                                            Ther Ex             Prone Hip Ext 2x10            Prone Hip IR 2x10 L1            Prone Hip ER 2x10            Cat/Camel  Next session           QKB  Next session                                                               Ther Activity                                       Posture Training x10' Pelvic neutral                                      Modalities             MHP  Next session start with prone wedge           CP x10' Prone wedge            US/Stim

## 2022-05-31 ENCOUNTER — TELEPHONE (OUTPATIENT)
Dept: GASTROENTEROLOGY | Facility: CLINIC | Age: 27
End: 2022-05-31

## 2022-05-31 NOTE — TELEPHONE ENCOUNTER
Patients GI provider:  Veronika Garduno    Number to return call: [phone]    Reason for call: Pt calling stating since taking CREON, she has been having trouble with having a bowel movement, has nausea and headaches, and is bloated      Scheduled procedure/appointment date if applicable: Appt: 4/25/2574

## 2022-06-01 ENCOUNTER — OFFICE VISIT (OUTPATIENT)
Dept: PHYSICAL THERAPY | Facility: CLINIC | Age: 27
End: 2022-06-01
Payer: COMMERCIAL

## 2022-06-01 DIAGNOSIS — M43.06 SPONDYLOLYSIS OF LUMBAR REGION: Primary | ICD-10-CM

## 2022-06-01 PROCEDURE — 97110 THERAPEUTIC EXERCISES: CPT | Performed by: PHYSICAL THERAPIST

## 2022-06-01 PROCEDURE — 97112 NEUROMUSCULAR REEDUCATION: CPT | Performed by: PHYSICAL THERAPIST

## 2022-06-01 PROCEDURE — 97140 MANUAL THERAPY 1/> REGIONS: CPT | Performed by: PHYSICAL THERAPIST

## 2022-06-01 NOTE — TELEPHONE ENCOUNTER
Spoke with pt who explained she started taking creon 5/9/22 and since she started that medication she has experienced nausea but no vomiting, headache everyday unrelieved with tylenol and at times it gets so bad pt reaches a point of crying, bloating, and constipation  Pt used to have issues with diarrhea but that subsided two days into taking creon  pts last BM was 5 days ago and that BM was hard and required straining  She is passing gas  Denies blood or mucus in stool  Denies other symptoms or change in medications  Stated she takes the creon 3x/daily with meals  It should also be mentioned her TSH was elevated on 5/10

## 2022-06-01 NOTE — PROGRESS NOTES
Daily Note     Today's date: 2022  Patient name: Alirio Gonzalez  : 1995  MRN: [id number]  Referring provider: Viktoria Beltrán DO  Dx:   Encounter Diagnosis     ICD-10-CM    1  Spondylolysis of lumbar region  M43 06                   Subjective: Pt reports HEP going well  Some improvement thus far  Anxious to continue making progress  Objective: See treatment diary below      Assessment: Tolerated treatment well  Patient demonstrated fatigue post treatment, exhibited good technique with therapeutic exercises and would benefit from continued PT      Plan: Continue per plan of care  Progress treatment as tolerated           Precautions: Pascual principle for post lumbar derangement    Daily Treatment Diary    HEP: Handout provided and discussed      Manuals 22           ART x15' Bilateral Hips/Pelvis x15'           PROM/Stretch             IASTM             STM/Triggerpoint             JM Prone P to A grade I, II Lumbosacral x10' x10'                        Neuro Re-Ed             JAYDEN x10' x10'           PPU  10x           Supine Piriformis 2 way 4x20'' ea 4x20'' ea           Supine Hip Flexor Stretch with PT assist  4x20''  ea           PPT  2x10                                                                            Ther Ex             Prone Hip Ext 2x10 2x10           Prone Hip IR 2x10 L1 2x10 L1           Prone Hip ER 2x10 2x10           Cat/Camel  2x10           QKB  2x5 ea                                                               Ther Activity                                       Posture Training x10' Pelvic neutral                                      Modalities             MHP  x10' start with prone wedge           CP x10' Prone wedge            US/Stim

## 2022-06-03 ENCOUNTER — OFFICE VISIT (OUTPATIENT)
Dept: PHYSICAL THERAPY | Facility: CLINIC | Age: 27
End: 2022-06-03
Payer: COMMERCIAL

## 2022-06-03 DIAGNOSIS — M43.06 SPONDYLOLYSIS OF LUMBAR REGION: Primary | ICD-10-CM

## 2022-06-03 PROCEDURE — 97112 NEUROMUSCULAR REEDUCATION: CPT | Performed by: PHYSICAL THERAPIST

## 2022-06-03 PROCEDURE — 97110 THERAPEUTIC EXERCISES: CPT | Performed by: PHYSICAL THERAPIST

## 2022-06-03 PROCEDURE — 97140 MANUAL THERAPY 1/> REGIONS: CPT | Performed by: PHYSICAL THERAPIST

## 2022-06-03 NOTE — PROGRESS NOTES
Daily Note     Today's date: 6/3/2022  Patient name: Andrade Collins  : 1995  MRN: [id number]  Referring provider: Saba Javed DO  Dx:   Encounter Diagnosis     ICD-10-CM    1  Spondylolysis of lumbar region  M43 06                   Subjective: Pt reports HEP going well  Some improvement thus far  Anxious to continue making progress  Objective: See treatment diary below      Assessment: Tolerated treatment well  Patient demonstrated fatigue post treatment, exhibited good technique with therapeutic exercises and would benefit from continued PT      Plan: Continue per plan of care  Progress treatment as tolerated           Precautions: Pascual principle for post lumbar derangement    Daily Treatment Diary    HEP: Handout provided and discussed      Manuals 5/23/22 6/1/22 6/3/22          ART x15' Bilateral Hips/Pelvis x15' x15'          PROM/Stretch             IASTM             STM/Triggerpoint             JM Prone P to A grade I, II Lumbosacral x10' x10' x10'                       Neuro Re-Ed             JAYDEN x10' x10' x10'          PPU  10x 10x          Supine Piriformis 2 way 4x20'' ea 4x20'' ea 4x20'' ea          Supine Hip Flexor Stretch with PT assist  4x20''  ea 4x20'' ea          PPT  2x10 2x10                                                                           Ther Ex             Prone Hip Ext 2x10 2x10 2x10          Prone Hip IR 2x10 L1 2x10 L1 2x10 L2          Prone Hip ER 2x10 2x10 2x10          Cat/Camel  2x10 2x10          QKB  2x5 ea 2x5 ea                                                              Ther Activity                                       Posture Training x10' Pelvic neutral                                      Modalities             MHP  x10' start with prone wedge x10' Prone Wedge          CP x10' Prone wedge            US/Stim

## 2022-06-05 DIAGNOSIS — R10.9 ABDOMINAL PAIN, UNSPECIFIED ABDOMINAL LOCATION: ICD-10-CM

## 2022-06-05 RX ORDER — DICYCLOMINE HCL 20 MG
TABLET ORAL
Qty: 60 TABLET | Refills: 0 | Status: SHIPPED | OUTPATIENT
Start: 2022-06-05

## 2022-06-09 RX ORDER — CETIRIZINE HYDROCHLORIDE 10 MG/1
10 TABLET ORAL DAILY
COMMUNITY
Start: 2022-05-09

## 2022-06-09 RX ORDER — NAPROXEN SODIUM 550 MG/1
550 TABLET ORAL 2 TIMES DAILY WITH MEALS
COMMUNITY
Start: 2022-05-18

## 2022-06-09 RX ORDER — NORGESTIMATE AND ETHINYL ESTRADIOL 0.25-0.035
1 KIT ORAL DAILY
COMMUNITY
Start: 2022-05-17 | End: 2023-05-17

## 2022-06-09 RX ORDER — NORGESTIMATE AND ETHINYL ESTRADIOL 0.25-0.035
1 KIT ORAL DAILY
COMMUNITY
Start: 2022-05-17

## 2022-06-10 ENCOUNTER — OFFICE VISIT (OUTPATIENT)
Dept: PHYSICAL THERAPY | Facility: CLINIC | Age: 27
End: 2022-06-10
Payer: COMMERCIAL

## 2022-06-10 ENCOUNTER — OFFICE VISIT (OUTPATIENT)
Dept: FAMILY MEDICINE CLINIC | Facility: CLINIC | Age: 27
End: 2022-06-10
Payer: COMMERCIAL

## 2022-06-10 ENCOUNTER — TELEPHONE (OUTPATIENT)
Dept: ADMINISTRATIVE | Facility: OTHER | Age: 27
End: 2022-06-10

## 2022-06-10 VITALS
RESPIRATION RATE: 16 BRPM | HEIGHT: 69 IN | SYSTOLIC BLOOD PRESSURE: 128 MMHG | WEIGHT: 168.6 LBS | BODY MASS INDEX: 24.97 KG/M2 | HEART RATE: 67 BPM | OXYGEN SATURATION: 98 % | DIASTOLIC BLOOD PRESSURE: 78 MMHG | TEMPERATURE: 98.1 F

## 2022-06-10 DIAGNOSIS — M43.06 SPONDYLOLYSIS OF LUMBAR REGION: ICD-10-CM

## 2022-06-10 DIAGNOSIS — M43.06 SPONDYLOLYSIS OF LUMBAR REGION: Primary | ICD-10-CM

## 2022-06-10 DIAGNOSIS — E03.9 HYPOTHYROIDISM, UNSPECIFIED TYPE: Primary | ICD-10-CM

## 2022-06-10 PROCEDURE — 97140 MANUAL THERAPY 1/> REGIONS: CPT

## 2022-06-10 PROCEDURE — T1015 CLINIC SERVICE: HCPCS | Performed by: FAMILY MEDICINE

## 2022-06-10 PROCEDURE — 3008F BODY MASS INDEX DOCD: CPT | Performed by: NURSE PRACTITIONER

## 2022-06-10 PROCEDURE — 97112 NEUROMUSCULAR REEDUCATION: CPT

## 2022-06-10 PROCEDURE — 97110 THERAPEUTIC EXERCISES: CPT

## 2022-06-10 NOTE — PROGRESS NOTES
Assessment/Plan     Diagnoses and all orders for this visit:    Hypothyroidism, unspecified type  Comments:  stable  cont current levothyroxine dose  recheck TSH in 2-3 weeks  adjust medication pending results  Orders:  -     TSH, 3rd generation with Free T4 reflex; Future    Spondylolysis of lumbar region  Comments:  improving  continue current physical therapy treatment  continue NSAIDs prn    Other orders  -     cetirizine (ZyrTEC) 10 mg tablet; Take 10 mg by mouth daily (Patient not taking: Reported on 6/10/2022)  -     naproxen sodium (ANAPROX) 550 mg tablet; Take 550 mg by mouth 2 (two) times a day with meals  -     norgestimate-ethinyl estradiol (ORTHO-CYCLEN) 0 25-35 MG-MCG per tablet; Take 1 tablet by mouth daily  -     norgestimate-ethinyl estradiol (ORTHO-CYCLEN) 0 25-35 MG-MCG per tablet; Take 1 tablet by mouth daily (Patient not taking: Reported on 6/10/2022)      The patient indicates understanding of these issues and agrees with the plan  Return in about 3 months (around 9/10/2022) for Next scheduled follow up  Subjective     Patient Id: Lolis Taylor is a 33 y o  female    Patient here today to f/u on low back pain and hypothyroidism  Recently had levothyroxine dose increased and tolerating well  She does continue to note some occasional fatigue but this has improved  She is currently receiving PT for her lower back which has been significantly helpful  She is only using NSAIDs prn   She is otherwise doing well today and has no further complaints  Review of Systems   Constitutional: Negative for chills and fever  Respiratory: Negative for shortness of breath  Cardiovascular: Negative for chest pain  Gastrointestinal: Negative for abdominal pain, constipation, diarrhea, nausea and vomiting  Endocrine: Negative for cold intolerance and heat intolerance  Genitourinary: Negative for dysuria  Musculoskeletal: Negative for arthralgias, back pain and myalgias     Skin: Negative for rash    Neurological: Negative for headaches  Psychiatric/Behavioral: Negative for dysphoric mood         Past Medical History:   Diagnosis Date    Disease of thyroid gland     Endometriosis     Ovarian cyst        Past Surgical History:   Procedure Laterality Date    DILATION AND CURETTAGE OF UTERUS      MA ENDOVENOUS LASER, 1ST VEIN Left 9/4/2020    Procedure: ENDOVASCULAR LASER THERAPY (EVLT) + stab phlebectomies;  Surgeon: Karina Tatum DO;  [facility]: [facility];  Service: Vascular    WISDOM TOOTH EXTRACTION         Family History   Problem Relation Age of Onset   Willena Divernon Cancer Mother     Hypertension Mother     Cancer Father     Hypertension Father     Diabetes Father     Cancer Sister        No Known Allergies      Current Outpatient Medications:     albuterol (ProAir HFA) 90 mcg/act inhaler, Inhale 2 puffs every 6 (six) hours as needed for shortness of breath, Disp: 8 5 g, Rfl: 0    Ascorbic Acid (vitamin C) 1000 MG tablet, Take 1,000 mg by mouth daily, Disp: , Rfl:     cetirizine (ZyrTEC) 10 MG chewable tablet, Chew 1 tablet (10 mg total) daily, Disp: 30 tablet, Rfl: 2    Cranberry 500 MG TABS, Take by mouth, Disp: , Rfl:     dicyclomine (BENTYL) 20 mg tablet, take 1 tablet by mouth every 6 hours, Disp: 60 tablet, Rfl: 0    diphenoxylate-atropine (LOMOTIL) 2 5-0 025 mg per tablet, Take 1 tablet by mouth 4 (four) times a day as needed for diarrhea, Disp: 120 tablet, Rfl: 5    fluticasone (FLONASE) 50 mcg/act nasal spray, 1 spray into each nostril daily, Disp: 18 2 mL, Rfl: 0    levothyroxine 88 mcg tablet, Take 1 tablet (88 mcg total) by mouth daily in the early morning, Disp: 30 tablet, Rfl: 2    Multiple Vitamin (MULTIVITAMIN) capsule, Take 1 capsule by mouth daily, Disp: , Rfl:     naproxen sodium (ANAPROX) 550 mg tablet, Take 550 mg by mouth 2 (two) times a day with meals, Disp: , Rfl:     norgestimate-ethinyl estradiol (ORTHO-CYCLEN) 0 25-35 MG-MCG per tablet, Take 1 tablet by mouth daily, Disp: , Rfl:     omeprazole (PriLOSEC) 40 MG capsule, Take 1 capsule (40 mg total) by mouth daily, Disp: 30 capsule, Rfl: 3    ondansetron (Zofran ODT) 4 mg disintegrating tablet, Take 1 tablet (4 mg total) by mouth every 6 (six) hours as needed for nausea or vomiting, Disp: 20 tablet, Rfl: 0    pancrelipase, Lip-Prot-Amyl, (CREON) 24,000 units, Take 24,000 units of lipase by mouth 3 (three) times a day with meals, Disp: 90 capsule, Rfl: 9    triamcinolone (KENALOG) 0 1 % ointment, Apply topically 2 (two) times a day, Disp: 80 g, Rfl: 0    benzonatate (TESSALON) 200 MG capsule, Take 1 capsule (200 mg total) by mouth as needed in the morning and 1 capsule (200 mg total) as needed at noon and 1 capsule (200 mg total) as needed in the evening for cough  (Patient not taking: Reported on 6/10/2022), Disp: 20 capsule, Rfl: 0    cetirizine (ZyrTEC) 10 mg tablet, Take 10 mg by mouth daily (Patient not taking: Reported on 6/10/2022), Disp: , Rfl:     HYDROcodone-acetaminophen (NORCO) 5-325 mg per tablet, Take 2 tablets by mouth every 6 (six) hours as needed (Patient not taking: Reported on 6/10/2022), Disp: , Rfl:     ketotifen (ZADITOR) 0 025 % ophthalmic solution, Administer 1 drop to both eyes 2 (two) times a day as needed (allergies) (Patient not taking: Reported on 6/10/2022), Disp: 5 mL, Rfl: 0    naproxen (Naprosyn) 500 mg tablet, Take 1 tablet (500 mg total) by mouth 2 (two) times a day with meals (Patient not taking: Reported on 6/10/2022), Disp: 28 tablet, Rfl: 0    norgestimate-ethinyl estradiol (ORTHO-CYCLEN) 0 25-35 MG-MCG per tablet, Take 1 tablet by mouth daily (Patient not taking: Reported on 6/10/2022), Disp: , Rfl:     Objective     Vitals:    06/10/22 1034   BP: 128/78   Pulse: 67   Resp: 16   Temp: 98 1 °F (36 7 °C)   SpO2: 98%   Weight: 76 5 kg (168 lb 9 6 oz)   Height: 5' 9" (1 753 m)       Physical Exam  Vitals and nursing note reviewed     Constitutional:       General: She is not in acute distress  Appearance: Normal appearance  She is well-developed  She is not ill-appearing or toxic-appearing  HENT:      Head: Normocephalic and atraumatic  Neck:      Thyroid: No thyromegaly  Cardiovascular:      Rate and Rhythm: Normal rate and regular rhythm  Heart sounds: Normal heart sounds  No murmur heard  Pulmonary:      Effort: Pulmonary effort is normal  No respiratory distress  Breath sounds: Normal breath sounds  No wheezing  Genitourinary:     Comments: Exam deferred  Musculoskeletal:      Cervical back: Neck supple  Right lower leg: No edema  Left lower leg: No edema  Skin:     General: Skin is warm  Neurological:      General: No focal deficit present  Mental Status: She is alert and oriented to person, place, and time     Psychiatric:         Mood and Affect: Mood normal          Behavior: Behavior normal          Erick Puri 29

## 2022-06-10 NOTE — TELEPHONE ENCOUNTER
----- Message from [facility] sent at 6/10/2022 10:35 AM EDT -----  Regarding: PAP  Can you please reach out [facility] in [place] for most recent cervical cancer screening  Dr Elfego Harrell   Thank you

## 2022-06-10 NOTE — LETTER
Procedure Request Form: Cervical Cancer Screening      Date Requested: 06/10/22  Patient: Nathaly Band  Patient : 1995   Referring Provider: Aileen Arthur, CRNP        Date of Procedure ______________________________       The above patient has informed us that they have completed their   most recent Cervical Cancer Screening at your facility  Please complete   this form and attach all corresponding procedure reports/results  Comments __________________________________________________________  ____________________________________________________________________  ____________________________________________________________________  ____________________________________________________________________    Facility Completing Procedure _________________________________________    Form Completed By (print name) _______________________________________      Signature __________________________________________________________      These reports are needed for  compliance  Please fax this completed form and a copy of the procedure report to our office located at [place] [address] as soon as possible to [phone] marcus Turner: Phone [phone]    We thank you for your assistance in treating our mutual patient

## 2022-06-10 NOTE — TELEPHONE ENCOUNTER
Upon review of the In Basket request and the patient's chart, initial outreach has been made via fax, please see Contacts section for details       Thank you  Nohelia Moore

## 2022-06-13 NOTE — TELEPHONE ENCOUNTER
Upon review of the In Basket request we were able to locate, review, and update the patient chart as requested for Pap Smear (HPV) aka Cervical Cancer Screening  Any additional questions or concerns should be emailed to the Practice Liaisons via [email or web address] email, please do not reply via In Basket      Thank you  Darrin Arevalo

## 2022-06-15 ENCOUNTER — APPOINTMENT (OUTPATIENT)
Dept: PHYSICAL THERAPY | Facility: CLINIC | Age: 27
End: 2022-06-15
Payer: COMMERCIAL

## 2022-06-17 ENCOUNTER — OFFICE VISIT (OUTPATIENT)
Dept: PHYSICAL THERAPY | Facility: CLINIC | Age: 27
End: 2022-06-17
Payer: COMMERCIAL

## 2022-06-17 DIAGNOSIS — M43.06 SPONDYLOLYSIS OF LUMBAR REGION: Primary | ICD-10-CM

## 2022-06-17 PROCEDURE — 97112 NEUROMUSCULAR REEDUCATION: CPT | Performed by: PHYSICAL THERAPIST

## 2022-06-17 PROCEDURE — 97110 THERAPEUTIC EXERCISES: CPT | Performed by: PHYSICAL THERAPIST

## 2022-06-17 PROCEDURE — 97140 MANUAL THERAPY 1/> REGIONS: CPT | Performed by: PHYSICAL THERAPIST

## 2022-06-17 NOTE — PROGRESS NOTES
Daily Note     Today's date: 2022  Patient name: Joe Edmond  : 1995  MRN: [id number]  Referring provider: Mark Campoverde DO  Dx:   Encounter Diagnosis     ICD-10-CM    1  Spondylolysis of lumbar region  M43 06                   Subjective: Patient reports continued progress and centralization  HEP continues to improve  Anxious to continue making progress  Objective: See treatment diary below      Assessment: Tolerated treatment well  Patient demonstrated fatigue post treatment, exhibited good technique with therapeutic exercises and would benefit from continued PT  Plan: Continue per plan of care  Progress treatment as tolerated           Precautions: Pascual principle for post lumbar derangement    Daily Treatment Diary    HEP: Handout provided and discussed      Manuals 5/23/22 6/1/22 6/3/22 6/10/22 6/17/22   ART x15' Bilateral Hips/Pelvis x15' x15'  x15'   PROM/Stretch    HA    IASTM        STM/Triggerpoint        JM Prone P to A grade I, II Lumbosacral x10' x10' x10'  x10'           Neuro Re-Ed        JAYDEN x10' x10' x10'  x10'   PPU  10x 10x 2x10 2x10   Supine Piriformis 2 way 4x20'' ea 4x20'' ea 4x20'' ea 4x20" ea 4x20'' ea   Supine Hip Flexor Stretch with PT assist  4x20''  ea 4x20'' ea 4x20" ea 4x20'' ea   PPT  2x10 2x10 2x10 2x10                                           Ther Ex        Prone Hip Ext 2x10 2x10 2x10 2x10 2x10   Prone Hip IR 2x10 L1 2x10 L1 2x10 L2 2x10 L2 2x10 L2   Prone Hip ER 2x10 2x10 2x10 2x10 2x10   Cat/Camel  2x10 2x10 2x10 2x10   QKB  2x5 ea 2x5 ea 2x5 ea 2x5 ea                                   Ther Activity                        Posture Training x10' Pelvic neutral                       Modalities        MHP  x10' start with prone wedge x10' Prone Wedge x10' prone wedge x10' Prone Wedge   CP x10' Prone wedge       US/Stim

## 2022-06-22 ENCOUNTER — OFFICE VISIT (OUTPATIENT)
Dept: PHYSICAL THERAPY | Facility: CLINIC | Age: 27
End: 2022-06-22
Payer: COMMERCIAL

## 2022-06-22 DIAGNOSIS — M43.06 SPONDYLOLYSIS OF LUMBAR REGION: Primary | ICD-10-CM

## 2022-06-22 PROCEDURE — 97110 THERAPEUTIC EXERCISES: CPT | Performed by: PHYSICAL THERAPIST

## 2022-06-22 PROCEDURE — 97112 NEUROMUSCULAR REEDUCATION: CPT | Performed by: PHYSICAL THERAPIST

## 2022-06-22 PROCEDURE — 97140 MANUAL THERAPY 1/> REGIONS: CPT | Performed by: PHYSICAL THERAPIST

## 2022-06-22 NOTE — PROGRESS NOTES
Daily Note     Today's date: 2022  Patient name: Ulices Mohan  : 1995  MRN: [id number]  Referring provider: Radha Benitez DO  Dx:   Encounter Diagnosis     ICD-10-CM    1  Spondylolysis of lumbar region  M43 06                   Subjective: Patient reports continued progress and centralization  HEP continues to improve  Anxious to continue making progress  Objective: See treatment diary below      Assessment: Tolerated treatment well  Patient demonstrated fatigue post treatment, exhibited good technique with therapeutic exercises and would benefit from continued PT  Plan: Continue per plan of care  Progress treatment as tolerated           Precautions: Pascual principle for post lumbar derangement    Daily Treatment Diary    HEP: Handout provided and discussed      Manuals 6/22/22 6/1/22 6/3/22 6/10/22 6/17/22   ART x15' Bilateral Hips/Pelvis x15' x15'  x15'   PROM/Stretch    HA    IASTM        STM/Triggerpoint        JM Prone P to A grade I, II Lumbosacral x10' x10' x10'  x10'           Neuro Re-Ed        JAYDEN x10' x10' x10'  x10'   PPU  10x 10x 2x10 2x10   Supine Piriformis 2 way 4x20'' ea 4x20'' ea 4x20'' ea 4x20" ea 4x20'' ea   Supine Hip Flexor Stretch with PT assist 4x20'' ea 4x20''  ea 4x20'' ea 4x20" ea 4x20'' ea   PPT 2x10 2x10 2x10 2x10 2x10                                           Ther Ex        Prone Hip Ext 2x10 2x10 2x10 2x10 2x10   Prone Hip IR 2x10 L2 2x10 L1 2x10 L2 2x10 L2 2x10 L2   Prone Hip ER 2x10 2x10 2x10 2x10 2x10   Cat/Camel 3x10 2x10 2x10 2x10 2x10   QKB 2x5 ea 2x5 ea 2x5 ea 2x5 ea 2x5 ea                                   Ther Activity                        Posture Training                        Modalities        MHP x10' x10' start with prone wedge x10' Prone Wedge x10' prone wedge x10' Prone Wedge   CP        US/Stim

## 2022-06-24 ENCOUNTER — OFFICE VISIT (OUTPATIENT)
Dept: PHYSICAL THERAPY | Facility: CLINIC | Age: 27
End: 2022-06-24
Payer: COMMERCIAL

## 2022-06-24 DIAGNOSIS — M43.06 SPONDYLOLYSIS OF LUMBAR REGION: Primary | ICD-10-CM

## 2022-06-24 PROCEDURE — 97140 MANUAL THERAPY 1/> REGIONS: CPT | Performed by: PHYSICAL THERAPIST

## 2022-06-24 PROCEDURE — 97112 NEUROMUSCULAR REEDUCATION: CPT | Performed by: PHYSICAL THERAPIST

## 2022-06-24 PROCEDURE — 97110 THERAPEUTIC EXERCISES: CPT | Performed by: PHYSICAL THERAPIST

## 2022-06-24 NOTE — PROGRESS NOTES
PT Re-Evaluation     Today's date: 2022  Patient name: Jonathon Odom  : 1995  MRN: [id number]  Referring provider: Sarah Pierce DO  Dx:   Encounter Diagnosis     ICD-10-CM    1   Spondylolysis of lumbar region  M43 06                   Assessment  Understanding of Dx/Px/POC: good   Prognosis: good    Goals  STGs: To be complete within 2-3 weeks (partially met)  - Able to sit for any extended amount of time without pain or limitation for increased safety and functional capacity with ADLs and work-related duty  - Able to repetitively bend over at trunk without pain or limitation for increased safety and functional capacity with ADLs and and work-related duty  - Able to complete all lifting/carrying activity without pain or limitation for increased safety and functional capacity with ADLs and work-related duty  - Able to complete transfers repetitively without pain or limitation for increased safety and functional capacity with ADLs and work-related duty    LTGs: To be complete within 4 weeks (partially met)  - Able to sit for any extended amount of time without pain or limitation for increased safety and functional capacity with ADLs and work-related duty  - Able to repetitively bend over at trunk without pain or limitation for increased safety and functional capacity with ADLs and and work-related duty  - Able to complete all lifting/carrying activity without pain or limitation for increased safety and functional capacity with ADLs and work-related duty  - Able to complete transfers repetitively without pain or limitation for increased safety and functional capacity with ADLs and work-related duty    Plan  Planned therapy interventions: manual therapy, neuromuscular re-education, patient education, postural training, self care, therapeutic activities, therapeutic exercise and home exercise program  Frequency: 2x week  Duration in weeks: 6       Upon completion of today's re-evaluation, as evidenced by present objective and subjective measures, Shilpa's sx remain consistent with continued, fair-paced progress from posterior lumbar derangement secondary to postural dysfunction and improper functional movement mechanics  Pt will benefit from continued skilled physical therapy to address current deficits  Subjective Evaluation    Pain  Current pain rating: 3  At best pain ratin  At worst pain ratin  Location: Lumbosacral    Patient Goals  Patient goals for therapy: increased strength, decreased pain and increased motion         Pt reports > 50% improvement since starting physical therapy  Reports pain, mobility, flexibility, and strength all continue to improve  Reports HEP continues to go well and continues to see benefit from continuing to attend physical therapy at this time          Objective Pain level ranges 1-5/10  AROM: Lumbar extension 50% decreased; Bilateral Hip ext and IR 25% decreased  Strength: Core stability and post/lat LE chain strength /  Postural Awareness: Poor (Ant pelvic tilt, flexed trunk)  Repeated movement testing: (+) for posterior lumbar derangement  Hip flexor flexibility: (+) Robbie Test bilaterally  FOTO: 69; GOAL: 66  Unable sit without pain and limitation, but improving  Unable to bend over at trunk without pain and limitation, but imprv  Unable to complete lifting/carrying activity without pain and limitation  Unable to complete transfers without pain and limitation             Precautions: Pascual principle for post lumbar derangement    Daily Treatment Diary    HEP: Handout provided and discussed      Manuals 6/22/22 6/24/22 6/3/22 6/10/22 6/17/22   ART x15' Bilateral Hips/Pelvis x15' x15'  x15'   PROM/Stretch    HA    IASTM        STM/Triggerpoint        JM Prone P to A grade I, II Lumbosacral x10' x10' x10'  x10'           Neuro Re-Ed        JAYDEN x10' x10' x10'  x10'   PPU  10x 10x 2x10 2x10   Supine Piriformis 2 way 4x20'' ea 4x20'' ea 4x20'' ea 4x20" ea 4x20'' ea Supine Hip Flexor Stretch with PT assist 4x20'' ea 4x20''  ea 4x20'' ea 4x20" ea 4x20'' ea   PPT 2x10 2x10 2x10 2x10 2x10   Kneeling Hip Flexor Stretch  4x20''                                      Ther Ex        Prone Hip Ext 2x10 2x10 2x10 2x10 2x10   Prone Hip IR 2x10 L2 2x10 L2 2x10 L2 2x10 L2 2x10 L2   Prone Hip ER 2x10 2x10 2x10 2x10 2x10   Cat/Camel 3x10 2x10 2x10 2x10 2x10   QKB 2x5 ea 2x5 ea 2x5 ea 2x5 ea 2x5 ea                                   Ther Activity                        Posture Training                        Modalities        MHP x10' x10' start with prone wedge x10' Prone Wedge x10' prone wedge x10' Prone Wedge   CP        US/Stim

## 2022-06-29 ENCOUNTER — APPOINTMENT (OUTPATIENT)
Dept: PHYSICAL THERAPY | Facility: CLINIC | Age: 27
End: 2022-06-29
Payer: COMMERCIAL

## 2022-07-01 ENCOUNTER — APPOINTMENT (OUTPATIENT)
Dept: LAB | Facility: MEDICAL CENTER | Age: 27
End: 2022-07-01
Payer: COMMERCIAL

## 2022-07-01 ENCOUNTER — OFFICE VISIT (OUTPATIENT)
Dept: PHYSICAL THERAPY | Facility: CLINIC | Age: 27
End: 2022-07-01
Payer: COMMERCIAL

## 2022-07-01 DIAGNOSIS — M43.06 SPONDYLOLYSIS OF LUMBAR REGION: Primary | ICD-10-CM

## 2022-07-01 DIAGNOSIS — E03.9 HYPOTHYROIDISM, UNSPECIFIED TYPE: ICD-10-CM

## 2022-07-01 LAB — TSH SERPL DL<=0.05 MIU/L-ACNC: 4.26 UIU/ML (ref 0.45–4.5)

## 2022-07-01 PROCEDURE — 97112 NEUROMUSCULAR REEDUCATION: CPT | Performed by: PHYSICAL THERAPIST

## 2022-07-01 PROCEDURE — 36415 COLL VENOUS BLD VENIPUNCTURE: CPT

## 2022-07-01 PROCEDURE — 97110 THERAPEUTIC EXERCISES: CPT | Performed by: PHYSICAL THERAPIST

## 2022-07-01 PROCEDURE — 84443 ASSAY THYROID STIM HORMONE: CPT

## 2022-07-01 PROCEDURE — 97140 MANUAL THERAPY 1/> REGIONS: CPT | Performed by: PHYSICAL THERAPIST

## 2022-07-01 NOTE — PROGRESS NOTES
Daily Note     Today's date: 2022  Patient name: Trung Blackmon  : 1995  MRN: [id number]  Referring provider: Sisi Child DO  Dx:   Encounter Diagnosis     ICD-10-CM    1  Spondylolysis of lumbar region  M43 06                   Subjective: Patient reports continued progress and centralization  HEP continues to improve  Anxious to continue making progress  Objective: See treatment diary below      Assessment: Tolerated treatment well  Patient demonstrated fatigue post treatment, exhibited good technique with therapeutic exercises and would benefit from continued PT  Plan: Continue per plan of care  Progress treatment as tolerated           Precautions: Pacsual principle for post lumbar derangement    Daily Treatment Diary    HEP: Handout provided and discussed      Manuals 6/22/22 6/24/22 7/1/22 6/10/22 6/17/22   ART x15' Bilateral Hips/Pelvis x15' x15'  x15'   PROM/Stretch    HA    IASTM        STM/Triggerpoint        JM Prone P to A grade I, II Lumbosacral x10' x10' x10'  x10'           Neuro Re-Ed        JAYDEN x10' x10' x10'  x10'   PPU  10x 10x 2x10 2x10   Supine Piriformis 2 way 4x20'' ea 4x20'' ea 4x20'' ea 4x20" ea 4x20'' ea   Supine Hip Flexor Stretch with PT assist 4x20'' ea 4x20''  ea 4x20'' ea 4x20" ea 4x20'' ea   PPT 2x10 2x10 2x10 2x10 2x10   Kneeling Hip Flexor Stretch  4x20'' 4x20''                                     Ther Ex        Prone Hip Ext 2x10 2x10 2x10 2x10 2x10   Prone Hip IR 2x10 L2 2x10 L2 2x10 L3 2x10 L2 2x10 L2   Prone Hip ER 2x10 2x10 2x10 2x10 2x10   Cat/Camel 3x10 2x10 2x10 2x10 2x10   QKB 2x5 ea 2x5 ea 3x5 ea 2x5 ea 2x5 ea   TB Lat Walks   3x60' L3                             Ther Activity                        Posture Training                        Modalities        MHP x10' x10' start with prone wedge x10' Prone Wedge x10' prone wedge x10' Prone Wedge   CP        US/Stim

## 2022-07-06 ENCOUNTER — APPOINTMENT (OUTPATIENT)
Dept: PHYSICAL THERAPY | Facility: CLINIC | Age: 27
End: 2022-07-06
Payer: COMMERCIAL

## 2022-07-08 ENCOUNTER — OFFICE VISIT (OUTPATIENT)
Dept: PHYSICAL THERAPY | Facility: CLINIC | Age: 27
End: 2022-07-08
Payer: COMMERCIAL

## 2022-07-08 DIAGNOSIS — M43.06 SPONDYLOLYSIS OF LUMBAR REGION: Primary | ICD-10-CM

## 2022-07-08 PROCEDURE — 97110 THERAPEUTIC EXERCISES: CPT | Performed by: PHYSICAL THERAPIST

## 2022-07-08 PROCEDURE — 97112 NEUROMUSCULAR REEDUCATION: CPT | Performed by: PHYSICAL THERAPIST

## 2022-07-08 PROCEDURE — 97140 MANUAL THERAPY 1/> REGIONS: CPT | Performed by: PHYSICAL THERAPIST

## 2022-07-08 NOTE — PROGRESS NOTES
Daily Note     Today's date: 2022  Patient name: Alfred Bryant  : 1995  MRN: [id number]  Referring provider: Jagdeep Metcalf DO  Dx:   Encounter Diagnosis     ICD-10-CM    1  Spondylolysis of lumbar region  M43 06                   Subjective: Patient reports continued progress and centralization  HEP continues to improve  Anxious to continue making progress  Objective: See treatment diary below      Assessment: Tolerated treatment well  Patient demonstrated fatigue post treatment, exhibited good technique with therapeutic exercises and would benefit from continued PT  Plan: Continue per plan of care  Progress treatment as tolerated           Precautions: Pascual principle for post lumbar derangement    Daily Treatment Diary    HEP: Handout provided and discussed      Manuals 22   ART x15' Bilateral Hips/Pelvis x15' x15' x15' x15'   PROM/Stretch        IASTM        STM/Triggerpoint        JM Prone P to A grade I, II Lumbosacral x10' x10' x10' x10' x10'           Neuro Re-Ed        JAYDEN x10' x10' x10' x10' x10'   PPU  10x 10x 2x10 2x10   Supine Piriformis 2 way 4x20'' ea 4x20'' ea 4x20'' ea 4x20" ea 4x20'' ea   Supine Hip Flexor Stretch with PT assist 4x20'' ea 4x20''  ea 4x20'' ea 4x20" ea 4x20'' ea   PPT 2x10 2x10 2x10 2x10 2x10   Kneeling Hip Flexor Stretch  4x20'' 4x20'' 4x20''                                    Ther Ex        Prone Hip Ext 2x10 2x10 2x10 2x10 2x10   Prone Hip IR 2x10 L2 2x10 L2 2x10 L3 2x10 L2 2x10 L2   Prone Hip ER 2x10 2x10 2x10 2x10 2x10   Cat/Camel 3x10 2x10 2x10 2x10 2x10   QKB 2x5 ea 2x5 ea 3x5 ea 2x5 ea 2x5 ea   TB Lat Walks   3x60' L3 3x60' L3                            Ther Activity                        Posture Training                        Modalities        MHP x10' x10' start with prone wedge x10' Prone Wedge x10' prone wedge x10' Prone Wedge   CP        US/Stim

## 2022-07-13 ENCOUNTER — APPOINTMENT (OUTPATIENT)
Dept: PHYSICAL THERAPY | Facility: CLINIC | Age: 27
End: 2022-07-13
Payer: COMMERCIAL

## 2022-07-15 ENCOUNTER — OFFICE VISIT (OUTPATIENT)
Dept: PHYSICAL THERAPY | Facility: CLINIC | Age: 27
End: 2022-07-15
Payer: COMMERCIAL

## 2022-07-15 DIAGNOSIS — M43.06 SPONDYLOLYSIS OF LUMBAR REGION: Primary | ICD-10-CM

## 2022-07-15 PROCEDURE — 97140 MANUAL THERAPY 1/> REGIONS: CPT | Performed by: PHYSICAL THERAPIST

## 2022-07-15 PROCEDURE — 97112 NEUROMUSCULAR REEDUCATION: CPT | Performed by: PHYSICAL THERAPIST

## 2022-07-15 PROCEDURE — 97110 THERAPEUTIC EXERCISES: CPT | Performed by: PHYSICAL THERAPIST

## 2022-07-15 NOTE — PROGRESS NOTES
Daily Note     Today's date: 7/15/2022  Patient name: Tootie Proctor  : 1995  MRN: [id number]  Referring provider: Lencho Rodríguez DO  Dx:   Encounter Diagnosis     ICD-10-CM    1  Spondylolysis of lumbar region  M43 06                   Subjective: Patient reports continued progress and centralization  HEP continues to improve  Anxious to continue making progress  Objective: See treatment diary below      Assessment: Tolerated treatment well  Patient demonstrated fatigue post treatment, exhibited good technique with therapeutic exercises and would benefit from continued PT  Plan: Continue per plan of care  Progress treatment as tolerated           Precautions: Pascual principle for post lumbar derangement    Daily Treatment Diary    HEP: Handout provided and discussed      Manuals 6/22/22 6/24/22 7/1/22 7/8/22 7/15/22   ART x15' Bilateral Hips/Pelvis x15' x15' x15' x15'   PROM/Stretch        IASTM        STM/Triggerpoint        JM Prone P to A grade I, II Lumbosacral x10' x10' x10' x10' x10'           Neuro Re-Ed        JAYDEN x10' x10' x10' x10' x10'   PPU  10x 10x 2x10 2x10   Supine Piriformis 2 way 4x20'' ea 4x20'' ea 4x20'' ea 4x20" ea 4x20'' ea   Supine Hip Flexor Stretch with PT assist 4x20'' ea 4x20''  ea 4x20'' ea 4x20" ea 4x20'' ea   PPT 2x10 2x10 2x10 2x10 2x10   Kneeling Hip Flexor Stretch  4x20'' 4x20'' 4x20'' 4x20''                                   Ther Ex        Prone Hip Ext 2x10 2x10 2x10 2x10 2x10   Prone Hip IR 2x10 L2 2x10 L2 2x10 L3 2x10 L2 2x10 L2   Prone Hip ER 2x10 2x10 2x10 2x10 2x10   Cat/Camel 3x10 2x10 2x10 2x10 2x10   QKB 2x5 ea 2x5 ea 3x5 ea 2x5 ea 2x5 ea   TB Lat Walks   3x60' L3 3x60' L3 3x60' L3                           Ther Activity                        Posture Training                        Modalities        MHP x10' x10' start with prone wedge x10' Prone Wedge x10' prone wedge x10' Prone Wedge   CP        US/Stim

## 2022-07-20 ENCOUNTER — APPOINTMENT (OUTPATIENT)
Dept: PHYSICAL THERAPY | Facility: CLINIC | Age: 27
End: 2022-07-20
Payer: COMMERCIAL

## 2022-07-22 ENCOUNTER — OFFICE VISIT (OUTPATIENT)
Dept: PHYSICAL THERAPY | Facility: CLINIC | Age: 27
End: 2022-07-22
Payer: COMMERCIAL

## 2022-07-22 DIAGNOSIS — M43.06 SPONDYLOLYSIS OF LUMBAR REGION: Primary | ICD-10-CM

## 2022-07-22 PROCEDURE — 97110 THERAPEUTIC EXERCISES: CPT | Performed by: PHYSICAL THERAPIST

## 2022-07-22 PROCEDURE — 97112 NEUROMUSCULAR REEDUCATION: CPT | Performed by: PHYSICAL THERAPIST

## 2022-07-22 PROCEDURE — 97140 MANUAL THERAPY 1/> REGIONS: CPT | Performed by: PHYSICAL THERAPIST

## 2022-07-22 NOTE — PROGRESS NOTES
Daily Note     Today's date: 2022  Patient name: Andrey Byrd  : 1995  MRN: [id number]  Referring provider: Jimmy Velasquez DO  Dx:   Encounter Diagnosis     ICD-10-CM    1  Spondylolysis of lumbar region  M43 06                   Subjective: Patient reports continued progress and centralization  HEP continues to improve  Anxious to continue making progress  Objective: See treatment diary below      Assessment: Tolerated treatment well  Patient demonstrated fatigue post treatment, exhibited good technique with therapeutic exercises and would benefit from continued PT  Plan: Continue per plan of care  Progress treatment as tolerated           Precautions: Pascual principle for post lumbar derangement    Daily Treatment Diary    HEP: Handout provided and discussed      Manuals 7/22/22 6/24/22 7/1/22 7/8/22 7/15/22   ART x15' Bilateral Hips/Pelvis x15' x15' x15' x15'   PROM/Stretch        IASTM        STM/Triggerpoint        JM Prone P to A grade I, II Lumbosacral x10' x10' x10' x10' x10'           Neuro Re-Ed        JAYDEN x10' x10' x10' x10' x10'   PPU  10x 10x 2x10 2x10   Supine Piriformis 2 way 4x20'' ea 4x20'' ea 4x20'' ea 4x20" ea 4x20'' ea   Supine Hip Flexor Stretch with PT assist 4x20'' ea 4x20''  ea 4x20'' ea 4x20" ea 4x20'' ea   PPT 2x10 2x10 2x10 2x10 2x10   Kneeling Hip Flexor Stretch 4x20'' 4x20'' 4x20'' 4x20'' 4x20''                                   Ther Ex        Prone Hip Ext 2x10 2x10 2x10 2x10 2x10   Prone Hip IR 2x10 L2 2x10 L2 2x10 L3 2x10 L2 2x10 L2   Prone Hip ER 2x10 2x10 2x10 2x10 2x10   Cat/Camel 3x10 2x10 2x10 2x10 2x10   QKB 2x5 ea 2x5 ea 3x5 ea 2x5 ea 2x5 ea   TB Lat Walks 3x60' L3  3x60' L3 3x60' L3 3x60' L3   SL Bridge 2x10 ea                       Ther Activity                        Posture Training                        Modalities        MHP x10' x10' start with prone wedge x10' Prone Wedge x10' prone wedge x10' Prone Wedge   CP        US/Stim

## 2022-07-27 ENCOUNTER — APPOINTMENT (OUTPATIENT)
Dept: PHYSICAL THERAPY | Facility: CLINIC | Age: 27
End: 2022-07-27
Payer: COMMERCIAL

## 2022-07-29 ENCOUNTER — OFFICE VISIT (OUTPATIENT)
Dept: PHYSICAL THERAPY | Facility: CLINIC | Age: 27
End: 2022-07-29
Payer: COMMERCIAL

## 2022-07-29 DIAGNOSIS — M43.06 SPONDYLOLYSIS OF LUMBAR REGION: Primary | ICD-10-CM

## 2022-07-29 PROCEDURE — 97112 NEUROMUSCULAR REEDUCATION: CPT | Performed by: PHYSICAL THERAPIST

## 2022-07-29 PROCEDURE — 97140 MANUAL THERAPY 1/> REGIONS: CPT | Performed by: PHYSICAL THERAPIST

## 2022-07-29 PROCEDURE — 97110 THERAPEUTIC EXERCISES: CPT | Performed by: PHYSICAL THERAPIST

## 2022-07-29 NOTE — PROGRESS NOTES
Daily Note     Today's date: 2022  Patient name: Colonel Bell  : 1995  MRN: [id number]  Referring provider: Luli Watson DO  Dx:   Encounter Diagnosis     ICD-10-CM    1  Spondylolysis of lumbar region  M43 06                   Subjective: Patient reports continued progress and centralization  HEP continues to improve  Anxious to continue making progress  Objective: See treatment diary below      Assessment: Tolerated treatment well  Patient demonstrated fatigue post treatment, exhibited good technique with therapeutic exercises and would benefit from continued PT  Plan: Continue per plan of care  Progress treatment as tolerated           Precautions: Pascual principle for post lumbar derangement    Daily Treatment Diary    HEP: Handout provided and discussed      Manuals 7/22/22 7/29/22 7/1/22 7/8/22 7/15/22   ART x15' Bilateral Hips/Pelvis x15' x15' x15' x15'   PROM/Stretch        IASTM        STM/Triggerpoint        JM Prone P to A grade I, II Lumbosacral x10' x10' x10' x10' x10'           Neuro Re-Ed        JAYDEN x10' x10' x10' x10' x10'   PPU  10x 10x 2x10 2x10   Supine Piriformis 2 way 4x20'' ea 4x20'' ea 4x20'' ea 4x20" ea 4x20'' ea   Supine Hip Flexor Stretch with PT assist 4x20'' ea 4x20''  ea 4x20'' ea 4x20" ea 4x20'' ea   PPT 2x10 2x10 2x10 2x10 2x10   Kneeling Hip Flexor Stretch 4x20'' 4x20'' 4x20'' 4x20'' 4x20''                                   Ther Ex        Prone Hip Ext 2x10 2x10 2x10 2x10 2x10   Prone Hip IR 2x10 L2 2x10 L2 2x10 L3 2x10 L2 2x10 L2   Prone Hip ER 2x10 2x10 2x10 2x10 2x10   Cat/Camel 3x10 2x10 2x10 2x10 2x10   QKB 2x5 ea 2x5 ea 3x5 ea 2x5 ea 2x5 ea   TB Lat Walks 3x60' L3 3x60' L3 3x60' L3 3x60' L3 3x60' L3   SL Bridge 2x10 ea 2x10 ea                      Ther Activity                        Posture Training                        Modalities        MHP x10' x10' start with prone wedge x10' Prone Wedge x10' prone wedge x10' Prone Wedge   CP        US/Stim

## 2022-08-03 ENCOUNTER — APPOINTMENT (OUTPATIENT)
Dept: PHYSICAL THERAPY | Facility: CLINIC | Age: 27
End: 2022-08-03
Payer: COMMERCIAL

## 2022-08-05 ENCOUNTER — APPOINTMENT (OUTPATIENT)
Dept: PHYSICAL THERAPY | Facility: CLINIC | Age: 27
End: 2022-08-05
Payer: COMMERCIAL

## 2022-08-12 ENCOUNTER — TELEPHONE (OUTPATIENT)
Dept: GASTROENTEROLOGY | Facility: CLINIC | Age: 27
End: 2022-08-12

## 2022-08-12 ENCOUNTER — OFFICE VISIT (OUTPATIENT)
Dept: PHYSICAL THERAPY | Facility: CLINIC | Age: 27
End: 2022-08-12
Payer: COMMERCIAL

## 2022-08-12 DIAGNOSIS — M43.06 SPONDYLOLYSIS OF LUMBAR REGION: Primary | ICD-10-CM

## 2022-08-12 PROCEDURE — 97110 THERAPEUTIC EXERCISES: CPT | Performed by: PHYSICAL THERAPIST

## 2022-08-12 PROCEDURE — 97112 NEUROMUSCULAR REEDUCATION: CPT | Performed by: PHYSICAL THERAPIST

## 2022-08-12 PROCEDURE — 97140 MANUAL THERAPY 1/> REGIONS: CPT | Performed by: PHYSICAL THERAPIST

## 2022-08-12 NOTE — PROGRESS NOTES
PT Discharge    Today's date: 2022  Patient name: Nuria Ott  : 1995  MRN: [id number]  Referring provider: Jolanta Yeh DO  Dx:   Encounter Diagnosis     ICD-10-CM    1  Spondylolysis of lumbar region  M43 06                     Goals  STGs: To be complete within 2-3 weeks (met)  - Able to sit for any extended amount of time without pain or limitation for increased safety and functional capacity with ADLs and work-related duty  - Able to repetitively bend over at trunk without pain or limitation for increased safety and functional capacity with ADLs and and work-related duty  - Able to complete all lifting/carrying activity without pain or limitation for increased safety and functional capacity with ADLs and work-related duty  - Able to complete transfers repetitively without pain or limitation for increased safety and functional capacity with ADLs and work-related duty    LTGs: To be complete within 4 weeks (met)  - Able to sit for any extended amount of time without pain or limitation for increased safety and functional capacity with ADLs and work-related duty  - Able to repetitively bend over at trunk without pain or limitation for increased safety and functional capacity with ADLs and and work-related duty  - Able to complete all lifting/carrying activity without pain or limitation for increased safety and functional capacity with ADLs and work-related duty  - Able to complete transfers repetitively without pain or limitation for increased safety and functional capacity with ADLs and work-related duty       Upon completion of today's re-evaluation, as evidenced by present objective and subjective measures, Shilpa's sx remain consistent with continued, fair-paced progress from posterior lumbar derangement secondary to postural dysfunction and improper functional movement mechanics  Pt will benefit from continued skilled physical therapy to address current deficits          Subjective Evaluation    Pain  Current pain ratin  At best pain ratin  At worst pain ratin  Location: Lumbosacral         Pt reports she feels ready to safely D/C to HEP after today's session, as she has achieved all personal and functional goals  Pt reports she has a good understanding of HEP and will reach out with nay questions/concerns/setbacks  Objective Pain level ranges 0-1/10  AROM: Lumbar extension WNL;  Bilateral Hip ext and IR WNL  Strength: Core stability and post/lat LE chain strength 5/5  Postural Awareness: Good  Repeated movement testing: (-) for posterior lumbar derangement  Hip flexor flexibility: (-) Robbie Test bilaterally  FOTO: 94; GOAL: 77  Able sit without pain and limitation, but improving  Able to bend over at trunk without pain and limitation, but imprv  Able to complete lifting/carrying activity without pain and limitation  Able to complete transfers without pain and limitation             Precautions: Pascual principle for post lumbar derangement    Daily Treatment Diary    HEP: Handout provided and discussed      Manuals 7/22/22 7/29/22 8/12/22 7/8/22 7/15/22   ART x15' Bilateral Hips/Pelvis x15' x15' x15' x15'   PROM/Stretch        IASTM        STM/Triggerpoint        JM Prone P to A grade I, II Lumbosacral x10' x10' x10' x10' x10'           Neuro Re-Ed        JAYDEN x10' x10' x10' x10' x10'   PPU  10x 10x 2x10 2x10   Supine Piriformis 2 way 4x20'' ea 4x20'' ea 4x20'' ea 4x20" ea 4x20'' ea   Supine Hip Flexor Stretch with PT assist 4x20'' ea 4x20''  ea 4x20'' ea 4x20" ea 4x20'' ea   PPT 2x10 2x10 2x10 2x10 2x10   Kneeling Hip Flexor Stretch 4x20'' 4x20'' 4x20'' 4x20'' 4x20''                                   Ther Ex        Prone Hip Ext 2x10 2x10 2x10 2x10 2x10   Prone Hip IR 2x10 L2 2x10 L2 2x10 L3 2x10 L2 2x10 L2   Prone Hip ER 2x10 2x10 2x10 2x10 2x10   Cat/Camel 3x10 2x10 2x10 2x10 2x10   QKB 2x5 ea 2x5 ea 3x5 ea 2x5 ea 2x5 ea   TB Lat Walks 3x60' L3 3x60' L3 3x60' L3 3x60' L3 3x60' L3   SL Bridge 2x10 ea 2x10 ea 2x10 ea                     Ther Activity                        Posture Training                        Modalities        MHP x10' x10' start with prone wedge x10' Prone Wedge x10' prone wedge x10' Prone Wedge   CP        US/Stim

## 2022-08-12 NOTE — TELEPHONE ENCOUNTER
----- Message from Mil Faye, [address] sent at 2022 11:21 AM EDT -----  Regarding: Sibo test  Sending a Sibo test for  Patient: Kari So   : 1995    Have a Kathaleen Battiest Day

## 2022-08-15 ENCOUNTER — OFFICE VISIT (OUTPATIENT)
Dept: GASTROENTEROLOGY | Facility: CLINIC | Age: 27
End: 2022-08-15
Payer: COMMERCIAL

## 2022-08-15 VITALS
RESPIRATION RATE: 16 BRPM | SYSTOLIC BLOOD PRESSURE: 129 MMHG | TEMPERATURE: 97.9 F | HEIGHT: 69 IN | DIASTOLIC BLOOD PRESSURE: 69 MMHG | WEIGHT: 169.2 LBS | HEART RATE: 62 BPM | BODY MASS INDEX: 25.06 KG/M2 | OXYGEN SATURATION: 98 %

## 2022-08-15 DIAGNOSIS — K21.9 GASTROESOPHAGEAL REFLUX DISEASE WITHOUT ESOPHAGITIS: ICD-10-CM

## 2022-08-15 DIAGNOSIS — R19.7 DIARRHEA, UNSPECIFIED TYPE: ICD-10-CM

## 2022-08-15 DIAGNOSIS — R10.9 ABDOMINAL PAIN, UNSPECIFIED ABDOMINAL LOCATION: ICD-10-CM

## 2022-08-15 DIAGNOSIS — R11.0 NAUSEA: Primary | ICD-10-CM

## 2022-08-15 PROCEDURE — 99214 OFFICE O/P EST MOD 30 MIN: CPT | Performed by: NURSE PRACTITIONER

## 2022-08-15 RX ORDER — DICYCLOMINE HCL 20 MG
20 TABLET ORAL EVERY 6 HOURS
Qty: 60 TABLET | Refills: 6 | Status: SHIPPED | OUTPATIENT
Start: 2022-08-15

## 2022-08-15 RX ORDER — DIPHENOXYLATE HYDROCHLORIDE AND ATROPINE SULFATE 2.5; .025 MG/1; MG/1
1 TABLET ORAL 4 TIMES DAILY PRN
Qty: 120 TABLET | Refills: 5 | Status: SHIPPED | OUTPATIENT
Start: 2022-08-15

## 2022-08-15 RX ORDER — ONDANSETRON 4 MG/1
4 TABLET, ORALLY DISINTEGRATING ORAL EVERY 6 HOURS PRN
Qty: 20 TABLET | Refills: 0 | Status: SHIPPED | OUTPATIENT
Start: 2022-08-15

## 2022-08-15 RX ORDER — OMEPRAZOLE 40 MG/1
40 CAPSULE, DELAYED RELEASE ORAL DAILY
Qty: 30 CAPSULE | Refills: 3 | Status: SHIPPED | OUTPATIENT
Start: 2022-08-15

## 2022-08-15 NOTE — PROGRESS NOTES
[facility] - Outpatient Follow-up Note  Colonel Bell 33 y o  female MRN: [id number]  Encounter: [id number]          ASSESSMENT AND PLAN:      1  Abdominal pain, unspecified abdominal location  2  Diarrhea, unspecified type    Patient continues with this intermittent abdominal discomfort and diarrhea  She had undergone a pancreatic elastase that revealed moderate pancreatic insufficiency and was started on Creon however was unable to tolerate 3 times a day due to upset stomach and headaches  Patient is taking it daily with her biggest meal and reports some mild improvement in her symptoms  She reports that typically the abdominal discomfort and diarrhea happens after eating  She had undergone a colonoscopy in April that was normal   Encouraged patient to continue Bentyl and Lomotil as needed and Creon with her biggest meal   Patient verbalized understanding     - dicyclomine (BENTYL) 20 mg tablet; Take 1 tablet (20 mg total) by mouth every 6 (six) hours  Dispense: 60 tablet; Refill: 6  - diphenoxylate-atropine (LOMOTIL) 2 5-0 025 mg per tablet; Take 1 tablet by mouth 4 (four) times a day as needed for diarrhea  Dispense: 120 tablet; Refill: 5    3  Gastroesophageal reflux disease without esophagitis  4  Nausea    Patient has a history of GERD and underwent EGD in April that revealed a small sliding hiatal hernia but otherwise normal   She is currently taking omeprazole 40 mg daily with good control of heartburn however does have frequent nausea with eating  She takes Zofran with some relief  Ultrasound in 2021 did reveal gallbladder sludge and stones however recent CT scan and ultrasound with normal gallbladder findings  Given her continued symptoms which typically occur after eating, discussed the possibility of gallbladder dyskinesia  Patient is agreeable to undergo HIDA scan at this time  - omeprazole (PriLOSEC) 40 MG capsule;  Take 1 capsule (40 mg total) by mouth daily Dispense: 30 capsule; Refill: 3  - ondansetron (Zofran ODT) 4 mg disintegrating tablet; Take 1 tablet (4 mg total) by mouth every 6 (six) hours as needed for nausea or vomiting  Dispense: 20 tablet; Refill: 0  - NM hepatobiliary w rx; Future    Will see patient back in 4-6 months or sooner if needed  ______________________________________________________________________    SUBJECTIVE:  Rupinder Galaviz is a 41-year-old female that presents today for follow-up of diarrhea and GERD  Patient had undergone an ultrasound in January that revealed hepatic mild steatosis with probable hepatic hemangioma with stable LFTs  Repeat ultrasound in May revealed similar appearing right anterior liver hyperechoic area likely representing hemangioma  Patient also recently underwent EGD that revealed a small sliding hiatal hernia but otherwise normal   Patient currently takes omeprazole 40 mg daily with good control of heartburn  She does however report frequent nausea but no vomiting or dysphagia  She also reports continuing with diarrhea and abdominal pain in which she takes Lomotil and Bentyl with some relief  She recently underwent pancreatic elastase due to oily stools  She was found to have moderate pancreatic insufficiency and was started on Creon  She reports that she is taking the Creon daily with her biggest meal as taking it 3 times a day was giving her headaches and an upset stomach  She does report some mild improvement since starting the Creon  She also just completed her SIBO test kit which has not yet resulted  She also reports following with her gyn physician and will be undergoing a transvaginal ultrasound in the near future  REVIEW OF SYSTEMS:  Review of Systems   HENT: Negative for trouble swallowing  Gastrointestinal: Positive for abdominal distention, abdominal pain, constipation, diarrhea and nausea  Negative for anal bleeding, blood in stool, rectal pain and vomiting     Musculoskeletal: Positive for myalgias  Negative for arthralgias  Neurological: Positive for headaches  All other systems reviewed and are negative          Historical Information   Past Medical History:   Diagnosis Date    Disease of thyroid gland     Endometriosis     Ovarian cyst      Past Surgical History:   Procedure Laterality Date    DILATION AND CURETTAGE OF UTERUS      LA ENDOVENOUS LASER, 1ST VEIN Left 2020    Procedure: ENDOVASCULAR LASER THERAPY (EVLT) + stab phlebectomies;  Surgeon: Babak Aponte DO;  Location: [facility];  Service: Vascular    WISDOM TOOTH EXTRACTION       Social History   Social History     Substance and Sexual Activity   Alcohol Use No     Social History     Substance and Sexual Activity   Drug Use No     Social History     Tobacco Use   Smoking Status Former Smoker    Packs/day: 0 00    Quit date: 2020    Years since quittin 2   Smokeless Tobacco Never Used   Tobacco Comment    3 cigarettes per day     Family History   Problem Relation Age of Onset    Cancer Mother     Hypertension Mother     OBI disease Mother     Cancer Father     Hypertension Father     Diabetes Father     Cancer Sister        Meds/Allergies       Current Outpatient Medications:     Ascorbic Acid (vitamin C) 1000 MG tablet    Cranberry 500 MG TABS    dicyclomine (BENTYL) 20 mg tablet    diphenoxylate-atropine (LOMOTIL) 2 5-0 025 mg per tablet    levothyroxine 88 mcg tablet    Multiple Vitamin (MULTIVITAMIN) capsule    naproxen sodium (ANAPROX) 550 mg tablet    omeprazole (PriLOSEC) 40 MG capsule    ondansetron (Zofran ODT) 4 mg disintegrating tablet    pancrelipase, Lip-Prot-Amyl, (CREON) 24,000 units    triamcinolone (KENALOG) 0 1 % ointment    albuterol (ProAir HFA) 90 mcg/act inhaler    benzonatate (TESSALON) 200 MG capsule    cetirizine (ZyrTEC) 10 MG chewable tablet    cetirizine (ZyrTEC) 10 mg tablet    fluticasone (FLONASE) 50 mcg/act nasal spray   HYDROcodone-acetaminophen (NORCO) 5-325 mg per tablet    ketotifen (ZADITOR) 0 025 % ophthalmic solution    naproxen (Naprosyn) 500 mg tablet    norgestimate-ethinyl estradiol (ORTHO-CYCLEN) 0 25-35 MG-MCG per tablet    norgestimate-ethinyl estradiol (ORTHO-CYCLEN) 0 25-35 MG-MCG per tablet    No Known Allergies        Objective     Blood pressure 129/69, pulse 62, temperature 97 9 °F (36 6 °C), temperature source Tympanic, resp  rate 16, height 5' 9" (1 753 m), weight 76 7 kg (169 lb 3 2 oz), SpO2 98 %, not currently breastfeeding  Body mass index is 24 99 kg/m²  PHYSICAL EXAM:      General Appearance:   Alert, cooperative, no distress   HEENT:   Normocephalic, atraumatic, anicteric  Neck:  Supple, symmetrical, trachea midline   Lungs:   Clear to auscultation bilaterally; no rales, rhonchi or wheezing; respirations unlabored    Heart[de-identified]   Regular rate and rhythm; no murmur, rub, or gallop  Abdomen:   Soft, right abdominal tenderness, non-distended; normal bowel sounds; no masses, no organomegaly    Genitalia:   Deferred    Rectal:   Deferred    Extremities:  No cyanosis, clubbing or edema    Skin:  No jaundice, rashes, or lesions             Lab Results:   No visits with results within 1 Day(s) from this visit  Latest known visit with results is:   Appointment on 07/01/2022   Component Date Value    TSH 3RD Tomer Korin 07/01/2022 4 260          Radiology Results:   No results found

## 2022-08-16 ENCOUNTER — APPOINTMENT (OUTPATIENT)
Dept: ULTRASOUND IMAGING | Facility: HOSPITAL | Age: 27
End: 2022-08-16
Payer: COMMERCIAL

## 2022-08-16 ENCOUNTER — HOSPITAL ENCOUNTER (EMERGENCY)
Facility: HOSPITAL | Age: 27
Discharge: HOME | End: 2022-08-16
Attending: EMERGENCY MEDICINE | Admitting: EMERGENCY MEDICINE
Payer: COMMERCIAL

## 2022-08-16 ENCOUNTER — APPOINTMENT (EMERGENCY)
Dept: CT IMAGING | Facility: HOSPITAL | Age: 27
End: 2022-08-16
Payer: COMMERCIAL

## 2022-08-16 VITALS
RESPIRATION RATE: 20 BRPM | HEART RATE: 58 BPM | HEIGHT: 69 IN | SYSTOLIC BLOOD PRESSURE: 122 MMHG | DIASTOLIC BLOOD PRESSURE: 78 MMHG | TEMPERATURE: 98.3 F | BODY MASS INDEX: 25.03 KG/M2 | OXYGEN SATURATION: 98 % | WEIGHT: 169 LBS

## 2022-08-16 DIAGNOSIS — N83.202 LEFT OVARIAN CYST: ICD-10-CM

## 2022-08-16 DIAGNOSIS — R93.5 ABNORMAL ULTRASOUND OF UTERUS: ICD-10-CM

## 2022-08-16 DIAGNOSIS — R10.31 RLQ ABDOMINAL PAIN: Primary | ICD-10-CM

## 2022-08-16 LAB
ALBUMIN SERPL BCP-MCNC: 4.5 G/DL (ref 3.5–5)
ALP SERPL-CCNC: 64 U/L (ref 46–116)
ALT SERPL W P-5'-P-CCNC: 46 U/L (ref 12–78)
ANION GAP SERPL CALCULATED.3IONS-SCNC: 11 MMOL/L (ref 4–13)
AST SERPL W P-5'-P-CCNC: 31 U/L (ref 5–45)
BASOPHILS # BLD AUTO: 0.04 THOUSANDS/ΜL (ref 0–0.1)
BASOPHILS NFR BLD AUTO: 1 % (ref 0–1)
BILIRUB SERPL-MCNC: 0.34 MG/DL (ref 0.2–1)
BILIRUB UR QL STRIP: NEGATIVE
BUN SERPL-MCNC: 10 MG/DL (ref 5–25)
CALCIUM SERPL-MCNC: 9.1 MG/DL (ref 8.3–10.1)
CHLORIDE SERPL-SCNC: 102 MMOL/L (ref 96–108)
CLARITY UR: CLEAR
CO2 SERPL-SCNC: 27 MMOL/L (ref 21–32)
COLOR UR: YELLOW
CREAT SERPL-MCNC: 0.91 MG/DL (ref 0.6–1.3)
EOSINOPHIL # BLD AUTO: 0.26 THOUSAND/ΜL (ref 0–0.61)
EOSINOPHIL NFR BLD AUTO: 3 % (ref 0–6)
ERYTHROCYTE [DISTWIDTH] IN BLOOD BY AUTOMATED COUNT: 11.9 % (ref 11.6–15.1)
EXT PREG TEST URINE: NEGATIVE
EXT. CONTROL ED NAV: NORMAL
GFR SERPL CREATININE-BSD FRML MDRD: 87 ML/MIN/1.73SQ M
GLUCOSE SERPL-MCNC: 94 MG/DL (ref 65–140)
GLUCOSE UR STRIP-MCNC: NEGATIVE MG/DL
HCT VFR BLD AUTO: 42.8 % (ref 34.8–46.1)
HGB BLD-MCNC: 14.4 G/DL (ref 11.5–15.4)
HGB UR QL STRIP.AUTO: NEGATIVE
IMM GRANULOCYTES # BLD AUTO: 0.01 THOUSAND/UL (ref 0–0.2)
IMM GRANULOCYTES NFR BLD AUTO: 0 % (ref 0–2)
KETONES UR STRIP-MCNC: NEGATIVE MG/DL
LEUKOCYTE ESTERASE UR QL STRIP: NEGATIVE
LIPASE SERPL-CCNC: 53 U/L (ref 73–393)
LYMPHOCYTES # BLD AUTO: 2.64 THOUSANDS/ΜL (ref 0.6–4.47)
LYMPHOCYTES NFR BLD AUTO: 31 % (ref 14–44)
MCH RBC QN AUTO: 31.2 PG (ref 26.8–34.3)
MCHC RBC AUTO-ENTMCNC: 33.6 G/DL (ref 31.4–37.4)
MCV RBC AUTO: 93 FL (ref 82–98)
MONOCYTES # BLD AUTO: 1.08 THOUSAND/ΜL (ref 0.17–1.22)
MONOCYTES NFR BLD AUTO: 13 % (ref 4–12)
NEUTROPHILS # BLD AUTO: 4.63 THOUSANDS/ΜL (ref 1.85–7.62)
NEUTS SEG NFR BLD AUTO: 52 % (ref 43–75)
NITRITE UR QL STRIP: NEGATIVE
NRBC BLD AUTO-RTO: 0 /100 WBCS
PH UR STRIP.AUTO: 7 [PH]
PLATELET # BLD AUTO: 347 THOUSANDS/UL (ref 149–390)
PMV BLD AUTO: 9.1 FL (ref 8.9–12.7)
POTASSIUM SERPL-SCNC: 3.8 MMOL/L (ref 3.5–5.3)
PROT SERPL-MCNC: 8.5 G/DL (ref 6.4–8.4)
PROT UR STRIP-MCNC: NEGATIVE MG/DL
RBC # BLD AUTO: 4.62 MILLION/UL (ref 3.81–5.12)
SODIUM SERPL-SCNC: 140 MMOL/L (ref 135–147)
SP GR UR STRIP.AUTO: 1.01 (ref 1–1.03)
UROBILINOGEN UR QL STRIP.AUTO: 0.2 E.U./DL
WBC # BLD AUTO: 8.66 THOUSAND/UL (ref 4.31–10.16)

## 2022-08-16 PROCEDURE — 76856 US EXAM PELVIC COMPLETE: CPT

## 2022-08-16 PROCEDURE — 81025 URINE PREGNANCY TEST: CPT | Performed by: PHYSICIAN ASSISTANT

## 2022-08-16 PROCEDURE — 36415 COLL VENOUS BLD VENIPUNCTURE: CPT | Performed by: PHYSICIAN ASSISTANT

## 2022-08-16 PROCEDURE — 76830 TRANSVAGINAL US NON-OB: CPT

## 2022-08-16 PROCEDURE — 96375 TX/PRO/DX INJ NEW DRUG ADDON: CPT

## 2022-08-16 PROCEDURE — 74177 CT ABD & PELVIS W/CONTRAST: CPT

## 2022-08-16 PROCEDURE — G1004 CDSM NDSC: HCPCS

## 2022-08-16 PROCEDURE — 85025 COMPLETE CBC W/AUTO DIFF WBC: CPT | Performed by: PHYSICIAN ASSISTANT

## 2022-08-16 PROCEDURE — 83690 ASSAY OF LIPASE: CPT | Performed by: PHYSICIAN ASSISTANT

## 2022-08-16 PROCEDURE — 80053 COMPREHEN METABOLIC PANEL: CPT | Performed by: PHYSICIAN ASSISTANT

## 2022-08-16 PROCEDURE — 99285 EMERGENCY DEPT VISIT HI MDM: CPT | Performed by: PHYSICIAN ASSISTANT

## 2022-08-16 PROCEDURE — 96374 THER/PROPH/DIAG INJ IV PUSH: CPT

## 2022-08-16 PROCEDURE — 99284 EMERGENCY DEPT VISIT MOD MDM: CPT

## 2022-08-16 PROCEDURE — 81003 URINALYSIS AUTO W/O SCOPE: CPT | Performed by: PHYSICIAN ASSISTANT

## 2022-08-16 RX ORDER — FENTANYL CITRATE 50 UG/ML
50 INJECTION, SOLUTION INTRAMUSCULAR; INTRAVENOUS ONCE
Status: COMPLETED | OUTPATIENT
Start: 2022-08-16 | End: 2022-08-16

## 2022-08-16 RX ORDER — KETOROLAC TROMETHAMINE 30 MG/ML
15 INJECTION, SOLUTION INTRAMUSCULAR; INTRAVENOUS ONCE
Status: COMPLETED | OUTPATIENT
Start: 2022-08-16 | End: 2022-08-16

## 2022-08-16 RX ORDER — HYDROCODONE BITARTRATE AND ACETAMINOPHEN 5; 325 MG/1; MG/1
1 TABLET ORAL EVERY 6 HOURS PRN
Qty: 8 TABLET | Refills: 0 | Status: SHIPPED | OUTPATIENT
Start: 2022-08-16

## 2022-08-16 RX ORDER — ONDANSETRON 2 MG/ML
4 INJECTION INTRAMUSCULAR; INTRAVENOUS ONCE
Status: COMPLETED | OUTPATIENT
Start: 2022-08-16 | End: 2022-08-16

## 2022-08-16 RX ORDER — KETOROLAC TROMETHAMINE 10 MG/1
10 TABLET, FILM COATED ORAL EVERY 6 HOURS PRN
Qty: 20 TABLET | Refills: 0 | Status: SHIPPED | OUTPATIENT
Start: 2022-08-16

## 2022-08-16 RX ADMIN — FENTANYL CITRATE 50 MCG: 50 INJECTION, SOLUTION INTRAMUSCULAR; INTRAVENOUS at 18:42

## 2022-08-16 RX ADMIN — KETOROLAC TROMETHAMINE 15 MG: 30 INJECTION, SOLUTION INTRAMUSCULAR at 16:27

## 2022-08-16 RX ADMIN — IOHEXOL 65 ML: 350 INJECTION, SOLUTION INTRAVENOUS at 17:02

## 2022-08-16 RX ADMIN — ONDANSETRON 4 MG: 2 INJECTION INTRAMUSCULAR; INTRAVENOUS at 16:27

## 2022-08-16 NOTE — Clinical Note
Sally Kidd was seen and treated in our emergency department on 8/16/2022  Diagnosis:     Shilpa    She may return on this date: 08/18/2022         If you have any questions or concerns, please don't hesitate to call        Miya Falcon PA-C    ______________________________           _______________          _______________  Hospital Representative                              Date                                Time

## 2022-08-16 NOTE — DISCHARGE INSTRUCTIONS
Rest, plenty of fluids  Take anti-inflammatory as directed with food  Caution sedation with pain reliever  No driving while taking  If having worsening or persistent pain, vomiting, fever or any other concerns, return to ER for re-evaluation  Follow up with your GYN and PCP or return to ER as needed

## 2022-08-16 NOTE — ED PROVIDER NOTES
History  Chief Complaint   Patient presents with    Abdominal Pain     RLQ pain, first started about a week ago  Worsening  Patient states frequent urination, painful urination  Pt states she feels "swollen"  33year old female with PMH endometriosis, ovarian cysts, hypothyroidism, pancreatitic insufficiency, hiatal hernia presents ambulatory from home for evaluation of RLQ abdominal pain  Pt reports symptoms started about a week ago  She reports pain worse since yesterday  Pain located in RLQ  She reports this radiates around her side to her flank and back  She reports she saw both her GYN and GI specialist yesterday due to the pain  She states GYN did a pelvic ultrasound which showed an ovarian cyst on the left but no abnormalities on the right  Pt notes her GI specialist ordered a HIDA scan due to concern of sludge in her gallbladder but this isn't scheduled until next week  Denies fever, chills  Reports feeling sweaty  Denies chest pain, SOB, cough, congestion or recent illness  Reports nausea, decreased appetite  Denies V/D/C  Reports urinary frequency  Denies dysuria, urgency, hematuria  Denies vaginal bleeding or discharge  She has had an endometrial ablation (approx 2 years ago) and does not get her menses  No reported aggravating or alleviating factors  Pain has been constant today  Took tylenol about 2 pm this afternoon without relief  No prior abdominal surgeries reported other than ablation and having a prior D&C        History provided by:  Patient and medical records   used: No    Abdominal Pain  Pain location:  RLQ  Pain quality: sharp    Pain radiates to:  R flank and back  Duration:  1 week  Timing:  Intermittent  Progression:  Worsening  Chronicity:  New  Context: not previous surgeries, not recent illness, not recent travel, not retching, not sick contacts, not suspicious food intake and not trauma    Relieved by:  Nothing  Worsened by: Nothing  Ineffective treatments:  Acetaminophen  Associated symptoms: nausea    Associated symptoms: no chest pain, no chills, no constipation, no cough, no diarrhea, no dysuria, no fatigue, no fever, no hematuria, no shortness of breath, no sore throat, no vaginal bleeding, no vaginal discharge and no vomiting    Risk factors: has not had multiple surgeries, not pregnant and no recent hospitalization        Prior to Admission Medications   Prescriptions Last Dose Informant Patient Reported? Taking?    Ascorbic Acid (vitamin C) 1000 MG tablet   Yes No   Sig: Take 1,000 mg by mouth daily   Cranberry 500 MG TABS  Self Yes No   Sig: Take by mouth   Multiple Vitamin (MULTIVITAMIN) capsule  Self Yes No   Sig: Take 1 capsule by mouth daily   cetirizine (ZyrTEC) 10 mg tablet   Yes No   Sig: Take 10 mg by mouth daily   Patient not taking: No sig reported   dicyclomine (BENTYL) 20 mg tablet   No No   Sig: Take 1 tablet (20 mg total) by mouth every 6 (six) hours   diphenoxylate-atropine (LOMOTIL) 2 5-0 025 mg per tablet   No No   Sig: Take 1 tablet by mouth 4 (four) times a day as needed for diarrhea   fluticasone (FLONASE) 50 mcg/act nasal spray   No No   Si spray into each nostril daily   Patient not taking: Reported on 8/15/2022   levothyroxine 88 mcg tablet   No No   Sig: Take 1 tablet (88 mcg total) by mouth daily in the early morning   naproxen sodium (ANAPROX) 550 mg tablet   Yes No   Sig: Take 550 mg by mouth 2 (two) times a day with meals   norgestimate-ethinyl estradiol (ORTHO-CYCLEN) 0 25-35 MG-MCG per tablet   Yes No   Sig: Take 1 tablet by mouth daily   Patient not taking: Reported on 8/15/2022   norgestimate-ethinyl estradiol (ORTHO-CYCLEN) 0 25-35 MG-MCG per tablet   Yes No   Sig: Take 1 tablet by mouth daily   Patient not taking: No sig reported   omeprazole (PriLOSEC) 40 MG capsule   No No   Sig: Take 1 capsule (40 mg total) by mouth daily   ondansetron (Zofran ODT) 4 mg disintegrating tablet   No No Sig: Take 1 tablet (4 mg total) by mouth every 6 (six) hours as needed for nausea or vomiting   pancrelipase, Lip-Prot-Amyl, (CREON) 24,000 units   No No   Sig: Take 24,000 units of lipase by mouth 3 (three) times a day with meals   triamcinolone (KENALOG) 0 1 % ointment   No No   Sig: Apply topically 2 (two) times a day      Facility-Administered Medications: None       Past Medical History:   Diagnosis Date    Disease of thyroid gland     Endometriosis     Ovarian cyst        Past Surgical History:   Procedure Laterality Date    DILATION AND CURETTAGE OF UTERUS      RI ENDOVENOUS LASER, 1ST VEIN Left 2020    Procedure: ENDOVASCULAR LASER THERAPY (EVLT) + stab phlebectomies;  Surgeon: Bar Centeno DO;  [facility]: [facility];  Service: Vascular    WISDOM TOOTH EXTRACTION         Family History   Problem Relation Age of Onset    Cancer Mother     Hypertension Mother     OBI disease Mother     Cancer Father     Hypertension Father     Diabetes Father     Cancer Sister      I have reviewed and agree with the history as documented  E-Cigarette/Vaping    E-Cigarette Use Never User      E-Cigarette/Vaping Substances    Nicotine No     THC No     CBD No     Flavoring No     Other No     Unknown No      Social History     Tobacco Use    Smoking status: Former Smoker     Packs/day: 0 00     Quit date: 2020     Years since quittin 2    Smokeless tobacco: Never Used    Tobacco comment: 3 cigarettes per day   Vaping Use    Vaping Use: Never used   Substance Use Topics    Alcohol use: No    Drug use: No       Review of Systems   Constitutional: Positive for appetite change and diaphoresis  Negative for chills, fatigue and fever  HENT: Negative  Negative for congestion, rhinorrhea and sore throat  Eyes: Negative  Negative for visual disturbance  Respiratory: Negative  Negative for cough, shortness of breath and wheezing  Cardiovascular: Negative    Negative for chest pain, palpitations and leg swelling  Gastrointestinal: Positive for abdominal pain and nausea  Negative for constipation, diarrhea and vomiting  Genitourinary: Positive for flank pain and frequency  Negative for dysuria, hematuria, vaginal bleeding and vaginal discharge  Musculoskeletal: Positive for back pain  Negative for myalgias and neck pain  Skin: Negative  Negative for rash  Neurological: Negative  Negative for dizziness, light-headedness and headaches  Psychiatric/Behavioral: Negative  Negative for confusion  All other systems reviewed and are negative  Physical Exam  Physical Exam  Vitals and nursing note reviewed  Constitutional:       General: She is not in acute distress  Appearance: She is well-developed  She is not toxic-appearing  HENT:      Head: Normocephalic and atraumatic  Right Ear: Hearing and external ear normal       Left Ear: Hearing and external ear normal       Nose: Nose normal       Mouth/Throat:      Mouth: Mucous membranes are moist       Pharynx: Oropharynx is clear  Uvula midline  Eyes:      General: Lids are normal  No scleral icterus  Conjunctiva/sclera: Conjunctivae normal       Pupils: Pupils are equal, round, and reactive to light  Neck:      Trachea: Trachea and phonation normal  No tracheal deviation  Cardiovascular:      Rate and Rhythm: Normal rate and regular rhythm  Pulses: Normal pulses  Radial pulses are 2+ on the right side and 2+ on the left side  Dorsalis pedis pulses are 2+ on the right side and 2+ on the left side  Posterior tibial pulses are 2+ on the right side and 2+ on the left side  Heart sounds: Normal heart sounds, S1 normal and S2 normal  No murmur heard  Pulmonary:      Effort: Pulmonary effort is normal  No tachypnea or respiratory distress  Breath sounds: Normal breath sounds  No wheezing, rhonchi or rales     Abdominal:      General: Bowel sounds are normal  There is no distension  Palpations: Abdomen is soft  Tenderness: There is abdominal tenderness in the right lower quadrant  There is no right CVA tenderness, left CVA tenderness, guarding or rebound  Hernia: No hernia is present  Genitourinary:     Comments: Pt deferred as she had GYN exam yesterday  Musculoskeletal:         General: No tenderness  Normal range of motion  Cervical back: Normal range of motion and neck supple  Skin:     General: Skin is warm and dry  Capillary Refill: Capillary refill takes less than 2 seconds  Findings: No rash  Neurological:      General: No focal deficit present  Mental Status: She is alert and oriented to person, place, and time  GCS: GCS eye subscore is 4  GCS verbal subscore is 5  GCS motor subscore is 6  Cranial Nerves: No cranial nerve deficit  Sensory: No sensory deficit  Motor: No abnormal muscle tone        Gait: Gait normal    Psychiatric:         Mood and Affect: Mood normal          Speech: Speech normal          Behavior: Behavior normal          Vital Signs  ED Triage Vitals [08/16/22 1604]   Temperature Pulse Respirations Blood Pressure SpO2   98 3 °F (36 8 °C) 85 20 142/93 100 %      Temp src Heart Rate Source Patient Position - Orthostatic VS BP Location FiO2 (%)   -- Monitor Sitting Right arm --      Pain Score       8           Vitals:    08/16/22 1604 08/16/22 1830   BP: 142/93 122/78   Pulse: 85 58   Patient Position - Orthostatic VS: Sitting Sitting         Visual Acuity      ED Medications  Medications   lactated ringers bolus 1,000 mL (1,000 mL Intravenous Not Given 8/16/22 1624)   ondansetron (ZOFRAN) injection 4 mg (4 mg Intravenous Given 8/16/22 1627)   ketorolac (TORADOL) injection 15 mg (15 mg Intravenous Given 8/16/22 1627)   iohexol (OMNIPAQUE) 350 MG/ML injection (MULTI-DOSE) 65 mL (65 mL Intravenous Given 8/16/22 1702)   fentanyl citrate (PF) 100 MCG/2ML 50 mcg (50 mcg Intravenous Given 8/16/22 1842) Diagnostic Studies  Results Reviewed     Procedure Component Value Units Date/Time    Lipase [[id number]]  (Abnormal) Collected: 08/16/22 1610    Lab Status: Final result Specimen: Blood from Arm, Right Updated: 08/16/22 1631     Lipase 53 u/L     Comprehensive metabolic panel [[id number]]  (Abnormal) Collected: 08/16/22 1610    Lab Status: Final result Specimen: Blood from Arm, Right Updated: 08/16/22 1631     Sodium 140 mmol/L      Potassium 3 8 mmol/L      Chloride 102 mmol/L      CO2 27 mmol/L      ANION GAP 11 mmol/L      BUN 10 mg/dL      Creatinine 0 91 mg/dL      Glucose 94 mg/dL      Calcium 9 1 mg/dL      AST 31 U/L      ALT 46 U/L      Alkaline Phosphatase 64 U/L      Total Protein 8 5 g/dL      Albumin 4 5 g/dL      Total Bilirubin 0 34 mg/dL      eGFR 87 ml/min/1 73sq m     Narrative:      National Kidney Disease Foundation guidelines for Chronic Kidney Disease (CKD):     Stage 1 with normal or high GFR (GFR > 90 mL/min/1 73 square meters)    Stage 2 Mild CKD (GFR = 60-89 mL/min/1 73 square meters)    Stage 3A Moderate CKD (GFR = 45-59 mL/min/1 73 square meters)    Stage 3B Moderate CKD (GFR = 30-44 mL/min/1 73 square meters)    Stage 4 Severe CKD (GFR = 15-29 mL/min/1 73 square meters)    Stage 5 End Stage CKD (GFR <15 mL/min/1 73 square meters)  Note: GFR calculation is accurate only with a steady state creatinine    UA w Reflex to Microscopic w Reflex to Culture [[id number]] Collected: 08/16/22 1610    Lab Status: Final result Specimen: Urine, Clean Catch Updated: 08/16/22 1618     Color, UA Yellow     Clarity, UA Clear     Specific Gravity, UA 1 010     pH, UA 7 0     Leukocytes, UA Negative     Nitrite, UA Negative     Protein, UA Negative mg/dl      Glucose, UA Negative mg/dl      Ketones, UA Negative mg/dl      Urobilinogen, UA 0 2 E U /dl      Bilirubin, UA Negative     Occult Blood, UA Negative    CBC and differential [[id number]]  (Abnormal) Collected: 08/16/22 1610    Lab Status: Final result Specimen: Blood from Arm, Right Updated: 08/16/22 1615     WBC 8 66 Thousand/uL      RBC 4 62 Million/uL      Hemoglobin 14 4 g/dL      Hematocrit 42 8 %      MCV 93 fL      MCH 31 2 pg      MCHC 33 6 g/dL      RDW 11 9 %      MPV 9 1 fL      Platelets 317 Thousands/uL      nRBC 0 /100 WBCs      Neutrophils Relative 52 %      Immat GRANS % 0 %      Lymphocytes Relative 31 %      Monocytes Relative 13 %      Eosinophils Relative 3 %      Basophils Relative 1 %      Neutrophils Absolute 4 63 Thousands/µL      Immature Grans Absolute 0 01 Thousand/uL      Lymphocytes Absolute 2 64 Thousands/µL      Monocytes Absolute 1 08 Thousand/µL      Eosinophils Absolute 0 26 Thousand/µL      Basophils Absolute 0 04 Thousands/µL     POCT pregnancy, urine [[id number]]  (Normal) Resulted: 08/16/22 1610    Lab Status: Final result Updated: 08/16/22 1611     EXT PREG TEST UR (Ref: Negative) Negative     Control Valid                 US pelvis complete w transvaginal   Final Result by Rome Morris MD (08/16 1900)       1  Possible adenomyosis with small simple and hemorrhagic cysts adjacent to ill-defined endometrium with underlying myometrial heterogeneity  If definitive diagnosis is desired, MRI is recommended  2   Complex appearing left ovarian cyst which may represent a multiloculated cyst  A cyst with multiple adjacent follicles and one or more cumulus oophorous is also possible though this should be considered an O-RADS 3 cyst  The recommendation is for    gynecologic management  Consider short interval follow-up ultrasound in 8-12 weeks given the possibility of a physiologic cyst       3   No evidence of ovarian torsion  The study was marked in [facility] for immediate notification                        Workstation performed: [id number]         CT abdomen pelvis with contrast   Final Result by Kevin De Luna MD (08/16 0316)      No findings for bowel obstruction, mesenteric inflammation, appendicitis, obstructive uropathy, free air, or free fluid noted in the abdomen/pelvis  There is a 3 2 x 3 1 cm ovoid hypodensity in the left adnexa noted, new since the prior study likely    representing small ovarian cyst or dominant follicle/corpus luteum  Other ancillary findings detailed above  Workstation performed: [id number]                    Procedures  Procedures         ED Course  ED Course as of 08/16/22 2118 Tue Aug 16, 2022   1605 Prior records reviewed to include GYN and GI office visits from yesterday  Reviewed pelvic ultrasound findings performed yesterday  1611 PREGNANCY TEST URINE: Negative   1620 WBC: 8 66   1620 Hemoglobin: 14 4   1620 Platelet Count: 676   1620 UA w Reflex to Microscopic w Reflex to Culture  unremarkable   1631 Glucose, Random: 94   1632 Creatinine: 0 91   1632 BUN: 10   1632 Sodium: 140   1632 Potassium: 3 8   1632 Chloride: 102   1632 CO2: 27   1632 Anion Gap: 11   1632 Calcium: 9 1   1632 AST: 31   1632 ALT: 46   1632 Alkaline Phosphatase: 64   1632 Total Protein(!): 8 5   1632 Albumin: 4 5   1632 TOTAL BILIRUBIN: 0 34   1632 eGFR: 87   1632 Lipase(!): 53  Below reference range   1720 CT performed and pending interpretation  1 CT abdomen pelvis with contrast  IMPRESSION:     No findings for bowel obstruction, mesenteric inflammation, appendicitis, obstructive uropathy, free air, or free fluid noted in the abdomen/pelvis  There is a 3 2 x 3 1 cm ovoid hypodensity in the left adnexa noted, new since the prior study likely   representing small ovarian cyst or dominant follicle/corpus luteum  Other ancillary findings detailed above  1734 Pt updated on results  She notes pain minimally improved and still present  This is well localized to RLQ  Will proceed with pelvic US  Pt agreeable  1914 US pelvis complete w transvaginal  IMPRESSION:     1    Possible adenomyosis with small simple and hemorrhagic cysts adjacent to ill-defined endometrium with underlying myometrial heterogeneity  If definitive diagnosis is desired, MRI is recommended      2  Complex appearing left ovarian cyst which may represent a multiloculated cyst  A cyst with multiple adjacent follicles and one or more cumulus oophorous is also possible though this should be considered an O-RADS 3 cyst  The recommendation is for   gynecologic management  Consider short interval follow-up ultrasound in 8-12 weeks given the possibility of a physiologic cyst      3  No evidence of ovarian torsion  1930 Pt updated on results  She notes some improvement of pain  Abdomen is non peritoneal   She is afebrile, hemodynamically stable  Labs and urine are unremarkable  We discussed ultrasound results and suspect GYN etiology of her symptoms  She was offered admission for further pain control and additional evaluation and pt declined and states she wants to go home and she plans to call her GYN tomorrow  She states they are planning to do a partial hysterectomy  UA unremarkable  No evidence of infection  No kidney stones  Normal appendix seen  No evidence of ovarian torsion  No ovarian cyst on right side however has one noted on left which is known  Other findings on ultrasound to include possible adenomyosis and complex left ovarian cyst   Discussed at length  She has h/o endometriosis which could be partial etiology of her symptoms and does suffer from chronic pelvic pain  I do not suspect a GB etiology of symptoms and has no RUQ abdominal pain or tenderness  No acute findings regarding GB on CT  Discussed continued symptomatic/supportive care  Advised rest, fluids  Rx pain reliever, PDMP was queried and no suspicious behaviors were noted  Risks/benefits/side effects discussed  Strict return precautions outlined  Advised outpatient follow up with PCP, GYN and GI or return to ER for change in condition as outlined   Pt verbalized understanding and had no further questions  SBIRT 20yo+    Flowsheet Row Most Recent Value   SBIRT (25 yo +)    In order to provide better care to our patients, we are screening all of our patients for alcohol and drug use  Would it be okay to ask you these screening questions? No Filed at: 08/16/2022 1614                    [facility]  Number of Diagnoses or Management Options  Abnormal ultrasound of uterus: new and requires workup  Left ovarian cyst: new and requires workup  RLQ abdominal pain: new and requires workup     Amount and/or Complexity of Data Reviewed  Clinical lab tests: ordered and reviewed  Tests in the radiology section of CPT®: ordered and reviewed  Decide to obtain previous medical records or to obtain history from someone other than the patient: yes  Obtain history from someone other than the patient: yes  Review and summarize past medical records: yes  Independent visualization of images, tracings, or specimens: yes    Patient Progress  Patient progress: improved      Disposition  Final diagnoses:   RLQ abdominal pain   Left ovarian cyst - Complex appearing left ovarian cyst which may represent a multiloculated cyst  A cyst with multiple adjacent follicles and one or more cumulus oophorous is also possible though this should be considered an O-RADS 3 cyst    Abnormal ultrasound of uterus - Possible adenomyosis with small simple and hemorrhagic cysts adjacent to ill-defined endometrium with underlying myometrial heterogeneity       Time reflects when diagnosis was documented in both MDM as applicable and the Disposition within this note     Time User Action Codes Description Comment    8/16/2022  7:39 PM Esperanza Jarret Add [R10 31] RLQ abdominal pain     8/16/2022  7:41 PM Esperanza Jarret Add [Q67 673] Left ovarian cyst     8/16/2022  7:41 PM Esperanza Jarret Add [R93 5] Abnormal ultrasound of uterus     8/16/2022  7:44 PM Esperanza Artet Modify [R85 138] Left ovarian cyst Complex appearing left ovarian cyst which may represent a multiloculated cyst  A cyst with multiple adjacent follicles and one or more cumulus oophorous is also possible though this should be considered an O-RADS 3 cyst     8/16/2022  7:44 PM Olving, Cora Sandifer Modify [R93 5] Abnormal ultrasound of uterus Possible adenomyosis with small simple and hemorrhagic cysts adjacent to ill-defined endometrium with underlying myometrial heterogeneity  ED Disposition     ED Disposition   Discharge    Condition   Stable    Date/Time   Tue Aug 16, 2022  7:45 PM    Comment   Shilpa Beil discharge to home/self care                 Follow-up Information     Follow up With Specialties Details Why Contact Info Additional Information    Joey Mcwilliams DO Obstetrics and Gynecology, Obstetrics, Gynecology Call on 8/17/2022  [place] [id number]  [phone]       [facility] Emergency Department Emergency Medicine  As needed [place] [id number]  [facility] Emergency Department, [address], [address]          Discharge Medication List as of 8/16/2022  7:53 PM      START taking these medications    Details   HYDROcodone-acetaminophen (Norco) 5-325 mg per tablet Take 1 tablet by mouth every 6 (six) hours as needed for pain Max Daily Amount: 4 tablets, Starting Tue 8/16/2022, Normal      ketorolac (TORADOL) 10 mg tablet Take 1 tablet (10 mg total) by mouth every 6 (six) hours as needed for moderate pain or severe pain, Starting Tue 8/16/2022, Normal         CONTINUE these medications which have NOT CHANGED    Details   Ascorbic Acid (vitamin C) 1000 MG tablet Take 1,000 mg by mouth daily, Historical Med      cetirizine (ZyrTEC) 10 mg tablet Take 10 mg by mouth daily, Starting Mon 5/9/2022, Historical Med      Cranberry 500 MG TABS Take by mouth, Historical Med      dicyclomine (BENTYL) 20 mg tablet Take 1 tablet (20 mg total) by mouth every 6 (six) hours, Starting Mon 8/15/2022, Normal      diphenoxylate-atropine (LOMOTIL) 2 5-0 025 mg per tablet Take 1 tablet by mouth 4 (four) times a day as needed for diarrhea, Starting Mon 8/15/2022, Normal      fluticasone (FLONASE) 50 mcg/act nasal spray 1 spray into each nostril daily, Starting Mon 9/20/2021, Normal      levothyroxine 88 mcg tablet Take 1 tablet (88 mcg total) by mouth daily in the early morning, Starting Thu 7/7/2022, Normal      Multiple Vitamin (MULTIVITAMIN) capsule Take 1 capsule by mouth daily, Historical Med      naproxen sodium (ANAPROX) 550 mg tablet Take 550 mg by mouth 2 (two) times a day with meals, Starting Wed 5/18/2022, Historical Med      !! norgestimate-ethinyl estradiol (ORTHO-CYCLEN) 0 25-35 MG-MCG per tablet Take 1 tablet by mouth daily, Starting Tue 5/17/2022, Until Wed 5/17/2023, Historical Med      !! norgestimate-ethinyl estradiol (ORTHO-CYCLEN) 0 25-35 MG-MCG per tablet Take 1 tablet by mouth daily, Starting Tue 5/17/2022, Historical Med      omeprazole (PriLOSEC) 40 MG capsule Take 1 capsule (40 mg total) by mouth daily, Starting Mon 8/15/2022, Normal      ondansetron (Zofran ODT) 4 mg disintegrating tablet Take 1 tablet (4 mg total) by mouth every 6 (six) hours as needed for nausea or vomiting, Starting Mon 8/15/2022, Normal      pancrelipase, Lip-Prot-Amyl, (CREON) 24,000 units Take 24,000 units of lipase by mouth 3 (three) times a day with meals, Starting Tue 5/17/2022, Normal      triamcinolone (KENALOG) 0 1 % ointment Apply topically 2 (two) times a day, Starting Mon 5/9/2022, Normal       !! - Potential duplicate medications found  Please discuss with provider  No discharge procedures on file      PDMP Review       Value Time User    PDMP Reviewed  Yes 8/16/2022  7:40 PM Tong Canada PA-C          ED Provider  Electronically Signed by           Tong Canada PA-C  08/16/22 9112

## 2022-08-17 ENCOUNTER — TELEPHONE (OUTPATIENT)
Dept: OTHER | Facility: HOSPITAL | Age: 27
End: 2022-08-17

## 2022-08-17 NOTE — TELEPHONE ENCOUNTER
Attempted to call patient  Left voicemail for return call   ----- Message from Brady Chakraborty RN sent at 8/16/2022  9:21 AM EDT -----  Regarding: FW: Abdominal pain      ----- Message -----  From: Colonel Bell  Sent: 8/16/2022   9:03 AM EDT  To: [facility] [place] [facility]  Subject: Abdominal pain  I got a sooner apt for obgyn  I have a cyst evident on left side  I will be going for a partial hysterectomy as soon as I schedule  I am still have pain on the left side, and my right side  The dyclomine has helped greatly but it's achy and feels weird  If you can please give me a call today

## 2022-08-18 ENCOUNTER — OFFICE VISIT (OUTPATIENT)
Dept: GASTROENTEROLOGY | Facility: CLINIC | Age: 27
End: 2022-08-18
Payer: COMMERCIAL

## 2022-08-18 DIAGNOSIS — R19.7 DIARRHEA, UNSPECIFIED TYPE: ICD-10-CM

## 2022-08-18 PROCEDURE — 91065 BREATH HYDROGEN/METHANE TEST: CPT | Performed by: NURSE PRACTITIONER

## 2022-08-18 NOTE — LETTER
August 19, 2022     [facility] [address]   [address] [facility]    Patient: Kari Rizzo   YOB: 1995   Date of Visit: 8/18/2022       Dear Dr Watt Jose Alfredo: Thank you for referring Kari Rizzo to me for Lactulose breath testing  Unfortunately the data collected from the breath test did not allow us to interpret the data  Please see below for results of the test   Repeat breath test recommended with strict adherence of directions of concern for SIBO  Sincerely,        Naheed Yee MD        CC: No Recipients  [address] [facility]       Bacterial Overgrowth Analytical Record    Kari Rizzo 33 y o  female MRN: [id number]      Date of Test: 8/10/22    Substrate Given: Lactulose    Ordering Provider: NILSON Guerrier    Medical Assistant: ELIZABET    Symptoms: diarrhea    The patient presents for bacterial overgrowth testing  Patient fasted overnight  Baseline readings obtained  Breath test performed every 20 min for a total of 3 hr    Sample Clock Time ppmH2 ppmCH4 Co2% [place]   Baseline   11:40 0 0 1 0 Too hi   #1  20 minutes 12:20 0 0 0 1 Too Hi   #2  40 minutes 12:40 0 0 0 1 Too Hi   #3  60 minutes 1:00 0 0 0 2 Too Hi   #4  80 minutes 1:20 1 0 1 0 Too Hi   #5  100 minutes 1:40 0 0 0 1 Too Hi   #6  120 minutes 2:00 1 0 0 1 Too Hi   #7  140 minutes 2:20 0 0 0 0 Too Hi   #8  160 minutes 2:40 0 0 0 0 Too Hi   #9  180 minutes 3:00 1 0 0 6 Too Hi     Please forward to ordering provider once reviewed  Physician interpretation:  Lactulose breath test cannot be interpreted as study data was flawed  Repeat breath test recommended with strict adherence of directions if concerned for SIBO

## 2022-08-19 ENCOUNTER — APPOINTMENT (OUTPATIENT)
Dept: PHYSICAL THERAPY | Facility: CLINIC | Age: 27
End: 2022-08-19
Payer: COMMERCIAL

## 2022-08-19 ENCOUNTER — TELEPHONE (OUTPATIENT)
Dept: GASTROENTEROLOGY | Facility: CLINIC | Age: 27
End: 2022-08-19

## 2022-08-19 NOTE — PROGRESS NOTES
[facility]       Bacterial Overgrowth Analytical Record    Rupinder Galaviz 33 y o  female MRN: [id number]      Date of Test: 8/10/22    Substrate Given: Lactulose    Ordering Provider: NILSON Gonzales    Medical Assistant: ELIZABET    Symptoms: diarrhea    The patient presents for bacterial overgrowth testing  Patient fasted overnight  Baseline readings obtained  Breath test performed every 20 min for a total of 3 hr    Sample Clock Time ppmH2 ppmCH4 Co2% [place]ard   Baseline   11:40 0 0 1 0 Too hi   #1  20 minutes 12:20 0 0 0 1 Too Hi   #2  40 minutes 12:40 0 0 0 1 Too Hi   #3  60 minutes 1:00 0 0 0 2 Too Hi   #4  80 minutes 1:20 1 0 1 0 Too Hi   #5  100 minutes 1:40 0 0 0 1 Too Hi   #6  120 minutes 2:00 1 0 0 1 Too Hi   #7  140 minutes 2:20 0 0 0 0 Too Hi   #8  160 minutes 2:40 0 0 0 0 Too Hi   #9  180 minutes 3:00 1 0 0 6 Too Hi     Please forward to ordering provider once reviewed  Physician interpretation:  Lactulose breath test cannot be interpreted as study data was flawed  Repeat breath test recommended with strict adherence of directions if concerned for SIBO

## 2022-08-19 NOTE — TELEPHONE ENCOUNTER
Spoke with patient over the phone regarding SIBO test results being inconclusive in requiring repeat  Patient prefers to hold off until gyn issues further treated

## 2022-08-24 ENCOUNTER — HOSPITAL ENCOUNTER (OUTPATIENT)
Dept: NUCLEAR MEDICINE | Facility: HOSPITAL | Age: 27
Discharge: HOME | End: 2022-08-24
Payer: COMMERCIAL

## 2022-08-24 DIAGNOSIS — R10.9 ABDOMINAL PAIN, UNSPECIFIED ABDOMINAL LOCATION: ICD-10-CM

## 2022-08-24 DIAGNOSIS — R11.0 NAUSEA: ICD-10-CM

## 2022-08-24 PROCEDURE — 78227 HEPATOBIL SYST IMAGE W/DRUG: CPT

## 2022-08-24 PROCEDURE — A9537 TC99M MEBROFENIN: HCPCS

## 2022-08-24 PROCEDURE — G1004 CDSM NDSC: HCPCS

## 2022-08-24 RX ADMIN — SINCALIDE 1.5 MCG: 5 INJECTION, POWDER, LYOPHILIZED, FOR SOLUTION INTRAVENOUS at 13:50

## 2022-08-25 DIAGNOSIS — R11.0 NAUSEA: ICD-10-CM

## 2022-08-25 DIAGNOSIS — R10.9 ABDOMINAL PAIN, UNSPECIFIED ABDOMINAL LOCATION: Primary | ICD-10-CM

## 2022-08-26 ENCOUNTER — TELEPHONE (OUTPATIENT)
Dept: SURGERY | Facility: CLINIC | Age: 27
End: 2022-08-26

## 2022-08-26 ENCOUNTER — APPOINTMENT (OUTPATIENT)
Dept: PHYSICAL THERAPY | Facility: CLINIC | Age: 27
End: 2022-08-26
Payer: COMMERCIAL

## 2022-08-26 NOTE — TELEPHONE ENCOUNTER
Attempted to contact patient to arrange appointment with Dr Zaida Gonzalez  No answer  Mailbox full

## 2022-09-12 ENCOUNTER — APPOINTMENT (OUTPATIENT)
Dept: LAB | Facility: MEDICAL CENTER | Age: 27
End: 2022-09-12
Payer: COMMERCIAL

## 2022-09-12 ENCOUNTER — OFFICE VISIT (OUTPATIENT)
Dept: FAMILY MEDICINE CLINIC | Facility: CLINIC | Age: 27
End: 2022-09-12
Payer: COMMERCIAL

## 2022-09-12 VITALS
BODY MASS INDEX: 24.82 KG/M2 | WEIGHT: 167.6 LBS | SYSTOLIC BLOOD PRESSURE: 124 MMHG | HEART RATE: 78 BPM | RESPIRATION RATE: 16 BRPM | DIASTOLIC BLOOD PRESSURE: 80 MMHG | TEMPERATURE: 96.9 F | OXYGEN SATURATION: 95 % | HEIGHT: 69 IN

## 2022-09-12 DIAGNOSIS — E03.9 HYPOTHYROIDISM, UNSPECIFIED TYPE: ICD-10-CM

## 2022-09-12 DIAGNOSIS — E78.2 MIXED HYPERLIPIDEMIA: ICD-10-CM

## 2022-09-12 DIAGNOSIS — N83.202 LEFT OVARIAN CYST: ICD-10-CM

## 2022-09-12 DIAGNOSIS — E03.9 HYPOTHYROIDISM, UNSPECIFIED TYPE: Primary | ICD-10-CM

## 2022-09-12 DIAGNOSIS — N94.89 FEMALE PELVIC CONGESTION SYNDROME: ICD-10-CM

## 2022-09-12 DIAGNOSIS — G89.29 CHRONIC PELVIC PAIN SYNDROME IN FEMALE: ICD-10-CM

## 2022-09-12 DIAGNOSIS — R10.2 CHRONIC PELVIC PAIN SYNDROME IN FEMALE: ICD-10-CM

## 2022-09-12 LAB
ALBUMIN SERPL BCP-MCNC: 4.2 G/DL (ref 3.5–5)
ALP SERPL-CCNC: 55 U/L (ref 46–116)
ALT SERPL W P-5'-P-CCNC: 34 U/L (ref 12–78)
ANION GAP SERPL CALCULATED.3IONS-SCNC: 1 MMOL/L (ref 4–13)
AST SERPL W P-5'-P-CCNC: 20 U/L (ref 5–45)
B-HCG SERPL-ACNC: <2 MIU/ML
BASOPHILS # BLD AUTO: 0.05 THOUSANDS/ΜL (ref 0–0.1)
BASOPHILS NFR BLD AUTO: 1 % (ref 0–1)
BILIRUB SERPL-MCNC: 0.41 MG/DL (ref 0.2–1)
BUN SERPL-MCNC: 8 MG/DL (ref 5–25)
CALCIUM SERPL-MCNC: 9.5 MG/DL (ref 8.3–10.1)
CHLORIDE SERPL-SCNC: 107 MMOL/L (ref 96–108)
CHOLEST SERPL-MCNC: 216 MG/DL
CO2 SERPL-SCNC: 28 MMOL/L (ref 21–32)
CREAT SERPL-MCNC: 0.74 MG/DL (ref 0.6–1.3)
EOSINOPHIL # BLD AUTO: 0.17 THOUSAND/ΜL (ref 0–0.61)
EOSINOPHIL NFR BLD AUTO: 2 % (ref 0–6)
ERYTHROCYTE [DISTWIDTH] IN BLOOD BY AUTOMATED COUNT: 12.2 % (ref 11.6–15.1)
GFR SERPL CREATININE-BSD FRML MDRD: 112 ML/MIN/1.73SQ M
GLUCOSE SERPL-MCNC: 81 MG/DL (ref 65–140)
HCT VFR BLD AUTO: 42.5 % (ref 34.8–46.1)
HDLC SERPL-MCNC: 43 MG/DL
HGB BLD-MCNC: 14.3 G/DL (ref 11.5–15.4)
IMM GRANULOCYTES # BLD AUTO: 0.02 THOUSAND/UL (ref 0–0.2)
IMM GRANULOCYTES NFR BLD AUTO: 0 % (ref 0–2)
LDLC SERPL CALC-MCNC: 145 MG/DL (ref 0–100)
LYMPHOCYTES # BLD AUTO: 2.44 THOUSANDS/ΜL (ref 0.6–4.47)
LYMPHOCYTES NFR BLD AUTO: 31 % (ref 14–44)
MCH RBC QN AUTO: 32.1 PG (ref 26.8–34.3)
MCHC RBC AUTO-ENTMCNC: 33.6 G/DL (ref 31.4–37.4)
MCV RBC AUTO: 96 FL (ref 82–98)
MONOCYTES # BLD AUTO: 0.79 THOUSAND/ΜL (ref 0.17–1.22)
MONOCYTES NFR BLD AUTO: 10 % (ref 4–12)
NEUTROPHILS # BLD AUTO: 4.37 THOUSANDS/ΜL (ref 1.85–7.62)
NEUTS SEG NFR BLD AUTO: 56 % (ref 43–75)
NONHDLC SERPL-MCNC: 173 MG/DL
NRBC BLD AUTO-RTO: 0 /100 WBCS
PLATELET # BLD AUTO: 378 THOUSANDS/UL (ref 149–390)
PMV BLD AUTO: 9.3 FL (ref 8.9–12.7)
POTASSIUM SERPL-SCNC: 4.3 MMOL/L (ref 3.5–5.3)
PROT SERPL-MCNC: 8.4 G/DL (ref 6.4–8.4)
RBC # BLD AUTO: 4.45 MILLION/UL (ref 3.81–5.12)
SODIUM SERPL-SCNC: 136 MMOL/L (ref 135–147)
TRIGL SERPL-MCNC: 142 MG/DL
TSH SERPL DL<=0.05 MIU/L-ACNC: 1.77 UIU/ML (ref 0.45–4.5)
WBC # BLD AUTO: 7.84 THOUSAND/UL (ref 4.31–10.16)

## 2022-09-12 PROCEDURE — 36415 COLL VENOUS BLD VENIPUNCTURE: CPT

## 2022-09-12 PROCEDURE — 84702 CHORIONIC GONADOTROPIN TEST: CPT

## 2022-09-12 PROCEDURE — 80061 LIPID PANEL: CPT

## 2022-09-12 PROCEDURE — 84443 ASSAY THYROID STIM HORMONE: CPT

## 2022-09-12 PROCEDURE — 85025 COMPLETE CBC W/AUTO DIFF WBC: CPT

## 2022-09-12 PROCEDURE — T1015 CLINIC SERVICE: HCPCS | Performed by: FAMILY MEDICINE

## 2022-09-12 PROCEDURE — 80053 COMPREHEN METABOLIC PANEL: CPT

## 2022-09-12 RX ORDER — LEVOTHYROXINE SODIUM 88 UG/1
88 TABLET ORAL
Qty: 90 TABLET | Refills: 3 | Status: CANCELLED | OUTPATIENT
Start: 2022-09-12

## 2022-09-12 RX ORDER — 1.1% SODIUM FLUORIDE PRESCRIPTION DENTAL CREAM 5 MG/G
CREAM DENTAL
COMMUNITY
Start: 2022-08-31

## 2022-09-12 RX ORDER — IBUPROFEN 800 MG/1
TABLET ORAL
COMMUNITY
Start: 2022-08-31

## 2022-09-12 NOTE — PROGRESS NOTES
Assessment/Plan     Diagnoses and all orders for this visit:    Hypothyroidism, unspecified type  Comments:  Recheck TSH  Continue current levothyroxine dose pending lab results  Orders:  -     TSH, 3rd generation with Free T4 reflex; Future    Mixed hyperlipidemia  Comments:  Recheck lipid panel  Will treat accordingly pending results  Orders:  -     Lipid panel; Future    Other orders  -     ibuprofen (MOTRIN) 800 mg tablet; take 1 tablet by mouth every 6 to 8 hours if needed with food for pain  -     SF 5000 Plus 1 1 % CREA; BRUSH once daily      Discussed HPV vaccine with patient today  She is quite confident she had this as an adolescent  She does not have any records available but will work on a locating these  The patient indicates understanding of these issues and agrees with the plan  Return in about 6 months (around 3/12/2023) for Recheck  Subjective     Patient Id: Megan Hassan is a 33 y o  female    HPI    Patient here today for three-month follow-up  She has a history of hypothyroidism and is currently on levothyroxine 88 mcg daily  She reports she is tolerating medication well and notes good adherence  She denies any overt symptoms of hypothyroidism at this time  Patient also has a history of hyperlipidemia  She is not currently on any medication for this  She does need a repeat lipid panel at this time  Also of note, patient has been scheduled for an upcoming a hysterectomy due to her chronic pelvic pain issues  She is scheduled for this next week in [place]  She notes she has pre-op labs to obtain from her OBGYN  She is otherwise doing well and has no further complaints today  Review of Systems   Constitutional: Negative for chills and fever  Respiratory: Negative for shortness of breath  Cardiovascular: Negative for chest pain  Gastrointestinal: Negative for abdominal pain, constipation, diarrhea, nausea and vomiting     Endocrine: Negative for cold intolerance and heat intolerance  Genitourinary: Positive for menstrual problem and pelvic pain  Negative for dysuria  Musculoskeletal: Negative for arthralgias and myalgias  Skin: Negative for rash  Neurological: Negative for headaches  Psychiatric/Behavioral: Negative for dysphoric mood         Past Medical History:   Diagnosis Date    Disease of thyroid gland     Endometriosis     Ovarian cyst        Past Surgical History:   Procedure Laterality Date    DILATION AND CURETTAGE OF UTERUS      CO ENDOVENOUS LASER, 1ST VEIN Left 9/4/2020    Procedure: ENDOVASCULAR LASER THERAPY (EVLT) + stab phlebectomies;  Surgeon: Fred Styles DO;  [facility];  Service: Vascular    WISDOM TOOTH EXTRACTION         Family History   Problem Relation Age of Onset    Cancer Mother     Hypertension Mother     OBI disease Mother     Cancer Father     Hypertension Father     Diabetes Father     Cancer Sister        No Known Allergies      Current Outpatient Medications:     Ascorbic Acid (vitamin C) 1000 MG tablet, Take 1,000 mg by mouth daily, Disp: , Rfl:     Cranberry 500 MG TABS, Take by mouth, Disp: , Rfl:     dicyclomine (BENTYL) 20 mg tablet, Take 1 tablet (20 mg total) by mouth every 6 (six) hours, Disp: 60 tablet, Rfl: 6    diphenoxylate-atropine (LOMOTIL) 2 5-0 025 mg per tablet, Take 1 tablet by mouth 4 (four) times a day as needed for diarrhea, Disp: 120 tablet, Rfl: 5    HYDROcodone-acetaminophen (Norco) 5-325 mg per tablet, Take 1 tablet by mouth every 6 (six) hours as needed for pain Max Daily Amount: 4 tablets, Disp: 8 tablet, Rfl: 0    ibuprofen (MOTRIN) 800 mg tablet, take 1 tablet by mouth every 6 to 8 hours if needed with food for pain, Disp: , Rfl:     ketorolac (TORADOL) 10 mg tablet, Take 1 tablet (10 mg total) by mouth every 6 (six) hours as needed for moderate pain or severe pain, Disp: 20 tablet, Rfl: 0    levothyroxine 88 mcg tablet, Take 1 tablet (88 mcg total) by mouth daily in the early morning, Disp: 90 tablet, Rfl: 3    Multiple Vitamin (MULTIVITAMIN) capsule, Take 1 capsule by mouth daily, Disp: , Rfl:     naproxen sodium (ANAPROX) 550 mg tablet, Take 550 mg by mouth 2 (two) times a day with meals, Disp: , Rfl:     omeprazole (PriLOSEC) 40 MG capsule, Take 1 capsule (40 mg total) by mouth daily, Disp: 30 capsule, Rfl: 3    ondansetron (Zofran ODT) 4 mg disintegrating tablet, Take 1 tablet (4 mg total) by mouth every 6 (six) hours as needed for nausea or vomiting, Disp: 20 tablet, Rfl: 0    pancrelipase, Lip-Prot-Amyl, (CREON) 24,000 units, Take 24,000 units of lipase by mouth 3 (three) times a day with meals, Disp: 90 capsule, Rfl: 9    SF 5000 Plus 1 1 % CREA, BRUSH once daily, Disp: , Rfl:     cetirizine (ZyrTEC) 10 mg tablet, Take 10 mg by mouth daily (Patient not taking: No sig reported), Disp: , Rfl:     fluticasone (FLONASE) 50 mcg/act nasal spray, 1 spray into each nostril daily (Patient not taking: No sig reported), Disp: 18 2 mL, Rfl: 0    norgestimate-ethinyl estradiol (ORTHO-CYCLEN) 0 25-35 MG-MCG per tablet, Take 1 tablet by mouth daily (Patient not taking: No sig reported), Disp: , Rfl:     norgestimate-ethinyl estradiol (ORTHO-CYCLEN) 0 25-35 MG-MCG per tablet, Take 1 tablet by mouth daily (Patient not taking: No sig reported), Disp: , Rfl:     triamcinolone (KENALOG) 0 1 % ointment, Apply topically 2 (two) times a day (Patient not taking: Reported on 9/12/2022), Disp: 80 g, Rfl: 0    Objective     Vitals:    09/12/22 0922   BP: 124/80   Pulse: 78   Resp: 16   Temp: (!) 96 9 °F (36 1 °C)   SpO2: 95%   Weight: 76 kg (167 lb 9 6 oz)   Height: 5' 9" (1 753 m)       Physical Exam  Vitals and nursing note reviewed  Constitutional:       General: She is not in acute distress  Appearance: Normal appearance  She is well-developed  She is not ill-appearing or toxic-appearing  HENT:      Head: Normocephalic and atraumatic     Eyes:      Extraocular Movements: Extraocular movements intact  Conjunctiva/sclera: Conjunctivae normal    Neck:      Thyroid: No thyroid mass, thyromegaly or thyroid tenderness  Cardiovascular:      Rate and Rhythm: Normal rate and regular rhythm  Heart sounds: Normal heart sounds  No murmur heard  Pulmonary:      Effort: Pulmonary effort is normal  No respiratory distress  Breath sounds: Normal breath sounds  No wheezing  Genitourinary:     Comments: Exam deferred  Musculoskeletal:      Cervical back: Neck supple  Right lower leg: No edema  Left lower leg: No edema  Lymphadenopathy:      Cervical: No cervical adenopathy  Skin:     General: Skin is warm  Neurological:      Mental Status: She is alert and oriented to person, place, and time  Mental status is at baseline     Psychiatric:         Mood and Affect: Mood normal          Behavior: Behavior normal          Erick Cullen

## 2022-09-20 DIAGNOSIS — E78.2 MIXED HYPERLIPIDEMIA: Primary | ICD-10-CM

## 2022-10-12 PROBLEM — S61.212A LACERATION OF RIGHT MIDDLE FINGER WITHOUT FOREIGN BODY WITHOUT DAMAGE TO NAIL: Status: RESOLVED | Noted: 2021-10-06 | Resolved: 2022-10-12
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
## 2022-12-11 DIAGNOSIS — R10.9 ABDOMINAL PAIN, UNSPECIFIED ABDOMINAL LOCATION: ICD-10-CM

## 2022-12-11 RX ORDER — DICYCLOMINE HCL 20 MG
TABLET ORAL
Qty: 60 TABLET | Refills: 6 | Status: SHIPPED | OUTPATIENT
Start: 2022-12-11 | End: 2022-12-12

## 2022-12-12 RX ORDER — DICYCLOMINE HCL 20 MG
TABLET ORAL
Qty: 60 TABLET | Refills: 6 | Status: SHIPPED | OUTPATIENT
Start: 2022-12-12

## 2022-12-24 DIAGNOSIS — K21.9 GASTROESOPHAGEAL REFLUX DISEASE WITHOUT ESOPHAGITIS: ICD-10-CM

## 2022-12-24 RX ORDER — OMEPRAZOLE 40 MG/1
CAPSULE, DELAYED RELEASE ORAL
Qty: 30 CAPSULE | Refills: 3 | Status: SHIPPED | OUTPATIENT
Start: 2022-12-24

## 2023-01-16 ENCOUNTER — HOSPITAL ENCOUNTER (EMERGENCY)
Facility: HOSPITAL | Age: 28
Discharge: HOME | End: 2023-01-16
Attending: EMERGENCY MEDICINE

## 2023-01-16 ENCOUNTER — OFFICE VISIT (OUTPATIENT)
Dept: GASTROENTEROLOGY | Facility: CLINIC | Age: 28
End: 2023-01-16

## 2023-01-16 ENCOUNTER — TELEPHONE (OUTPATIENT)
Dept: GASTROENTEROLOGY | Facility: CLINIC | Age: 28
End: 2023-01-16

## 2023-01-16 ENCOUNTER — APPOINTMENT (EMERGENCY)
Dept: CT IMAGING | Facility: HOSPITAL | Age: 28
End: 2023-01-16

## 2023-01-16 VITALS
DIASTOLIC BLOOD PRESSURE: 77 MMHG | RESPIRATION RATE: 16 BRPM | BODY MASS INDEX: 26.13 KG/M2 | WEIGHT: 172.4 LBS | SYSTOLIC BLOOD PRESSURE: 122 MMHG | TEMPERATURE: 97.8 F | OXYGEN SATURATION: 97 % | HEIGHT: 68 IN | HEART RATE: 86 BPM

## 2023-01-16 VITALS
OXYGEN SATURATION: 95 % | HEIGHT: 68 IN | WEIGHT: 171 LBS | HEART RATE: 63 BPM | BODY MASS INDEX: 25.91 KG/M2 | DIASTOLIC BLOOD PRESSURE: 65 MMHG | SYSTOLIC BLOOD PRESSURE: 103 MMHG | RESPIRATION RATE: 20 BRPM | TEMPERATURE: 97.4 F

## 2023-01-16 DIAGNOSIS — R10.11 RIGHT UPPER QUADRANT ABDOMINAL PAIN: Primary | ICD-10-CM

## 2023-01-16 DIAGNOSIS — R19.5 LOW FECAL ELASTASE LEVEL: ICD-10-CM

## 2023-01-16 DIAGNOSIS — R10.11 RUQ PAIN: Primary | ICD-10-CM

## 2023-01-16 DIAGNOSIS — K21.9 GASTROESOPHAGEAL REFLUX DISEASE WITHOUT ESOPHAGITIS: ICD-10-CM

## 2023-01-16 LAB
ALBUMIN SERPL BCP-MCNC: 4.7 G/DL (ref 3.5–5)
ALP SERPL-CCNC: 60 U/L (ref 46–116)
ALT SERPL W P-5'-P-CCNC: 41 U/L (ref 12–78)
ANION GAP SERPL CALCULATED.3IONS-SCNC: 10 MMOL/L (ref 4–13)
AST SERPL W P-5'-P-CCNC: 25 U/L (ref 5–45)
BASOPHILS # BLD AUTO: 0.05 THOUSANDS/ÂΜL (ref 0–0.1)
BASOPHILS NFR BLD AUTO: 0 % (ref 0–1)
BILIRUB SERPL-MCNC: 0.6 MG/DL (ref 0.2–1)
BILIRUB UR QL STRIP: NEGATIVE
BUN SERPL-MCNC: 10 MG/DL (ref 5–25)
CALCIUM SERPL-MCNC: 9.7 MG/DL (ref 8.3–10.1)
CHLORIDE SERPL-SCNC: 101 MMOL/L (ref 96–108)
CLARITY UR: CLEAR
CO2 SERPL-SCNC: 27 MMOL/L (ref 21–32)
COLOR UR: YELLOW
CREAT SERPL-MCNC: 0.76 MG/DL (ref 0.6–1.3)
EOSINOPHIL # BLD AUTO: 0.16 THOUSAND/ÂΜL (ref 0–0.61)
EOSINOPHIL NFR BLD AUTO: 1 % (ref 0–6)
ERYTHROCYTE [DISTWIDTH] IN BLOOD BY AUTOMATED COUNT: 11.6 % (ref 11.6–15.1)
EXT PREGNANCY TEST URINE: NEGATIVE
EXT. CONTROL: NORMAL
FLUAV RNA RESP QL NAA+PROBE: NEGATIVE
FLUBV RNA RESP QL NAA+PROBE: NEGATIVE
GFR SERPL CREATININE-BSD FRML MDRD: 108 ML/MIN/1.73SQ M
GLUCOSE SERPL-MCNC: 84 MG/DL (ref 65–140)
GLUCOSE UR STRIP-MCNC: NEGATIVE MG/DL
HCT VFR BLD AUTO: 41.4 % (ref 34.8–46.1)
HGB BLD-MCNC: 14.2 G/DL (ref 11.5–15.4)
HGB UR QL STRIP.AUTO: NEGATIVE
IMM GRANULOCYTES # BLD AUTO: 0.02 THOUSAND/UL (ref 0–0.2)
IMM GRANULOCYTES NFR BLD AUTO: 0 % (ref 0–2)
KETONES UR STRIP-MCNC: ABNORMAL MG/DL
LACTATE SERPL-SCNC: 0.6 MMOL/L (ref 0.5–2)
LEUKOCYTE ESTERASE UR QL STRIP: NEGATIVE
LIPASE SERPL-CCNC: 41 U/L (ref 73–393)
LYMPHOCYTES # BLD AUTO: 2.91 THOUSANDS/ÂΜL (ref 0.6–4.47)
LYMPHOCYTES NFR BLD AUTO: 25 % (ref 14–44)
MCH RBC QN AUTO: 32.1 PG (ref 26.8–34.3)
MCHC RBC AUTO-ENTMCNC: 34.3 G/DL (ref 31.4–37.4)
MCV RBC AUTO: 94 FL (ref 82–98)
MONOCYTES # BLD AUTO: 0.82 THOUSAND/ÂΜL (ref 0.17–1.22)
MONOCYTES NFR BLD AUTO: 7 % (ref 4–12)
NEUTROPHILS # BLD AUTO: 7.94 THOUSANDS/ÂΜL (ref 1.85–7.62)
NEUTS SEG NFR BLD AUTO: 67 % (ref 43–75)
NITRITE UR QL STRIP: NEGATIVE
NRBC BLD AUTO-RTO: 0 /100 WBCS
PH UR STRIP.AUTO: 7 [PH]
PLATELET # BLD AUTO: 375 THOUSANDS/UL (ref 149–390)
PMV BLD AUTO: 8.9 FL (ref 8.9–12.7)
POTASSIUM SERPL-SCNC: 3.8 MMOL/L (ref 3.5–5.3)
PROT SERPL-MCNC: 8.2 G/DL (ref 6.4–8.4)
PROT UR STRIP-MCNC: NEGATIVE MG/DL
RBC # BLD AUTO: 4.42 MILLION/UL (ref 3.81–5.12)
RSV RNA RESP QL NAA+PROBE: NEGATIVE
SARS-COV-2 RNA RESP QL NAA+PROBE: NEGATIVE
SODIUM SERPL-SCNC: 138 MMOL/L (ref 135–147)
SP GR UR STRIP.AUTO: 1.01 (ref 1–1.03)
UROBILINOGEN UR QL STRIP.AUTO: 0.2 E.U./DL
WBC # BLD AUTO: 11.9 THOUSAND/UL (ref 4.31–10.16)

## 2023-01-16 RX ORDER — HYDROMORPHONE HCL/PF 1 MG/ML
0.5 SYRINGE (ML) INJECTION ONCE
Status: COMPLETED | OUTPATIENT
Start: 2023-01-16 | End: 2023-01-16

## 2023-01-16 RX ORDER — ONDANSETRON 2 MG/ML
4 INJECTION INTRAMUSCULAR; INTRAVENOUS ONCE
Status: COMPLETED | OUTPATIENT
Start: 2023-01-16 | End: 2023-01-16

## 2023-01-16 RX ADMIN — ONDANSETRON 4 MG: 2 INJECTION INTRAMUSCULAR; INTRAVENOUS at 16:11

## 2023-01-16 RX ADMIN — IOHEXOL 100 ML: 350 INJECTION, SOLUTION INTRAVENOUS at 17:22

## 2023-01-16 RX ADMIN — HYDROMORPHONE HYDROCHLORIDE 0.5 MG: 1 INJECTION, SOLUTION INTRAMUSCULAR; INTRAVENOUS; SUBCUTANEOUS at 17:08

## 2023-01-16 RX ADMIN — SODIUM CHLORIDE 1000 ML: 0.9 INJECTION, SOLUTION INTRAVENOUS at 16:11

## 2023-01-16 NOTE — PROGRESS NOTES
[facility] - Outpatient Follow-up Note  Sathish Allen 33 y o  female MRN: [id number]  Encounter: [id number]    ASSESSMENT AND PLAN:      1  RUQ pain    Patient here today for acute problem visit  She developed acute onset of epigastric and right upper quadrant pain within the past 12 to 24 hours  She is exquisitely tender to palpation on abdominal examination and has a positive Cabrera sign  Last p o  intake 0300  While it is true she does have chronic abdominal pain, nausea, and GERD, this does appear to be a more acute issue and I am concerned for an acute gallbladder abnormality versus pancreatitis versus other intra-abdominal pathology  At this time, I have instructed her to present to the emergency department for an expedited evaluation to include right upper quadrant ultrasound of the gallbladder and serologic evaluation  She will present to the emergency department via private vehicle  2  Low fecal elastase level    Patient with chronic diarrhea, low fecal elastase 136; SIBO testing inconclusive, colonoscopy without evidence of IBD or microscopic colitis she is being treated with pancreatic enzyme supplementation and as needed antidiarrheals  Given acuity of abdominal pain, in-depth discussion of her lower GI complaints was not completed today  We will plan to follow-up with her when she is stabilized from an abdominal pain perspective to discuss her chronic GI symptoms  3  Gastroesophageal reflux disease without esophagitis    Continue on PPI at this time  Continue with diet and lifestyle modifications for GERD  We will plan to follow up in approx 6-8 weeks  ______________________________________________________________________    SUBJECTIVE: Patient is a 33 y o  female who presents today for follow-up regarding abdominal pain  Pmhx sig for GERD, EPI, hypothyroidism, tobacco use disorder, hx of lap hyster in 09/2022  Pt is new to me   Previously saw 10 Rhonda Royal Johanne El  She was last evaluated in 08/2022 for chronic diarrhea and abdominal discomfort  She was started on Creon though was unable to tolerate 3 times daily dosing due to stomachaches and headaches  She was on Bentyl and Lomotil as needed  She was also having heartburn and nausea, on omeprazole daily with as needed Zofran     01/16/23:     Patient is here today in follow-up  She unfortunately is not feeling well, she developed acute onset of right upper quadrant pain and epigastric pain last evening  She denies any obvious precipitants, has not eaten anything abnormal, been on antibiotics, traveled recently, been exposed to contacts, or had any EtOH intake  She notes that she has sharp pain in her right upper quadrant, which radiates to the shoulder and through to the back  At times it radiates down into her pelvis as well  She endorses associated nausea though no emesis  She feels chilled and weak, though has no recorded fevers  She denies any significant heartburn or indigestion at this time, though she has dealt with this more chronically  She is still dealing with chronic urgent loose stool  Her last bowel movement was around 11 AM this morning, and she shares that she had 9 BMs this morning  No BRBPR or melena  Last p o  intake was at 3 AM when she ate 3 munchkins  Does not believe that her pain significantly changed with oral intake  05/2022: RUQ US: right anterior liver hemangioma; no biliary pathology   08/2022: HIDA: delayed visualization of gallbladder; EF 50%      Endoscopic evaluation:   EGD: 04/2022: esophagus normal, small sliding hiatal hernia; stomach and duodenum appeared normal  A  Duodenum, bx r o celiac: Small bowel mucosa with no significant histopathologic abnormality  Negative for malabsorption pattern  Negative for malignancy  B  Stomach, bx ro h pylori: Gastric mucosa with no significant histopathologic abnormality   Negative for H  pylori by routine H&E  Negative for malignancy  Colon: 2022: TI and colon appeared normal   C  Terminal Ileum, bx r o enteritis: Small bowel mucosa with no significant histopathologic abnormality  Negative for acute inflammation and malabsorption pattern  Negative for malignancy  D  Colon, random colon r o microscopic colitis: Colonic mucosa with no significant histopathologic abnormality  No evidence of microscopic colitis or acute inflammation   Negative for dysplasia and malignancy    Review of Systems   Per HPI    Historical Information   Past Medical History:   Diagnosis Date   • Disease of thyroid gland    • Endometriosis    • Ovarian cyst      Past Surgical History:   Procedure Laterality Date   • DILATION AND CURETTAGE OF UTERUS     • NE ENDOVEN ABLTJ INCMPTNT VEIN XTR LASER 1ST VEIN Left 2020    Procedure: ENDOVASCULAR LASER THERAPY (EVLT) + stab phlebectomies;  Surgeon: Carol Garcia DO;  Location: [facility];  Service: Vascular   • WISDOM TOOTH EXTRACTION       Social History   Social History     Substance and Sexual Activity   Alcohol Use No     Social History     Substance and Sexual Activity   Drug Use No     Social History     Tobacco Use   Smoking Status Former   • Packs/day: 0 00   • Types: Cigarettes   • Quit date: 2020   • Years since quittin 7   Smokeless Tobacco Never   Tobacco Comments    3 cigarettes per day     Family History   Problem Relation Age of Onset   • Cancer Mother    • Hypertension Mother    • OBI disease Mother    • Cancer Father    • Hypertension Father    • Diabetes Father    • Cancer Sister        Meds/Allergies       Current Outpatient Medications:   •  AMYLASE-LIPASE-PROTEASE PO  •  Ascorbic Acid (vitamin C) 1000 MG tablet  •  Cranberry 500 MG TABS  •  dicyclomine (BENTYL) 20 mg tablet  •  diphenoxylate-atropine (LOMOTIL) 2 5-0 025 mg per tablet  •  HYDROcodone-acetaminophen (Norco) 5-325 mg per tablet  •  ibuprofen (MOTRIN) 600 mg tablet  •  ibuprofen (MOTRIN) 800 mg tablet  •  ketorolac (TORADOL) 10 mg tablet  •  levothyroxine 88 mcg tablet  •  metroNIDAZOLE (METROGEL) 0 75 % vaginal gel  •  Multiple Vitamin (MULTIVITAMIN) capsule  •  naproxen sodium (ANAPROX) 550 mg tablet  •  omeprazole (PriLOSEC) 40 MG capsule  •  ondansetron (Zofran ODT) 4 mg disintegrating tablet  •  pancrelipase, Lip-Prot-Amyl, (CREON) 24,000 units  •  SF 5000 Plus 1 1 % CREA  •  cetirizine (ZyrTEC) 10 mg tablet  •  fluticasone (FLONASE) 50 mcg/act nasal spray  •  norgestimate-ethinyl estradiol (ORTHO-CYCLEN) 0 25-35 MG-MCG per tablet  •  norgestimate-ethinyl estradiol (ORTHO-CYCLEN) 0 25-35 MG-MCG per tablet  •  triamcinolone (KENALOG) 0 1 % ointment    No Known Allergies    Objective     Blood pressure 122/77, pulse 86, temperature 97 8 °F (36 6 °C), temperature source Tympanic, resp  rate 16, height 5' 8" (1 727 m), weight 78 2 kg (172 lb 6 4 oz), SpO2 97 %, not currently breastfeeding  Body mass index is 26 21 kg/m²  Physical Exam  Vitals and nursing note reviewed  Constitutional:       General: She is in acute distress  Appearance: She is well-developed  HENT:      Head: Normocephalic and atraumatic  Cardiovascular:      Rate and Rhythm: Normal rate  Pulmonary:      Effort: Pulmonary effort is normal       Breath sounds: Normal breath sounds  Abdominal:      General: Abdomen is flat  A surgical scar is present  Bowel sounds are normal       Palpations: Abdomen is soft  Tenderness: There is abdominal tenderness in the right upper quadrant and epigastric area  Positive signs include Cabrera's sign  Skin:     General: Skin is warm and dry  Coloration: Skin is not cyanotic  Neurological:      General: No focal deficit present  Mental Status: She is alert  Psychiatric:         Mood and Affect: Mood normal          Behavior: Behavior normal        Lab Results:   No visits with results within 1 Day(s) from this visit     Latest known visit with results is:   Appointment on 09/12/2022   Component Date Value   • TSH 3RD GENERATON 09/12/2022 1 770    • Cholesterol 09/12/2022 216 (H)    • Triglycerides 09/12/2022 142    • HDL, Direct 09/12/2022 43 (L)    • LDL Calculated 09/12/2022 145 (H)    • Non-HDL-Chol (CHOL-HDL) 09/12/2022 173    • Sodium 09/12/2022 136    • Potassium 09/12/2022 4 3    • Chloride 09/12/2022 107    • CO2 09/12/2022 28    • ANION GAP 09/12/2022 1 (L)    • BUN 09/12/2022 8    • Creatinine 09/12/2022 0 74    • Glucose 09/12/2022 81    • Calcium 09/12/2022 9 5    • AST 09/12/2022 20    • ALT 09/12/2022 34    • Alkaline Phosphatase 09/12/2022 55    • Total Protein 09/12/2022 8 4    • Albumin 09/12/2022 4 2    • Total Bilirubin 09/12/2022 0 41    • eGFR 09/12/2022 112    • WBC 09/12/2022 7 84    • RBC 09/12/2022 4 45    • Hemoglobin 09/12/2022 14 3    • Hematocrit 09/12/2022 42 5    • MCV 09/12/2022 96    • MCH 09/12/2022 32 1    • MCHC 09/12/2022 33 6    • RDW 09/12/2022 12 2    • MPV 09/12/2022 9 3    • Platelets 94/10/5616 378    • nRBC 09/12/2022 0    • Neutrophils Relative 09/12/2022 56    • Immat GRANS % 09/12/2022 0    • Lymphocytes Relative 09/12/2022 31    • Monocytes Relative 09/12/2022 10    • Eosinophils Relative 09/12/2022 2    • Basophils Relative 09/12/2022 1    • Neutrophils Absolute 09/12/2022 4 37    • Immature Grans Absolute 09/12/2022 0 02    • Lymphocytes Absolute 09/12/2022 2 44    • Monocytes Absolute 09/12/2022 0 79    • Eosinophils Absolute 09/12/2022 0 17    • Basophils Absolute 09/12/2022 0 05    • HCG, Quant 09/12/2022 <2      Radiology Results:   No results found  Gary Acosta PA-C    **Please note:  Dictation voice to text software may have been used in the creation of this record  Occasional wrong word or “sound alike” substitutions may have occurred due to the inherent limitations of voice recognition software  Read the chart carefully and recognize, using context, where substitutions have occurred  **

## 2023-01-16 NOTE — ED PROVIDER NOTES
Final Diagnosis:  1  Right upper quadrant abdominal pain        Chief Complaint   Patient presents with   • Abdominal Pain     RUQ abdominal pain with nvd starting last night  GI states +Cabrera's sign concerned for acute gallbladder     HPI  Patient presents for evaluation of right upper quadrant abdominal pain  Patient states that this started acutely last night and has been persistent since then  She tells me that she has a history of pancreatic insufficiency as well as that her gallbladder works at approximately 50%  She follows with GI for this  She also tells me that she has has a history of hysterectomy, left oophorectomy and salpingectomy  This is corroborated by review of records that show that she has had this is done in September of this year at another outside hospital   She recently followed up with OB/GYN in December for this  Patient endorses nausea without any episodes of vomiting  No fever or chills  She identifies most of her pain as being in the right upper quadrant and somewhat in the right lower quadrant  Denies any significant flank tenderness  No dysuria hematuria  Was originally constipated but multiple bowel movements this morning  Unless otherwise specified:  - No language barrier    - History obtained from patient  - There are no limitations to the history obtained  - Previous charting was reviewed    PMH:   has a past medical history of Disease of thyroid gland, Endometriosis, and Ovarian cyst     PSH:   has a past surgical history that includes Dilation and curettage of uterus; Hi Hat tooth extraction; and pr endoven abltj incmptnt vein xtr laser 1st vein (Left, 9/4/2020)  ROS:  Review of Systems   -   - 13 point ROS was performed and all are normal unless stated in the history above  - Nursing note reviewed  Vitals reviewed  - Orders placed by myself and/or advanced practitioner / resident          PE:   Vitals:    01/16/23 1537   BP: 140/78   BP Location: Left arm   Pulse: 84   Resp: 20   Temp: (!) 97 4 °F (36 3 °C)   TempSrc: Tympanic   SpO2: 95%   Weight: 77 6 kg (171 lb)   Height: 5' 8" (1 727 m)     Vitals reviewed by me  Patient with soft abdomen  No guarding  Diffusely tender on right side and most in the right upper quadrant  Minimal tenderness on left side or right flank  Unless otherwise specified above:    General: VS reviewed  Appears in NAD    Head: Normocephalic, atraumatic  Eyes: EOM-I  No exudate  ENT: Atraumatic external nose and ears  No malocclusion  No stridor  No drooling  Neck: No JVD  CV: No pallor noted  Lungs:   No tachypnea  No respiratory distress    Abdomen:  Soft, non-tender, non-distended    MSK:   No obvious deformity    Skin: Dry, intact  No obvious rash  Neuro: Awake, alert, GCS15, CN II-XII grossly intact  Speaking in full sentences  Motor grossly intact  Psychiatric/Behavioral: Appropriate mood and affect   Exam: deferred    Physical Exam     Procedures   A:  - Nursing note reviewed  CT abdomen pelvis with contrast   Final Result      No acute inflammatory changes in the abdomen or the pelvis              Workstation performed: [id number]           Orders Placed This Encounter   Procedures   • Blood culture #1   • Blood culture #2   • FLU/RSV/COVID - if FLU/RSV clinically relevant   • CT abdomen pelvis with contrast   • CBC and differential   • Comprehensive metabolic panel   • Lipase   • Lactic acid   • UA w Reflex to Microscopic w Reflex to Culture   • Insert peripheral IV   • POCT pregnancy, urine     Labs Reviewed   CBC AND DIFFERENTIAL - Abnormal       Result Value Ref Range Status    WBC 11 90 (*) 4 31 - 10 16 Thousand/uL Final    RBC 4 42  3 81 - 5 12 Million/uL Final    Hemoglobin 14 2  11 5 - 15 4 g/dL Final    Hematocrit 41 4  34 8 - 46 1 % Final    MCV 94  82 - 98 fL Final    MCH 32 1  26 8 - 34 3 pg Final    MCHC 34 3  31 4 - 37 4 g/dL Final    RDW 11 6  11 6 - 15 1 % Final    MPV 8 9  8 9 - 12 7 fL Final    Platelets 502  356 - 390 Thousands/uL Final    nRBC 0  /100 WBCs Final    Neutrophils Relative 67  43 - 75 % Final    Immat GRANS % 0  0 - 2 % Final    Lymphocytes Relative 25  14 - 44 % Final    Monocytes Relative 7  4 - 12 % Final    Eosinophils Relative 1  0 - 6 % Final    Basophils Relative 0  0 - 1 % Final    Neutrophils Absolute 7 94 (*) 1 85 - 7 62 Thousands/µL Final    Immature Grans Absolute 0 02  0 00 - 0 20 Thousand/uL Final    Lymphocytes Absolute 2 91  0 60 - 4 47 Thousands/µL Final    Monocytes Absolute 0 82  0 17 - 1 22 Thousand/µL Final    Eosinophils Absolute 0 16  0 00 - 0 61 Thousand/µL Final    Basophils Absolute 0 05  0 00 - 0 10 Thousands/µL Final   LIPASE - Abnormal    Lipase 41 (*) 73 - 393 u/L Final   UA W REFLEX TO MICROSCOPIC WITH REFLEX TO CULTURE - Abnormal    Color, UA Yellow   Final    Clarity, UA Clear   Final    Specific [place], UA 1 010  1 003 - 1 030 Final    pH, UA 7 0  4 5, 5 0, 5 5, 6 0, 6 5, 7 0, 7 5, 8 0 Final    Leukocytes, UA Negative  Negative Final    Nitrite, UA Negative  Negative Final    Protein, UA Negative  Negative mg/dl Final    Glucose, UA Negative  Negative mg/dl Final    Ketones, UA Trace (*) Negative mg/dl Final    Urobilinogen, UA 0 2  0 2, 1 0 E U /dl E U /dl Final    Bilirubin, UA Negative  Negative Final    Occult Blood, UA Negative  Negative Final   COVID19, INFLUENZA A/B, RSV PCR, [facility] - Normal    SARS-CoV-2 Negative  Negative Final    INFLUENZA A PCR Negative  Negative Final    INFLUENZA B PCR Negative  Negative Final    RSV PCR Negative  Negative Final    Narrative:     FOR PEDIATRIC PATIENTS - copy/paste COVID Guidelines URL to browser: [email or web address]    SARS-CoV-2 assay is a Nucleic Acid Amplification assay intended for the  qualitative detection of nucleic acid from SARS-CoV-2 in nasopharyngeal  swabs   Results are for the presumptive identification of SARS-CoV-2 RNA  Positive results are indicative of infection with SARS-CoV-2, the virus  causing COVID-19, but do not rule out bacterial infection or co-infection  with other viruses  Laboratories within the [place] and its  territories are required to report all positive results to the appropriate  public health authorities  Negative results do not preclude SARS-CoV-2  infection and should not be used as the sole basis for treatment or other  patient management decisions  Negative results must be combined with  clinical observations, patient history, and epidemiological information  This test has not been FDA cleared or approved  This test has been authorized by FDA under an Emergency Use Authorization  (EUA)  This test is only authorized for the duration of time the  declaration that circumstances exist justifying the authorization of the  emergency use of an in vitro diagnostic tests for detection of SARS-CoV-2  virus and/or diagnosis of COVID-19 infection under section 564(b)(1) of  the Act, 21 U  S C  785JJM-0(L)(5), unless the authorization is terminated  or revoked sooner  The test has been validated but independent review by FDA  and CLIA is pending  Test performed using [employer] Gene[employer]pert: This RT-PCR assay targets N2,  a region unique to SARS-CoV-2  A conserved region in the E-gene was chosen  for pan-Sarbecovirus detection which includes SARS-CoV-2  According to CMS-2020-01-R, this platform meets the definition of high-throughput technology  LACTIC ACID, PLASMA - Normal    LACTIC ACID 0 6  0 5 - 2 0 mmol/L Final    Narrative:     Result may be elevated if tourniquet was used during collection     POCT PREGNANCY, URINE - Normal    EXT Preg Test, Ur Negative   Final    Control Valid   Final   BLOOD CULTURE   BLOOD CULTURE   COMPREHENSIVE METABOLIC PANEL    Sodium 267  135 - 147 mmol/L Final    Potassium 3 8  3 5 - 5 3 mmol/L Final    Chloride 101  96 - 108 mmol/L Final    CO2 27  21 - 32 mmol/L Final    ANION GAP 10  4 - 13 mmol/L Final    BUN 10  5 - 25 mg/dL Final    Creatinine 0 76  0 60 - 1 30 mg/dL Final    Comment: Standardized to IDMS reference method    Glucose 84  65 - 140 mg/dL Final    Comment: If the patient is fasting, the ADA then defines impaired fasting glucose as > 100 mg/dL and diabetes as > or equal to 123 mg/dL  Specimen collection should occur prior to Sulfasalazine administration due to the potential for falsely depressed results  Specimen collection should occur prior to Sulfapyridine administration due to the potential for falsely elevated results  Calcium 9 7  8 3 - 10 1 mg/dL Final    AST 25  5 - 45 U/L Final    Comment: Specimen collection should occur prior to Sulfasalazine administration due to the potential for falsely depressed results  ALT 41  12 - 78 U/L Final    Comment: Specimen collection should occur prior to Sulfasalazine administration due to the potential for falsely depressed results  Alkaline Phosphatase 60  46 - 116 U/L Final    Total Protein 8 2  6 4 - 8 4 g/dL Final    Albumin 4 7  3 5 - 5 0 g/dL Final    Total Bilirubin 0 60  0 20 - 1 00 mg/dL Final    Comment: Use of this assay is not recommended for patients undergoing treatment with eltrombopag due to the potential for falsely elevated results  eGFR 108  ml/min/1 73sq m Final    Narrative:     [place] guidelines for Chronic Kidney Disease (CKD):   •  Stage 1 with normal or high GFR (GFR > 90 mL/min/1 73 square meters)  •  Stage 2 Mild CKD (GFR = 60-89 mL/min/1 73 square meters)  •  Stage 3A Moderate CKD (GFR = 45-59 mL/min/1 73 square meters)  •  Stage 3B Moderate CKD (GFR = 30-44 mL/min/1 73 square meters)  •  Stage 4 Severe CKD (GFR = 15-29 mL/min/1 73 square meters)  •  Stage 5 End Stage CKD (GFR <15 mL/min/1 73 square meters)  Note: GFR calculation is accurate only with a steady state creatinine         Final Diagnosis:  1   Right upper quadrant abdominal pain        P:  -Patient presents for evaluation of acute onset of right-sided abdominal pain  Concern for cholecystitis  Will also evaluate for pancreatitis, appendicitis, cholelithiasis  Provide analgesia, fluids   -Laboratory analysis was unremarkable  CT failed to show any acute pathology  I went back and reviewed with the patient  She says she felt improved while sitting here in the emergency department  There is possibly early gastroenteritis developing, possibly gout bowel gas pain  No clear cause at this time  Discussed return precautions  Medications   sodium chloride 0 9 % bolus 1,000 mL (1,000 mL Intravenous New Bag 1/16/23 1611)   HYDROmorphone (DILAUDID) injection 0 5 mg (0 5 mg Intravenous Given 1/16/23 1708)   ondansetron (ZOFRAN) injection 4 mg (4 mg Intravenous Given 1/16/23 1611)   iohexol (OMNIPAQUE) 350 MG/ML injection (SINGLE-DOSE) 100 mL (100 mL Intravenous Given 1/16/23 1722)     Time reflects when diagnosis was documented in both MDM as applicable and the Disposition within this note     Time User Action Codes Description Comment    1/16/2023  6:24 PM Yudith Murphy Add [R10 11] Right upper quadrant abdominal pain       ED Disposition     ED Disposition   Discharge    Condition   Stable    Date/Time   Mon Jan 16, 2023  6:24 PM    Comment   Shilpa Kennyil discharge to home/self care  Follow-up Information     Follow up With Specialties Details Why [address] [facility], [address] [facility]   [address] [place] [address] [place] [address],  [address] [phone]          Patient's Medications   Discharge Prescriptions    No medications on file     No discharge procedures on file  Prior to Admission Medications   Prescriptions Last Dose Informant Patient Reported? Taking?    AMYLASE-LIPASE-PROTEASE PO   Yes No   Sig: Take 1 capsule by mouth daily   Ascorbic Acid (vitamin C) 1000 MG tablet   Yes No   Sig: Take 1,000 mg by mouth daily   Cranberry 500 MG TABS Yes No   Sig: Take by mouth   HYDROcodone-acetaminophen (Norco) 5-325 mg per tablet   No No   Sig: Take 1 tablet by mouth every 6 (six) hours as needed for pain Max Daily Amount: 4 tablets   Multiple Vitamin (MULTIVITAMIN) capsule   Yes No   Sig: Take 1 capsule by mouth daily   SF 5000 Plus 1 1 % CREA   Yes No   Sig: BRUSH once daily   cetirizine (ZyrTEC) 10 mg tablet   Yes No   Sig: Take 10 mg by mouth daily   Patient not taking: Reported on 6/10/2022   dicyclomine (BENTYL) 20 mg tablet   No No   Sig: TAKE 1 TABLET BY MOUTH EVERY 6 HOURS   diphenoxylate-atropine (LOMOTIL) 2 5-0 025 mg per tablet   No No   Sig: Take 1 tablet by mouth 4 (four) times a day as needed for diarrhea   fluticasone (FLONASE) 50 mcg/act nasal spray   No No   Si spray into each nostril daily   Patient not taking: Reported on 8/15/2022   ibuprofen (MOTRIN) 600 mg tablet   Yes No   Sig: take 1 tablet by mouth every 6 hours if needed for MILD PAIN ( PAIN SCALE 1-3 ) with meals   ibuprofen (MOTRIN) 800 mg tablet   Yes No   Sig: take 1 tablet by mouth every 6 to 8 hours if needed with food for pain   ketorolac (TORADOL) 10 mg tablet   No No   Sig: Take 1 tablet (10 mg total) by mouth every 6 (six) hours as needed for moderate pain or severe pain   levothyroxine 88 mcg tablet   No No   Sig: Take 1 tablet (88 mcg total) by mouth daily in the early morning   metroNIDAZOLE (METROGEL) 0 75 % vaginal gel   Yes No   Sig: insert 1 APPLICATION INTO THE VAGINA nightly for 5 days   naproxen sodium (ANAPROX) 550 mg tablet   Yes No   Sig: Take 550 mg by mouth 2 (two) times a day with meals   norgestimate-ethinyl estradiol (ORTHO-CYCLEN) 0 25-35 MG-MCG per tablet   Yes No   Sig: Take 1 tablet by mouth daily   Patient not taking: Reported on 8/15/2022   norgestimate-ethinyl estradiol (ORTHO-CYCLEN) 0 25-35 MG-MCG per tablet   Yes No   Sig: Take 1 tablet by mouth daily   Patient not taking: Reported on 6/10/2022   omeprazole (PriLOSEC) 40 MG capsule   No No Sig: take 1 capsule by mouth once daily   ondansetron (Zofran ODT) 4 mg disintegrating tablet   No No   Sig: Take 1 tablet (4 mg total) by mouth every 6 (six) hours as needed for nausea or vomiting   pancrelipase, Lip-Prot-Amyl, (CREON) 24,000 units   No No   Sig: Take 24,000 units of lipase by mouth 3 (three) times a day with meals   triamcinolone (KENALOG) 0 1 % ointment   No No   Sig: Apply topically 2 (two) times a day   Patient not taking: Reported on 9/12/2022      Facility-Administered Medications: None       Portions of the record may have been created with voice recognition software  Occasional wrong word or "sound a like" substitutions may have occurred due to the inherent limitations of voice recognition software  Read the chart carefully and recognize, using context, where substitutions have occurred      Electronically signed by:  MD Julissa Duron MD  01/16/23 [id number]

## 2023-01-16 NOTE — TELEPHONE ENCOUNTER
OV 8/15/22 Pravin Louis today [place]      H/o   1  Abdominal pain, unspecified abdominal location  2  Diarrhea, unspecified type  3  Gastroesophageal reflux disease without esophagitis  4  Nausea    Meds   Omeprazole 40 mg daily  Bentyl 20 mg q 6 hrs PRN  Zofran 4 mg q 6 hrs PRN      Spoke with pt reporting symptoms of intermittent 6/10 shooting pain from RUQ to pelvic area starting last night  She took bentyl and Zofran for nausea from pain that seemed to help her watery diarrhea stools become more soft and muddy  She did not appear as if in distress at the time of this call  Reviewed alarming s/s that would require ED evaluation, pt will try heating pad and waiting until appt this afternoon  Pt is agreeable to ED eval if symptoms increase in severity  Reviewed hydration, bland diet and supporting symptoms   Pt agreeable and verbalized understanding of all information

## 2023-01-16 NOTE — TELEPHONE ENCOUNTER
Patients GI provider: [facility]     Number to return call: [phone]    Reason for call: Pt would like a sooner appt time today  Pt having pain on right side near gallbladder       Scheduled procedure/appointment date if applicable: 1/61/93

## 2023-01-17 DIAGNOSIS — R11.0 NAUSEA: ICD-10-CM

## 2023-01-17 DIAGNOSIS — K86.89 PANCREATIC INSUFFICIENCY: ICD-10-CM

## 2023-01-17 RX ORDER — ONDANSETRON 4 MG/1
4 TABLET, ORALLY DISINTEGRATING ORAL EVERY 6 HOURS PRN
Qty: 20 TABLET | Refills: 0 | Status: SHIPPED | OUTPATIENT
Start: 2023-01-17

## 2023-01-17 NOTE — TELEPHONE ENCOUNTER
Pt sent to ER from office on 01/16/23 for sig RUQ pain  Contrasted CT scan in ER unremarkable       - please call pt, clinical update  - ER postulated early onset gastroenteritis, would recommend bland diet at this time, supportive care with anti-emetics  - okay for PRN bentyl use for abd pain/cramping  - would recommend referral to General Surgery for RUQ pain

## 2023-01-17 NOTE — TELEPHONE ENCOUNTER
LVM relaying recommendations and information regarding general surgery referral  Pt instructed to call back for update and if she needs refills on her medications

## 2023-01-21 LAB
BACTERIA BLD CULT: NORMAL
BACTERIA BLD CULT: NORMAL

## 2023-01-25 ENCOUNTER — OFFICE VISIT (OUTPATIENT)
Dept: SURGERY | Facility: CLINIC | Age: 28
End: 2023-01-25

## 2023-01-25 VITALS
BODY MASS INDEX: 25.91 KG/M2 | TEMPERATURE: 97.2 F | OXYGEN SATURATION: 98 % | SYSTOLIC BLOOD PRESSURE: 103 MMHG | DIASTOLIC BLOOD PRESSURE: 70 MMHG | HEIGHT: 68 IN | HEART RATE: 69 BPM | WEIGHT: 171 LBS

## 2023-01-25 DIAGNOSIS — R10.11 CHRONIC RIGHT UPPER QUADRANT PAIN: ICD-10-CM

## 2023-01-25 DIAGNOSIS — K82.8 BILIARY DYSKINESIA: Primary | ICD-10-CM

## 2023-01-25 DIAGNOSIS — G89.29 CHRONIC RIGHT UPPER QUADRANT PAIN: ICD-10-CM

## 2023-01-25 NOTE — ASSESSMENT & PLAN NOTE
33year old female with chronic history of right upper quadrant pain with negative work up thus far (CT, Ultrasound, hepatobiliary scan with CCK)  Patient presents for evaluation and to discuss further options  Her previous hepatobiliary scan does show a low normal asymptomatic contractile response of the gallbladder to cholecystokinin infusion (50%), and when correlated with her symptoms of persistent right upper quadrant pain associated with food which significantly affects her life on a daily basis, it would be reasonable to suspect biliary dyskinesia  Options going forward were discussed with patient  At this time she would like to avoid surgical intervention if possible due to recent surgeries  She is wondering if there are other studies or imaging that can be done to further assess her gall bladder prior to considering surgical intervention  Given her last ultrasound was completed in May of 2022, we will order a RUQ ultrasound at this time  Plan:  Obtain RUQ ultrasound to evaluate gall bladder/ liver  Will call patient with these results and pending these findings, discuss further follow up vs surgical intervention
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
## 2023-01-30 ENCOUNTER — HOSPITAL ENCOUNTER (OUTPATIENT)
Dept: ULTRASOUND IMAGING | Facility: HOSPITAL | Age: 28
Discharge: HOME | End: 2023-01-30

## 2023-01-30 DIAGNOSIS — K82.8 BILIARY DYSKINESIA: ICD-10-CM

## 2023-01-31 ENCOUNTER — OFFICE VISIT (OUTPATIENT)
Dept: FAMILY MEDICINE CLINIC | Facility: CLINIC | Age: 28
End: 2023-01-31

## 2023-01-31 VITALS
BODY MASS INDEX: 25.85 KG/M2 | WEIGHT: 170 LBS | HEART RATE: 76 BPM | RESPIRATION RATE: 18 BRPM | DIASTOLIC BLOOD PRESSURE: 62 MMHG | OXYGEN SATURATION: 98 % | TEMPERATURE: 97.1 F | SYSTOLIC BLOOD PRESSURE: 107 MMHG

## 2023-01-31 DIAGNOSIS — R21 RASH OF FACE: Primary | ICD-10-CM

## 2023-01-31 DIAGNOSIS — Z28.21 REFUSED INFLUENZA VACCINE: ICD-10-CM

## 2023-01-31 DIAGNOSIS — E03.9 ACQUIRED HYPOTHYROIDISM: ICD-10-CM

## 2023-01-31 RX ORDER — CLINDAMYCIN PHOSPHATE, BENZOYL PEROXIDE 25; 10 MG/G; MG/G
1 GEL TOPICAL DAILY
Qty: 50 G | Refills: 0 | Status: SHIPPED | OUTPATIENT
Start: 2023-01-31
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
## 2023-02-02 ENCOUNTER — DOCUMENTATION (OUTPATIENT)
Dept: FAMILY MEDICINE CLINIC | Facility: CLINIC | Age: 28
End: 2023-02-02

## 2023-02-02 ENCOUNTER — TELEPHONE (OUTPATIENT)
Dept: SURGERY | Facility: CLINIC | Age: 28
End: 2023-02-02

## 2023-02-06 ENCOUNTER — APPOINTMENT (OUTPATIENT)
Dept: LAB | Facility: MEDICAL CENTER | Age: 28
End: 2023-02-06

## 2023-02-06 ENCOUNTER — TELEPHONE (OUTPATIENT)
Dept: SURGERY | Facility: CLINIC | Age: 28
End: 2023-02-06

## 2023-02-06 ENCOUNTER — OFFICE VISIT (OUTPATIENT)
Dept: SURGERY | Facility: CLINIC | Age: 28
End: 2023-02-06

## 2023-02-06 VITALS
TEMPERATURE: 97.7 F | BODY MASS INDEX: 25.91 KG/M2 | HEART RATE: 76 BPM | OXYGEN SATURATION: 97 % | WEIGHT: 171 LBS | SYSTOLIC BLOOD PRESSURE: 118 MMHG | DIASTOLIC BLOOD PRESSURE: 64 MMHG | HEIGHT: 68 IN

## 2023-02-06 DIAGNOSIS — G89.29 CHRONIC RIGHT UPPER QUADRANT PAIN: Primary | ICD-10-CM

## 2023-02-06 DIAGNOSIS — R21 RASH OF FACE: ICD-10-CM

## 2023-02-06 DIAGNOSIS — R21 RASH OF FACE: Primary | ICD-10-CM

## 2023-02-06 DIAGNOSIS — R10.11 CHRONIC RIGHT UPPER QUADRANT PAIN: Primary | ICD-10-CM

## 2023-02-06 RX ORDER — SODIUM CHLORIDE, SODIUM LACTATE, POTASSIUM CHLORIDE, CALCIUM CHLORIDE 600; 310; 30; 20 MG/100ML; MG/100ML; MG/100ML; MG/100ML
125 INJECTION, SOLUTION INTRAVENOUS CONTINUOUS
OUTPATIENT
Start: 2023-02-06

## 2023-02-06 RX ORDER — HEPARIN SODIUM 5000 [USP'U]/ML
5000 INJECTION, SOLUTION INTRAVENOUS; SUBCUTANEOUS ONCE
OUTPATIENT
Start: 2023-02-06 | End: 2023-02-06

## 2023-02-06 RX ORDER — CHLORHEXIDINE GLUCONATE 4 G/100ML
SOLUTION TOPICAL DAILY PRN
OUTPATIENT
Start: 2023-02-06

## 2023-02-06 NOTE — ASSESSMENT & PLAN NOTE
Patient with recurrent right upper quadrant pain  She has suspected sludge from previous ultrasound and a ejection fraction of 50% on HIDA  Despite her imaging, she does have signs and symptoms of likely biliary dyskinesia versus biliary colic from known sludge  As noted before upper endoscopy was unremarkable  Patient would now like to proceed with laparoscopic cholecystectomy  Think this is reasonable  She will be scheduled for laparoscopically sleeping, possible open  The procedure itself including all associated risks and benefits were discussed with the patient in great detail  The patient verbalized understands risks and when to proceed, consent was signed  No additional preop work-up is needed at this time
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
## 2023-02-07 LAB — ANA SER QL IA: NEGATIVE

## 2023-02-14 ENCOUNTER — TELEPHONE (OUTPATIENT)
Dept: OTHER | Facility: OTHER | Age: 28
End: 2023-02-14

## 2023-02-14 NOTE — TELEPHONE ENCOUNTER
Patient states [place] paper work was faxed to office last Thursday and she is asking for status of completion  Please call patient and advise

## 2023-02-23 DIAGNOSIS — R21 RASH OF FACE: Primary | ICD-10-CM

## 2023-02-23 RX ORDER — CLINDAMYCIN PHOSPHATE AND BENZOYL PEROXIDE 10; 50 MG/G; MG/G
1 GEL TOPICAL EVERY MORNING
Qty: 45 G | Refills: 5 | Status: SHIPPED | OUTPATIENT
Start: 2023-02-23

## 2023-02-27 ENCOUNTER — OFFICE VISIT (OUTPATIENT)
Dept: FAMILY MEDICINE CLINIC | Facility: CLINIC | Age: 28
End: 2023-02-27

## 2023-02-27 VITALS
SYSTOLIC BLOOD PRESSURE: 110 MMHG | WEIGHT: 174 LBS | BODY MASS INDEX: 26.37 KG/M2 | TEMPERATURE: 97 F | OXYGEN SATURATION: 98 % | RESPIRATION RATE: 18 BRPM | HEART RATE: 76 BPM | DIASTOLIC BLOOD PRESSURE: 74 MMHG | HEIGHT: 68 IN

## 2023-02-27 DIAGNOSIS — E03.9 HYPOTHYROIDISM, UNSPECIFIED TYPE: ICD-10-CM

## 2023-02-27 DIAGNOSIS — Z00.00 ANNUAL PHYSICAL EXAM: Primary | ICD-10-CM

## 2023-02-27 DIAGNOSIS — H60.391 OTHER INFECTIVE ACUTE OTITIS EXTERNA OF RIGHT EAR: ICD-10-CM

## 2023-02-27 RX ORDER — LEVOTHYROXINE SODIUM 88 UG/1
88 TABLET ORAL
Qty: 90 TABLET | Refills: 3 | Status: SHIPPED | OUTPATIENT
Start: 2023-02-27

## 2023-02-27 RX ORDER — CIPROFLOXACIN AND DEXAMETHASONE 3; 1 MG/ML; MG/ML
4 SUSPENSION/ DROPS AURICULAR (OTIC) 2 TIMES DAILY
Qty: 7.5 ML | Refills: 0 | Status: SHIPPED | OUTPATIENT
Start: 2023-02-27

## 2023-02-27 NOTE — PROGRESS NOTES
ADULT ANNUAL PHYSICAL  [address]    NAME: Ora Marley  AGE: 33 y o  SEX: female  : 1995     DATE: 2023     Assessment and Plan:     Problem List Items Addressed This Visit        Endocrine    Hypothyroidism    Relevant Medications    levothyroxine 88 mcg tablet   Other Visit Diagnoses     Annual physical exam    -  Primary    Other infective acute otitis externa of right ear        Relevant Medications    ciprofloxacin-dexamethasone (CIPRODEX) otic suspension    BMI 26 0-26 9,adult              Immunizations and preventive care screenings were discussed with patient today  Appropriate education was printed on patient's after visit summary  Counseling:  Alcohol/drug use: discussed moderation in alcohol intake, the recommendations for healthy alcohol use, and avoidance of illicit drug use  Dental Health: discussed importance of regular tooth brushing, flossing, and dental visits  Injury prevention: discussed safety/seat belts, safety helmets, smoke detectors, carbon dioxide detectors, and smoking near bedding or upholstery  Sexual health: discussed sexually transmitted diseases, partner selection, use of condoms, avoidance of unintended pregnancy, and contraceptive alternatives  · Exercise: the importance of regular exercise/physical activity was discussed  Recommend exercise 3-5 times per week for at least 30 minutes  BMI Counseling: Body mass index is 26 46 kg/m²  The BMI is above normal  Nutrition recommendations include reducing portion sizes, decreasing overall calorie intake, 3-5 servings of fruits/vegetables daily, reducing fast food intake, consuming healthier snacks, decreasing soda and/or juice intake, moderation in carbohydrate intake, increasing intake of lean protein, reducing intake of saturated fat and trans fat and reducing intake of cholesterol   Exercise recommendations include moderate aerobic physical activity for 150 minutes/week, exercising 3-5 times per week and strength training exercises  Return in 1 year (on 2/27/2024)  Chief Complaint:     Chief Complaint   Patient presents with   • Annual Exam   • Earache     Right ear-       History of Present Illness:     Adult Annual Physical   Patient here for a comprehensive physical exam  The patient reports right ear pain  Present for past 2 days  Wears headset at work  Taking ibuprofen  No fever  No drainage  She is also planning to have gallbladder surgery on 3/13/23  Otherwise doing well today and has no concerns  Diet and Physical Activity  · Diet/Nutrition: well balanced diet  · Exercise: moderate cardiovascular exercise, 3-4 times a week on average and 30-60 minutes on average  Depression Screening  PHQ-2/9 Depression Screening    Little interest or pleasure in doing things: 0 - not at all  Feeling down, depressed, or hopeless: 0 - not at all  PHQ-2 Score: 0  PHQ-2 Interpretation: Negative depression screen       General Health  · Sleep: sleeps well and gets 4-6 hours of sleep on average  · Hearing: normal - bilateral   · Vision: no vision problems, goes for regular eye exams and wears glasses  · Dental: regular dental visits, brushes teeth twice daily and flosses teeth occasionally  /GYN Health  · Last menstrual period: n/a, s/p hysterectomy  · Contraceptive method: hysterectomy  · History of STDs?: no   · Sexually active with 1 male partner    Social  No etoh, tob or drug use     Review of Systems:     Review of Systems   Constitutional: Negative for chills and fever  HENT: Positive for ear pain  Negative for ear discharge and hearing loss  Respiratory: Negative for shortness of breath  Cardiovascular: Negative for chest pain  Gastrointestinal: Negative for abdominal pain, constipation, diarrhea, nausea and vomiting  Genitourinary: Negative for dysuria and menstrual problem     Musculoskeletal: Negative for arthralgias and myalgias  Skin: Negative for rash  Neurological: Negative for headaches  Psychiatric/Behavioral: Negative for dysphoric mood  All other systems reviewed and are negative       Past Medical History:     Past Medical History:   Diagnosis Date   • Disease of thyroid gland    • Endometriosis    • Ovarian cyst       Past Surgical History:     Past Surgical History:   Procedure Laterality Date   • DILATION AND CURETTAGE OF UTERUS     • DC ENDOVEN ABLTJ INCMPTNT VEIN XTR LASER 1ST VEIN Left 2020    Procedure: ENDOVASCULAR LASER THERAPY (EVLT) + stab phlebectomies;  Surgeon: Volodymyr Omalley DO;  Location: [facility];  Service: Vascular   • WISDOM TOOTH EXTRACTION        Social History:     Social History     Socioeconomic History   • Marital status: Single     Spouse name: None   • Number of children: None   • Years of education: None   • Highest education level: None   Occupational History   • None   Tobacco Use   • Smoking status: Former     Packs/day: 0 00     Types: Cigarettes     Quit date: 2020     Years since quittin 8   • Smokeless tobacco: Never   • Tobacco comments:     3 cigarettes per day   Vaping Use   • Vaping Use: Never used   Substance and Sexual Activity   • Alcohol use: No   • Drug use: No   • Sexual activity: Yes     Partners: Male     Birth control/protection: Other   Other Topics Concern   • None   Social History Narrative   • None     Social Determinants of Health     Financial Resource Strain: Not on file   Food Insecurity: Not on file   Transportation Needs: Not on file   Physical Activity: Not on file   Stress: Not on file   Social Connections: Not on file   Intimate Partner Violence: Not on file   Housing Stability: Not on file      Family History:     Family History   Problem Relation Age of Onset   • Cancer Mother    • Hypertension Mother    • OBI disease Mother    • Cancer Father    • Hypertension Father    • Diabetes Father    • Cancer Sister Current Medications:     Current Outpatient Medications   Medication Sig Dispense Refill   • ciprofloxacin-dexamethasone (CIPRODEX) otic suspension Administer 4 drops to the right ear 2 (two) times a day 7 5 mL 0   • Clindamycin Phos-Benzoyl Perox gel Apply 1 application topically every morning 45 g 5   • dicyclomine (BENTYL) 20 mg tablet TAKE 1 TABLET BY MOUTH EVERY 6 HOURS 60 tablet 6   • diphenoxylate-atropine (LOMOTIL) 2 5-0 025 mg per tablet Take 1 tablet by mouth 4 (four) times a day as needed for diarrhea 120 tablet 5   • levothyroxine 88 mcg tablet Take 1 tablet (88 mcg total) by mouth daily in the early morning 90 tablet 3   • Multiple Vitamin (MULTIVITAMIN) capsule Take 1 capsule by mouth daily     • naproxen sodium (ANAPROX) 550 mg tablet Take 550 mg by mouth 2 (two) times a day with meals     • omeprazole (PriLOSEC) 40 MG capsule take 1 capsule by mouth once daily 30 capsule 3   • ondansetron (Zofran ODT) 4 mg disintegrating tablet Take 1 tablet (4 mg total) by mouth every 6 (six) hours as needed for nausea or vomiting 20 tablet 0   • pancrelipase, Lip-Prot-Amyl, (CREON) 24,000 units Take 24,000 units of lipase by mouth 3 (three) times a day with meals 90 capsule 0   • metroNIDAZOLE (METROGEL) 0 75 % vaginal gel insert 1 APPLICATION INTO THE VAGINA nightly for 5 days       No current facility-administered medications for this visit  Allergies:     No Known Allergies   Physical Exam:     /74   Pulse 76   Temp (!) 97 °F (36 1 °C)   Resp 18   Ht 5' 8" (1 727 m)   Wt 78 9 kg (174 lb)   SpO2 98%   BMI 26 46 kg/m²     Physical Exam  Vitals and nursing note reviewed  Constitutional:       General: She is not in acute distress  Appearance: Normal appearance  She is well-developed  She is not ill-appearing or toxic-appearing  HENT:      Head: Normocephalic and atraumatic  Right Ear: Swelling and tenderness present  No middle ear effusion  There is no impacted cerumen   No mastoid tenderness  Tympanic membrane is not erythematous or bulging  Left Ear: Tympanic membrane, ear canal and external ear normal  There is no impacted cerumen  Ears:      Comments: Right auditory canal edematous and erythematous  Tender to touch with ear speculum  Mouth/Throat:      Mouth: Mucous membranes are moist       Pharynx: Oropharynx is clear  No oropharyngeal exudate or posterior oropharyngeal erythema  Eyes:      Extraocular Movements: Extraocular movements intact  Conjunctiva/sclera: Conjunctivae normal       Pupils: Pupils are equal, round, and reactive to light  Neck:      Thyroid: No thyromegaly  Cardiovascular:      Rate and Rhythm: Normal rate and regular rhythm  Heart sounds: Normal heart sounds  No murmur heard  Pulmonary:      Effort: Pulmonary effort is normal  No respiratory distress  Breath sounds: Normal breath sounds  No wheezing  Abdominal:      General: There is no distension  Palpations: Abdomen is soft  There is no mass  Tenderness: There is no abdominal tenderness  There is no guarding or rebound  Hernia: No hernia is present  Genitourinary:     Comments: Exam deferred  Musculoskeletal:      Cervical back: Neck supple  Right lower leg: No edema  Left lower leg: No edema  Skin:     General: Skin is warm  Neurological:      General: No focal deficit present  Mental Status: She is alert and oriented to person, place, and time  Mental status is at baseline  Psychiatric:         Mood and Affect: Mood normal          Behavior: Behavior normal          Thought Content:  Thought content normal          Judgment: Judgment normal           Hawa Cope, DO   [address]

## 2023-03-01 NOTE — PRE-PROCEDURE INSTRUCTIONS
Pre-Surgery Instructions:   Medication Instructions   • ciprofloxacin-dexamethasone (CIPRODEX) otic suspension Take day of surgery  • Clindamycin Phos-Benzoyl Perox gel Hold day of surgery  • dicyclomine (BENTYL) 20 mg tablet Uses PRN- OK to take day of surgery   • diphenoxylate-atropine (LOMOTIL) 2 5-0 025 mg per tablet Uses PRN- OK to take day of surgery   • levothyroxine 88 mcg tablet Take day of surgery  • Multiple Vitamin (MULTIVITAMIN) capsule Stop taking 7 days prior to surgery  • naproxen sodium (ANAPROX) 550 mg tablet Stop taking 7 days prior to surgery  • omeprazole (PriLOSEC) 40 MG capsule Take day of surgery  • ondansetron (Zofran ODT) 4 mg disintegrating tablet Uses PRN- OK to take day of surgery   • pancrelipase, Lip-Prot-Amyl, (CREON) 24,000 units Take night before surgery   Pre op and bathing instructions reviewed  Pt has hibiclens  Pt  Verbalized understanding of current visitor restrictions  Pt  Verbalized an understanding of all instructions reviewed and offers no concerns at this time  Instructed to avoid all ASA/NSAIDs and OTC Vit/Supp from now until after surgery per anesthesia guidelines   Tylenol ok prn  DOS meds with a few sips of H2O

## 2023-03-11 ENCOUNTER — ANESTHESIA EVENT (OUTPATIENT)
Dept: PERIOP | Facility: HOSPITAL | Age: 28
End: 2023-03-11

## 2023-03-11 NOTE — ANESTHESIA PREPROCEDURE EVALUATION
Procedure:  CHOLECYSTECTOMY LAPAROSCOPIC (Abdomen)    Prior GA LMA 3    Hx PONV    Denies the following: CP/SOB with exertion, asthma, COPD, BRUCE, stroke/TIA, seizure      Relevant Problems   CARDIO   (+) Borderline hyperlipidemia      ENDO   (+) Hypothyroidism        Physical Exam    Airway    Mallampati score: II  TM Distance: >3 FB  Neck ROM: full     Dental   No notable dental hx     Cardiovascular      Pulmonary      Other Findings        Anesthesia Plan  ASA Score- 2     Anesthesia Type- general with ASA Monitors  Additional Monitors:   Airway Plan: ETT  Comment: Consented for TAP block if converted to open   Plan Factors-Exercise tolerance (METS): >4 METS  Chart reviewed  Existing labs reviewed  Patient summary reviewed  Patient is not a current smoker  Induction- intravenous  Postoperative Plan-     Informed Consent- Anesthetic plan and risks discussed with patient  I personally reviewed this patient with the CRNA  Discussed and agreed on the Anesthesia Plan with the CRNA  Alayna Messina

## 2023-03-13 ENCOUNTER — HOSPITAL ENCOUNTER (OUTPATIENT)
Facility: HOSPITAL | Age: 28
Setting detail: OUTPATIENT SURGERY
Discharge: HOME | End: 2023-03-13
Attending: SURGERY | Admitting: SURGERY

## 2023-03-13 ENCOUNTER — ANESTHESIA (OUTPATIENT)
Dept: PERIOP | Facility: HOSPITAL | Age: 28
End: 2023-03-13

## 2023-03-13 VITALS
DIASTOLIC BLOOD PRESSURE: 64 MMHG | RESPIRATION RATE: 20 BRPM | TEMPERATURE: 97.1 F | SYSTOLIC BLOOD PRESSURE: 102 MMHG | OXYGEN SATURATION: 99 % | HEIGHT: 68 IN | WEIGHT: 174 LBS | HEART RATE: 62 BPM | BODY MASS INDEX: 26.37 KG/M2

## 2023-03-13 DIAGNOSIS — R10.11 CHRONIC RIGHT UPPER QUADRANT PAIN: Primary | ICD-10-CM

## 2023-03-13 DIAGNOSIS — G89.29 CHRONIC RIGHT UPPER QUADRANT PAIN: Primary | ICD-10-CM

## 2023-03-13 RX ORDER — CHLORHEXIDINE GLUCONATE 4 G/100ML
SOLUTION TOPICAL DAILY PRN
Status: DISCONTINUED | OUTPATIENT
Start: 2023-03-13 | End: 2023-03-13

## 2023-03-13 RX ORDER — OXYCODONE HYDROCHLORIDE AND ACETAMINOPHEN 5; 325 MG/1; MG/1
1 TABLET ORAL EVERY 4 HOURS PRN
Qty: 20 TABLET | Refills: 0 | Status: SHIPPED | OUTPATIENT
Start: 2023-03-13

## 2023-03-13 RX ORDER — PROPOFOL 10 MG/ML
INJECTION, EMULSION INTRAVENOUS AS NEEDED
Status: DISCONTINUED | OUTPATIENT
Start: 2023-03-13 | End: 2023-03-13

## 2023-03-13 RX ORDER — CEFAZOLIN SODIUM 1 G/50ML
1000 SOLUTION INTRAVENOUS ONCE
Status: COMPLETED | OUTPATIENT
Start: 2023-03-13 | End: 2023-03-13

## 2023-03-13 RX ORDER — FENTANYL CITRATE 50 UG/ML
INJECTION, SOLUTION INTRAMUSCULAR; INTRAVENOUS AS NEEDED
Status: DISCONTINUED | OUTPATIENT
Start: 2023-03-13 | End: 2023-03-13

## 2023-03-13 RX ORDER — ROCURONIUM BROMIDE 10 MG/ML
INJECTION, SOLUTION INTRAVENOUS AS NEEDED
Status: DISCONTINUED | OUTPATIENT
Start: 2023-03-13 | End: 2023-03-13

## 2023-03-13 RX ORDER — EPHEDRINE SULFATE 50 MG/ML
INJECTION INTRAVENOUS AS NEEDED
Status: DISCONTINUED | OUTPATIENT
Start: 2023-03-13 | End: 2023-03-13

## 2023-03-13 RX ORDER — ONDANSETRON 2 MG/ML
4 INJECTION INTRAMUSCULAR; INTRAVENOUS EVERY 6 HOURS PRN
Status: DISCONTINUED | OUTPATIENT
Start: 2023-03-13 | End: 2023-03-13 | Stop reason: HOSPADM

## 2023-03-13 RX ORDER — SODIUM CHLORIDE, SODIUM LACTATE, POTASSIUM CHLORIDE, CALCIUM CHLORIDE 600; 310; 30; 20 MG/100ML; MG/100ML; MG/100ML; MG/100ML
125 INJECTION, SOLUTION INTRAVENOUS CONTINUOUS
Status: DISCONTINUED | OUTPATIENT
Start: 2023-03-13 | End: 2023-03-13

## 2023-03-13 RX ORDER — MIDAZOLAM HYDROCHLORIDE 2 MG/2ML
INJECTION, SOLUTION INTRAMUSCULAR; INTRAVENOUS AS NEEDED
Status: DISCONTINUED | OUTPATIENT
Start: 2023-03-13 | End: 2023-03-13

## 2023-03-13 RX ORDER — OXYCODONE HYDROCHLORIDE 5 MG/1
5 TABLET ORAL EVERY 4 HOURS PRN
Status: CANCELLED | OUTPATIENT
Start: 2023-03-13

## 2023-03-13 RX ORDER — ACETAMINOPHEN 325 MG/1
650 TABLET ORAL EVERY 4 HOURS PRN
Status: CANCELLED | OUTPATIENT
Start: 2023-03-13

## 2023-03-13 RX ORDER — HEPARIN SODIUM 5000 [USP'U]/ML
5000 INJECTION, SOLUTION INTRAVENOUS; SUBCUTANEOUS ONCE
Status: COMPLETED | OUTPATIENT
Start: 2023-03-13 | End: 2023-03-13

## 2023-03-13 RX ORDER — ONDANSETRON 2 MG/ML
4 INJECTION INTRAMUSCULAR; INTRAVENOUS ONCE AS NEEDED
Status: COMPLETED | OUTPATIENT
Start: 2023-03-13 | End: 2023-03-13

## 2023-03-13 RX ORDER — HYDROMORPHONE HCL/PF 1 MG/ML
0.5 SYRINGE (ML) INJECTION
Status: DISCONTINUED | OUTPATIENT
Start: 2023-03-13 | End: 2023-03-13 | Stop reason: HOSPADM

## 2023-03-13 RX ORDER — LIDOCAINE HYDROCHLORIDE 10 MG/ML
INJECTION, SOLUTION EPIDURAL; INFILTRATION; INTRACAUDAL; PERINEURAL AS NEEDED
Status: DISCONTINUED | OUTPATIENT
Start: 2023-03-13 | End: 2023-03-13

## 2023-03-13 RX ORDER — DEXMEDETOMIDINE HYDROCHLORIDE 100 UG/ML
INJECTION, SOLUTION INTRAVENOUS AS NEEDED
Status: DISCONTINUED | OUTPATIENT
Start: 2023-03-13 | End: 2023-03-13

## 2023-03-13 RX ORDER — BUPIVACAINE HYDROCHLORIDE 5 MG/ML
INJECTION, SOLUTION PERINEURAL AS NEEDED
Status: DISCONTINUED | OUTPATIENT
Start: 2023-03-13 | End: 2023-03-13 | Stop reason: HOSPADM

## 2023-03-13 RX ORDER — SCOLOPAMINE TRANSDERMAL SYSTEM 1 MG/1
1 PATCH, EXTENDED RELEASE TRANSDERMAL ONCE
Status: COMPLETED | OUTPATIENT
Start: 2023-03-13 | End: 2023-03-13

## 2023-03-13 RX ORDER — FENTANYL CITRATE/PF 50 MCG/ML
25 SYRINGE (ML) INJECTION
Status: DISCONTINUED | OUTPATIENT
Start: 2023-03-13 | End: 2023-03-13 | Stop reason: HOSPADM

## 2023-03-13 RX ORDER — OXYCODONE HYDROCHLORIDE AND ACETAMINOPHEN 5; 325 MG/1; MG/1
2 TABLET ORAL EVERY 4 HOURS PRN
Status: DISCONTINUED | OUTPATIENT
Start: 2023-03-13 | End: 2023-03-13 | Stop reason: HOSPADM

## 2023-03-13 RX ORDER — DEXAMETHASONE SODIUM PHOSPHATE 10 MG/ML
INJECTION, SOLUTION INTRAMUSCULAR; INTRAVENOUS AS NEEDED
Status: DISCONTINUED | OUTPATIENT
Start: 2023-03-13 | End: 2023-03-13

## 2023-03-13 RX ORDER — KETOROLAC TROMETHAMINE 30 MG/ML
30 INJECTION, SOLUTION INTRAMUSCULAR; INTRAVENOUS ONCE
Status: COMPLETED | OUTPATIENT
Start: 2023-03-13 | End: 2023-03-13

## 2023-03-13 RX ORDER — ONDANSETRON 2 MG/ML
INJECTION INTRAMUSCULAR; INTRAVENOUS AS NEEDED
Status: DISCONTINUED | OUTPATIENT
Start: 2023-03-13 | End: 2023-03-13

## 2023-03-13 RX ORDER — ALBUTEROL SULFATE 2.5 MG/3ML
2.5 SOLUTION RESPIRATORY (INHALATION) ONCE AS NEEDED
Status: DISCONTINUED | OUTPATIENT
Start: 2023-03-13 | End: 2023-03-13 | Stop reason: HOSPADM

## 2023-03-13 RX ADMIN — FENTANYL CITRATE 25 MCG: 50 INJECTION, SOLUTION INTRAMUSCULAR; INTRAVENOUS at 09:39

## 2023-03-13 RX ADMIN — ONDANSETRON 4 MG: 2 INJECTION INTRAMUSCULAR; INTRAVENOUS at 09:33

## 2023-03-13 RX ADMIN — CEFAZOLIN SODIUM 1000 MG: 1 SOLUTION INTRAVENOUS at 07:32

## 2023-03-13 RX ADMIN — FENTANYL CITRATE 50 MCG: 50 INJECTION, SOLUTION INTRAMUSCULAR; INTRAVENOUS at 09:25

## 2023-03-13 RX ADMIN — FENTANYL CITRATE 50 MCG: 50 INJECTION, SOLUTION INTRAMUSCULAR; INTRAVENOUS at 07:40

## 2023-03-13 RX ADMIN — FENTANYL CITRATE 25 MCG: 50 INJECTION, SOLUTION INTRAMUSCULAR; INTRAVENOUS at 09:33

## 2023-03-13 RX ADMIN — HYDROMORPHONE HYDROCHLORIDE 0.5 MG: 1 INJECTION, SOLUTION INTRAMUSCULAR; INTRAVENOUS; SUBCUTANEOUS at 09:45

## 2023-03-13 RX ADMIN — KETOROLAC TROMETHAMINE 30 MG: 30 INJECTION, SOLUTION INTRAMUSCULAR at 09:30

## 2023-03-13 RX ADMIN — DEXMEDETOMIDINE HCL 8 MCG: 100 INJECTION INTRAVENOUS at 08:56

## 2023-03-13 RX ADMIN — OXYCODONE HYDROCHLORIDE AND ACETAMINOPHEN 2 TABLET: 5; 325 TABLET ORAL at 11:09

## 2023-03-13 RX ADMIN — ROCURONIUM BROMIDE 30 MG: 10 INJECTION, SOLUTION INTRAVENOUS at 07:34

## 2023-03-13 RX ADMIN — LIDOCAINE HYDROCHLORIDE 50 MG: 10 INJECTION, SOLUTION EPIDURAL; INFILTRATION; INTRACAUDAL; PERINEURAL at 07:34

## 2023-03-13 RX ADMIN — EPHEDRINE SULFATE 5 MG: 50 INJECTION, SOLUTION INTRAVENOUS at 08:29

## 2023-03-13 RX ADMIN — FENTANYL CITRATE 50 MCG: 50 INJECTION, SOLUTION INTRAMUSCULAR; INTRAVENOUS at 08:00

## 2023-03-13 RX ADMIN — SCOPALAMINE 1 PATCH: 1 PATCH, EXTENDED RELEASE TRANSDERMAL at 07:30

## 2023-03-13 RX ADMIN — SODIUM CHLORIDE, SODIUM LACTATE, POTASSIUM CHLORIDE, AND CALCIUM CHLORIDE: .6; .31; .03; .02 INJECTION, SOLUTION INTRAVENOUS at 08:57

## 2023-03-13 RX ADMIN — HEPARIN SODIUM 5000 UNITS: 5000 INJECTION, SOLUTION INTRAVENOUS; SUBCUTANEOUS at 07:09

## 2023-03-13 RX ADMIN — DEXMEDETOMIDINE HCL 8 MCG: 100 INJECTION INTRAVENOUS at 08:11

## 2023-03-13 RX ADMIN — SODIUM CHLORIDE, SODIUM LACTATE, POTASSIUM CHLORIDE, AND CALCIUM CHLORIDE 125 ML/HR: .6; .31; .03; .02 INJECTION, SOLUTION INTRAVENOUS at 07:07

## 2023-03-13 RX ADMIN — SUGAMMADEX 160 MG: 100 INJECTION, SOLUTION INTRAVENOUS at 09:09

## 2023-03-13 RX ADMIN — PROPOFOL 200 MG: 10 INJECTION, EMULSION INTRAVENOUS at 07:34

## 2023-03-13 RX ADMIN — ONDANSETRON 4 MG: 2 INJECTION INTRAMUSCULAR; INTRAVENOUS at 08:57

## 2023-03-13 RX ADMIN — FENTANYL CITRATE 50 MCG: 50 INJECTION, SOLUTION INTRAMUSCULAR; INTRAVENOUS at 08:54

## 2023-03-13 RX ADMIN — HYDROMORPHONE HYDROCHLORIDE 0.5 MG: 1 INJECTION, SOLUTION INTRAMUSCULAR; INTRAVENOUS; SUBCUTANEOUS at 09:53

## 2023-03-13 RX ADMIN — ROCURONIUM BROMIDE 20 MG: 10 INJECTION, SOLUTION INTRAVENOUS at 07:48

## 2023-03-13 RX ADMIN — DEXAMETHASONE SODIUM PHOSPHATE 8 MG: 10 INJECTION, SOLUTION INTRAMUSCULAR; INTRAVENOUS at 07:50

## 2023-03-13 RX ADMIN — HYDROMORPHONE HYDROCHLORIDE 0.5 MG: 1 INJECTION, SOLUTION INTRAMUSCULAR; INTRAVENOUS; SUBCUTANEOUS at 10:07

## 2023-03-13 RX ADMIN — ROCURONIUM BROMIDE 10 MG: 10 INJECTION, SOLUTION INTRAVENOUS at 08:36

## 2023-03-13 RX ADMIN — DEXMEDETOMIDINE HCL 12 MCG: 100 INJECTION INTRAVENOUS at 08:00

## 2023-03-13 RX ADMIN — MIDAZOLAM 2 MG: 1 INJECTION INTRAMUSCULAR; INTRAVENOUS at 07:30

## 2023-03-13 RX ADMIN — DEXMEDETOMIDINE HCL 12 MCG: 100 INJECTION INTRAVENOUS at 07:48

## 2023-03-13 NOTE — PROGRESS NOTES
Pt received from OR with pain 8/10 - fentanyl given by CRNA upon arrival   Pt is easily arousable and reports mild nausea; will monitor

## 2023-03-13 NOTE — OP NOTE
OPERATIVE REPORT  PATIENT NAME: Nancy Hussein    :  1995  MRN: [id number]  Pt Location: [facility] OR ROOM 02    SURGERY DATE: 3/13/2023    Surgeon(s) and Role: * Nav Starr DO - Primary     * Chong Mcclendon PA-C - Assisting     * Aixa Cash MD - Assisting    Preop Diagnosis:  Chronic right upper quadrant pain [R10 11, G89 29]    Post-Op Diagnosis Codes:     * Chronic right upper quadrant pain [R10 11, G89 29]    Procedure(s):  CHOLECYSTECTOMY LAPAROSCOPIC    Specimen(s):  ID Type Source Tests Collected by Time Destination   1 :  Tissue Gallbladder TISSUE EXAM Nav Starr DO 3/13/2023 0858        Estimated Blood Loss:   Minimal    Drains:  * No LDAs found *    Anesthesia Type:   General    Operative Indications:  Chronic right upper quadrant pain [R10 11, G89 29]      Operative Findings:  Distended gallbladder with cholelithiasis     Complications:   None    Procedure and Technique:  The patient's identity and consent for procedure were confirmed  She was then taken to the operating room, transferred to the operating table, placed in supine position and placed under general anesthesia with endotracheal intubation  Aixa-operative antibiotics were dosed  The abdomen was prepped and draped in the usual sterile fashion with chloraprep  Prior to commencement of the operation, a Time-out checklist was satisfactorily completed and agreed upon by al members of the operative team      The abdomen was entered via [address] in the infraumbilical position and a 12mm port was placed  # additional 5mm RUQ working ports were placed under direct visualization  The gallbladder fundus was grasped and retraced cephalad  Peritoneal attachments were taken with a combination of blunt and electrosurgical dissection  The infundibulum as grasped and retracted inferolaterally   Medial and lateral peritoneum was scored with cautery and blunt dissection was carried out to achieve a critical view of safety with two isolated ductal structures entering the gallbladder  The cystic duct was clipped and divided, and a smaller structure corresponding to the cystic artery was taken in a similar fashion  The gallbladder was electrosurgically dissected off of the liver bed, and hemostasis of the liver bed was ensured during this process  The gallbladder was placed into a specimen bag and removed from the abdomen  Upon final inspection, clipped structures were intact with clips in place, and the surgical bed was hemostatic  Ports were removed under direct visualization  Fascia at the 12mm port site was closed with ) vicryl in a figure of eight fashion  Skin closure at all port sites was achieved with 4-0 Monocryl in a buried manner  Skin sites were anesthetized with infiltration of 0 5% marcaine, cleansed, dried and dressed with steri strips, gauze, and tegaderm film  The patient was awakened on table uneventfully, extubated, and taken to the PACU in stable condition  Dr Olga Herr was present throughout the procedure      Patient Disposition:  extubated and stable, PACU        SIGNATURE: Sarabjit Nguyen MD  DATE: March 13, 2023  TIME: 9:14 AM

## 2023-03-13 NOTE — DISCHARGE INSTRUCTIONS
Bandages may come off 1 day after surgery    Leave the steri strips on    You may shower starting the day after surgery    You may eat a regular diet  Please take tylenol and motrin within regular dosages for mild to moderate pain   Percocet has been provided for moderate to severe pain, please take only as prescribed    Avoid lifting more than 10-15 lbs for 4-6 weeks

## 2023-03-13 NOTE — ANESTHESIA POSTPROCEDURE EVALUATION
Post-Op Assessment Note    CV Status:  Stable    Pain management: adequate     Mental Status:  Alert and awake   Hydration Status:  Euvolemic   PONV Controlled:  Controlled   Airway Patency:  Patent      Post Op Vitals Reviewed: Yes      Staff: CRNA         No notable events documented      BP   133/80   Temp     Pulse  68   Resp   18   SpO2   98%
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
## 2023-03-21 ENCOUNTER — CLINICAL SUPPORT (OUTPATIENT)
Dept: FAMILY MEDICINE CLINIC | Facility: CLINIC | Age: 28
End: 2023-03-21

## 2023-03-21 DIAGNOSIS — Z11.1 ENCOUNTER FOR PPD TEST: Primary | ICD-10-CM

## 2023-03-23 ENCOUNTER — CLINICAL SUPPORT (OUTPATIENT)
Dept: FAMILY MEDICINE CLINIC | Facility: CLINIC | Age: 28
End: 2023-03-23

## 2023-03-23 DIAGNOSIS — Z11.1 ENCOUNTER FOR PPD SKIN TEST READING: Primary | ICD-10-CM

## 2023-03-23 LAB
INDURATION: 0 MM
TB SKIN TEST: NEGATIVE

## 2023-03-27 ENCOUNTER — TELEPHONE (OUTPATIENT)
Dept: SURGERY | Facility: CLINIC | Age: 28
End: 2023-03-27

## 2023-03-27 ENCOUNTER — OFFICE VISIT (OUTPATIENT)
Dept: SURGERY | Facility: CLINIC | Age: 28
End: 2023-03-27

## 2023-03-27 VITALS
HEART RATE: 74 BPM | BODY MASS INDEX: 25.76 KG/M2 | TEMPERATURE: 97.6 F | DIASTOLIC BLOOD PRESSURE: 80 MMHG | HEIGHT: 68 IN | WEIGHT: 170 LBS | OXYGEN SATURATION: 98 % | SYSTOLIC BLOOD PRESSURE: 122 MMHG

## 2023-03-27 DIAGNOSIS — G89.29 CHRONIC RIGHT UPPER QUADRANT PAIN: Primary | ICD-10-CM

## 2023-03-27 DIAGNOSIS — R10.11 CHRONIC RIGHT UPPER QUADRANT PAIN: Primary | ICD-10-CM
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
## 2023-03-27 NOTE — ASSESSMENT & PLAN NOTE
Status post laparoscopic cholecystectomy  Pathology showing chronic inflammation which was discussed with the patient  Currently doing well  Mild periumbilical discomfort  Incisions healing well  May return to work tomorrow light duty for 2 weeks  Follow-up in 2 weeks for further evaluation

## 2023-03-31 ENCOUNTER — HOSPITAL ENCOUNTER (EMERGENCY)
Facility: HOSPITAL | Age: 28
Discharge: HOME | End: 2023-03-31
Attending: EMERGENCY MEDICINE

## 2023-03-31 ENCOUNTER — APPOINTMENT (EMERGENCY)
Dept: CT IMAGING | Facility: HOSPITAL | Age: 28
End: 2023-03-31

## 2023-03-31 VITALS
OXYGEN SATURATION: 96 % | HEART RATE: 70 BPM | DIASTOLIC BLOOD PRESSURE: 55 MMHG | TEMPERATURE: 98 F | WEIGHT: 165.34 LBS | RESPIRATION RATE: 18 BRPM | BODY MASS INDEX: 25.14 KG/M2 | SYSTOLIC BLOOD PRESSURE: 100 MMHG

## 2023-03-31 DIAGNOSIS — R10.9 ABDOMINAL PAIN: Primary | ICD-10-CM

## 2023-03-31 DIAGNOSIS — R11.0 NAUSEA: ICD-10-CM

## 2023-03-31 DIAGNOSIS — Z90.49 S/P LAPAROSCOPIC CHOLECYSTECTOMY: ICD-10-CM

## 2023-03-31 LAB
ALBUMIN SERPL BCP-MCNC: 5.1 G/DL (ref 3.5–5)
ALP SERPL-CCNC: 51 U/L (ref 34–104)
ALT SERPL W P-5'-P-CCNC: 14 U/L (ref 7–52)
ANION GAP SERPL CALCULATED.3IONS-SCNC: 9 MMOL/L (ref 4–13)
AST SERPL W P-5'-P-CCNC: 14 U/L (ref 13–39)
BASOPHILS # BLD AUTO: 0.05 THOUSANDS/ÂΜL (ref 0–0.1)
BASOPHILS NFR BLD AUTO: 1 % (ref 0–1)
BILIRUB DIRECT SERPL-MCNC: 0.09 MG/DL (ref 0–0.2)
BILIRUB SERPL-MCNC: 0.61 MG/DL (ref 0.2–1)
BILIRUB UR QL STRIP: NEGATIVE
BUN SERPL-MCNC: 14 MG/DL (ref 5–25)
CALCIUM SERPL-MCNC: 10 MG/DL (ref 8.4–10.2)
CHLORIDE SERPL-SCNC: 103 MMOL/L (ref 96–108)
CLARITY UR: CLEAR
CO2 SERPL-SCNC: 26 MMOL/L (ref 21–32)
COLOR UR: NORMAL
CREAT SERPL-MCNC: 0.66 MG/DL (ref 0.6–1.3)
D DIMER PPP FEU-MCNC: 0.35 UG/ML FEU
EOSINOPHIL # BLD AUTO: 0.24 THOUSAND/ÂΜL (ref 0–0.61)
EOSINOPHIL NFR BLD AUTO: 2 % (ref 0–6)
ERYTHROCYTE [DISTWIDTH] IN BLOOD BY AUTOMATED COUNT: 11.7 % (ref 11.6–15.1)
GFR SERPL CREATININE-BSD FRML MDRD: 122 ML/MIN/1.73SQ M
GLUCOSE SERPL-MCNC: 85 MG/DL (ref 65–140)
GLUCOSE UR STRIP-MCNC: NEGATIVE MG/DL
HCT VFR BLD AUTO: 39.3 % (ref 34.8–46.1)
HGB BLD-MCNC: 13.8 G/DL (ref 11.5–15.4)
HGB UR QL STRIP.AUTO: NEGATIVE
IMM GRANULOCYTES # BLD AUTO: 0.02 THOUSAND/UL (ref 0–0.2)
IMM GRANULOCYTES NFR BLD AUTO: 0 % (ref 0–2)
KETONES UR STRIP-MCNC: NEGATIVE MG/DL
LACTATE SERPL-SCNC: 0.7 MMOL/L (ref 0.5–2)
LEUKOCYTE ESTERASE UR QL STRIP: NEGATIVE
LIPASE SERPL-CCNC: 13 U/L (ref 11–82)
LYMPHOCYTES # BLD AUTO: 2.94 THOUSANDS/ÂΜL (ref 0.6–4.47)
LYMPHOCYTES NFR BLD AUTO: 30 % (ref 14–44)
MAGNESIUM SERPL-MCNC: 2 MG/DL (ref 1.9–2.7)
MCH RBC QN AUTO: 33.3 PG (ref 26.8–34.3)
MCHC RBC AUTO-ENTMCNC: 35.1 G/DL (ref 31.4–37.4)
MCV RBC AUTO: 95 FL (ref 82–98)
MONOCYTES # BLD AUTO: 0.84 THOUSAND/ÂΜL (ref 0.17–1.22)
MONOCYTES NFR BLD AUTO: 9 % (ref 4–12)
NEUTROPHILS # BLD AUTO: 5.77 THOUSANDS/ÂΜL (ref 1.85–7.62)
NEUTS SEG NFR BLD AUTO: 58 % (ref 43–75)
NITRITE UR QL STRIP: NEGATIVE
NRBC BLD AUTO-RTO: 0 /100 WBCS
PH UR STRIP.AUTO: 7 [PH]
PLATELET # BLD AUTO: 368 THOUSANDS/UL (ref 149–390)
PMV BLD AUTO: 9.2 FL (ref 8.9–12.7)
POTASSIUM SERPL-SCNC: 3.8 MMOL/L (ref 3.5–5.3)
PROT SERPL-MCNC: 7.8 G/DL (ref 6.4–8.4)
PROT UR STRIP-MCNC: NEGATIVE MG/DL
RBC # BLD AUTO: 4.15 MILLION/UL (ref 3.81–5.12)
SODIUM SERPL-SCNC: 138 MMOL/L (ref 135–147)
SP GR UR STRIP.AUTO: <=1.005 (ref 1–1.03)
UROBILINOGEN UR QL STRIP.AUTO: 0.2 E.U./DL
WBC # BLD AUTO: 9.86 THOUSAND/UL (ref 4.31–10.16)

## 2023-03-31 RX ORDER — KETOROLAC TROMETHAMINE 30 MG/ML
15 INJECTION, SOLUTION INTRAMUSCULAR; INTRAVENOUS ONCE
Status: COMPLETED | OUTPATIENT
Start: 2023-03-31 | End: 2023-03-31

## 2023-03-31 RX ORDER — ONDANSETRON 4 MG/1
4 TABLET, ORALLY DISINTEGRATING ORAL EVERY 8 HOURS PRN
Qty: 20 TABLET | Refills: 0 | Status: SHIPPED | OUTPATIENT
Start: 2023-03-31

## 2023-03-31 RX ORDER — ONDANSETRON 2 MG/ML
4 INJECTION INTRAMUSCULAR; INTRAVENOUS ONCE
Status: COMPLETED | OUTPATIENT
Start: 2023-03-31 | End: 2023-03-31

## 2023-03-31 RX ORDER — MORPHINE SULFATE 4 MG/ML
4 INJECTION, SOLUTION INTRAMUSCULAR; INTRAVENOUS ONCE
Status: COMPLETED | OUTPATIENT
Start: 2023-03-31 | End: 2023-03-31

## 2023-03-31 RX ADMIN — IOHEXOL 100 ML: 350 INJECTION, SOLUTION INTRAVENOUS at 18:10

## 2023-03-31 RX ADMIN — MORPHINE SULFATE 4 MG: 4 INJECTION, SOLUTION INTRAMUSCULAR; INTRAVENOUS at 16:29

## 2023-03-31 RX ADMIN — KETOROLAC TROMETHAMINE 15 MG: 30 INJECTION, SOLUTION INTRAMUSCULAR; INTRAVENOUS at 16:31

## 2023-03-31 RX ADMIN — ONDANSETRON 4 MG: 2 INJECTION INTRAMUSCULAR; INTRAVENOUS at 16:26

## 2023-03-31 RX ADMIN — SODIUM CHLORIDE, SODIUM LACTATE, POTASSIUM CHLORIDE, AND CALCIUM CHLORIDE 1000 ML: .6; .31; .03; .02 INJECTION, SOLUTION INTRAVENOUS at 16:26

## 2023-03-31 RX ADMIN — MORPHINE SULFATE 2 MG: 2 INJECTION, SOLUTION INTRAMUSCULAR; INTRAVENOUS at 17:46

## 2023-03-31 NOTE — ED PROVIDER NOTES
History  Chief Complaint   Patient presents with   • Abdominal Pain     Pt had lap choley on 3/13/23  Since then has been having abdominal pain but today became severe with bloating and nausea     73-year-old female was lying in bed at approximately noon today when she had acute onset of worsening side sticker pain in the right upper quadrant right mid axial line which radiated to the periumbilical region this was associate with nausea and bloating she denies any vomiting  She had some chills but no fever  She has had no change to her stool denies any diarrhea constipation no melena or hematochezia the pain is worse if she takes a deep breath but is denying any chest pain or shortness of breath she has had no cough or upper respiratory complaints no falls or trauma no lightheadedness no dysuria or increased urinary frequency no leg swelling calf pain no prior history of DVT or PE it does radiate slightly to the back  She denies any vaginal bleeding or discharge  She did try some Tylenol prior to arrival   Patient had a recent laparoscopic cholecystectomy on 3/13/2023 for chronic right upper quadrant pain it did show some chronic inflammation she followed up with Dr Merlinda Combe on 3/27/2023 and had some mild periumbilical pain  PMHX: Hyperlipidemia varicose veins hypothyroidism GERD pancreatic insufficiency endometriosis past surgical history is laparoscopic cholecystectomy as discussed above hysterectomy and varicose vein repair  Prior to Admission Medications   Prescriptions Last Dose Informant Patient Reported? Taking?    Clindamycin Phos-Benzoyl Perox gel   No No   Sig: Apply 1 application topically every morning   Multiple Vitamin (MULTIVITAMIN) capsule   Yes No   Sig: Take 1 capsule by mouth daily   ciprofloxacin-dexamethasone (CIPRODEX) otic suspension   No No   Sig: Administer 4 drops to the right ear 2 (two) times a day   dicyclomine (BENTYL) 20 mg tablet   No No   Sig: TAKE 1 TABLET BY MOUTH EVERY 6 HOURS   Patient taking differently: Take 20 mg by mouth as needed   diphenoxylate-atropine (LOMOTIL) 2 5-0 025 mg per tablet   No No   Sig: Take 1 tablet by mouth 4 (four) times a day as needed for diarrhea   levothyroxine 88 mcg tablet   No No   Sig: Take 1 tablet (88 mcg total) by mouth daily in the early morning   naproxen sodium (ANAPROX) 550 mg tablet   Yes No   Sig: Take 550 mg by mouth as needed   omeprazole (PriLOSEC) 40 MG capsule   No No   Sig: take 1 capsule by mouth once daily   Patient taking differently: 40 mg every morning   ondansetron (Zofran ODT) 4 mg disintegrating tablet   No No   Sig: Take 1 tablet (4 mg total) by mouth every 6 (six) hours as needed for nausea or vomiting   oxyCODONE-acetaminophen (PERCOCET) 5-325 mg per tablet   No No   Sig: Take 1 tablet by mouth every 4 (four) hours as needed for moderate pain or severe pain Max Daily Amount: 6 tablets   pancrelipase, Lip-Prot-Amyl, (CREON) 24,000 units   No No   Sig: Take 24,000 units of lipase by mouth 3 (three) times a day with meals   Patient taking differently: Take 24,000 units of lipase by mouth daily before dinner      Facility-Administered Medications: None       Past Medical History:   Diagnosis Date   • Anemia    • COVID 03/2021   • Disease of thyroid gland    • Endometriosis    • GERD (gastroesophageal reflux disease)    • Hyperlipidemia    • Liver disease     hemangioma   • Ovarian cyst    • Pancreatic insufficiency        Past Surgical History:   Procedure Laterality Date   • DILATION AND CURETTAGE OF UTERUS     • HYSTERECTOMY     • AR ENDOVEN ABLTJ INCMPTNT VEIN XTR LASER 1ST VEIN Left 09/04/2020    Procedure: ENDOVASCULAR LASER THERAPY (EVLT) + stab phlebectomies;  Surgeon: Noa Alcantara DO;  [facility];  Service: Vascular   • AR LAPAROSCOPY SURG CHOLECYSTECTOMY N/A 3/13/2023    Procedure: CHOLECYSTECTOMY LAPAROSCOPIC;  Surgeon: Karime Fernando DO;  Location: [facility];  Service: General   • WISDOM TOOTH EXTRACTION         Family History   Problem Relation Age of Onset   • Cancer Mother    • Hypertension Mother    • OBI disease Mother    • Cancer Father    • Hypertension Father    • Diabetes Father    • Cancer Sister      I have reviewed and agree with the history as documented  E-Cigarette/Vaping   • E-Cigarette Use Never User      E-Cigarette/Vaping Substances   • Nicotine No    • THC No    • CBD No    • Flavoring No    • Other No    • Unknown No      Social History     Tobacco Use   • Smoking status: Former     Packs/day: 0 00     Types: Cigarettes     Quit date: 2020     Years since quittin 9   • Smokeless tobacco: Never   • Tobacco comments:     3 cigarettes per day   Vaping Use   • Vaping Use: Never used   Substance Use Topics   • Alcohol use: No   • Drug use: No       Review of Systems   Constitutional: Negative for activity change, appetite change, chills and fever  HENT: Negative for congestion, ear pain, rhinorrhea, sneezing and sore throat  Eyes: Negative for discharge  Respiratory: Negative for cough and shortness of breath  Cardiovascular: Negative for chest pain and leg swelling  Gastrointestinal: Positive for abdominal distention, abdominal pain (RUQ periumbilical) and nausea  Negative for blood in stool, diarrhea and vomiting  Endocrine: Negative for polyuria  Genitourinary: Negative for difficulty urinating, dysuria, frequency, urgency, vaginal bleeding and vaginal discharge  Musculoskeletal: Negative for back pain and myalgias  Skin: Positive for rash (left mid forearm not painful or itchy)  Neurological: Positive for headaches  Negative for dizziness, weakness, light-headedness and numbness  Hematological: Negative for adenopathy  Psychiatric/Behavioral: Negative for confusion  All other systems reviewed and are negative  Physical Exam  Physical Exam  Vitals and nursing note reviewed  Constitutional:       General: She is in acute distress (mild pain)  Appearance: She is well-developed  She is not ill-appearing, toxic-appearing or diaphoretic  HENT:      Head: Normocephalic and atraumatic  Right Ear: Tympanic membrane and external ear normal       Left Ear: Tympanic membrane and external ear normal       Nose: Nose normal       Mouth/Throat:      Mouth: Mucous membranes are moist       Pharynx: No oropharyngeal exudate  Eyes:      General:         Right eye: No discharge  Left eye: No discharge  Extraocular Movements: Extraocular movements intact  Conjunctiva/sclera: Conjunctivae normal       Pupils: Pupils are equal, round, and reactive to light  Neck:      Comments: No midline or paraspinous tenderness  Cardiovascular:      Rate and Rhythm: Normal rate and regular rhythm  Heart sounds: Normal heart sounds  Pulmonary:      Effort: Pulmonary effort is normal  No respiratory distress  Breath sounds: Normal breath sounds  No stridor  No wheezing or rhonchi  Abdominal:      General: Bowel sounds are normal  There is no distension  Palpations: Abdomen is soft  There is no mass  Tenderness: There is abdominal tenderness (RUQ greater than LUQ periumbilical)  There is no right CVA tenderness, left CVA tenderness, guarding or rebound  Comments: laproscopic incision c/d/I Back no midline or CVA tenderness   Musculoskeletal:         General: No tenderness or deformity  Normal range of motion  Cervical back: Normal range of motion and neck supple  No rigidity or tenderness  Lymphadenopathy:      Cervical: No cervical adenopathy  Skin:     General: Skin is warm and dry  Findings: Rash present  Comments: Left foream rash with erythma with papules questionable vesicles volar aspect of mid forearm  Not itchy or painful    Neurological:      Mental Status: She is alert and oriented to person, place, and time  Cranial Nerves: No cranial nerve deficit  Motor: No abnormal muscle tone  Coordination: Coordination normal       Comments: Gait steady         Vital Signs  ED Triage Vitals [03/31/23 1552]   Temperature Pulse Respirations Blood Pressure SpO2   98 °F (36 7 °C) 88 18 146/67 97 %      Temp src Heart Rate Source Patient Position - Orthostatic VS BP Location FiO2 (%)   -- Monitor Sitting Left arm --      Pain Score       10 - Worst Possible Pain           Vitals:    03/31/23 1700 03/31/23 1730 03/31/23 1830 03/31/23 1900   BP: 126/67 129/73 116/65 100/55   Pulse: 66 70 63 70   Patient Position - Orthostatic VS: Sitting Sitting Sitting Sitting         Visual Acuity      ED Medications  Medications   lactated ringers bolus 1,000 mL (0 mL Intravenous Stopped 3/31/23 2128)   ondansetron (ZOFRAN) injection 4 mg (4 mg Intravenous Given 3/31/23 1626)   ketorolac (TORADOL) injection 15 mg (15 mg Intravenous Given 3/31/23 1631)   morphine injection 4 mg (4 mg Intravenous Given 3/31/23 1629)   morphine injection 2 mg (2 mg Intravenous Given 3/31/23 1746)   iohexol (OMNIPAQUE) 350 MG/ML injection (SINGLE-DOSE) 100 mL (100 mL Intravenous Given 3/31/23 1810)       Diagnostic Studies  Results Reviewed     Procedure Component Value Units Date/Time    Lactic acid [[id number]]  (Normal) Collected: 03/31/23 1624    Lab Status: Final result Specimen: Blood from Arm, Right Updated: 03/31/23 1714     LACTIC ACID 0 7 mmol/L     Narrative:      Result may be elevated if tourniquet was used during collection      Basic metabolic panel [[id number]] Collected: 03/31/23 1624    Lab Status: Final result Specimen: Blood from Arm, Right Updated: 03/31/23 1714     Sodium 138 mmol/L      Potassium 3 8 mmol/L      Chloride 103 mmol/L      CO2 26 mmol/L      ANION GAP 9 mmol/L      BUN 14 mg/dL      Creatinine 0 66 mg/dL      Glucose 85 mg/dL      Calcium 10 0 mg/dL      eGFR 122 ml/min/1 73sq m     Narrative:      [place] guidelines for Chronic Kidney Disease (CKD):   •  Stage 1 with normal or high GFR (GFR > 90 mL/min/1 73 square meters)  •  Stage 2 Mild CKD (GFR = 60-89 mL/min/1 73 square meters)  •  Stage 3A Moderate CKD (GFR = 45-59 mL/min/1 73 square meters)  •  Stage 3B Moderate CKD (GFR = 30-44 mL/min/1 73 square meters)  •  Stage 4 Severe CKD (GFR = 15-29 mL/min/1 73 square meters)  •  Stage 5 End Stage CKD (GFR <15 mL/min/1 73 square meters)  Note: GFR calculation is accurate only with a steady state creatinine    Hepatic function panel [[id number]]  (Abnormal) Collected: 03/31/23 1624    Lab Status: Final result Specimen: Blood from Arm, Right Updated: 03/31/23 1714     Total Bilirubin 0 61 mg/dL      Bilirubin, Direct 0 09 mg/dL      Alkaline Phosphatase 51 U/L      AST 14 U/L      ALT 14 U/L      Total Protein 7 8 g/dL      Albumin 5 1 g/dL     Magnesium [[id number]]  (Normal) Collected: 03/31/23 1624    Lab Status: Final result Specimen: Blood from Arm, Right Updated: 03/31/23 1714     Magnesium 2 0 mg/dL     Lipase [[id number]]  (Normal) Collected: 03/31/23 1624    Lab Status: Final result Specimen: Blood from Arm, Right Updated: 03/31/23 1714     Lipase 13 u/L     D-Dimer [[id number]]  (Normal) Collected: 03/31/23 1624    Lab Status: Final result Specimen: Blood from Arm, Right Updated: 03/31/23 1704     D-Dimer, Quant 0 35 ug/ml FEU     UA w Reflex to Microscopic w Reflex to Culture [[id number]] Collected: 03/31/23 1624    Lab Status: Final result Specimen: Urine, Clean Catch Updated: 03/31/23 1653     Color, UA Light Yellow     Clarity, UA Clear     Specific Gravity, UA <=1 005     pH, UA 7 0     Leukocytes, UA Negative     Nitrite, UA Negative     Protein, UA Negative mg/dl      Glucose, UA Negative mg/dl      Ketones, UA Negative mg/dl      Urobilinogen, UA 0 2 E U /dl      Bilirubin, UA Negative     Occult Blood, UA Negative    CBC and differential [[id number]] Collected: 03/31/23 1624    Lab Status: Final result Specimen: Blood from Arm, Right Updated: 03/31/23 1638     WBC 9 86 Thousand/uL RBC 4 15 Million/uL      Hemoglobin 13 8 g/dL      Hematocrit 39 3 %      MCV 95 fL      MCH 33 3 pg      MCHC 35 1 g/dL      RDW 11 7 %      MPV 9 2 fL      Platelets 963 Thousands/uL      nRBC 0 /100 WBCs      Neutrophils Relative 58 %      Immat GRANS % 0 %      Lymphocytes Relative 30 %      Monocytes Relative 9 %      Eosinophils Relative 2 %      Basophils Relative 1 %      Neutrophils Absolute 5 77 Thousands/µL      Immature Grans Absolute 0 02 Thousand/uL      Lymphocytes Absolute 2 94 Thousands/µL      Monocytes Absolute 0 84 Thousand/µL      Eosinophils Absolute 0 24 Thousand/µL      Basophils Absolute 0 05 Thousands/µL                  CT abdomen pelvis with contrast   Final Result by Manish Porter MD (03/31 2009)      No acute abnormality  Status post cholecystectomy with no evidence of complication  Workstation performed: [id number]                    Procedures  Procedures         ED Course  ED Course as of 04/01/23 0346   Fri Mar 31, 2023   1725 Updated with labs still have pain will redose additional morphine                                             Medical Decision Making  Mdm: Acute onset of right upper quadrant pain radiating to the back in periumbilical region and a [id number] female who had a laparoscopic cholecystectomy on the 13th followup with general surgery 3/27/23 mild periumbilcal pain but otherwise doing well acute onset of symptoms today at noon  Secondary to a pleuritic component will assess for possibility of pulmonary embolism, less likely choledocholithiasis will check for hepatitis pancreatitis symptoms not really consistent with an exacerbation of GERD plan on evaluating for intra-abdominal infection with CT abdomen pelvis and to check for bowel  obstruction less likely if D-dimer positive will add CTA of chest evaluate anemia initiate symptomatic management with IV fluids Zofran Toradol and morphine    Rash is nonspecific blanchable appears dermatomes would be broad T2, C6; not painful making shingles less likely not itchy recommend observation no intraoral lesions    Extensively discussed labs and CT findings no evidence of infection PE, complication from surgery obstruction appendicitis lower resp infection  Recommend follow up  with GI; will return with  worsening or change in rash  May use bentyl as needed will rx zofran  Recommend bland diet patient comfortable with plan    Amount and/or Complexity of Data Reviewed  Labs: ordered  Radiology: ordered  Risk  Prescription drug management  Disposition  Final diagnoses:   Abdominal pain   Nausea   S/P laparoscopic cholecystectomy - 3/13/23 no evidence of complication     Time reflects when diagnosis was documented in both MDM as applicable and the Disposition within this note     Time User Action Codes Description Comment    3/31/2023  8:46 PM Ava Yi Add [R10 9] Abdominal pain     3/31/2023  8:46 PM Ava Yi Add [R11 0] Nausea     3/31/2023  8:46 PM Ava Yi Add [Z90 49] S/P laparoscopic cholecystectomy     3/31/2023  8:47 PM Ava Yi Modify [Z90 49] S/P laparoscopic cholecystectomy 3/13/23 no evidence of complication      ED Disposition     ED Disposition   Discharge    Condition   Stable    Date/Time   Fri Mar 31, 2023  8:48 PM    Comment   Shilpa Beil discharge to home/self care                 Follow-up Information     Follow up With Specialties Details Why Contact Info Additional [facility] Call in 3 days peristant abdominal pain [address] [address]  Sonia Donovan [facility] Sulaiman King [address] [place] [address], [address] [place] [address]          Discharge Medication List as of 3/31/2023  8:54 PM      START taking these medications    Details   !! ondansetron (ZOFRAN-ODT) 4 mg disintegrating tablet Take 1 tablet (4 mg total) by mouth every 8 (eight) hours as needed for nausea or vomiting for up to 20 doses, Starting Fri 3/31/2023, Normal       !! - Potential duplicate medications found  Please discuss with provider  CONTINUE these medications which have NOT CHANGED    Details   ciprofloxacin-dexamethasone (CIPRODEX) otic suspension Administer 4 drops to the right ear 2 (two) times a day, Starting Mon 2/27/2023, Normal      Clindamycin Phos-Benzoyl Perox gel Apply 1 application topically every morning, Starting Thu 2/23/2023, Normal      dicyclomine (BENTYL) 20 mg tablet TAKE 1 TABLET BY MOUTH EVERY 6 HOURS, Normal      diphenoxylate-atropine (LOMOTIL) 2 5-0 025 mg per tablet Take 1 tablet by mouth 4 (four) times a day as needed for diarrhea, Starting Mon 8/15/2022, Normal      levothyroxine 88 mcg tablet Take 1 tablet (88 mcg total) by mouth daily in the early morning, Starting Mon 2/27/2023, Normal      Multiple Vitamin (MULTIVITAMIN) capsule Take 1 capsule by mouth daily, Historical Med      naproxen sodium (ANAPROX) 550 mg tablet Take 550 mg by mouth as needed, Starting Wed 5/18/2022, Historical Med      omeprazole (PriLOSEC) 40 MG capsule take 1 capsule by mouth once daily, Normal      !! ondansetron (Zofran ODT) 4 mg disintegrating tablet Take 1 tablet (4 mg total) by mouth every 6 (six) hours as needed for nausea or vomiting, Starting Tue 1/17/2023, Normal      oxyCODONE-acetaminophen (PERCOCET) 5-325 mg per tablet Take 1 tablet by mouth every 4 (four) hours as needed for moderate pain or severe pain Max Daily Amount: 6 tablets, Starting Mon 3/13/2023, Normal      pancrelipase, Lip-Prot-Amyl, (CREON) 24,000 units Take 24,000 units of lipase by mouth 3 (three) times a day with meals, Starting Tue 1/17/2023, Normal       !! - Potential duplicate medications found  Please discuss with provider  No discharge procedures on file      PDMP Review       Value Time User    PDMP Reviewed  Yes 8/16/2022  7:40 PM Santa Vela PA-C ED Provider  Electronically Signed by           Josy Payan MD  04/01/23 0024

## 2023-03-31 NOTE — ED NOTES
Blister like rash noted on inner area of left forearm  No drainage noted  Dr Luz Elena Iqbal made aware        Zoltan Crow RN  03/31/23 9431

## 2023-03-31 NOTE — Clinical Note
Danna Quintero was seen and treated in our emergency department on 3/31/2023  Diagnosis:     Luca Damon  may return to work on return date  She may return on this date: 04/03/2023         If you have any questions or concerns, please don't hesitate to call        Eloisa Quiles MD    ______________________________           _______________          _______________  Hospital Representative                              Date                                Time

## 2023-04-01 NOTE — DISCHARGE INSTRUCTIONS
Zofran as needed for nausea  Plenty of fliuds gatorade powerade freeze pops jello apple juice  OK to take Bentyl as previously prescribed  Yalobusha diet bannas rice applesauce toast   Return with fever inability to keep fluids down dark tarry stools red stools  No drinking driving or heavy machinery use for 6 hours after discharge

## 2023-04-04 ENCOUNTER — OFFICE VISIT (OUTPATIENT)
Dept: URGENT CARE | Facility: MEDICAL CENTER | Age: 28
End: 2023-04-04

## 2023-04-04 VITALS
WEIGHT: 165 LBS | DIASTOLIC BLOOD PRESSURE: 78 MMHG | SYSTOLIC BLOOD PRESSURE: 124 MMHG | OXYGEN SATURATION: 98 % | HEIGHT: 68 IN | BODY MASS INDEX: 25.01 KG/M2 | RESPIRATION RATE: 20 BRPM | HEART RATE: 110 BPM | TEMPERATURE: 98.9 F

## 2023-04-04 DIAGNOSIS — L25.9 CONTACT DERMATITIS, UNSPECIFIED CONTACT DERMATITIS TYPE, UNSPECIFIED TRIGGER: Primary | ICD-10-CM

## 2023-04-04 NOTE — PROGRESS NOTES
[facility]        NAME: Inés Taylor is a 33 y o  female  : 1995    MRN: [id number]  DATE: 2023  TIME: 12:42 PM    Assessment and Plan   Contact dermatitis, unspecified contact dermatitis type, unspecified trigger [L25 9]  1  Contact dermatitis, unspecified contact dermatitis type, unspecified trigger  fluocinonide (LIDEX) 0 05 % cream            Patient Instructions       Follow up with PCP in 3-5 days  Proceed to  ER if symptoms worsen  Chief Complaint     Chief Complaint   Patient presents with   • Rash     Possible allergic reaction to laundry detergent, pt  Broke out into a rash to left arm and neck, treating with benadryl and cortisone cream with no relief, noticed rash on Friday          History of Present Illness       Patient presents with a 4 day hx of a rash on her right upper back and left forearm  Started after she wore a sweater with was irritating her  Unsure if the sweater was washed in a different laundry detergent  Applying OTC HC 1 % cream with minimal improvement  Review of Systems   Review of Systems   Constitutional: Negative for chills and fever  Skin: Positive for rash           Current Medications       Current Outpatient Medications:   •  dicyclomine (BENTYL) 20 mg tablet, TAKE 1 TABLET BY MOUTH EVERY 6 HOURS (Patient taking differently: Take 20 mg by mouth as needed), Disp: 60 tablet, Rfl: 6  •  diphenoxylate-atropine (LOMOTIL) 2 5-0 025 mg per tablet, Take 1 tablet by mouth 4 (four) times a day as needed for diarrhea, Disp: 120 tablet, Rfl: 5  •  fluocinonide (LIDEX) 0 05 % cream, Apply topically 2 (two) times a day, Disp: 45 g, Rfl: 1  •  levothyroxine 88 mcg tablet, Take 1 tablet (88 mcg total) by mouth daily in the early morning, Disp: 90 tablet, Rfl: 3  •  Multiple Vitamin (MULTIVITAMIN) capsule, Take 1 capsule by mouth daily, Disp: , Rfl:   •  omeprazole (PriLOSEC) 40 MG capsule, take 1 capsule by mouth once daily (Patient taking differently: 40 mg every morning), Disp: 30 capsule, Rfl: 3  •  pancrelipase, Lip-Prot-Amyl, (CREON) 24,000 units, Take 24,000 units of lipase by mouth 3 (three) times a day with meals (Patient taking differently: Take 24,000 units of lipase by mouth daily before dinner), Disp: 90 capsule, Rfl: 0  •  ciprofloxacin-dexamethasone (CIPRODEX) otic suspension, Administer 4 drops to the right ear 2 (two) times a day (Patient not taking: Reported on 4/4/2023), Disp: 7 5 mL, Rfl: 0  •  Clindamycin Phos-Benzoyl Perox gel, Apply 1 application topically every morning, Disp: 45 g, Rfl: 5  •  naproxen sodium (ANAPROX) 550 mg tablet, Take 550 mg by mouth as needed (Patient not taking: Reported on 4/4/2023), Disp: , Rfl:   •  ondansetron (Zofran ODT) 4 mg disintegrating tablet, Take 1 tablet (4 mg total) by mouth every 6 (six) hours as needed for nausea or vomiting (Patient not taking: Reported on 4/4/2023), Disp: 20 tablet, Rfl: 0  •  ondansetron (ZOFRAN-ODT) 4 mg disintegrating tablet, Take 1 tablet (4 mg total) by mouth every 8 (eight) hours as needed for nausea or vomiting for up to 20 doses (Patient not taking: Reported on 4/4/2023), Disp: 20 tablet, Rfl: 0  •  oxyCODONE-acetaminophen (PERCOCET) 5-325 mg per tablet, Take 1 tablet by mouth every 4 (four) hours as needed for moderate pain or severe pain Max Daily Amount: 6 tablets (Patient not taking: Reported on 4/4/2023), Disp: 20 tablet, Rfl: 0    Current Allergies     Allergies as of 04/04/2023   • (No Known Allergies)            The following portions of the patient's history were reviewed and updated as appropriate: allergies, current medications, past family history, past medical history, past social history, past surgical history and problem list      Past Medical History:   Diagnosis Date   • Anemia    • COVID 03/2021   • Disease of thyroid gland    • Endometriosis    • GERD (gastroesophageal reflux disease)    • Hyperlipidemia    • Liver disease     hemangioma   • Ovarian cyst    • "Pancreatic insufficiency        Past Surgical History:   Procedure Laterality Date   • DILATION AND CURETTAGE OF UTERUS     • HYSTERECTOMY     • SD ENDOVEN ABLTJ INCMPTNT VEIN XTR LASER 1ST VEIN Left 09/04/2020    Procedure: ENDOVASCULAR LASER THERAPY (EVLT) + stab phlebectomies;  Surgeon: Dao Hogan DO;  Location[facility];  Service: Vascular   • SD LAPAROSCOPY SURG CHOLECYSTECTOMY N/A 3/13/2023    Procedure: CHOLECYSTECTOMY LAPAROSCOPIC;  Surgeon: Heidy Chambers DO;  Location: [facility];  Service: General   • WISDOM TOOTH EXTRACTION         Family History   Problem Relation Age of Onset   • Cancer Mother    • Hypertension Mother    • OBI disease Mother    • Cancer Father    • Hypertension Father    • Diabetes Father    • Cancer Sister          Medications have been verified  Objective   /78   Pulse (!) 110   Temp 98 9 °F (37 2 °C)   Resp 20   Ht 5' 8\" (1 727 m)   Wt 74 8 kg (165 lb)   LMP 02/06/2021   SpO2 98%   BMI 25 09 kg/m²   Patient's last menstrual period was 02/06/2021  Physical Exam     Physical Exam  Vitals and nursing note reviewed  Constitutional:       Appearance: Normal appearance  HENT:      Head: Normocephalic and atraumatic  Cardiovascular:      Rate and Rhythm: Normal rate and regular rhythm  Pulmonary:      Effort: Pulmonary effort is normal    Skin:     General: Skin is warm  Findings: Rash (papular rash right forearm volar aspect  same rash which is reolving on right upper back ) present  Neurological:      Mental Status: She is alert                     "

## 2023-05-05 ENCOUNTER — OFFICE VISIT (OUTPATIENT)
Dept: URGENT CARE | Facility: MEDICAL CENTER | Age: 28
End: 2023-05-05

## 2023-05-05 VITALS
SYSTOLIC BLOOD PRESSURE: 124 MMHG | BODY MASS INDEX: 25.7 KG/M2 | HEART RATE: 100 BPM | TEMPERATURE: 98.2 F | HEIGHT: 68 IN | WEIGHT: 169.6 LBS | OXYGEN SATURATION: 98 % | DIASTOLIC BLOOD PRESSURE: 82 MMHG | RESPIRATION RATE: 20 BRPM

## 2023-05-05 DIAGNOSIS — J04.0 ACUTE LARYNGITIS: Primary | ICD-10-CM

## 2023-05-05 DIAGNOSIS — J06.9 ACUTE URI: ICD-10-CM

## 2023-05-05 RX ORDER — FLUTICASONE PROPIONATE 50 MCG
1 SPRAY, SUSPENSION (ML) NASAL DAILY
Qty: 11.1 ML | Refills: 0 | Status: SHIPPED | OUTPATIENT
Start: 2023-05-05

## 2023-05-05 RX ORDER — DEXAMETHASONE SODIUM PHOSPHATE 10 MG/ML
8 INJECTION, SOLUTION INTRAMUSCULAR; INTRAVENOUS ONCE
Status: COMPLETED | OUTPATIENT
Start: 2023-05-05 | End: 2023-05-05

## 2023-05-05 RX ORDER — GUAIFENESIN 600 MG/1
1200 TABLET, EXTENDED RELEASE ORAL EVERY 12 HOURS SCHEDULED
Qty: 30 TABLET | Refills: 0 | Status: SHIPPED | OUTPATIENT
Start: 2023-05-05

## 2023-05-05 RX ADMIN — DEXAMETHASONE SODIUM PHOSPHATE 8 MG: 10 INJECTION, SOLUTION INTRAMUSCULAR; INTRAVENOUS at 12:28

## 2023-05-05 NOTE — PROGRESS NOTES
[facility]        NAME: Fabi Home is a 33 y o  female  : 1995    MRN: [id number]  DATE: May 5, 2023  TIME: 1:00 PM    Assessment and Plan   Acute laryngitis [J04 0]  1  Acute laryngitis  dexamethasone (PF) (DECADRON) injection 8 mg      2  Acute URI  dexamethasone (PF) (DECADRON) injection 8 mg    guaiFENesin (MUCINEX) 600 mg 12 hr tablet    fluticasone (FLONASE) 50 mcg/act nasal spray            Patient Instructions       Follow up with PCP in 3-5 days  Proceed to  ER if symptoms worsen  Chief Complaint     Chief Complaint   Patient presents with    Sore Throat     Sore throat with loss of voice, both daughters just had an upper respiratory infection, When coughing green phlegm is noticed, nausea, ear began bothering her this morning as well  Patient currently using tylenol, cold and flu medicine, and cough drops with minimal relief          History of Present Illness       Patient was recently on Augmentin (BID x 7days ) for Dental infection  Patient here today for URI symptoms, which started on Wednesday  Yesterday loss of voice  Taking Tylenol Cold and Flu, throat lozenges OTC, as well as an OTC 24hr allergy medication    URI   This is a new problem  The current episode started in the past 7 days  The problem has been unchanged  There has been no fever  Associated symptoms include congestion, coughing, headaches, nausea, rhinorrhea, sinus pain and a sore throat  Pertinent negatives include no abdominal pain, chest pain, diarrhea, dysuria, ear pain, joint pain, joint swelling, neck pain, plugged ear sensation, rash, swollen glands, vomiting or wheezing  She has tried antihistamine, sleep, decongestant and acetaminophen for the symptoms  The treatment provided mild relief  Review of Systems   Review of Systems   Constitutional: Negative for appetite change, chills and fever  HENT: Positive for congestion, rhinorrhea, sinus pain and sore throat  Negative for ear pain  Eyes: Negative for pain and visual disturbance  Respiratory: Positive for cough  Negative for shortness of breath and wheezing  Cardiovascular: Negative for chest pain and palpitations  Gastrointestinal: Positive for nausea  Negative for abdominal pain, diarrhea and vomiting  Genitourinary: Negative for dysuria and hematuria  Musculoskeletal: Negative for arthralgias, back pain, joint pain and neck pain  Skin: Negative for color change and rash  Neurological: Positive for headaches  Negative for seizures and syncope  All other systems reviewed and are negative  Current Medications       Current Outpatient Medications:     dicyclomine (BENTYL) 20 mg tablet, TAKE 1 TABLET BY MOUTH EVERY 6 HOURS (Patient taking differently: Take 20 mg by mouth as needed), Disp: 60 tablet, Rfl: 6    diphenoxylate-atropine (LOMOTIL) 2 5-0 025 mg per tablet, Take 1 tablet by mouth 4 (four) times a day as needed for diarrhea, Disp: 120 tablet, Rfl: 5    fluticasone (FLONASE) 50 mcg/act nasal spray, 1 spray into each nostril daily, Disp: 11 1 mL, Rfl: 0    guaiFENesin (MUCINEX) 600 mg 12 hr tablet, Take 2 tablets (1,200 mg total) by mouth every 12 (twelve) hours, Disp: 30 tablet, Rfl: 0    levothyroxine 88 mcg tablet, Take 1 tablet (88 mcg total) by mouth daily in the early morning, Disp: 90 tablet, Rfl: 3    Multiple Vitamin (MULTIVITAMIN) capsule, Take 1 capsule by mouth daily, Disp: , Rfl:     omeprazole (PriLOSEC) 40 MG capsule, take 1 capsule by mouth once daily (Patient taking differently: 40 mg every morning), Disp: 30 capsule, Rfl: 3    pancrelipase, Lip-Prot-Amyl, (CREON) 24,000 units, Take 24,000 units of lipase by mouth 3 (three) times a day with meals (Patient taking differently: Take 24,000 units of lipase by mouth daily before dinner), Disp: 90 capsule, Rfl: 0  No current facility-administered medications for this visit      Current Allergies     Allergies as of 05/05/2023    (No Known "Allergies)            The following portions of the patient's history were reviewed and updated as appropriate: allergies, current medications, past family history, past medical history, past social history, past surgical history and problem list      Past Medical History:   Diagnosis Date    Anemia     COVID 03/2021    Disease of thyroid gland     Endometriosis     GERD (gastroesophageal reflux disease)     Hyperlipidemia     Liver disease     hemangioma    Ovarian cyst     Pancreatic insufficiency        Past Surgical History:   Procedure Laterality Date    DILATION AND CURETTAGE OF UTERUS      HYSTERECTOMY      [place] ENDOVEN ABLTJ INCMPTNT VEIN XTR LASER 1ST VEIN Left 09/04/2020    Procedure: ENDOVASCULAR LASER THERAPY (EVLT) + stab phlebectomies;  Surgeon: Diann Pritchett DO;  [facility];  Service: Vascular    [place] LAPAROSCOPY SURG CHOLECYSTECTOMY N/A 3/13/2023    Procedure: CHOLECYSTECTOMY LAPAROSCOPIC;  Surgeon: Dai Rapp DO;  Location: [facility];  Service: General    WISDOM TOOTH EXTRACTION         Family History   Problem Relation Age of Onset    Cancer Mother     Hypertension Mother     OBI disease Mother     Cancer Father     Hypertension Father     Diabetes Father     Cancer Sister          Medications have been verified  Objective   /82   Pulse 100   Temp 98 2 °F (36 8 °C) (Temporal)   Resp 20   Ht 5' 8\" (1 727 m)   Wt 76 9 kg (169 lb 9 6 oz)   LMP 02/06/2021   SpO2 98%   BMI 25 79 kg/m²        Physical Exam     Physical Exam  Vitals and nursing note reviewed  Constitutional:       General: She is not in acute distress  Appearance: Normal appearance  She is normal weight  She is not ill-appearing  HENT:      Head: Normocephalic and atraumatic        Right Ear: Tympanic membrane, ear canal and external ear normal       Left Ear: Tympanic membrane, ear canal and external ear normal       Nose: Nose normal       Mouth/Throat:      Mouth: " Mucous membranes are moist       Pharynx: Oropharynx is clear  Eyes:      Extraocular Movements: Extraocular movements intact  Conjunctiva/sclera: Conjunctivae normal       Pupils: Pupils are equal, round, and reactive to light  Cardiovascular:      Rate and Rhythm: Normal rate and regular rhythm  Pulses: Normal pulses  Heart sounds: Normal heart sounds  Pulmonary:      Effort: Pulmonary effort is normal       Breath sounds: Decreased breath sounds present  Comments: Productive cough for green mucus  Abdominal:      General: Abdomen is flat  Bowel sounds are normal       Palpations: Abdomen is soft  Musculoskeletal:         General: Normal range of motion  Cervical back: Normal range of motion and neck supple  Skin:     General: Skin is warm  Capillary Refill: Capillary refill takes less than 2 seconds  Neurological:      General: No focal deficit present  Mental Status: She is alert and oriented to person, place, and time     Psychiatric:         Mood and Affect: Mood normal          Behavior: Behavior normal

## 2023-05-05 NOTE — PATIENT INSTRUCTIONS
You may take over the counter Tylenol (Acetaminophen) and/or Motrin (Ibuprofen) as needed, as directed on packaging  Be sure to get plenty of rest, and drinking fluids to remain hydrated  Over the counter decongestants can be used, only as directed on the box  However if you have any history of cardiac disease or high blood pressure these should be avoided, as we caution the use of these since they can make place strain on your heart and cause increase in blood pressure  It is recommended in lieu of decongestants to use cool mist vaporizer, saline nasal spray to relieve nasal congestion  It is also important to remain hydrated and drink plenty of fluids (avoiding caffeine and alcohol)  The unnecessary use of antibiotics can have harmful affect, unwanted side-effects and can lead to antibiotic resistant bacteria in the future  You are being treated today for a viral illness  Viral illnesses do not require antibiotics, and are treated symptomatically  According to the Centers for Disease Control and Prevention, about one-third of antibiotic use in the outpatient setting, is not needed nor appropriate  Antibiotics treat infections caused by bacteria  But they don't treat infections caused by viruses (viral infections)  For example, an antibiotic is the correct treatment for strep throat, which is caused by bacteria  But it's not the right treatment for most sore throats, which are caused by viruses  By being proactive and treating your individual symptoms, this may help you feel better  You may have had testing done today which when completed and resulted may change the course of your treatment  It is at that time that if a change in your treatment is necessary that you will hear from our office  I would also recommend you follow up with your primary care provider in the next few days  Chace Wagner

## 2023-06-11 ENCOUNTER — OFFICE VISIT (OUTPATIENT)
Dept: URGENT CARE | Facility: MEDICAL CENTER | Age: 28
End: 2023-06-11
Payer: COMMERCIAL

## 2023-06-11 ENCOUNTER — APPOINTMENT (OUTPATIENT)
Dept: RADIOLOGY | Facility: MEDICAL CENTER | Age: 28
End: 2023-06-11
Payer: COMMERCIAL

## 2023-06-11 VITALS
HEART RATE: 85 BPM | HEIGHT: 68 IN | SYSTOLIC BLOOD PRESSURE: 120 MMHG | WEIGHT: 172 LBS | DIASTOLIC BLOOD PRESSURE: 70 MMHG | OXYGEN SATURATION: 98 % | TEMPERATURE: 98.6 F | RESPIRATION RATE: 18 BRPM | BODY MASS INDEX: 26.07 KG/M2

## 2023-06-11 DIAGNOSIS — S93.402A SPRAIN OF LEFT ANKLE, UNSPECIFIED LIGAMENT, INITIAL ENCOUNTER: Primary | ICD-10-CM

## 2023-06-11 DIAGNOSIS — S99.911A INJURY OF RIGHT ANKLE, INITIAL ENCOUNTER: ICD-10-CM

## 2023-06-11 PROCEDURE — 99212 OFFICE O/P EST SF 10 MIN: CPT | Performed by: PHYSICIAN ASSISTANT

## 2023-06-11 PROCEDURE — 73610 X-RAY EXAM OF ANKLE: CPT

## 2023-06-11 PROCEDURE — S9088 SERVICES PROVIDED IN URGENT: HCPCS | Performed by: PHYSICIAN ASSISTANT

## 2023-06-11 NOTE — PROGRESS NOTES
[facility]        NAME: Russ Torre is a 33 y o  female  : 1995    MRN: [id number]  DATE: 2023  TIME: 1:32 PM    Assessment and Plan   Sprain of left ankle, unspecified ligament, initial encounter [S93 402A]  1  Sprain of left ankle, unspecified ligament, initial encounter        2  Injury of right ankle, initial encounter  XR ankle 3+ vw left            Patient Instructions       Follow up with PCP in 3-5 days  Proceed to  ER if symptoms worsen  Chief Complaint     Chief Complaint   Patient presents with   • Ankle Pain     Left ankle pain that started yesterday was chasing the kids around at the park and heard a pop  History of Present Illness       And was chasing her children at the park yesterday when she felt a pop in her left ankle which caused her to start falling  Pain in the left ankle continues today  Review of Systems   Review of Systems   Constitutional: Negative for fever     Musculoskeletal:        Left ankle pain         Current Medications       Current Outpatient Medications:   •  dicyclomine (BENTYL) 20 mg tablet, TAKE 1 TABLET BY MOUTH EVERY 6 HOURS (Patient taking differently: Take 20 mg by mouth as needed), Disp: 60 tablet, Rfl: 6  •  diphenoxylate-atropine (LOMOTIL) 2 5-0 025 mg per tablet, Take 1 tablet by mouth 4 (four) times a day as needed for diarrhea, Disp: 120 tablet, Rfl: 5  •  fluticasone (FLONASE) 50 mcg/act nasal spray, 1 spray into each nostril daily, Disp: 11 1 mL, Rfl: 0  •  levothyroxine 88 mcg tablet, Take 1 tablet (88 mcg total) by mouth daily in the early morning, Disp: 90 tablet, Rfl: 3  •  Multiple Vitamin (MULTIVITAMIN) capsule, Take 1 capsule by mouth daily, Disp: , Rfl:   •  omeprazole (PriLOSEC) 40 MG capsule, take 1 capsule by mouth once daily (Patient taking differently: 40 mg every morning), Disp: 30 capsule, Rfl: 3  •  pancrelipase, Lip-Prot-Amyl, (CREON) 24,000 units, Take 24,000 units of lipase by mouth 3 (three) times a "day with meals (Patient taking differently: Take 24,000 units of lipase by mouth daily before dinner), Disp: 90 capsule, Rfl: 0  •  guaiFENesin (MUCINEX) 600 mg 12 hr tablet, Take 2 tablets (1,200 mg total) by mouth every 12 (twelve) hours (Patient not taking: Reported on 6/11/2023), Disp: 30 tablet, Rfl: 0    Current Allergies     Allergies as of 06/11/2023   • (No Known Allergies)            The following portions of the patient's history were reviewed and updated as appropriate: allergies, current medications, past family history, past medical history, past social history, past surgical history and problem list      Past Medical History:   Diagnosis Date   • Anemia    • COVID 03/2021   • Disease of thyroid gland    • Endometriosis    • GERD (gastroesophageal reflux disease)    • Hyperlipidemia    • Liver disease     hemangioma   • Ovarian cyst    • Pancreatic insufficiency        Past Surgical History:   Procedure Laterality Date   • DILATION AND CURETTAGE OF UTERUS     • HYSTERECTOMY     • [place] ENDOVEN ABLTJ INCMPTNT VEIN XTR LASER 1ST VEIN Left 09/04/2020    Procedure: ENDOVASCULAR LASER THERAPY (EVLT) + stab phlebectomies;  Surgeon: Nicholas Mariscal DO;  [facility];  Service: Vascular   • [place] LAPAROSCOPY SURG CHOLECYSTECTOMY N/A 3/13/2023    Procedure: CHOLECYSTECTOMY LAPAROSCOPIC;  Surgeon: James Honeycutt DO;  [facility];  Service: General   • WISDOM TOOTH EXTRACTION         Family History   Problem Relation Age of Onset   • Cancer Mother    • Hypertension Mother    • OBI disease Mother    • Cancer Father    • Hypertension Father    • Diabetes Father    • Cancer Sister          Medications have been verified  Objective   /70   Pulse 85   Temp 98 6 °F (37 °C)   Resp 18   Ht 5' 8\" (1 727 m)   Wt 78 kg (172 lb)   LMP 01/07/2021 (Approximate)   SpO2 98%   BMI 26 15 kg/m²   Patient's last menstrual period was 01/07/2021 (approximate)         Physical Exam     Physical " Exam  Vitals and nursing note reviewed  Constitutional:       Appearance: Normal appearance  HENT:      Head: Normocephalic and atraumatic  Cardiovascular:      Rate and Rhythm: Normal rate and regular rhythm  Pulmonary:      Effort: Pulmonary effort is normal    Musculoskeletal:      Comments: Left ankle without ecchymosis swelling rashes or wounds  Sensation intact to light touch capillary refill less than 2 seconds pain over the entire anterior ankle  Skin:     General: Skin is warm  Neurological:      Mental Status: She is alert  Left ankle xr no acute osseous abnormality

## 2023-06-11 NOTE — PATIENT INSTRUCTIONS
Rest  Ice  Elevation  Take an anti-inflammatory as needed for pain  If no improvement follow up with orthopedics

## 2023-09-06 NOTE — TELEPHONE ENCOUNTER
Pt wanted to cancled ultrasound, colon and follow up, currently being tested for COVID will call back when ready to r s 15

## 2023-09-20 ENCOUNTER — TELEMEDICINE (OUTPATIENT)
Dept: FAMILY MEDICINE CLINIC | Facility: CLINIC | Age: 28
End: 2023-09-20
Payer: COMMERCIAL

## 2023-09-20 DIAGNOSIS — L50.9 URTICARIA: Primary | ICD-10-CM

## 2023-09-20 PROCEDURE — 99213 OFFICE O/P EST LOW 20 MIN: CPT | Performed by: PHYSICIAN ASSISTANT

## 2023-09-20 RX ORDER — PREDNISONE 20 MG/1
40 TABLET ORAL DAILY
Qty: 10 TABLET | Refills: 0 | Status: SHIPPED | OUTPATIENT
Start: 2023-09-20 | End: 2023-09-25

## 2023-09-20 RX ORDER — CETIRIZINE HYDROCHLORIDE 10 MG/1
10 TABLET ORAL DAILY
Qty: 90 TABLET | Refills: 0 | Status: SHIPPED | OUTPATIENT
Start: 2023-09-20

## 2023-09-20 NOTE — PROGRESS NOTES
Virtual Regular Visit    Verification of patient location:    Patient is located at Home in the following state in which I hold an active license [place]      Assessment/Plan:    Problem List Items Addressed This Visit    None  Visit Diagnoses     Urticaria    -  Primary    Relevant Medications    cetirizine (ZyrTEC) 10 mg tablet    predniSONE 20 mg tablet    Other Relevant Orders    [facility] Panel, Adult        -cont benadryl, add zyrtec and prednisone   -FUP after allergy testing       Reason for visit is   Chief Complaint   Patient presents with   • Virtual Regular Visit        Encounter provider Sayda Macdonald PA-C    Provider located at [address] [facility] [address]      Recent Visits  No visits were found meeting these conditions. Showing recent visits within past 7 days and meeting all other requirements  Today's Visits  Date Type Provider Dept   09/20/23 Telemedicine Tia Beal PA-C [facility]   Showing today's visits and meeting all other requirements  Future Appointments  No visits were found meeting these conditions. Showing future appointments within next 150 days and meeting all other requirements       The patient was identified by name and date of birth. Adan Castañeda was informed that this is a telemedicine visit and that the visit is being conducted through the [employer]. She agrees to proceed. .  My office door was closed. No one else was in the room. She acknowledged consent and understanding of privacy and security of the video platform. The patient has agreed to participate and understands they can discontinue the visit at any time. Patient is aware this is a billable service. Subjective  Adan Castañeda is a 33 y.o. female presents via telemedicine for concern of hives. States she Is having recurrent episodes with most recent starting Monday.  She has them on her bilateral upper arms and face. They are itchy. She has no new exposures that she is aware of. Reports seasonal allergies with sore throat and runny nose. Hx of tomato allergy as a child but not recently. Has been using hydrocortisone cream and benadryl with fair relief. No recent illness.        HPI     Past Medical History:   Diagnosis Date   • Anemia    • COVID 03/2021   • Disease of thyroid gland    • Endometriosis    • GERD (gastroesophageal reflux disease)    • Hyperlipidemia    • Liver disease     hemangioma   • Ovarian cyst    • Pancreatic insufficiency        Past Surgical History:   Procedure Laterality Date   • DILATION AND CURETTAGE OF UTERUS     • HYSTERECTOMY     • [place] ENDOVEN ABLTJ INCMPTNT VEIN XTR LASER 1ST VEIN Left 09/04/2020    Procedure: ENDOVASCULAR LASER THERAPY (EVLT) + stab phlebectomies;  Surgeon: Gisele Schwarz DO;  [facility];  Service: Vascular   • [place] LAPAROSCOPY SURG CHOLECYSTECTOMY N/A 3/13/2023    Procedure: CHOLECYSTECTOMY LAPAROSCOPIC;  Surgeon: Anastasiia Brewster DO;  Location: [facility];  Service: General   • WISDOM TOOTH EXTRACTION         Current Outpatient Medications   Medication Sig Dispense Refill   • cetirizine (ZyrTEC) 10 mg tablet Take 1 tablet (10 mg total) by mouth daily 90 tablet 0   • predniSONE 20 mg tablet Take 2 tablets (40 mg total) by mouth daily for 5 days Take with food 10 tablet 0   • dicyclomine (BENTYL) 20 mg tablet TAKE 1 TABLET BY MOUTH EVERY 6 HOURS (Patient taking differently: Take 20 mg by mouth as needed) 60 tablet 6   • diphenoxylate-atropine (LOMOTIL) 2.5-0.025 mg per tablet Take 1 tablet by mouth 4 (four) times a day as needed for diarrhea 120 tablet 5   • fluticasone (FLONASE) 50 mcg/act nasal spray 1 spray into each nostril daily 11.1 mL 0   • guaiFENesin (MUCINEX) 600 mg 12 hr tablet Take 2 tablets (1,200 mg total) by mouth every 12 (twelve) hours (Patient not taking: Reported on 6/11/2023) 30 tablet 0   • levothyroxine 88 mcg tablet Take 1 tablet (88 mcg total) by mouth daily in the early morning 90 tablet 3   • Multiple Vitamin (MULTIVITAMIN) capsule Take 1 capsule by mouth daily     • omeprazole (PriLOSEC) 40 MG capsule take 1 capsule by mouth once daily (Patient taking differently: 40 mg every morning) 30 capsule 3   • pancrelipase, Lip-Prot-Amyl, (CREON) 24,000 units Take 24,000 units of lipase by mouth 3 (three) times a day with meals (Patient taking differently: Take 24,000 units of lipase by mouth daily before dinner) 90 capsule 0     No current facility-administered medications for this visit. No Known Allergies    Review of Systems   Constitutional: Negative. HENT: Positive for rhinorrhea and sore throat. Respiratory: Negative. Cardiovascular: Negative. Skin: Positive for rash. Video Exam    There were no vitals filed for this visit.     Physical Exam     Visit Time  Total Visit Duration: 10

## 2023-09-21 ENCOUNTER — APPOINTMENT (OUTPATIENT)
Dept: LAB | Facility: MEDICAL CENTER | Age: 28
End: 2023-09-21
Payer: COMMERCIAL

## 2023-09-21 DIAGNOSIS — L50.9 URTICARIA: ICD-10-CM

## 2023-09-21 PROCEDURE — 86003 ALLG SPEC IGE CRUDE XTRC EA: CPT

## 2023-09-21 PROCEDURE — 82785 ASSAY OF IGE: CPT

## 2023-09-21 PROCEDURE — 36415 COLL VENOUS BLD VENIPUNCTURE: CPT

## 2023-09-24 LAB
A ALTERNATA IGE QN: <0.1 KUA/I
A FUMIGATUS IGE QN: <0.1 KUA/I
BERMUDA GRASS IGE QN: 0.14 KUA/I
BOXELDER IGE QN: <0.1 KUA/I
C HERBARUM IGE QN: <0.1 KUA/I
CAT DANDER IGE QN: <0.1 KUA/I
CMN PIGWEED IGE QN: 0.13 KUA/I
COMMON RAGWEED IGE QN: <0.1 KUA/I
COTTONWOOD IGE QN: 0.1 KUA/I
D FARINAE IGE QN: <0.1 KUA/I
D PTERONYSS IGE QN: <0.1 KUA/I
DOG DANDER IGE QN: <0.1 KUA/I
LONDON PLANE IGE QN: 0.29 KUA/I
MOUSE URINE PROT IGE QN: <0.1 KUA/I
MT JUNIPER IGE QN: 0.11 KUA/I
MUGWORT IGE QN: <0.1 KUA/I
P NOTATUM IGE QN: <0.1 KUA/I
ROACH IGE QN: <0.1 KUA/I
SHEEP SORREL IGE QN: 0.13 KUA/I
SILVER BIRCH IGE QN: 0.19 KUA/I
TIMOTHY IGE QN: <0.1 KUA/I
TOTAL IGE SMQN RAST: 22 KU/L (ref 0–113)
WALNUT IGE QN: 0.32 KUA/I
WHITE ASH IGE QN: 0.33 KUA/I
WHITE ELM IGE QN: 0.17 KUA/I
WHITE MULBERRY IGE QN: <0.1 KUA/I
WHITE OAK IGE QN: <0.1 KUA/I

## 2023-09-25 DIAGNOSIS — Z83.2 FAMILY HISTORY OF AUTOIMMUNE DISORDER: Primary | ICD-10-CM

## 2023-09-30 ENCOUNTER — APPOINTMENT (OUTPATIENT)
Dept: LAB | Facility: MEDICAL CENTER | Age: 28
End: 2023-09-30
Payer: COMMERCIAL

## 2023-09-30 DIAGNOSIS — Z83.2 FAMILY HISTORY OF AUTOIMMUNE DISORDER: ICD-10-CM

## 2023-09-30 LAB — TSH SERPL DL<=0.05 MIU/L-ACNC: 2.29 UIU/ML (ref 0.45–4.5)

## 2023-09-30 PROCEDURE — 36415 COLL VENOUS BLD VENIPUNCTURE: CPT

## 2023-09-30 PROCEDURE — 84443 ASSAY THYROID STIM HORMONE: CPT

## 2023-09-30 PROCEDURE — 86200 CCP ANTIBODY: CPT

## 2023-09-30 PROCEDURE — 86038 ANTINUCLEAR ANTIBODIES: CPT

## 2023-09-30 PROCEDURE — 86430 RHEUMATOID FACTOR TEST QUAL: CPT

## 2023-10-01 LAB — ANA SER QL IA: NEGATIVE

## 2023-10-02 LAB — RHEUMATOID FACT SER QL LA: NEGATIVE

## 2023-10-03 ENCOUNTER — OFFICE VISIT (OUTPATIENT)
Dept: URGENT CARE | Facility: MEDICAL CENTER | Age: 28
End: 2023-10-03
Payer: COMMERCIAL

## 2023-10-03 VITALS
HEIGHT: 68 IN | RESPIRATION RATE: 16 BRPM | SYSTOLIC BLOOD PRESSURE: 122 MMHG | BODY MASS INDEX: 26.28 KG/M2 | DIASTOLIC BLOOD PRESSURE: 80 MMHG | WEIGHT: 173.4 LBS | OXYGEN SATURATION: 98 % | HEART RATE: 97 BPM | TEMPERATURE: 98.2 F

## 2023-10-03 DIAGNOSIS — R11.0 NAUSEA: Primary | ICD-10-CM

## 2023-10-03 PROCEDURE — S9088 SERVICES PROVIDED IN URGENT: HCPCS

## 2023-10-03 PROCEDURE — 99212 OFFICE O/P EST SF 10 MIN: CPT

## 2023-10-03 RX ORDER — ONDANSETRON 4 MG/1
4 TABLET, ORALLY DISINTEGRATING ORAL ONCE
Status: COMPLETED | OUTPATIENT
Start: 2023-10-03 | End: 2023-10-03

## 2023-10-03 RX ADMIN — ONDANSETRON 4 MG: 4 TABLET, ORALLY DISINTEGRATING ORAL at 13:07

## 2023-10-03 NOTE — LETTER
October 3, 2023     Patient: Oh Lester   YOB: 1995   Date of Visit: 10/3/2023       To Whom it May Concern:    Oh Lester was seen in my clinic on 10/3/2023. She may return to work on 10/5/2023 . If you have any questions or concerns, please don't hesitate to call.          Sincerely,          JESUS Martinez        CC: No Recipients

## 2023-10-03 NOTE — PROGRESS NOTES
[facility]        NAME: Grady Harrell is a 33 y.o. female  : 1995    MRN: [id number]  DATE: October 3, 2023  TIME: 2:28 PM    Assessment and Plan   Nausea [R11.0]  1. Nausea  ondansetron (ZOFRAN-ODT) dispersible tablet 4 mg            Patient Instructions       Follow up with PCP in 3-5 days. Proceed to  ER if symptoms worsen. Chief Complaint     Chief Complaint   Patient presents with   • Cold Like Symptoms     Fever of 101.2 last night, has been taking tylenol/motrin. Reports body aches/chills and HA. Works as an  with young children, denies sick contacts at home. Tested for covid 1.5 weeks ago was negative. • Vomiting     C/o n/v/d since yesterday, vomited 4x, diarrhea more than 4x, denies blood in stools. History of Present Illness       Patient being tested for auto-immune disease as she has frequent illnesses, breaks out in rashes with illnesses and also has a chronic rash on her face (butterfly like). Last night during the night woke up with nausea and vomiting x4 this AM. Continues to have nausea but last emesis was around 7am. Sipping at gingerale. Having headache, fever TMAX 102.7 this AM, took tylenol around 8am. Also have slight sinus congestion. She did do a home COVID test which was negative. She is an  at a pre-school and have had several students ill with similar. Hx: Hysterectomy. Vomiting   This is a new problem. The current episode started yesterday. Associated symptoms include a fever and headaches. Pertinent negatives include no abdominal pain, arthralgias, chest pain, chills, coughing, diarrhea or dizziness. Review of Systems   Review of Systems   Constitutional: Positive for appetite change, fatigue and fever. Negative for chills. HENT: Positive for congestion. Negative for ear pain, postnasal drip, rhinorrhea, sinus pressure, sinus pain, sore throat and trouble swallowing.     Respiratory: Negative for cough and shortness of breath. Cardiovascular: Negative for chest pain and palpitations. Gastrointestinal: Positive for nausea and vomiting. Negative for abdominal pain, constipation and diarrhea. Musculoskeletal: Negative for arthralgias and back pain. Skin: Negative for color change and rash. Neurological: Positive for headaches. Negative for dizziness and light-headedness. All other systems reviewed and are negative. Current Medications       Current Outpatient Medications:   •  cetirizine (ZyrTEC) 10 mg tablet, Take 1 tablet (10 mg total) by mouth daily, Disp: 90 tablet, Rfl: 0  •  diphenoxylate-atropine (LOMOTIL) 2.5-0.025 mg per tablet, Take 1 tablet by mouth 4 (four) times a day as needed for diarrhea, Disp: 120 tablet, Rfl: 5  •  levothyroxine 88 mcg tablet, Take 1 tablet (88 mcg total) by mouth daily in the early morning, Disp: 90 tablet, Rfl: 3  •  Multiple Vitamin (MULTIVITAMIN) capsule, Take 1 capsule by mouth daily, Disp: , Rfl:   •  omeprazole (PriLOSEC) 40 MG capsule, take 1 capsule by mouth once daily (Patient taking differently: 40 mg every morning), Disp: 30 capsule, Rfl: 3  •  dicyclomine (BENTYL) 20 mg tablet, TAKE 1 TABLET BY MOUTH EVERY 6 HOURS (Patient not taking: Reported on 10/3/2023), Disp: 60 tablet, Rfl: 6  No current facility-administered medications for this visit.     Current Allergies     Allergies as of 10/03/2023   • (No Known Allergies)            The following portions of the patient's history were reviewed and updated as appropriate: allergies, current medications, past family history, past medical history, past social history, past surgical history and problem list.     Past Medical History:   Diagnosis Date   • Anemia    • COVID 03/2021   • Disease of thyroid gland    • Endometriosis    • GERD (gastroesophageal reflux disease)    • Hyperlipidemia    • Liver disease     hemangioma   • Ovarian cyst    • Pancreatic insufficiency        Past Surgical History:   Procedure Laterality Date   • DILATION AND CURETTAGE OF UTERUS     • HYSTERECTOMY     • RI ENDOVEN ABLTJ INCMPTNT VEIN XTR LASER 1ST VEIN Left 09/04/2020    Procedure: ENDOVASCULAR LASER THERAPY (EVLT) + stab phlebectomies;  Surgeon: Bird Villatoro DO;  Location: [facility];  Service: Vascular   • RI LAPAROSCOPY SURG CHOLECYSTECTOMY N/A 3/13/2023    Procedure: CHOLECYSTECTOMY LAPAROSCOPIC;  Surgeon: Deanna Jade DO;  Location: [facility];  Service: General   • WISDOM TOOTH EXTRACTION         Family History   Problem Relation Age of Onset   • Cancer Mother    • Hypertension Mother    • OBI disease Mother    • Cancer Father    • Hypertension Father    • Diabetes Father    • Cancer Sister          Medications have been verified. Objective   /80   Pulse 97   Temp 98.2 °F (36.8 °C)   Resp 16   Ht 5' 8" (1.727 m)   Wt 78.7 kg (173 lb 6.4 oz)   LMP 01/07/2021 (Approximate)   SpO2 98%   BMI 26.37 kg/m²        Physical Exam     Physical Exam  Vitals and nursing note reviewed. Constitutional:       General: She is not in acute distress. Appearance: Normal appearance. She is normal weight. She is not ill-appearing. HENT:      Head: Normocephalic and atraumatic. Right Ear: Tympanic membrane, ear canal and external ear normal.      Left Ear: Tympanic membrane, ear canal and external ear normal.      Nose: Nose normal.      Mouth/Throat:      Mouth: Mucous membranes are moist.      Pharynx: Oropharynx is clear. Eyes:      Extraocular Movements: Extraocular movements intact. Conjunctiva/sclera: Conjunctivae normal.      Pupils: Pupils are equal, round, and reactive to light. Cardiovascular:      Rate and Rhythm: Normal rate and regular rhythm. Pulses: Normal pulses. Heart sounds: Normal heart sounds. Pulmonary:      Effort: Pulmonary effort is normal.      Breath sounds: Normal breath sounds. Abdominal:      General: Abdomen is flat.  Bowel sounds are normal.      Palpations: Abdomen is soft. Tenderness: There is no abdominal tenderness. Comments: Discomfort in the upper abdomen due to vomiting. Musculoskeletal:         General: Normal range of motion. Cervical back: Normal range of motion and neck supple. Skin:     General: Skin is warm. Capillary Refill: Capillary refill takes less than 2 seconds. Neurological:      General: No focal deficit present. Mental Status: She is alert and oriented to person, place, and time.    Psychiatric:         Mood and Affect: Mood normal.         Behavior: Behavior normal.

## 2023-10-03 NOTE — PATIENT INSTRUCTIONS
You may take over the counter Tylenol (Acetaminophen) and/or Motrin (Ibuprofen) as needed, as directed on packaging. Be sure to get plenty of rest, and drinking fluids to remain hydrated. Please follow up with your primary provider in the next several days. Should you have any worsening of symptoms, or lack of improvement please be re-evaluated. If needed for significant concerns, consider 911 or ER evaluation.

## 2023-10-06 LAB — CCP AB SER IA-ACNC: 1.1

## 2023-10-10 ENCOUNTER — TELEPHONE (OUTPATIENT)
Dept: PULMONOLOGY | Facility: CLINIC | Age: 28
End: 2023-10-10

## 2023-10-10 NOTE — TELEPHONE ENCOUNTER
----- Message from Velvet Trejo PA-C sent at 10/10/2023 10:47 AM EDT -----  Regarding: FW: Appointment & testing   Contact: [phone]  Pt needs 4pm appointment. If she is willing to go to  I think we would have openings. I also told her about [facility].   ----- Message -----  From: Alis Mccann RN  Sent: 10/9/2023  12:57 PM EDT  To: Velvet Trejo PA-C  Subject: FW: Appointment & testing                          ----- Message -----  From: Oh Lester  Sent: 10/7/2023   7:33 AM EDT  To: [facility]  Subject: Appointment & testing                            Hello, it's been a little since my gualbladder has been removed. I wanted to get retested for the pancreatic insufficiency and make sure everything else is going ok. I had notice more mucus in my stool and just want to make sure everything is OK and if I need to go back on creon for it. I am available 4p.m. for a office visit on certain days.

## 2023-10-15 ENCOUNTER — OFFICE VISIT (OUTPATIENT)
Dept: URGENT CARE | Facility: MEDICAL CENTER | Age: 28
End: 2023-10-15
Payer: COMMERCIAL

## 2023-10-15 VITALS
SYSTOLIC BLOOD PRESSURE: 118 MMHG | DIASTOLIC BLOOD PRESSURE: 60 MMHG | TEMPERATURE: 98.6 F | HEIGHT: 68 IN | BODY MASS INDEX: 26.83 KG/M2 | WEIGHT: 177 LBS | RESPIRATION RATE: 18 BRPM | HEART RATE: 78 BPM | OXYGEN SATURATION: 100 %

## 2023-10-15 DIAGNOSIS — J32.9 VIRAL SINUSITIS: Primary | ICD-10-CM

## 2023-10-15 DIAGNOSIS — J02.9 SORE THROAT: ICD-10-CM

## 2023-10-15 DIAGNOSIS — B97.89 VIRAL SINUSITIS: Primary | ICD-10-CM

## 2023-10-15 LAB — S PYO AG THROAT QL: NEGATIVE

## 2023-10-15 PROCEDURE — 99213 OFFICE O/P EST LOW 20 MIN: CPT | Performed by: STUDENT IN AN ORGANIZED HEALTH CARE EDUCATION/TRAINING PROGRAM

## 2023-10-15 PROCEDURE — 87880 STREP A ASSAY W/OPTIC: CPT | Performed by: STUDENT IN AN ORGANIZED HEALTH CARE EDUCATION/TRAINING PROGRAM

## 2023-10-15 PROCEDURE — S9088 SERVICES PROVIDED IN URGENT: HCPCS | Performed by: STUDENT IN AN ORGANIZED HEALTH CARE EDUCATION/TRAINING PROGRAM

## 2023-10-15 RX ORDER — FLUTICASONE PROPIONATE 50 MCG
1 SPRAY, SUSPENSION (ML) NASAL 2 TIMES DAILY
Qty: 16 G | Refills: 0 | Status: SHIPPED | OUTPATIENT
Start: 2023-10-15

## 2023-10-15 NOTE — PATIENT INSTRUCTIONS
Saline nasal rinse twice daily followed by flonase twice daily  Tylenol Sinus and Mucinex over the counter  Warm compresses over sinuses  Steam treatment (practice proper safety precautions when handing hot liquids/steam)  Over the counter saline nasal spray  Provided warning signs for superimposed bacterial infection. Follow up with PCP in 3-5 days. Proceed to  ER if symptoms worsen.

## 2023-10-15 NOTE — PROGRESS NOTES
[facility]        NAME: Titus Sacks is a 33 y.o. female  : 1995    MRN: [id number]  DATE: October 15, 2023  TIME: 10:27 AM    Assessment and Plan   Viral sinusitis [J32.9, B97.89]  1. Viral sinusitis  fluticasone (FLONASE) 50 mcg/act nasal spray      2. Sore throat  POCT rapid strepA          Results for orders placed or performed in visit on 10/15/23   POCT rapid strepA   Result Value Ref Range     RAPID STREP A Negative Negative     Saline nasal rinse twice daily followed by flonase twice daily  Tylenol Sinus and Mucinex over the counter  Warm compresses over sinuses  Steam treatment (practice proper safety precautions when handing hot liquids/steam)  Over the counter saline nasal spray  Provided warning signs for superimposed bacterial infection. Follow up with PCP in 3-5 days. Proceed to  ER if symptoms worsen. Patient Instructions       Follow up with PCP in 3-5 days. Proceed to  ER if symptoms worsen. Chief Complaint     Chief Complaint   Patient presents with    Sore Throat     Sore throat x 2 days white spots noted. Body aches noted . Home covid test done on Friday with negative results          History of Present Illness       HPI    Patient presents to clinic with 1 week of nasal congestion, bilateral ear pain, rhinorrhea, sinus pain and pressure. Reports onset of sore throat with the symptoms but worsening over the last 2 days along with a cough. Some pain with swallowing. Denies fever, chills, GI symptoms, chest pain, dyspnea. Reports mild frontal headache    Review of Systems   Review of Systems   Constitutional:  Positive for chills. Negative for fatigue and fever. HENT:  Positive for congestion, ear pain, rhinorrhea, sinus pressure, sinus pain and trouble swallowing. Negative for ear discharge and sore throat. Eyes:  Negative for pain and visual disturbance. Respiratory:  Positive for cough. Negative for chest tightness and shortness of breath. Cardiovascular:  Negative for chest pain and palpitations. Gastrointestinal:  Negative for abdominal pain, constipation, diarrhea, nausea and vomiting. Genitourinary:  Negative for dysuria, hematuria and menstrual problem. Musculoskeletal:  Negative for arthralgias and back pain. Skin:  Negative for color change and rash. Neurological:  Negative for seizures and syncope. Psychiatric/Behavioral:  Negative for dysphoric mood and suicidal ideas. All other systems reviewed and are negative.         Current Medications       Current Outpatient Medications:     cetirizine (ZyrTEC) 10 mg tablet, Take 1 tablet (10 mg total) by mouth daily, Disp: 90 tablet, Rfl: 0    dicyclomine (BENTYL) 20 mg tablet, TAKE 1 TABLET BY MOUTH EVERY 6 HOURS, Disp: 60 tablet, Rfl: 6    diphenoxylate-atropine (LOMOTIL) 2.5-0.025 mg per tablet, Take 1 tablet by mouth 4 (four) times a day as needed for diarrhea, Disp: 120 tablet, Rfl: 5    fluticasone (FLONASE) 50 mcg/act nasal spray, 1 spray into each nostril 2 (two) times a day, Disp: 16 g, Rfl: 0    levothyroxine 88 mcg tablet, Take 1 tablet (88 mcg total) by mouth daily in the early morning, Disp: 90 tablet, Rfl: 3    Multiple Vitamin (MULTIVITAMIN) capsule, Take 1 capsule by mouth daily, Disp: , Rfl:     omeprazole (PriLOSEC) 40 MG capsule, take 1 capsule by mouth once daily (Patient taking differently: 40 mg every morning), Disp: 30 capsule, Rfl: 3    Current Allergies     Allergies as of 10/15/2023    (No Known Allergies)            The following portions of the patient's history were reviewed and updated as appropriate: allergies, current medications, past family history, past medical history, past social history, past surgical history and problem list.     Past Medical History:   Diagnosis Date    Anemia     COVID 03/2021    Disease of thyroid gland     Endometriosis     GERD (gastroesophageal reflux disease)     Hyperlipidemia     Liver disease     hemangioma    Ovarian cyst     Pancreatic insufficiency        Past Surgical History:   Procedure Laterality Date    DILATION AND CURETTAGE OF UTERUS      HYSTERECTOMY      MN ENDOVEN ABLTJ INCMPTNT VEIN XTR LASER 1ST VEIN Left 09/04/2020    Procedure: ENDOVASCULAR LASER THERAPY (EVLT) + stab phlebectomies;  Surgeon: Teodoro Cuello DO;  Location[facility];  Service: Vascular    MN LAPAROSCOPY SURG CHOLECYSTECTOMY N/A 3/13/2023    Procedure: CHOLECYSTECTOMY LAPAROSCOPIC;  Surgeon: Essie Mcardle, DO;  Location: [facility];  Service: General    WISDOM TOOTH EXTRACTION         Family History   Problem Relation Age of Onset    Cancer Mother     Hypertension Mother     OBI disease Mother     Cancer Father     Hypertension Father     Diabetes Father     Cancer Sister          Medications have been verified. Objective   /60   Pulse 78   Temp 98.6 °F (37 °C)   Resp 18   Ht 5' 8" (1.727 m)   Wt 80.3 kg (177 lb)   LMP 01/07/2021 (Approximate)   SpO2 100%   BMI 26.91 kg/m²        Physical Exam     Physical Exam  Constitutional:       General: She is not in acute distress. Appearance: Normal appearance. HENT:      Head: Normocephalic and atraumatic. Right Ear: Ear canal normal. No drainage, swelling or tenderness. A middle ear effusion is present. Tympanic membrane is not erythematous. Left Ear: Ear canal normal. No drainage, swelling or tenderness. A middle ear effusion is present. Tympanic membrane is not erythematous. Nose: Congestion present. Mouth/Throat:      Mouth: Mucous membranes are moist.      Pharynx: Oropharynx is clear. Posterior oropharyngeal erythema present. No oropharyngeal exudate. Tonsils: No tonsillar exudate or tonsillar abscesses. Eyes:      Extraocular Movements: Extraocular movements intact. Right eye: Normal extraocular motion. Left eye: Normal extraocular motion.       Conjunctiva/sclera: Conjunctivae normal.   Cardiovascular:      Rate and Rhythm: Normal rate. Pulmonary:      Effort: Pulmonary effort is normal. No respiratory distress. Musculoskeletal:      Cervical back: Normal range of motion. Lymphadenopathy:      Cervical: No cervical adenopathy. Skin:     General: Skin is warm and dry. Neurological:      General: No focal deficit present. Mental Status: She is alert and oriented to person, place, and time.    Psychiatric:         Mood and Affect: Mood normal.         Behavior: Behavior normal.

## 2023-10-25 PROBLEM — N83.209 OVARIAN CYST: Status: ACTIVE | Noted: 2022-09-20

## 2023-10-25 PROBLEM — M79.602 PAIN OF LEFT UPPER EXTREMITY: Status: RESOLVED | Noted: 2017-05-15 | Resolved: 2023-10-25
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
## 2023-10-25 PROBLEM — R10.2 PELVIC PAIN: Status: ACTIVE | Noted: 2020-12-03

## 2023-10-25 PROBLEM — F17.200 TOBACCO USE DISORDER: Status: RESOLVED | Noted: 2019-01-02 | Resolved: 2023-10-25

## 2023-10-25 PROBLEM — J02.9 SORE THROAT: Status: RESOLVED | Noted: 2021-10-06 | Resolved: 2023-10-25

## 2023-10-25 PROBLEM — N94.6 DYSMENORRHEA: Status: ACTIVE | Noted: 2020-12-03

## 2023-10-25 PROBLEM — E78.5 BORDERLINE HYPERLIPIDEMIA: Status: RESOLVED | Noted: 2019-01-02 | Resolved: 2023-10-25

## 2023-10-25 PROBLEM — N92.1 MENORRHAGIA WITH IRREGULAR CYCLE: Status: ACTIVE | Noted: 2020-12-03

## 2023-10-25 PROBLEM — Z48.02 ENCOUNTER FOR REMOVAL OF SUTURES: Status: RESOLVED | Noted: 2021-10-06 | Resolved: 2023-10-25

## 2023-10-26 ENCOUNTER — OFFICE VISIT (OUTPATIENT)
Dept: FAMILY MEDICINE CLINIC | Facility: CLINIC | Age: 28
End: 2023-10-26
Payer: COMMERCIAL

## 2023-10-26 VITALS
OXYGEN SATURATION: 96 % | BODY MASS INDEX: 26.55 KG/M2 | HEART RATE: 71 BPM | HEIGHT: 68 IN | DIASTOLIC BLOOD PRESSURE: 78 MMHG | SYSTOLIC BLOOD PRESSURE: 116 MMHG | TEMPERATURE: 98.8 F | WEIGHT: 175.2 LBS

## 2023-10-26 DIAGNOSIS — L71.9 ROSACEA: Primary | ICD-10-CM

## 2023-10-26 DIAGNOSIS — L50.1 CHRONIC IDIOPATHIC URTICARIA: ICD-10-CM

## 2023-10-26 PROBLEM — R21 MALAR RASH: Status: ACTIVE | Noted: 2023-10-26

## 2023-10-26 PROCEDURE — T1015 CLINIC SERVICE: HCPCS | Performed by: STUDENT IN AN ORGANIZED HEALTH CARE EDUCATION/TRAINING PROGRAM

## 2023-10-26 RX ORDER — METRONIDAZOLE 7.5 MG/G
GEL TOPICAL 2 TIMES DAILY
Qty: 45 G | Refills: 0 | Status: SHIPPED | OUTPATIENT
Start: 2023-10-26

## 2023-10-26 RX ORDER — FAMOTIDINE 40 MG/1
40 TABLET, FILM COATED ORAL DAILY
Qty: 30 TABLET | Refills: 1 | Status: SHIPPED | OUTPATIENT
Start: 2023-10-26

## 2023-10-26 NOTE — PROGRESS NOTES
Assessment/Plan:      Rosacea  -Physical exam and history suggests rosacea, patient has risk factor as a young woman, fair skin, and symptoms 3+ months  -     metroNIDAZOLE (METROGEL) 0.75 % gel; Apply topically 2 (two) times a day Apply and rub a thin film twice daily, morning and evening, to entire affected area after face wash    Chronic idiopathic urticaria  -Diagnosis is based on history and past photograph, will try anti-histamine as below. However, we recommend her come in for same day skin biopsy to rule out leukocytoclastic vasculitis (lower likelihood) because the treatment would be VERY different and she responded well to oral prednisone. The photo at times would show small papules almost like petechiae. .. would rule out definitely if returns  -     famotidine (PEPCID) 40 MG tablet; Take 1 tablet (40 mg total) by mouth daily      Subjective:      Patient ID: Abhay Flores is a 33 y.o. female. 28yo woman PMH endometriosis s/p hysterectomy 3/2023, hypothyroidism, presents for evaluation of recurrent skin urticaria. Since her hysterectomy in March, she has been having recurrent hives on her skin. They appear without trigger, lasts for several days, then disappear for several days, then reappear in another location on her body. .. then the cycle repeats. She has seen multiple physicians for this and did not have a resolution. The rash are red, raised, comes in small patches - can appear on her legs, arms, or other areas but not related to contact dermatitis. She did not have environmental factors - no new detergent, soap, perfume. Another problem is severe malar rash on her face that becomes more red or lighter red over time. They have not resolved and persisted for several months. She tried cortisone cream daily x 7 days each time, but not really helping. Takes zyrtec in AM, benadryl in PM, flonase.    Lupus workup negative x2        The following portions of the patient's history were reviewed and updated as appropriate: allergies, current medications, past family history, past medical history, past social history, past surgical history, and problem list.    Review of Systems   Constitutional:  Negative for activity change and fever. HENT:  Negative for hearing loss and sore throat. Eyes:  Negative for discharge and visual disturbance. Respiratory:  Negative for cough and shortness of breath. Cardiovascular:  Negative for chest pain and palpitations. Gastrointestinal:  Negative for abdominal pain and nausea. Genitourinary:  Negative for difficulty urinating, dysuria, menstrual problem and pelvic pain. Musculoskeletal:  Negative for arthralgias and back pain. Skin:  Positive for color change and rash. Neurological:  Negative for dizziness and light-headedness. Objective:      /78 (BP Location: Right arm, Patient Position: Sitting, Cuff Size: Large)   Pulse 71   Temp 98.8 °F (37.1 °C) (Tympanic)   Ht 5' 8" (1.727 m)   Wt 79.5 kg (175 lb 3.2 oz)   LMP 01/07/2021 (Approximate)   SpO2 96%   BMI 26.64 kg/m²          Physical Exam  Vitals and nursing note reviewed. Constitutional:       General: She is not in acute distress. Appearance: Normal appearance. She is not ill-appearing. HENT:      Head: Normocephalic and atraumatic. Nose: Nose normal. No congestion. Eyes:      Extraocular Movements: Extraocular movements intact. Conjunctiva/sclera: Conjunctivae normal.   Cardiovascular:      Rate and Rhythm: Normal rate and regular rhythm. Pulses: Normal pulses. Heart sounds: Normal heart sounds. No murmur heard. Pulmonary:      Effort: Pulmonary effort is normal. No respiratory distress. Breath sounds: Normal breath sounds. No stridor. No wheezing, rhonchi or rales. Abdominal:      General: Abdomen is flat. There is no distension. Palpations: Abdomen is soft. Tenderness: There is no abdominal tenderness.    Musculoskeletal: General: No swelling. Normal range of motion. Cervical back: Normal range of motion and neck supple. Skin:     General: Skin is warm and dry. Findings: Rash present. Comments: Malar region with erythematous papular rash with small telangiectasia, without nose or eye involvement. Photo: small patch of urticaria on her arm (since resolved)   Neurological:      General: No focal deficit present. Mental Status: She is alert. Mental status is at baseline.

## 2023-10-26 NOTE — LETTER
October 26, 2023     Patient: Afsaneh Calvin  YOB: 1995  Date of Visit: 10/26/2023      To Whom it May Concern:    Afsaneh Calvin is under my professional care. Gerardone Lesley was seen in my office on 10/26/2023. Please excuse her from work. If you have any questions or concerns, please don't hesitate to call.          Sincerely,          Valeria Durand MD        CC: No Recipients

## 2023-10-26 NOTE — PATIENT INSTRUCTIONS
Once you get a flare up, please come into the clinic on the same day --> to get skin biopsy  In the meantime, take pepcid to see if this can prevent the onset of your diagnosis    For your face rash, this is likely rosacea  Use topical metronidazole twice daily after you wash face, until they go away

## 2023-11-05 ENCOUNTER — OFFICE VISIT (OUTPATIENT)
Dept: URGENT CARE | Facility: MEDICAL CENTER | Age: 28
End: 2023-11-05
Payer: COMMERCIAL

## 2023-11-05 VITALS
BODY MASS INDEX: 26.37 KG/M2 | RESPIRATION RATE: 18 BRPM | HEIGHT: 68 IN | HEART RATE: 90 BPM | DIASTOLIC BLOOD PRESSURE: 60 MMHG | OXYGEN SATURATION: 98 % | TEMPERATURE: 98.9 F | SYSTOLIC BLOOD PRESSURE: 118 MMHG | WEIGHT: 174 LBS

## 2023-11-05 DIAGNOSIS — J02.9 ACUTE PHARYNGITIS, UNSPECIFIED ETIOLOGY: Primary | ICD-10-CM

## 2023-11-05 LAB — S PYO AG THROAT QL: NEGATIVE

## 2023-11-05 PROCEDURE — 87880 STREP A ASSAY W/OPTIC: CPT | Performed by: STUDENT IN AN ORGANIZED HEALTH CARE EDUCATION/TRAINING PROGRAM

## 2023-11-05 PROCEDURE — 87070 CULTURE OTHR SPECIMN AEROBIC: CPT | Performed by: STUDENT IN AN ORGANIZED HEALTH CARE EDUCATION/TRAINING PROGRAM

## 2023-11-05 PROCEDURE — S9088 SERVICES PROVIDED IN URGENT: HCPCS | Performed by: STUDENT IN AN ORGANIZED HEALTH CARE EDUCATION/TRAINING PROGRAM

## 2023-11-05 PROCEDURE — 99213 OFFICE O/P EST LOW 20 MIN: CPT | Performed by: STUDENT IN AN ORGANIZED HEALTH CARE EDUCATION/TRAINING PROGRAM

## 2023-11-05 RX ORDER — AMOXICILLIN 500 MG/1
500 CAPSULE ORAL EVERY 12 HOURS SCHEDULED
Qty: 20 CAPSULE | Refills: 0 | Status: SHIPPED | OUTPATIENT
Start: 2023-11-05 | End: 2023-11-15

## 2023-11-05 NOTE — PROGRESS NOTES
[facility]        NAME: Sonja Hagan is a 33 y.o. female  : 1995    MRN: [id number]    Assessment and Plan   Acute pharyngitis, unspecified etiology [J02.9]  1. Acute pharyngitis, unspecified etiology  POCT rapid strepA    Throat culture    amoxicillin (AMOXIL) 500 mg capsule          Results for orders placed or performed in visit on 23   POCT rapid strepA   Result Value Ref Range     RAPID STREP A Negative Negative     Rapid strep neg, will send for culture due to tonsillar exudates. 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 If culture is neg, will stop antibiotics. Patient Instructions       Follow up with PCP in 3-5 days. Proceed to  ER if symptoms worsen. Chief Complaint     Chief Complaint   Patient presents with    Sore Throat     Sore throat x 3 weeks was here 2 weeks ago with the same symptoms high fever on Friday          History of Present Illness       Sore Throat   This is a recurrent problem. The current episode started 1 to 4 weeks ago. The problem has been rapidly worsening. The pain is worse on the left side. The maximum temperature recorded prior to her arrival was 101 - 101.9 F. The fever has been present for 3 to 4 days. The pain is at a severity of 10/10. Associated symptoms include congestion, coughing, diarrhea, headaches, a hoarse voice, neck pain, stridor, swollen glands and trouble swallowing. Pertinent negatives include no abdominal pain, shortness of breath or vomiting. She has had exposure to strep and mono. Fever 2 days ago at 101.4F. reports sore throat, productive cough, neck pain, pain with swallowing, chills, nausea, diarrhea, rhinorrhea, congestion  Denies vomiting, ear pain   Has been using sinus rinse and flonase intermittently since last visit but not daily as prescribed. Review of Systems   Review of Systems   Constitutional:  Positive for chills and fever.    HENT:  Positive for congestion, hoarse voice, rhinorrhea, sore throat and trouble swallowing. Respiratory:  Positive for cough and stridor. Negative for chest tightness and shortness of breath. Cardiovascular:  Negative for chest pain and palpitations. Gastrointestinal:  Positive for diarrhea and nausea. Negative for abdominal pain and vomiting. Genitourinary:  Negative for dysuria. Musculoskeletal:  Positive for neck pain. Negative for neck stiffness. Skin:  Negative for rash. Neurological:  Positive for headaches. Negative for light-headedness. Psychiatric/Behavioral:  Negative for dysphoric mood and suicidal ideas. The patient is not nervous/anxious.       Current Medications       Current Outpatient Medications:     amoxicillin (AMOXIL) 500 mg capsule, Take 1 capsule (500 mg total) by mouth every 12 (twelve) hours for 10 days, Disp: 20 capsule, Rfl: 0    cetirizine (ZyrTEC) 10 mg tablet, Take 1 tablet (10 mg total) by mouth daily, Disp: 90 tablet, Rfl: 0    dicyclomine (BENTYL) 20 mg tablet, TAKE 1 TABLET BY MOUTH EVERY 6 HOURS, Disp: 60 tablet, Rfl: 6    diphenoxylate-atropine (LOMOTIL) 2.5-0.025 mg per tablet, Take 1 tablet by mouth 4 (four) times a day as needed for diarrhea, Disp: 120 tablet, Rfl: 5    famotidine (PEPCID) 40 MG tablet, Take 1 tablet (40 mg total) by mouth daily, Disp: 30 tablet, Rfl: 1    fluticasone (FLONASE) 50 mcg/act nasal spray, 1 spray into each nostril 2 (two) times a day, Disp: 16 g, Rfl: 0    levothyroxine 88 mcg tablet, Take 1 tablet (88 mcg total) by mouth daily in the early morning, Disp: 90 tablet, Rfl: 3    metroNIDAZOLE (METROGEL) 0.75 % gel, Apply topically 2 (two) times a day Apply and rub a thin film twice daily, morning and evening, to entire affected area after face wash, Disp: 45 g, Rfl: 0    Multiple Vitamin (MULTIVITAMIN) capsule, Take 1 capsule by mouth daily, Disp: , Rfl:     omeprazole (PriLOSEC) 40 MG capsule, take 1 capsule by mouth once daily (Patient taking differently: 40 mg every morning), Disp: 30 capsule, Rfl: 3    Current Allergies     Allergies as of 11/05/2023    (No Known Allergies)            The following portions of the patient's history were reviewed and updated as appropriate: allergies, current medications, past family history, past medical history, past social history, past surgical history and problem list.     Past Medical History:   Diagnosis Date    Endometriosis     GERD (gastroesophageal reflux disease)     Hyperlipidemia     hypothyroidism     Liver disease     hemangioma    Ovarian cyst     Pancreatic insufficiency        Past Surgical History:   Procedure Laterality Date    DILATION AND CURETTAGE OF UTERUS      HYSTERECTOMY      MT ENDOVEN ABLTJ INCMPTNT VEIN XTR LASER 1ST VEIN Left 09/04/2020    Procedure: ENDOVASCULAR LASER THERAPY (EVLT) + stab phlebectomies;  Surgeon: Rajwinder Zuñiga DO;  [facility];  Service: Vascular    MT LAPAROSCOPY SURG CHOLECYSTECTOMY N/A 3/13/2023    Procedure: CHOLECYSTECTOMY LAPAROSCOPIC;  Surgeon: Deion Jin DO;  Location: [facility];  Service: General    WISDOM TOOTH EXTRACTION         Family History   Problem Relation Age of Onset    Cancer Mother     Hypertension Mother     OBI disease Mother     Cancer Father     Hypertension Father     Diabetes Father     Cancer Sister          Medications have been verified. Objective   /60   Pulse 90   Temp 98.9 °F (37.2 °C)   Resp 18   Ht 5' 8" (1.727 m)   Wt 78.9 kg (174 lb)   LMP 01/07/2021 (Approximate)   SpO2 98%   BMI 26.46 kg/m²        Physical Exam     Physical Exam  Constitutional:       General: She is not in acute distress. Appearance: Normal appearance. HENT:      Head: Normocephalic and atraumatic. Right Ear: Tympanic membrane and ear canal normal. No drainage, swelling or tenderness. No middle ear effusion. Tympanic membrane is not erythematous.       Left Ear: Tympanic membrane and ear canal normal. No drainage, swelling or tenderness. No middle ear effusion. Tympanic membrane is not erythematous. Nose: Congestion present. Mouth/Throat:      Mouth: Mucous membranes are moist.      Pharynx: Posterior oropharyngeal erythema present. No oropharyngeal exudate. Tonsils: Tonsillar exudate present. No tonsillar abscesses. 1+ on the right. 1+ on the left. Eyes:      Extraocular Movements: Extraocular movements intact. Right eye: Normal extraocular motion. Left eye: Normal extraocular motion. Conjunctiva/sclera: Conjunctivae normal.   Cardiovascular:      Rate and Rhythm: Normal rate. Pulmonary:      Effort: Pulmonary effort is normal. No respiratory distress. Musculoskeletal:      Cervical back: Normal range of motion and neck supple. Skin:     General: Skin is warm and dry. Neurological:      General: No focal deficit present. Mental Status: She is alert and oriented to person, place, and time.    Psychiatric:         Mood and Affect: Mood normal.         Behavior: Behavior normal.

## 2023-11-07 LAB — BACTERIA THROAT CULT: NORMAL

## 2023-11-09 ENCOUNTER — APPOINTMENT (OUTPATIENT)
Dept: LAB | Facility: MEDICAL CENTER | Age: 28
End: 2023-11-09
Payer: COMMERCIAL

## 2023-11-09 ENCOUNTER — PATIENT MESSAGE (OUTPATIENT)
Dept: GASTROENTEROLOGY | Facility: CLINIC | Age: 28
End: 2023-11-09

## 2023-11-09 ENCOUNTER — OFFICE VISIT (OUTPATIENT)
Dept: FAMILY MEDICINE CLINIC | Facility: CLINIC | Age: 28
End: 2023-11-09
Payer: COMMERCIAL

## 2023-11-09 VITALS
BODY MASS INDEX: 26.46 KG/M2 | HEART RATE: 66 BPM | SYSTOLIC BLOOD PRESSURE: 120 MMHG | DIASTOLIC BLOOD PRESSURE: 82 MMHG | WEIGHT: 174.6 LBS | OXYGEN SATURATION: 97 % | TEMPERATURE: 98.1 F | HEIGHT: 68 IN

## 2023-11-09 DIAGNOSIS — R19.7 DIARRHEA, UNSPECIFIED TYPE: ICD-10-CM

## 2023-11-09 DIAGNOSIS — R19.5 LOW FECAL ELASTASE LEVEL: ICD-10-CM

## 2023-11-09 DIAGNOSIS — R05.1 ACUTE COUGH: Primary | ICD-10-CM

## 2023-11-09 DIAGNOSIS — R19.5 LOW FECAL ELASTASE LEVEL: Primary | ICD-10-CM

## 2023-11-09 PROCEDURE — 99213 OFFICE O/P EST LOW 20 MIN: CPT

## 2023-11-09 PROCEDURE — 87177 OVA AND PARASITES SMEARS: CPT

## 2023-11-09 PROCEDURE — 87209 SMEAR COMPLEX STAIN: CPT

## 2023-11-09 PROCEDURE — 82653 EL-1 FECAL QUANTITATIVE: CPT

## 2023-11-09 PROCEDURE — 83993 ASSAY FOR CALPROTECTIN FECAL: CPT

## 2023-11-09 PROCEDURE — 87329 GIARDIA AG IA: CPT

## 2023-11-09 PROCEDURE — T1015 CLINIC SERVICE: HCPCS

## 2023-11-09 RX ORDER — AZITHROMYCIN 250 MG/1
250 TABLET, FILM COATED ORAL EVERY 24 HOURS
Qty: 6 TABLET | Refills: 0 | Status: SHIPPED | OUTPATIENT
Start: 2023-11-09 | End: 2023-11-15

## 2023-11-09 RX ORDER — BENZONATATE 100 MG/1
100 CAPSULE ORAL 3 TIMES DAILY PRN
Qty: 20 CAPSULE | Refills: 0 | Status: SHIPPED | OUTPATIENT
Start: 2023-11-09

## 2023-11-09 NOTE — LETTER
November 9, 2023     Patient: Damon Smith  YOB: 1995  Date of Visit: 11/9/2023      To Whom it May Concern:    Damon Smith is under my professional care. Alessandra Rosa was seen in my office on 11/9/2023. Alessandra Rosa may return to work on Nov 13, 2023 . If you have any questions or concerns, please don't hesitate to call.          Sincerely,          Chalino Taylor MD        CC: No Recipients

## 2023-11-09 NOTE — ASSESSMENT & PLAN NOTE
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 No ear pain, no vomiting, no trouble vomiting. Ongoing Diarrhea, seen by GI. Eating and drinking okay. Seen at urgent care - Neg covid, negative Strep. Took 1 dose amoxillin. Tried Cold and Flu, mucinex  Taking Zyrtec and Flonase daily. No flu shot.    Plan:  Start antibiotic; Z-jennyfer  Take Tessalon Perles as needed  Encourage flu shot  Return to clinic in 1 week if symptoms do not improve

## 2023-11-10 ENCOUNTER — APPOINTMENT (OUTPATIENT)
Dept: LAB | Facility: MEDICAL CENTER | Age: 28
End: 2023-11-10
Payer: COMMERCIAL

## 2023-11-10 DIAGNOSIS — R19.7 DIARRHEA, UNSPECIFIED TYPE: ICD-10-CM

## 2023-11-10 DIAGNOSIS — R19.5 LOW FECAL ELASTASE LEVEL: ICD-10-CM

## 2023-11-10 LAB — G LAMBLIA AG STL QL IA: NEGATIVE

## 2023-11-10 PROCEDURE — 82705 FATS/LIPIDS FECES QUAL: CPT

## 2023-11-10 NOTE — PROGRESS NOTES
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 No ear pain, no vomiting, no trouble vomiting. Ongoing Diarrhea, seen by GI. Eating and drinking okay. Seen at urgent care - Neg covid, negative Strep. Took 1 dose amoxillin. Tried Cold and Flu, mucinex  Taking Zyrtec and Flonase daily. No flu shot. Plan:  Start antibiotic; Z-jennyfer  Take Tessalon Perles as needed  Encourage flu shot  Return to clinic in 1 week if symptoms do not improve     Diagnoses and all orders for this visit:    Acute cough  -     azithromycin (ZITHROMAX) 250 mg tablet; Take 1 tablet (250 mg total) by mouth every 24 hours for 6 days Take 2 tabs of 250mg on day 1. Take 1 tabs of 250mg on day 2,3,4,5.  -     benzonatate (TESSALON PERLES) 100 mg capsule; Take 1 capsule (100 mg total) by mouth 3 (three) times a day as needed for cough          Subjective:      Patient ID: Rasheeda Hui is a 33 y.o. female. Patient is a 44-year-old female complaining of productive cough for 3 weeks. 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 Both tested negative for COVID and strep at urgent care. Complaining of fever and chills. Max temp 102.4F. No shortness of breath or difficulty breathing. Symptoms include cough, sore throat, sinus congestion, runny nose, fever (max temp 102.4f), headaches, muscle aches. No ear pain, no vomiting, no trouble vomiting. Ongoing Diarrhea, seen by GI. Eating and drinking okay. Seen at urgent care - Neg covid, negative Strep. Took 1 dose amoxillin. Tried Cold and Flu, mucinex. Taking Zyrtec and Flonase daily. No flu shot.          The following portions of the patient's history were reviewed and updated as appropriate: allergies, current medications, past family history, past medical history, past social history, past surgical history, and problem list.    Review of Systems   Constitutional:  Positive for chills, fatigue and fever. Negative for activity change and appetite change. HENT:  Positive for congestion, postnasal drip, rhinorrhea, sinus pain and sore throat. Negative for ear pain and trouble swallowing. Eyes:  Negative for pain and visual disturbance. Respiratory:  Positive for cough. Negative for shortness of breath and wheezing. Cardiovascular:  Negative for chest pain and palpitations. Gastrointestinal:  Negative for abdominal pain and vomiting. Genitourinary:  Negative for dysuria and hematuria. Musculoskeletal:  Positive for myalgias. Negative for arthralgias and back pain. Skin:  Negative for color change and rash. Neurological:  Negative for seizures and syncope. All other systems reviewed and are negative. Objective:      /82 (BP Location: Left arm, Patient Position: Sitting, Cuff Size: Standard)   Pulse 66   Temp 98.1 °F (36.7 °C) (Tympanic)   Ht 5' 8" (1.727 m)   Wt 79.2 kg (174 lb 9.6 oz)   LMP 01/07/2021 (Approximate)   SpO2 97%   BMI 26.55 kg/m²          Physical Exam  Vitals and nursing note reviewed. Constitutional:       General: She is not in acute distress. Appearance: Normal appearance. She is normal weight. HENT:      Head: Normocephalic. Right Ear: Tympanic membrane, ear canal and external ear normal.      Left Ear: Tympanic membrane, ear canal and external ear normal.      Nose: No congestion or rhinorrhea. Mouth/Throat:      Mouth: Mucous membranes are moist.      Pharynx: Posterior oropharyngeal erythema present. No oropharyngeal exudate. Eyes:      General:         Right eye: No discharge. Left eye: No discharge.       Conjunctiva/sclera: Conjunctivae normal.   Cardiovascular:      Rate and Rhythm: Normal rate and regular rhythm. Pulses: Normal pulses. Heart sounds: Normal heart sounds. No murmur heard. Pulmonary:      Effort: Pulmonary effort is normal. No respiratory distress. Breath sounds: Normal breath sounds. No wheezing or rhonchi. Abdominal:      General: There is no distension. Palpations: Abdomen is soft. Tenderness: There is no abdominal tenderness. Musculoskeletal:      Cervical back: Normal range of motion. Right lower leg: No edema. Left lower leg: No edema. Lymphadenopathy:      Cervical: No cervical adenopathy. Skin:     Coloration: Skin is not jaundiced. Findings: No erythema or rash. Neurological:      Mental Status: She is alert. Mental status is at baseline.    Psychiatric:         Mood and Affect: Mood normal.         Behavior: Behavior normal.

## 2023-11-13 ENCOUNTER — TELEPHONE (OUTPATIENT)
Age: 28
End: 2023-11-13

## 2023-11-13 LAB — ELASTASE PANC STL-MCNT: 106 UG ELAST./G

## 2023-11-13 NOTE — TELEPHONE ENCOUNTER
Patients GI provider:  Víctor RAYA    Number to return call: [phone]    Reason for call: Pt calling because she would like to schedule an appointment with Kd Santillan and she would like to follow up on her request for Creon based on her labs    Scheduled procedure/appointment date if applicable: N/A

## 2023-11-14 LAB
FAT STL QL: NORMAL
NEUTRAL FAT STL QL: NORMAL

## 2023-11-14 NOTE — TELEPHONE ENCOUNTER
Patients GI provider:  ELVER Burgos    Number to return call: [phone]    Reason for call: Pt calling stating she saw the recent blood work results and states she needs creon to be reordered. Pt doesn't want to wait until her appointment. Please call back to discuss.     Scheduled procedure/appointment date if applicable: Apt 72/88/6825

## 2023-11-15 DIAGNOSIS — K86.89 PANCREATIC INSUFFICIENCY: ICD-10-CM

## 2023-11-15 DIAGNOSIS — R19.7 DIARRHEA, UNSPECIFIED TYPE: ICD-10-CM

## 2023-11-15 DIAGNOSIS — R19.5 LOW FECAL ELASTASE LEVEL: Primary | ICD-10-CM

## 2023-11-15 LAB — CALPROTECTIN STL-MCNT: 21 UG/G (ref 0–120)

## 2023-12-14 ENCOUNTER — OFFICE VISIT (OUTPATIENT)
Dept: GASTROENTEROLOGY | Facility: CLINIC | Age: 28
End: 2023-12-14
Payer: COMMERCIAL

## 2023-12-14 VITALS
BODY MASS INDEX: 26.52 KG/M2 | HEART RATE: 72 BPM | HEIGHT: 68 IN | OXYGEN SATURATION: 98 % | TEMPERATURE: 96.7 F | SYSTOLIC BLOOD PRESSURE: 127 MMHG | WEIGHT: 175 LBS | DIASTOLIC BLOOD PRESSURE: 85 MMHG

## 2023-12-14 DIAGNOSIS — K21.9 GASTROESOPHAGEAL REFLUX DISEASE WITHOUT ESOPHAGITIS: ICD-10-CM

## 2023-12-14 DIAGNOSIS — R10.9 ABDOMINAL PAIN, UNSPECIFIED ABDOMINAL LOCATION: Primary | ICD-10-CM

## 2023-12-14 DIAGNOSIS — K86.89 PANCREATIC INSUFFICIENCY: ICD-10-CM

## 2023-12-14 DIAGNOSIS — R19.5 LOW FECAL ELASTASE LEVEL: ICD-10-CM

## 2023-12-14 DIAGNOSIS — R19.7 DIARRHEA, UNSPECIFIED TYPE: ICD-10-CM

## 2023-12-14 PROCEDURE — 99214 OFFICE O/P EST MOD 30 MIN: CPT | Performed by: NURSE PRACTITIONER

## 2023-12-14 RX ORDER — DICYCLOMINE HCL 20 MG
20 TABLET ORAL EVERY 6 HOURS
Qty: 60 TABLET | Refills: 6 | Status: SHIPPED | OUTPATIENT
Start: 2023-12-14

## 2023-12-14 RX ORDER — OMEPRAZOLE 40 MG/1
40 CAPSULE, DELAYED RELEASE ORAL DAILY
Qty: 30 CAPSULE | Refills: 3 | Status: CANCELLED | OUTPATIENT
Start: 2023-12-14

## 2023-12-14 RX ORDER — FAMOTIDINE 40 MG/1
40 TABLET, FILM COATED ORAL DAILY
Qty: 90 TABLET | Refills: 3 | Status: SHIPPED | OUTPATIENT
Start: 2023-12-14

## 2023-12-14 RX ORDER — DIPHENOXYLATE HYDROCHLORIDE AND ATROPINE SULFATE 2.5; .025 MG/1; MG/1
1 TABLET ORAL 4 TIMES DAILY PRN
Qty: 120 TABLET | Refills: 5 | Status: SHIPPED | OUTPATIENT
Start: 2023-12-14

## 2023-12-14 NOTE — PATIENT INSTRUCTIONS
Continue famotidine 40 mg daily. Continue Creon as prescribed. Continue Dicyclomine and Lomotil as needed. Continue to try to drink at least 64 oz of water daily. Continue to monitor for red flag symptoms. Proceed with repeat fecal elastase studies in May. Schedule a f/u OV in 1 year.

## 2023-12-14 NOTE — PROGRESS NOTES
[facility] - Outpatient Follow-up Note  Ella Venegas 33 y.o. female MRN: [id number]  Encounter: [id number]          ASSESSMENT AND PLAN:    The patient presents today for follow-up office visit regarding chronic abdominal pain, diarrhea, GERD symptoms with a history of low fecal elastase level secondary to pancreatic insufficiency. Exam: Abdomen soft, nondistended, nontender, with hypoactive bowel sounds x 4. No edema noted of the b/l lower extremities upon exam today. Skin is non-icteric. 1. Abdominal pain, unspecified abdominal location  2. Low fecal elastase level  3. Diarrhea, unspecified type  4. Pancreatic insufficiency  While the patient was in the office today, I discussed with the patient that with regards to her pancreatic insufficiency, unfortunately it is going to be about management, most likely for the rest of her life as there is no cure. I discussed with the patient that since she just restarted the Creon because of her recent stool studies, it may take a while to level back off with regards to the side effects, but she does feel that the improvement she feels with the Creon is currently worth giving the medication more time and hopefully seeing improvement in the side effects. At this point we will continue the Creon as prescribed and proceed with repeat stool testing in 6 months. Advised patient once we have the results of the stool tests we will contact her to review results and any other treatment plan recommendations. The patient was agreeable and verbalized an understanding. Can continue the dicyclomine as needed for any abdominal pain and spasms. Can continue Imodium as needed for any loose stools. - dicyclomine (BENTYL) 20 mg tablet; Take 1 tablet (20 mg total) by mouth every 6 (six) hours  Dispense: 60 tablet; Refill: 6  - pancrelipase, Lip-Prot-Amyl, (CREON) 12,000 units capsule;  Take 2 capsules with meals and 1 capsule with snacks  Dispense: 270 capsule; Refill: 3  - Fecal fat, qualitative; Future  - Fecal fat, quantitative; Future  - Calprotectin,Fecal; Future  - Pancreatic elastase, fecal; Future    5. Gastroesophageal reflux disease without esophagitis  Stable    Continue famotidine as prescribed. Continue to avoid trigger foods, including excessive amounts of alcohol.    - famotidine (PEPCID) 40 MG tablet; Take 1 tablet (40 mg total) by mouth daily  Dispense: 90 tablet; Refill: 3     The patient will schedule a follow up office visit in 1 year, but understands to call or contact our office if there are any issues or concerns in the mean time. ______________________________________________________________________    SUBJECTIVE: Patient is a 33 y.o. female who was previously seen in our office on 3/31/23 by DEBORAH Burgos PA-C and presents today for follow-up office visit regarding chronic abdominal pain, diarrhea, GERD symptoms with a history of low fecal elastase level secondary to pancreatic insufficiency. The patient reports that since going back on the Creon she has continue with some of the nausea symptoms that are somewhat improving as well as some chronic daily headaches that are manageable with over-the-counter medications. The patient reports she does continue with intermittent abdominal discomfort when eating certain trigger foods as well as chronic bloating. She reports she was previously on omeprazole for her GERD symptoms, but recently was started on famotidine 40 mg daily for her urticaria, but felt that the famotidine was actually more helpful for her reflux symptoms and the omeprazole, so titrated off the omeprazole and is only using the famotidine with complete resolution of her reflux symptoms and urticaria. The patient denies any reflux, dysphagia, vomiting, decreased appetite, unplanned weight loss, or abdominal pain. Water Intake: 4-5 bottles per day.      The patient reports that they have a BM 3-4 x daily and reports that it is  relieving, without any consistent diarrhea, nocturnal BMs, constipation, straining, melena or bloody stools. Last BM: Today. Flatus: Yes. Meds: Creon 2 capsules with meals and with snacks daily, famotidine 40 mg daily and dicyclomine as needed for abdominal pain and spasms. NSAID Use: Ibuprofen as needed. Tobacco: Denied  ETOH: Denied  Marijuana: Denied  Illicit Drug Use: Denied    Imaging: (3/31/23): CT scan of the abdomen and pelvis with contrast: Normal    Endoscopy History: EGD: (4/12/22): Small sliding hiatal hernia. Path: Normal.    COLONOSCOPY: (4/12/22): Normal with recommendation to proceed with a repeat colonoscopy at the age of 40. DUE: At the age of 40. REVIEW OF SYSTEMS IS OTHERWISE NEGATIVE.       Historical Information   Past Medical History:   Diagnosis Date    Endometriosis     GERD (gastroesophageal reflux disease) 12/14/2023    Hyperlipidemia     hypothyroidism     Liver disease     hemangioma    Ovarian cyst     Pancreatic insufficiency 12/14/2023     Past Surgical History:   Procedure Laterality Date    DILATION AND CURETTAGE OF UTERUS      HYSTERECTOMY      [place] ENDOVEN ABLTJ INCMPTNT VEIN XTR LASER 1ST VEIN Left 09/04/2020    Procedure: ENDOVASCULAR LASER THERAPY (EVLT) + stab phlebectomies;  Surgeon: Rosa Byers DO;  [facility];  Service: Vascular    [place] LAPAROSCOPY SURG CHOLECYSTECTOMY N/A 3/13/2023    Procedure: CHOLECYSTECTOMY LAPAROSCOPIC;  Surgeon: Maria E Oliveros DO;  Location: [facility];  Service: General    WISDOM TOOTH EXTRACTION       Social History   Social History     Substance and Sexual Activity   Alcohol Use No     Social History     Substance and Sexual Activity   Drug Use No     Social History     Tobacco Use   Smoking Status Former    Current packs/day: 0.00    Types: Cigarettes    Quit date: 5/2/2020    Years since quitting: 3.6   Smokeless Tobacco Never   Tobacco Comments    3 cigarettes per day     Family History Problem Relation Age of Onset    Cancer Mother     Hypertension Mother     OBI disease Mother     Cancer Father     Hypertension Father     Diabetes Father     Cancer Sister        Meds/Allergies       Current Outpatient Medications:     cetirizine (ZyrTEC) 10 mg tablet    dicyclomine (BENTYL) 20 mg tablet    diphenoxylate-atropine (LOMOTIL) 2.5-0.025 mg per tablet    famotidine (PEPCID) 40 MG tablet    fluticasone (FLONASE) 50 mcg/act nasal spray    levothyroxine 88 mcg tablet    metroNIDAZOLE (METROGEL) 0.75 % gel    Multiple Vitamin (MULTIVITAMIN) capsule    omeprazole (PriLOSEC) 40 MG capsule    pancrelipase, Lip-Prot-Amyl, (CREON) 12,000 units capsule    Allergies   Allergen Reactions    Pollen Extract Hives           Objective     Blood pressure 127/85, pulse 72, temperature (!) 96.7 °F (35.9 °C), temperature source Tympanic, height 5' 8" (1.727 m), weight 79.4 kg (175 lb), last menstrual period 01/07/2021, SpO2 98%, not currently breastfeeding. Body mass index is 26.61 kg/m². PHYSICAL EXAM:      General Appearance:   Alert, cooperative, no distress   HEENT:   Normocephalic, atraumatic, anicteric. Neck:  Supple, symmetrical, trachea midline   Lungs:   Clear to auscultation bilaterally; no rales, rhonchi or wheezing; respirations unlabored    Heart[de-identified]   Regular rate and rhythm; no murmur, rub, or gallop. Abdomen:   Soft, non-tender, non-distended; normal bowel sounds; no masses, no organomegaly    Genitalia:   Deferred    Rectal:   Deferred    Extremities:  No cyanosis, clubbing or edema    Pulses:  2+ and symmetric    Skin:  No jaundice, rashes, or lesions    Lymph nodes:  No palpable cervical lymphadenopathy        Lab Results:   No visits with results within 1 Day(s) from this visit. Latest known visit with results is:   Appointment on 11/10/2023   Component Date Value    Fat Qual Neutral, Stl 11/10/2023 Normal     Fat,Totall 11/10/2023 Normal          Radiology Results:   No results found. Answers submitted by the patient for this visit:  Abdominal Pain Questionnaire (Submitted on 12/12/2023)  Chief Complaint: Abdominal pain  Chronicity: recurrent  Onset: more than 1 year ago  Onset quality: undetermined  Frequency: daily  Episode duration: 1 Hours  Progression since onset: gradually improving  Pain location: epigastric region  Pain - numeric: 7/10  Pain quality: aching  Radiates to: does not radiate  belching: Yes  diarrhea: Yes  flatus: Yes  headaches: Yes  nausea:  Yes  Aggravated by: deep breathing  Relieved by: certain positions  Diagnostic workup: GI consult, lower endoscopy, surgery, upper endoscopy

## 2024-01-08 PROBLEM — R05.1 ACUTE COUGH: Status: RESOLVED | Noted: 2023-11-09 | Resolved: 2024-01-08

## 2024-01-19 DIAGNOSIS — L71.9 ROSACEA: ICD-10-CM

## 2024-01-19 DIAGNOSIS — K21.9 GASTROESOPHAGEAL REFLUX DISEASE WITHOUT ESOPHAGITIS: ICD-10-CM

## 2024-01-19 RX ORDER — FAMOTIDINE 40 MG/1
40 TABLET, FILM COATED ORAL DAILY
Qty: 90 TABLET | Refills: 1 | Status: SHIPPED | OUTPATIENT
Start: 2024-01-19

## 2024-01-19 RX ORDER — METRONIDAZOLE 7.5 MG/G
GEL TOPICAL 2 TIMES DAILY
Qty: 45 G | Refills: 0 | Status: SHIPPED | OUTPATIENT
Start: 2024-01-19

## 2024-01-31 ENCOUNTER — OFFICE VISIT (OUTPATIENT)
Dept: URGENT CARE | Facility: MEDICAL CENTER | Age: 29
End: 2024-01-31
Payer: COMMERCIAL

## 2024-01-31 VITALS — HEIGHT: 65 IN | WEIGHT: 173 LBS | TEMPERATURE: 98.5 F | BODY MASS INDEX: 28.82 KG/M2 | OXYGEN SATURATION: 98 %

## 2024-01-31 DIAGNOSIS — J02.9 SORE THROAT: Primary | ICD-10-CM

## 2024-01-31 LAB — S PYO AG THROAT QL: NEGATIVE

## 2024-01-31 PROCEDURE — 87880 STREP A ASSAY W/OPTIC: CPT

## 2024-01-31 PROCEDURE — 87636 SARSCOV2 & INF A&B AMP PRB: CPT

## 2024-01-31 PROCEDURE — 87070 CULTURE OTHR SPECIMN AEROBIC: CPT

## 2024-01-31 PROCEDURE — 99212 OFFICE O/P EST SF 10 MIN: CPT

## 2024-01-31 PROCEDURE — S9088 SERVICES PROVIDED IN URGENT: HCPCS

## 2024-01-31 NOTE — LETTER
January 31, 2024     Patient: Shilpa Sweeney   YOB: 1995   Date of Visit: 1/31/2024       To Whom it May Concern:    Shilpa Sweeney was seen in my clinic on 1/31/2024. She may return to work on 02/02/2024 .    If you have any questions or concerns, please don't hesitate to call.         Sincerely,          NILSON Perdue        CC: No Recipients

## 2024-01-31 NOTE — PATIENT INSTRUCTIONS
You may take over the counter Tylenol (Acetaminophen) and/or Motrin (Ibuprofen) as needed, as directed on packaging.   Be sure to get plenty of rest, and drinking fluids to remain hydrated.   Please follow up with your primary provider in the next several days. Should you have any worsening of symptoms, or lack of improvement please be re-evaluated. If needed for significant concerns, consider 911 or ER evaluation.     The unnecessary use of antibiotics can have harmful affect, unwanted side-effects and can lead to antibiotic resistant bacteria in the future. You are being treated today for a viral illness. Viral illnesses do not require antibiotics, and are treated symptomatically.   According to the Centers for Disease Control and Prevention, about one-third of antibiotic use in the outpatient setting, is not needed nor appropriate. Antibiotics treat infections caused by bacteria. But they don't treat infections caused by viruses (viral infections). For example, an antibiotic is the correct treatment for strep throat, which is caused by bacteria. But it's not the right treatment for most sore throats, which are caused by viruses.By being proactive and treating your individual symptoms, this may help you feel better.     You may have had testing done today which when completed and resulted may change the course of your treatment. It is at that time that if a change in your treatment is necessary that you will hear from our office. I would also recommend you follow up with your primary care provider in the next few days.

## 2024-01-31 NOTE — PROGRESS NOTES
[facility]        NAME: Shilpa Sweeney is a 28 y.o. female  : 1995    MRN: [id number]  DATE: 2024  TIME: 1:15 PM    Assessment and Plan   Sore throat [J02.9]  1. Sore throat  POCT rapid ANTIGEN strepA    Throat culture    Covid/Flu- Office Collect Normal    Throat culture    Covid/Flu- Office Collect Normal            Patient Instructions       Follow up with PCP in 3-5 days.  Proceed to  ER if symptoms worsen.    Chief Complaint     Chief Complaint   Patient presents with   • Fever   • Earache     right         History of Present Illness       Patient here with sore throat.  Children recently tested positive for strep. She was also exposed to COVID and Flu recently.         Review of Systems   Review of Systems   Constitutional:  Positive for fatigue. Negative for chills and fever.   HENT:  Positive for congestion, ear pain, postnasal drip, rhinorrhea and sore throat. Negative for sinus pressure, sinus pain and trouble swallowing.    Respiratory:  Positive for cough. Negative for shortness of breath.    Cardiovascular:  Negative for chest pain and palpitations.   Gastrointestinal:  Positive for nausea. Negative for abdominal pain, constipation, diarrhea and vomiting.   Musculoskeletal:  Negative for arthralgias and back pain.   Skin:  Negative for color change and rash.   Neurological:  Positive for headaches. Negative for dizziness and light-headedness.   All other systems reviewed and are negative.        Current Medications       Current Outpatient Medications:   •  cetirizine (ZyrTEC) 10 mg tablet, Take 1 tablet (10 mg total) by mouth daily, Disp: 90 tablet, Rfl: 0  •  dicyclomine (BENTYL) 20 mg tablet, Take 1 tablet (20 mg total) by mouth every 6 (six) hours, Disp: 60 tablet, Rfl: 6  •  diphenoxylate-atropine (LOMOTIL) 2.5-0.025 mg per tablet, Take 1 tablet by mouth 4 (four) times a day as needed for diarrhea, Disp: 120 tablet, Rfl: 5  •  famotidine (PEPCID) 40 MG tablet, Take 1  tablet (40 mg total) by mouth daily, Disp: 90 tablet, Rfl: 1  •  fluticasone (FLONASE) 50 mcg/act nasal spray, 1 spray into each nostril 2 (two) times a day, Disp: 16 g, Rfl: 0  •  levothyroxine 88 mcg tablet, Take 1 tablet (88 mcg total) by mouth daily in the early morning, Disp: 90 tablet, Rfl: 3  •  metroNIDAZOLE (METROGEL) 0.75 % gel, Apply topically 2 (two) times a day Apply and rub a thin film twice daily, morning and evening, to entire affected area after face wash, Disp: 45 g, Rfl: 0  •  Multiple Vitamin (MULTIVITAMIN) capsule, Take 1 capsule by mouth daily, Disp: , Rfl:   •  omeprazole (PriLOSEC) 40 MG capsule, take 1 capsule by mouth once daily (Patient taking differently: 40 mg every morning), Disp: 30 capsule, Rfl: 3  •  pancrelipase, Lip-Prot-Amyl, (CREON) 12,000 units capsule, Take 2 capsules with meals and 1 capsule with snacks, Disp: 270 capsule, Rfl: 3    Current Allergies     Allergies as of 01/31/2024 - Reviewed 01/31/2024   Allergen Reaction Noted   • Pollen extract Hives 12/14/2023            The following portions of the patient's history were reviewed and updated as appropriate: allergies, current medications, past family history, past medical history, past social history, past surgical history and problem list.     Past Medical History:   Diagnosis Date   • Endometriosis    • GERD (gastroesophageal reflux disease) 12/14/2023   • Hyperlipidemia    • hypothyroidism    • Liver disease     hemangioma   • Ovarian cyst    • Pancreatic insufficiency 12/14/2023       Past Surgical History:   Procedure Laterality Date   • DILATION AND CURETTAGE OF UTERUS     • HYSTERECTOMY     • [place] ENDOVEN ABLTJ INCMPTNT VEIN XTR LASER 1ST VEIN Left 09/04/2020    Procedure: ENDOVASCULAR LASER THERAPY (EVLT) + stab phlebectomies;  Surgeon: Colin Black DO;  [facility]: [facility];  Service: Vascular   • [place] LAPAROSCOPY SURG CHOLECYSTECTOMY N/A 3/13/2023    Procedure: CHOLECYSTECTOMY LAPAROSCOPIC;  Surgeon: Robbie  "DO Nohemi;  Location: [facility];  Service: General   • WISDOM TOOTH EXTRACTION         Family History   Problem Relation Age of Onset   • Cancer Mother    • Hypertension Mother    • OBI disease Mother    • Cancer Father    • Hypertension Father    • Diabetes Father    • Cancer Sister          Medications have been verified.        Objective   Temp 98.5 °F (36.9 °C)   Ht 5' 5\" (1.651 m)   Wt 78.5 kg (173 lb)   LMP 01/07/2021 (Approximate)   SpO2 98%   BMI 28.79 kg/m²        Physical Exam     Physical Exam  Vitals and nursing note reviewed.   Constitutional:       General: She is not in acute distress.     Appearance: Normal appearance. She is normal weight. She is not ill-appearing.   HENT:      Head: Normocephalic and atraumatic.      Right Ear: Ear canal and external ear normal. A middle ear effusion is present.      Left Ear: Tympanic membrane, ear canal and external ear normal.      Nose: Congestion and rhinorrhea present. Rhinorrhea is clear.      Mouth/Throat:      Lips: Pink.      Mouth: Mucous membranes are moist.      Pharynx: Oropharynx is clear. Uvula midline. No pharyngeal swelling, oropharyngeal exudate, posterior oropharyngeal erythema or uvula swelling.      Tonsils: No tonsillar exudate or tonsillar abscesses. 0 on the right. 0 on the left.   Eyes:      Extraocular Movements: Extraocular movements intact.      Conjunctiva/sclera: Conjunctivae normal.      Pupils: Pupils are equal, round, and reactive to light.   Cardiovascular:      Rate and Rhythm: Normal rate and regular rhythm.      Pulses: Normal pulses.      Heart sounds: Normal heart sounds.   Pulmonary:      Effort: Pulmonary effort is normal.      Breath sounds: Normal breath sounds.   Abdominal:      General: Abdomen is flat. Bowel sounds are normal.      Palpations: Abdomen is soft.   Musculoskeletal:         General: Normal range of motion.      Cervical back: Normal range of motion and neck supple.   Skin:     General: Skin is warm. "      Capillary Refill: Capillary refill takes less than 2 seconds.   Neurological:      General: No focal deficit present.      Mental Status: She is alert and oriented to person, place, and time.   Psychiatric:         Mood and Affect: Mood normal.         Behavior: Behavior normal.

## 2024-02-01 LAB
FLUAV RNA RESP QL NAA+PROBE: NEGATIVE
FLUBV RNA RESP QL NAA+PROBE: NEGATIVE
SARS-COV-2 RNA RESP QL NAA+PROBE: NEGATIVE

## 2024-02-02 LAB — BACTERIA THROAT CULT: NORMAL

## 2024-02-14 ENCOUNTER — OFFICE VISIT (OUTPATIENT)
Dept: URGENT CARE | Facility: MEDICAL CENTER | Age: 29
End: 2024-02-14
Payer: COMMERCIAL

## 2024-02-14 VITALS
HEIGHT: 68 IN | OXYGEN SATURATION: 99 % | BODY MASS INDEX: 26.98 KG/M2 | HEART RATE: 70 BPM | DIASTOLIC BLOOD PRESSURE: 70 MMHG | SYSTOLIC BLOOD PRESSURE: 100 MMHG | WEIGHT: 178 LBS | TEMPERATURE: 98.9 F | RESPIRATION RATE: 18 BRPM

## 2024-02-14 DIAGNOSIS — R05.8 POST-VIRAL COUGH SYNDROME: Primary | ICD-10-CM

## 2024-02-14 PROCEDURE — S9088 SERVICES PROVIDED IN URGENT: HCPCS | Performed by: STUDENT IN AN ORGANIZED HEALTH CARE EDUCATION/TRAINING PROGRAM

## 2024-02-14 PROCEDURE — 99213 OFFICE O/P EST LOW 20 MIN: CPT | Performed by: STUDENT IN AN ORGANIZED HEALTH CARE EDUCATION/TRAINING PROGRAM

## 2024-02-14 RX ORDER — BENZONATATE 200 MG/1
200 CAPSULE ORAL 3 TIMES DAILY PRN
Qty: 20 CAPSULE | Refills: 0 | Status: SHIPPED | OUTPATIENT
Start: 2024-02-14

## 2024-02-14 RX ORDER — ALBUTEROL SULFATE 90 UG/1
2 AEROSOL, METERED RESPIRATORY (INHALATION) EVERY 6 HOURS PRN
Qty: 8.5 G | Refills: 0 | Status: SHIPPED | OUTPATIENT
Start: 2024-02-14

## 2024-02-14 RX ORDER — FLUTICASONE PROPIONATE 50 MCG
1 SPRAY, SUSPENSION (ML) NASAL 2 TIMES DAILY
Qty: 16 G | Refills: 0 | Status: SHIPPED | OUTPATIENT
Start: 2024-02-14

## 2024-02-14 NOTE — PROGRESS NOTES
[facility]        NAME: Shilpa Sweeney is a 28 y.o. female  : 1995    MRN: [id number]    Assessment and Plan   Post-viral cough syndrome [R05.8]  1. Post-viral cough syndrome  benzonatate (TESSALON) 200 MG capsule    fluticasone (FLONASE) 50 mcg/act nasal spray    albuterol (ProAir HFA) 90 mcg/act inhaler        Low suspicion of an acute infection at this time.  Due to mild wheezing noted on exam, this is likely postviral bronchospasms causing persistence of cough.  Possible allergic contribution as well for which Flonase will help.  Will start on Tessalon Perles and Flonase.  Will give rescue inhaler just in case she does not get adequate benefit with the prior to.  Discussed natural course of postviral cough syndrome.    Patient Instructions     See wrap up for details  Follow up with PCP in 3-5 days.  Proceed to  ER if symptoms worsen.    Chief Complaint     Chief Complaint   Patient presents with    Sore Throat     Sore throat and chest discomfort from coughing . Was tested for strep and flu with negative results          History of Present Illness     HPI    Patient reports persistence of cough after being seen in office 2 weeks ago for sore throat.  Rapid strep, COVID and flu were negative at the time.  Reports nonproductive cough, sore throat, clear rhinorrhea, congestion  Denies fever, chills, nausea, vomiting , myalgias  Taking Zyrtec for seasonal allergies    Review of Systems   Review of Systems   Constitutional:  Negative for chills and fever.   HENT:  Positive for congestion, rhinorrhea and sore throat. Negative for ear pain.    Eyes:  Negative for pain and visual disturbance.   Respiratory:  Positive for cough. Negative for chest tightness and shortness of breath.    Cardiovascular:  Negative for chest pain and palpitations.   Gastrointestinal:  Negative for abdominal pain, constipation, diarrhea, nausea and vomiting.   Genitourinary:  Negative for dysuria, hematuria and menstrual  problem.   Musculoskeletal:  Negative for arthralgias and back pain.   Skin:  Negative for color change and rash.   Neurological:  Negative for seizures and syncope.   Psychiatric/Behavioral:  Negative for dysphoric mood and suicidal ideas.    All other systems reviewed and are negative.    Current Medications       Current Outpatient Medications:     albuterol (ProAir HFA) 90 mcg/act inhaler, Inhale 2 puffs every 6 (six) hours as needed for wheezing (cough), Disp: 8.5 g, Rfl: 0    benzonatate (TESSALON) 200 MG capsule, Take 1 capsule (200 mg total) by mouth 3 (three) times a day as needed for cough, Disp: 20 capsule, Rfl: 0    cetirizine (ZyrTEC) 10 mg tablet, Take 1 tablet (10 mg total) by mouth daily, Disp: 90 tablet, Rfl: 0    dicyclomine (BENTYL) 20 mg tablet, Take 1 tablet (20 mg total) by mouth every 6 (six) hours, Disp: 60 tablet, Rfl: 6    diphenoxylate-atropine (LOMOTIL) 2.5-0.025 mg per tablet, Take 1 tablet by mouth 4 (four) times a day as needed for diarrhea, Disp: 120 tablet, Rfl: 5    famotidine (PEPCID) 40 MG tablet, Take 1 tablet (40 mg total) by mouth daily, Disp: 90 tablet, Rfl: 1    fluticasone (FLONASE) 50 mcg/act nasal spray, 1 spray into each nostril 2 (two) times a day, Disp: 16 g, Rfl: 0    levothyroxine 88 mcg tablet, Take 1 tablet (88 mcg total) by mouth daily in the early morning, Disp: 90 tablet, Rfl: 3    metroNIDAZOLE (METROGEL) 0.75 % gel, Apply topically 2 (two) times a day Apply and rub a thin film twice daily, morning and evening, to entire affected area after face wash, Disp: 45 g, Rfl: 0    Multiple Vitamin (MULTIVITAMIN) capsule, Take 1 capsule by mouth daily, Disp: , Rfl:     omeprazole (PriLOSEC) 40 MG capsule, take 1 capsule by mouth once daily (Patient taking differently: 40 mg every morning), Disp: 30 capsule, Rfl: 3    pancrelipase, Lip-Prot-Amyl, (CREON) 12,000 units capsule, Take 2 capsules with meals and 1 capsule with snacks, Disp: 270 capsule, Rfl: 3    fluticasone  "(FLONASE) 50 mcg/act nasal spray, 1 spray into each nostril 2 (two) times a day, Disp: 16 g, Rfl: 0    Current Allergies     Allergies as of 02/14/2024 - Reviewed 02/14/2024   Allergen Reaction Noted    Pollen extract Hives 12/14/2023            The following portions of the patient's history were reviewed and updated as appropriate: allergies, current medications, past family history, past medical history, past social history, past surgical history and problem list.     Past Medical History:   Diagnosis Date    Endometriosis     GERD (gastroesophageal reflux disease) 12/14/2023    Hyperlipidemia     hypothyroidism     Liver disease     hemangioma    Ovarian cyst     Pancreatic insufficiency 12/14/2023       Past Surgical History:   Procedure Laterality Date    DILATION AND CURETTAGE OF UTERUS      HYSTERECTOMY      [place] ENDOVEN ABLTJ INCMPTNT VEIN XTR LASER 1ST VEIN Left 09/04/2020    Procedure: ENDOVASCULAR LASER THERAPY (EVLT) + stab phlebectomies;  Surgeon: Colin Black DO;  [facility];  Service: Vascular    [place] LAPAROSCOPY SURG CHOLECYSTECTOMY N/A 3/13/2023    Procedure: CHOLECYSTECTOMY LAPAROSCOPIC;  Surgeon: Robbie Song DO;  Location: [facility];  Service: General    WISDOM TOOTH EXTRACTION         Family History   Problem Relation Age of Onset    Cancer Mother     Hypertension Mother     OBI disease Mother     Cancer Father     Hypertension Father     Diabetes Father     Cancer Sister          Medications have been verified.        Objective   /70   Pulse 70   Temp 98.9 °F (37.2 °C)   Resp 18   Ht 5' 8\" (1.727 m)   Wt 80.7 kg (178 lb)   LMP 01/07/2021 (Approximate)   SpO2 99%   BMI 27.06 kg/m²        Physical Exam     Physical Exam  Constitutional:       General: She is not in acute distress.     Appearance: Normal appearance.   HENT:      Head: Normocephalic and atraumatic.      Right Ear: External ear normal.      Left Ear: External ear normal.      Nose: Congestion " present.      Mouth/Throat:      Mouth: Mucous membranes are moist.      Pharynx: Oropharynx is clear. No posterior oropharyngeal erythema.      Tonsils: No tonsillar exudate or tonsillar abscesses. 1+ on the right. 1+ on the left.   Eyes:      Extraocular Movements: Extraocular movements intact.      Conjunctiva/sclera: Conjunctivae normal.   Cardiovascular:      Rate and Rhythm: Normal rate and regular rhythm.   Pulmonary:      Effort: Pulmonary effort is normal. No respiratory distress.      Breath sounds: Wheezing (Very faint expiratory wheezing in lower lobes bilaterally) present.   Musculoskeletal:      Cervical back: Normal range of motion.   Skin:     General: Skin is warm and dry.   Neurological:      General: No focal deficit present.      Mental Status: She is alert and oriented to person, place, and time.   Psychiatric:         Mood and Affect: Mood normal.         Behavior: Behavior normal.

## 2024-02-20 DIAGNOSIS — E03.9 HYPOTHYROIDISM, UNSPECIFIED TYPE: ICD-10-CM

## 2024-02-20 RX ORDER — LEVOTHYROXINE SODIUM 88 UG/1
88 TABLET ORAL
Qty: 90 TABLET | Refills: 3 | Status: SHIPPED | OUTPATIENT
Start: 2024-02-20

## 2024-03-28 ENCOUNTER — TELEPHONE (OUTPATIENT)
Dept: GASTROENTEROLOGY | Facility: CLINIC | Age: 29
End: 2024-03-28

## 2024-03-28 NOTE — TELEPHONE ENCOUNTER
I called to schedule her for her 1 year follow up appointment with NILSON Funez. LMOM to call the office to schedule.

## 2024-04-07 ENCOUNTER — OFFICE VISIT (OUTPATIENT)
Dept: URGENT CARE | Facility: MEDICAL CENTER | Age: 29
End: 2024-04-07
Payer: COMMERCIAL

## 2024-04-07 VITALS
SYSTOLIC BLOOD PRESSURE: 120 MMHG | HEART RATE: 72 BPM | OXYGEN SATURATION: 97 % | TEMPERATURE: 98.6 F | DIASTOLIC BLOOD PRESSURE: 72 MMHG | WEIGHT: 179 LBS | BODY MASS INDEX: 27.22 KG/M2 | RESPIRATION RATE: 18 BRPM

## 2024-04-07 DIAGNOSIS — J02.9 SORE THROAT: Primary | ICD-10-CM

## 2024-04-07 LAB — S PYO AG THROAT QL: NEGATIVE

## 2024-04-07 PROCEDURE — S9088 SERVICES PROVIDED IN URGENT: HCPCS

## 2024-04-07 PROCEDURE — 87880 STREP A ASSAY W/OPTIC: CPT

## 2024-04-07 PROCEDURE — 99212 OFFICE O/P EST SF 10 MIN: CPT

## 2024-04-07 PROCEDURE — 87070 CULTURE OTHR SPECIMN AEROBIC: CPT

## 2024-04-07 RX ORDER — IBUPROFEN 800 MG/1
800 TABLET ORAL EVERY 6 HOURS PRN
COMMUNITY
Start: 2024-03-20 | End: 2025-03-20

## 2024-04-07 NOTE — PATIENT INSTRUCTIONS
You may take over the counter Tylenol (Acetaminophen) and/or Motrin (Ibuprofen) as needed, as directed on packaging.   Be sure to get plenty of rest, and drinking fluids to remain hydrated.     Please follow up with your primary provider in the next several days. Should you have any worsening of symptoms, or lack of improvement please be re-evaluated. If needed for significant concerns, consider 911 or ER evaluation. .    If tests have been performed at [facility], our office will contact you with results if changes need to be made to the care plan discussed with you at the visit.  You can review your full results on [facility] MyChart

## 2024-04-07 NOTE — PROGRESS NOTES
[facility]        NAME: Shilpa Sweeney is a 28 y.o. female  : 1995    MRN: [id number]  DATE: 2024  TIME: 10:16 AM    Assessment and Plan   Sore throat [J02.9]  1. Sore throat  POCT rapid strepA    Throat culture            Patient Instructions       Follow up with PCP in 3-5 days.  Proceed to  ER if symptoms worsen.    If tests are performed, our office will contact you with results only if changes need to made to the care plan discussed with you at the visit. [facility]    Chief Complaint     Chief Complaint   Patient presents with   • Sore Throat     Sore throat started on Friday, Taking tylenol and ibuprofen. Neck also hurts with right ear hurting. Home COVID negative yesterday   • Abdominal Pain         History of Present Illness       Patient here with sore throat which started on Friday. Taking tylenol/ibuprofen. Also having right ear pain. Home covid test yesterday was negative. Also having some abdominal discomfort. This morning noted her throat to have white patches. She also has generalized body aches. Last dose of ibuprofen was 7am. Temps-TMAX 100.4 but has been taking the tylenol/ibuprofen for pain.         Review of Systems   Review of Systems   Constitutional:  Positive for chills, fatigue and fever.   HENT:  Positive for sore throat. Negative for congestion, postnasal drip, rhinorrhea, sinus pressure, sinus pain and trouble swallowing.    Respiratory:  Negative for cough and shortness of breath.    Gastrointestinal:  Negative for abdominal pain, constipation, diarrhea, nausea and vomiting.   Musculoskeletal:  Positive for myalgias and neck pain.   Skin:  Negative for color change and rash.   Neurological:  Negative for dizziness, light-headedness and headaches.         Current Medications       Current Outpatient Medications:   •  albuterol (ProAir HFA) 90 mcg/act inhaler, Inhale 2 puffs every 6 (six) hours as needed for wheezing  (cough), Disp: 8.5 g, Rfl: 0  •  benzonatate (TESSALON) 200 MG capsule, Take 1 capsule (200 mg total) by mouth 3 (three) times a day as needed for cough, Disp: 20 capsule, Rfl: 0  •  cetirizine (ZyrTEC) 10 mg tablet, Take 1 tablet (10 mg total) by mouth daily, Disp: 90 tablet, Rfl: 0  •  dicyclomine (BENTYL) 20 mg tablet, Take 1 tablet (20 mg total) by mouth every 6 (six) hours, Disp: 60 tablet, Rfl: 6  •  diphenoxylate-atropine (LOMOTIL) 2.5-0.025 mg per tablet, Take 1 tablet by mouth 4 (four) times a day as needed for diarrhea, Disp: 120 tablet, Rfl: 5  •  famotidine (PEPCID) 40 MG tablet, Take 1 tablet (40 mg total) by mouth daily, Disp: 90 tablet, Rfl: 1  •  fluticasone (FLONASE) 50 mcg/act nasal spray, 1 spray into each nostril 2 (two) times a day, Disp: 16 g, Rfl: 0  •  fluticasone (FLONASE) 50 mcg/act nasal spray, 1 spray into each nostril 2 (two) times a day, Disp: 16 g, Rfl: 0  •  ibuprofen (MOTRIN) 800 mg tablet, Take 800 mg by mouth every 6 (six) hours as needed, Disp: , Rfl:   •  levothyroxine 88 mcg tablet, TAKE 1 TABLET BY MOUTH DAILY IN THE EARLY MORNING, Disp: 90 tablet, Rfl: 3  •  metroNIDAZOLE (METROGEL) 0.75 % gel, Apply topically 2 (two) times a day Apply and rub a thin film twice daily, morning and evening, to entire affected area after face wash, Disp: 45 g, Rfl: 0  •  Multiple Vitamin (MULTIVITAMIN) capsule, Take 1 capsule by mouth daily, Disp: , Rfl:   •  omeprazole (PriLOSEC) 40 MG capsule, take 1 capsule by mouth once daily (Patient taking differently: 40 mg every morning), Disp: 30 capsule, Rfl: 3  •  pancrelipase, Lip-Prot-Amyl, (CREON) 12,000 units capsule, Take 2 capsules with meals and 1 capsule with snacks, Disp: 270 capsule, Rfl: 3    Current Allergies     Allergies as of 04/07/2024 - Reviewed 04/07/2024   Allergen Reaction Noted   • Pollen extract Hives 12/14/2023            The following portions of the patient's history were reviewed and updated as appropriate: allergies, current  medications, past family history, past medical history, past social history, past surgical history and problem list.     Past Medical History:   Diagnosis Date   • Endometriosis    • GERD (gastroesophageal reflux disease) 12/14/2023   • Hyperlipidemia    • hypothyroidism    • Liver disease     hemangioma   • Ovarian cyst    • Pancreatic insufficiency 12/14/2023       Past Surgical History:   Procedure Laterality Date   • DILATION AND CURETTAGE OF UTERUS     • HYSTERECTOMY     • [place] ENDOVEN ABLTJ INCMPTNT VEIN XTR LASER 1ST VEIN Left 09/04/2020    Procedure: ENDOVASCULAR LASER THERAPY (EVLT) + stab phlebectomies;  Surgeon: Colin Black DO;  [facility]: [facility];  Service: Vascular   • [place] LAPAROSCOPY SURG CHOLECYSTECTOMY N/A 3/13/2023    Procedure: CHOLECYSTECTOMY LAPAROSCOPIC;  Surgeon: Robbie Song DO;  Location: [facility];  Service: General   • WISDOM TOOTH EXTRACTION         Family History   Problem Relation Age of Onset   • Cancer Mother    • Hypertension Mother    • OBI disease Mother    • Cancer Father    • Hypertension Father    • Diabetes Father    • Cancer Sister          Medications have been verified.        Objective   /72   Pulse 72   Temp 98.6 °F (37 °C)   Resp 18   Wt 81.2 kg (179 lb)   LMP 01/07/2021 (Approximate)   SpO2 97%   BMI 27.22 kg/m²        Physical Exam     Physical Exam  Vitals and nursing note reviewed.   Constitutional:       General: She is not in acute distress.     Appearance: Normal appearance. She is normal weight. She is not ill-appearing.   HENT:      Head: Normocephalic and atraumatic.      Right Ear: Tympanic membrane, ear canal and external ear normal. Tympanic membrane is not erythematous or bulging.      Left Ear: Tympanic membrane, ear canal and external ear normal. Tympanic membrane is not erythematous or bulging.      Nose: Nose normal. No congestion or rhinorrhea.      Mouth/Throat:      Lips: Pink.      Mouth: Mucous membranes are moist.       Pharynx: Oropharynx is clear. Uvula midline. Posterior oropharyngeal erythema present. No pharyngeal swelling.      Tonsils: No tonsillar exudate. 1+ on the right. 1+ on the left.   Eyes:      Extraocular Movements: Extraocular movements intact.      Conjunctiva/sclera: Conjunctivae normal.      Pupils: Pupils are equal, round, and reactive to light.   Cardiovascular:      Rate and Rhythm: Normal rate and regular rhythm.      Pulses: Normal pulses.      Heart sounds: Normal heart sounds.   Pulmonary:      Effort: Pulmonary effort is normal.      Breath sounds: Normal breath sounds.   Abdominal:      General: Abdomen is flat. Bowel sounds are normal.      Palpations: Abdomen is soft.   Musculoskeletal:         General: Normal range of motion.      Cervical back: Full passive range of motion without pain, normal range of motion and neck supple.   Skin:     General: Skin is warm.      Capillary Refill: Capillary refill takes less than 2 seconds.      Findings: No rash.   Neurological:      General: No focal deficit present.      Mental Status: She is alert and oriented to person, place, and time.   Psychiatric:         Mood and Affect: Mood normal.         Behavior: Behavior normal.

## 2024-04-09 ENCOUNTER — TELEMEDICINE (OUTPATIENT)
Dept: FAMILY MEDICINE CLINIC | Facility: CLINIC | Age: 29
End: 2024-04-09
Payer: COMMERCIAL

## 2024-04-09 DIAGNOSIS — J02.9 SORE THROAT: Primary | ICD-10-CM

## 2024-04-09 DIAGNOSIS — K86.89 PANCREATIC INSUFFICIENCY: ICD-10-CM

## 2024-04-09 DIAGNOSIS — E03.9 ACQUIRED HYPOTHYROIDISM: ICD-10-CM

## 2024-04-09 DIAGNOSIS — H92.09 EAR DISCOMFORT, UNSPECIFIED LATERALITY: ICD-10-CM

## 2024-04-09 LAB — BACTERIA THROAT CULT: NORMAL

## 2024-04-09 PROCEDURE — 99214 OFFICE O/P EST MOD 30 MIN: CPT

## 2024-04-09 RX ORDER — AZITHROMYCIN 250 MG/1
500 TABLET, FILM COATED ORAL EVERY 24 HOURS
Qty: 10 TABLET | Refills: 0 | Status: SHIPPED | OUTPATIENT
Start: 2024-04-09 | End: 2024-04-14

## 2024-04-09 RX ORDER — AMOXICILLIN AND CLAVULANATE POTASSIUM 875; 125 MG/1; MG/1
1 TABLET, FILM COATED ORAL EVERY 12 HOURS SCHEDULED
Qty: 10 TABLET | Refills: 0 | Status: CANCELLED | OUTPATIENT
Start: 2024-04-09 | End: 2024-04-14

## 2024-04-09 RX ORDER — PHENOL 1.4 %
1 AEROSOL, SPRAY (ML) MUCOUS MEMBRANE EVERY 2 HOUR PRN
Qty: 118 ML | Refills: 0 | Status: SHIPPED | OUTPATIENT
Start: 2024-04-09 | End: 2024-04-19

## 2024-04-09 NOTE — PROGRESS NOTES
Assessment/Plan:    No problem-specific Assessment & Plan notes found for this encounter.         Problem List Items Addressed This Visit    None        Subjective:      Patient ID: Shilpa Sweeney is a 28 y.o. female.    HPI    The following portions of the patient's history were reviewed and updated as appropriate: allergies, current medications, past family history, past medical history, past social history, past surgical history, and problem list.    Review of Systems      Objective:      LMP 01/07/2021 (Approximate)          Physical Exam      Leann Fuentes (Yi-Ling), DO, Family Medicine PGY-1, Date and Time: 04/09/24 3:44 PM

## 2024-04-09 NOTE — PROGRESS NOTES
Virtual Regular Visit    Verification of patient location:    Patient is located at Home in the following state in which I hold an active license [place]      Assessment/Plan:    Problem List Items Addressed This Visit       Hypothyroidism     TSH in 09/23 wnl. On Levothyroxine 88mcg dailiy. No new symptoms  - continue levothyroxine daily  - monitor TSH w/ T4 reflex         Relevant Orders    TSH, 3rd generation with Free T4 reflex    Pancreatic insufficiency     Had diarrhea s/p cholecystectomy in 2023. Currently on Creon daily.  - f/u soluble vitamins A, D, E, K  - continue Creon and f/u with GI yearly appointment         Relevant Orders    Vitamin A and E    Vitamin D 25 hydroxy    Vitamin K     Other Visit Diagnoses       Sore throat    -  Primary    sore throat started on 04/05. Pt went to urgent care on 04/07. Tried ibuprofen, tylenoll, motrin with no relief. Has associated HA and the symptoms are getting worse with new white patches on posterior pharynx. No fever, cough, dizziness.  - use chloraseptic spray every 2 hr as needed for 5 days  - Could try salt water gurgle or drink honey water  - continue over the Counter Tylenol or Ibuprofen  - Zithromax 250mg BID daily for 5 days  - referral to ENT given, patient wants to check tonsils    Relevant Medications    phenol (Chloraseptic) 1.4 % mucosal liquid    azithromycin (ZITHROMAX) 250 mg tablet    Other Relevant Orders    Ambulatory Referral to Otolaryngology    Ear discomfort, unspecified laterality        sore throat with headache started on 04/05. Today she notices the pain and soreness is also present in the ear.  - referral to ENT given  - take Zithromax 250mg BID daily for 5 days  - recommend f/u visit if sx worsen or does not improve after antibiotic course    Relevant Medications    azithromycin (ZITHROMAX) 250 mg tablet                 Reason for visit is   Chief Complaint   Patient presents with    Virtual Regular Visit     Pt was seen at urgent care on  4/7  Pt states she has increased sore throat, stating she has white spots visible on tonsils    Virtual Regular Visit          Encounter provider Priti Fuentes DO    Provider located at [address] [address]      Recent Visits  No visits were found meeting these conditions.  Showing recent visits within past 7 days and meeting all other requirements  Today's Visits  Date Type Provider Dept   04/09/24 Telemedicine Priti Fuentes DO [facility]   Showing today's visits and meeting all other requirements  Future Appointments  No visits were found meeting these conditions.  Showing future appointments within next 150 days and meeting all other requirements       The patient was identified by name and date of birth. Shilpa Sweeney was informed that this is a telemedicine visit and that the visit is being conducted through the Epic Embedded platform. She agrees to proceed..  My office door was closed. No one else was in the room.  She acknowledged consent and understanding of privacy and security of the video platform. The patient has agreed to participate and understands they can discontinue the visit at any time.    Patient is aware this is a billable service.     Subjective    HPI   Shilpa Sweeney is a 28 y.o. female PMH hypothyroidism, pancreatic insufficiency, GERD, and s/p cholecystectomy presents for sore throat. Patient reported her sore throat started on Friday 04/05/2024 and on Sunday 04/07/2024 patient went to urgent care and Strep test was negative. She tried tylenol, motrin, ibuprofen and OTC flu medicine with no relief of the sore throat pain. She has associated headache and b/l ear soreness. She recalled she had multiple ~ 5 sore throat episodes in the past one year. She hasn't f/u with ENT before. She denied fever, chills, dizziness, N/V, changes with bowel movement, or urinary symptoms.   Per patient she was having  diarrhea s/p cholecystectomy in 2023 and stool test incidentally shows she has pancreatic insufficiency. She's currently on Creon daily. Patient is planning to f/u with GI for yearly visit.   Hypothyroidism, she's on 88mcg levothyroxine daily. Last TSH lab in 09/2023 and it's wnl. Denied new symptoms.     Past Medical History:   Diagnosis Date    Endometriosis     GERD (gastroesophageal reflux disease) 12/14/2023    Hyperlipidemia     hypothyroidism     Liver disease     hemangioma    Ovarian cyst     Pancreatic insufficiency 12/14/2023       Past Surgical History:   Procedure Laterality Date    DILATION AND CURETTAGE OF UTERUS      HYSTERECTOMY      ND ENDOVEN ABLTJ INCMPTNT VEIN XTR LASER 1ST VEIN Left 09/04/2020    Procedure: ENDOVASCULAR LASER THERAPY (EVLT) + stab phlebectomies;  Surgeon: Colin Black DO;  Location: [facility];  Service: Vascular    ND LAPAROSCOPY SURG CHOLECYSTECTOMY N/A 3/13/2023    Procedure: CHOLECYSTECTOMY LAPAROSCOPIC;  Surgeon: Robbie Song DO;  Location: [facility];  Service: General    WISDOM TOOTH EXTRACTION         Current Outpatient Medications   Medication Sig Dispense Refill    azithromycin (ZITHROMAX) 250 mg tablet Take 2 tablets (500 mg total) by mouth every 24 hours for 5 days 10 tablet 0    phenol (Chloraseptic) 1.4 % mucosal liquid Apply 1 spray to the mouth or throat every 2 (two) hours as needed (use it every 2 hours) for up to 10 days 118 mL 0    albuterol (ProAir HFA) 90 mcg/act inhaler Inhale 2 puffs every 6 (six) hours as needed for wheezing (cough) 8.5 g 0    benzonatate (TESSALON) 200 MG capsule Take 1 capsule (200 mg total) by mouth 3 (three) times a day as needed for cough 20 capsule 0    cetirizine (ZyrTEC) 10 mg tablet Take 1 tablet (10 mg total) by mouth daily 90 tablet 0    dicyclomine (BENTYL) 20 mg tablet Take 1 tablet (20 mg total) by mouth every 6 (six) hours 60 tablet 6    diphenoxylate-atropine (LOMOTIL) 2.5-0.025 mg per tablet Take 1 tablet by  mouth 4 (four) times a day as needed for diarrhea 120 tablet 5    famotidine (PEPCID) 40 MG tablet Take 1 tablet (40 mg total) by mouth daily 90 tablet 1    fluticasone (FLONASE) 50 mcg/act nasal spray 1 spray into each nostril 2 (two) times a day 16 g 0    fluticasone (FLONASE) 50 mcg/act nasal spray 1 spray into each nostril 2 (two) times a day 16 g 0    ibuprofen (MOTRIN) 800 mg tablet Take 800 mg by mouth every 6 (six) hours as needed      levothyroxine 88 mcg tablet TAKE 1 TABLET BY MOUTH DAILY IN THE EARLY MORNING 90 tablet 3    metroNIDAZOLE (METROGEL) 0.75 % gel Apply topically 2 (two) times a day Apply and rub a thin film twice daily, morning and evening, to entire affected area after face wash 45 g 0    Multiple Vitamin (MULTIVITAMIN) capsule Take 1 capsule by mouth daily      omeprazole (PriLOSEC) 40 MG capsule take 1 capsule by mouth once daily (Patient taking differently: 40 mg every morning) 30 capsule 3    pancrelipase, Lip-Prot-Amyl, (CREON) 12,000 units capsule Take 2 capsules with meals and 1 capsule with snacks 270 capsule 3     No current facility-administered medications for this visit.        Allergies   Allergen Reactions    Pollen Extract Hives       Review of Systems   Constitutional:  Negative for appetite change, chills, fever and unexpected weight change.   HENT:  Positive for ear pain and sore throat. Negative for congestion, ear discharge, hearing loss, postnasal drip and rhinorrhea.         Mild b/l ear soreness; sore throat with white patches noted in posterior pharynx   Eyes:  Negative for pain and visual disturbance.   Respiratory:  Negative for cough and shortness of breath.    Cardiovascular:  Negative for chest pain and palpitations.   Gastrointestinal:  Negative for abdominal pain, constipation, diarrhea, nausea and vomiting.   Genitourinary:  Negative for dysuria and hematuria.   Musculoskeletal:  Negative for arthralgias and back pain.   Skin:  Negative for color change and  rash.   Neurological:  Positive for headaches. Negative for dizziness.   All other systems reviewed and are negative.      Video Exam    There were no vitals filed for this visit.    Limited PE due to virtual visit  Physical Exam  HENT:      Nose: Nose normal.   Eyes:      General:         Right eye: No discharge.         Left eye: No discharge.      Conjunctiva/sclera: Conjunctivae normal.      Pupils: Pupils are equal, round, and reactive to light.   Neurological:      Mental Status: She is oriented to person, place, and time.          Visit Time  Total Visit Duration: 25 minutes    Leann Fuentes DO ([facility]), Family Medicine PGY-1, Date and Time: 04/09/24 10:23 PM

## 2024-04-10 NOTE — ASSESSMENT & PLAN NOTE
Had diarrhea s/p cholecystectomy in 2023. Currently on Creon daily.  - f/u soluble vitamins A, D, E, K  - continue Creon and f/u with GI yearly appointment

## 2024-04-10 NOTE — ASSESSMENT & PLAN NOTE
TSH in 09/23 wnl. On Levothyroxine 88mcg marleen. No new symptoms  - continue levothyroxine daily  - monitor TSH w/ T4 reflex

## 2024-04-13 ENCOUNTER — APPOINTMENT (OUTPATIENT)
Dept: LAB | Facility: MEDICAL CENTER | Age: 29
End: 2024-04-13
Payer: COMMERCIAL

## 2024-04-13 DIAGNOSIS — K86.89 PANCREATIC INSUFFICIENCY: Primary | ICD-10-CM

## 2024-04-13 DIAGNOSIS — E03.9 ACQUIRED HYPOTHYROIDISM: ICD-10-CM

## 2024-04-13 LAB
25(OH)D3 SERPL-MCNC: 14.7 NG/ML (ref 30–100)
TSH SERPL DL<=0.05 MIU/L-ACNC: 3.28 UIU/ML (ref 0.45–4.5)

## 2024-04-13 PROCEDURE — 84590 ASSAY OF VITAMIN A: CPT

## 2024-04-13 PROCEDURE — 84597 ASSAY OF VITAMIN K: CPT

## 2024-04-13 PROCEDURE — 84443 ASSAY THYROID STIM HORMONE: CPT

## 2024-04-13 PROCEDURE — 36415 COLL VENOUS BLD VENIPUNCTURE: CPT

## 2024-04-13 PROCEDURE — 82306 VITAMIN D 25 HYDROXY: CPT

## 2024-04-13 PROCEDURE — 84446 ASSAY OF VITAMIN E: CPT

## 2024-04-18 LAB
A-TOCOPHEROL VIT E SERPL-MCNC: 12.6 MG/L (ref 5.9–19.4)
GAMMA TOCOPHEROL SERPL-MCNC: 1 MG/L (ref 0.7–4.9)
VIT A SERPL-MCNC: 38 UG/DL (ref 18.9–57.3)

## 2024-04-19 LAB — PHYTONADIONE SERPL-MCNC: 0.18 NG/ML (ref 0.1–2.2)

## 2024-06-12 ENCOUNTER — APPOINTMENT (EMERGENCY)
Dept: CT IMAGING | Facility: HOSPITAL | Age: 29
End: 2024-06-12
Payer: COMMERCIAL

## 2024-06-12 ENCOUNTER — APPOINTMENT (EMERGENCY)
Dept: ULTRASOUND IMAGING | Facility: HOSPITAL | Age: 29
End: 2024-06-12
Payer: COMMERCIAL

## 2024-06-12 ENCOUNTER — HOSPITAL ENCOUNTER (EMERGENCY)
Facility: HOSPITAL | Age: 29
Discharge: HOME | End: 2024-06-12
Attending: EMERGENCY MEDICINE
Payer: COMMERCIAL

## 2024-06-12 VITALS
SYSTOLIC BLOOD PRESSURE: 113 MMHG | HEART RATE: 72 BPM | RESPIRATION RATE: 17 BRPM | BODY MASS INDEX: 27.22 KG/M2 | TEMPERATURE: 100.1 F | WEIGHT: 179.01 LBS | DIASTOLIC BLOOD PRESSURE: 73 MMHG | OXYGEN SATURATION: 97 %

## 2024-06-12 DIAGNOSIS — R11.0 NAUSEA: ICD-10-CM

## 2024-06-12 DIAGNOSIS — R10.31 RIGHT LOWER QUADRANT ABDOMINAL PAIN: Primary | ICD-10-CM

## 2024-06-12 LAB
ALBUMIN SERPL BCP-MCNC: 5.1 G/DL (ref 3.5–5)
ALP SERPL-CCNC: 58 U/L (ref 34–104)
ALT SERPL W P-5'-P-CCNC: 15 U/L (ref 7–52)
ANION GAP SERPL CALCULATED.3IONS-SCNC: 11 MMOL/L (ref 4–13)
AST SERPL W P-5'-P-CCNC: 15 U/L (ref 13–39)
BASOPHILS # BLD AUTO: 0.06 THOUSANDS/ÂΜL (ref 0–0.1)
BASOPHILS NFR BLD AUTO: 1 % (ref 0–1)
BILIRUB SERPL-MCNC: 0.54 MG/DL (ref 0.2–1)
BILIRUB UR QL STRIP: NEGATIVE
BUN SERPL-MCNC: 10 MG/DL (ref 5–25)
CALCIUM SERPL-MCNC: 10.2 MG/DL (ref 8.4–10.2)
CHLORIDE SERPL-SCNC: 104 MMOL/L (ref 96–108)
CLARITY UR: CLEAR
CO2 SERPL-SCNC: 22 MMOL/L (ref 21–32)
COLOR UR: COLORLESS
CREAT SERPL-MCNC: 0.75 MG/DL (ref 0.6–1.3)
EOSINOPHIL # BLD AUTO: 0.25 THOUSAND/ÂΜL (ref 0–0.61)
EOSINOPHIL NFR BLD AUTO: 2 % (ref 0–6)
ERYTHROCYTE [DISTWIDTH] IN BLOOD BY AUTOMATED COUNT: 11.8 % (ref 11.6–15.1)
EXT PREGNANCY TEST URINE: NEGATIVE
EXT. CONTROL: NORMAL
GFR SERPL CREATININE-BSD FRML MDRD: 108 ML/MIN/1.73SQ M
GLUCOSE SERPL-MCNC: 87 MG/DL (ref 65–140)
GLUCOSE UR STRIP-MCNC: NEGATIVE MG/DL
HCT VFR BLD AUTO: 46.3 % (ref 34.8–46.1)
HGB BLD-MCNC: 15.8 G/DL (ref 11.5–15.4)
HGB UR QL STRIP.AUTO: NEGATIVE
IMM GRANULOCYTES # BLD AUTO: 0.03 THOUSAND/UL (ref 0–0.2)
IMM GRANULOCYTES NFR BLD AUTO: 0 % (ref 0–2)
KETONES UR STRIP-MCNC: NEGATIVE MG/DL
LEUKOCYTE ESTERASE UR QL STRIP: NEGATIVE
LYMPHOCYTES # BLD AUTO: 2.97 THOUSANDS/ÂΜL (ref 0.6–4.47)
LYMPHOCYTES NFR BLD AUTO: 28 % (ref 14–44)
MCH RBC QN AUTO: 32.3 PG (ref 26.8–34.3)
MCHC RBC AUTO-ENTMCNC: 34.1 G/DL (ref 31.4–37.4)
MCV RBC AUTO: 95 FL (ref 82–98)
MONOCYTES # BLD AUTO: 0.95 THOUSAND/ÂΜL (ref 0.17–1.22)
MONOCYTES NFR BLD AUTO: 9 % (ref 4–12)
NEUTROPHILS # BLD AUTO: 6.22 THOUSANDS/ÂΜL (ref 1.85–7.62)
NEUTS SEG NFR BLD AUTO: 60 % (ref 43–75)
NITRITE UR QL STRIP: NEGATIVE
NRBC BLD AUTO-RTO: 0 /100 WBCS
PH UR STRIP.AUTO: 7 [PH]
PLATELET # BLD AUTO: 399 THOUSANDS/UL (ref 149–390)
PMV BLD AUTO: 9 FL (ref 8.9–12.7)
POTASSIUM SERPL-SCNC: 4 MMOL/L (ref 3.5–5.3)
PROT SERPL-MCNC: 8.5 G/DL (ref 6.4–8.4)
PROT UR STRIP-MCNC: NEGATIVE MG/DL
RBC # BLD AUTO: 4.89 MILLION/UL (ref 3.81–5.12)
SODIUM SERPL-SCNC: 137 MMOL/L (ref 135–147)
SP GR UR STRIP.AUTO: <1.005 (ref 1–1.03)
UROBILINOGEN UR STRIP-ACNC: <2 MG/DL
WBC # BLD AUTO: 10.48 THOUSAND/UL (ref 4.31–10.16)

## 2024-06-12 PROCEDURE — 76705 ECHO EXAM OF ABDOMEN: CPT

## 2024-06-12 PROCEDURE — 76856 US EXAM PELVIC COMPLETE: CPT

## 2024-06-12 PROCEDURE — 81003 URINALYSIS AUTO W/O SCOPE: CPT | Performed by: EMERGENCY MEDICINE

## 2024-06-12 PROCEDURE — 80053 COMPREHEN METABOLIC PANEL: CPT | Performed by: EMERGENCY MEDICINE

## 2024-06-12 PROCEDURE — 99285 EMERGENCY DEPT VISIT HI MDM: CPT | Performed by: EMERGENCY MEDICINE

## 2024-06-12 PROCEDURE — 81025 URINE PREGNANCY TEST: CPT | Performed by: EMERGENCY MEDICINE

## 2024-06-12 PROCEDURE — 74177 CT ABD & PELVIS W/CONTRAST: CPT

## 2024-06-12 PROCEDURE — 96365 THER/PROPH/DIAG IV INF INIT: CPT

## 2024-06-12 PROCEDURE — 85025 COMPLETE CBC W/AUTO DIFF WBC: CPT | Performed by: EMERGENCY MEDICINE

## 2024-06-12 PROCEDURE — 93005 ELECTROCARDIOGRAM TRACING: CPT

## 2024-06-12 PROCEDURE — 36415 COLL VENOUS BLD VENIPUNCTURE: CPT | Performed by: EMERGENCY MEDICINE

## 2024-06-12 PROCEDURE — 96375 TX/PRO/DX INJ NEW DRUG ADDON: CPT

## 2024-06-12 PROCEDURE — 76830 TRANSVAGINAL US NON-OB: CPT

## 2024-06-12 PROCEDURE — 96376 TX/PRO/DX INJ SAME DRUG ADON: CPT

## 2024-06-12 PROCEDURE — 99284 EMERGENCY DEPT VISIT MOD MDM: CPT

## 2024-06-12 RX ORDER — FENTANYL CITRATE 50 UG/ML
50 INJECTION, SOLUTION INTRAMUSCULAR; INTRAVENOUS ONCE
Status: COMPLETED | OUTPATIENT
Start: 2024-06-12 | End: 2024-06-12

## 2024-06-12 RX ORDER — DICYCLOMINE HCL 20 MG
20 TABLET ORAL ONCE
Status: COMPLETED | OUTPATIENT
Start: 2024-06-12 | End: 2024-06-12

## 2024-06-12 RX ORDER — ONDANSETRON 2 MG/ML
4 INJECTION INTRAMUSCULAR; INTRAVENOUS ONCE
Status: COMPLETED | OUTPATIENT
Start: 2024-06-12 | End: 2024-06-12

## 2024-06-12 RX ORDER — SODIUM CHLORIDE, SODIUM GLUCONATE, SODIUM ACETATE, POTASSIUM CHLORIDE, MAGNESIUM CHLORIDE, SODIUM PHOSPHATE, DIBASIC, AND POTASSIUM PHOSPHATE .53; .5; .37; .037; .03; .012; .00082 G/100ML; G/100ML; G/100ML; G/100ML; G/100ML; G/100ML; G/100ML
1000 INJECTION, SOLUTION INTRAVENOUS ONCE
Status: COMPLETED | OUTPATIENT
Start: 2024-06-12 | End: 2024-06-12

## 2024-06-12 RX ORDER — KETOROLAC TROMETHAMINE 30 MG/ML
15 INJECTION, SOLUTION INTRAMUSCULAR; INTRAVENOUS ONCE
Status: COMPLETED | OUTPATIENT
Start: 2024-06-12 | End: 2024-06-12

## 2024-06-12 RX ADMIN — IOHEXOL 100 ML: 350 INJECTION, SOLUTION INTRAVENOUS at 14:20

## 2024-06-12 RX ADMIN — ONDANSETRON 4 MG: 2 INJECTION INTRAMUSCULAR; INTRAVENOUS at 13:39

## 2024-06-12 RX ADMIN — FENTANYL CITRATE 50 MCG: 50 INJECTION INTRAMUSCULAR; INTRAVENOUS at 12:27

## 2024-06-12 RX ADMIN — KETOROLAC TROMETHAMINE 15 MG: 30 INJECTION, SOLUTION INTRAMUSCULAR at 15:46

## 2024-06-12 RX ADMIN — SODIUM CHLORIDE, SODIUM GLUCONATE, SODIUM ACETATE, POTASSIUM CHLORIDE, MAGNESIUM CHLORIDE, SODIUM PHOSPHATE, DIBASIC, AND POTASSIUM PHOSPHATE 1000 ML: .53; .5; .37; .037; .03; .012; .00082 INJECTION, SOLUTION INTRAVENOUS at 13:39

## 2024-06-12 RX ADMIN — DICYCLOMINE HYDROCHLORIDE 20 MG: 20 TABLET ORAL at 17:09

## 2024-06-12 RX ADMIN — FENTANYL CITRATE 50 MCG: 50 INJECTION INTRAMUSCULAR; INTRAVENOUS at 13:39

## 2024-06-12 RX ADMIN — KETOROLAC TROMETHAMINE 15 MG: 30 INJECTION, SOLUTION INTRAMUSCULAR at 13:39

## 2024-06-12 NOTE — ED PROVIDER NOTES
History  Chief Complaint   Patient presents with    Abdominal Pain     Pt complains of right lower abd pain since 11 am this morning. 10/10 sharp pain      29-year-old female presents for evaluation of sharp, nonradiating right lower quadrant abdominal pain that onset abruptly at 1100 hours this morning while she was curling her hair.  No preceding symptoms.  No injury.  Exacerbated after walking.  No home remedies.  Patient with past medical history of endometriosis status post hysterectomy with left oophorectomy and cholecystectomy.  Most recent pelvic ultrasound March/2024 showed no abnormalities of the right ovary.  Patient with associated nausea without vomiting.  He denies any urinary symptoms or flank pain.  No fever or chills.  No bowel abnormalities reported.  Patient was in usual state of health until the pain onset this morning.  On exam, the patient appears uncomfortable but is not toxic or ill-appearing.      Abdominal Pain  Associated symptoms: nausea    Associated symptoms: no chest pain, no chills, no cough, no diarrhea, no dysuria, no fatigue, no fever, no hematuria, no shortness of breath, no sore throat, no vaginal bleeding, no vaginal discharge and no vomiting        Prior to Admission Medications   Prescriptions Last Dose Informant Patient Reported? Taking?   Multiple Vitamin (MULTIVITAMIN) capsule  Self Yes No   Sig: Take 1 capsule by mouth daily   albuterol (ProAir HFA) 90 mcg/act inhaler   No No   Sig: Inhale 2 puffs every 6 (six) hours as needed for wheezing (cough)   benzonatate (TESSALON) 200 MG capsule   No No   Sig: Take 1 capsule (200 mg total) by mouth 3 (three) times a day as needed for cough   cetirizine (ZyrTEC) 10 mg tablet   No No   Sig: Take 1 tablet (10 mg total) by mouth daily   dicyclomine (BENTYL) 20 mg tablet 6/12/2024  No Yes   Sig: Take 1 tablet (20 mg total) by mouth every 6 (six) hours   diphenoxylate-atropine (LOMOTIL) 2.5-0.025 mg per tablet   No No   Sig: Take 1  tablet by mouth 4 (four) times a day as needed for diarrhea   famotidine (PEPCID) 40 MG tablet   No No   Sig: Take 1 tablet (40 mg total) by mouth daily   fluticasone (FLONASE) 50 mcg/act nasal spray   No No   Si spray into each nostril 2 (two) times a day   fluticasone (FLONASE) 50 mcg/act nasal spray   No No   Si spray into each nostril 2 (two) times a day   ibuprofen (MOTRIN) 800 mg tablet   Yes No   Sig: Take 800 mg by mouth every 6 (six) hours as needed   levothyroxine 88 mcg tablet 2024  No Yes   Sig: TAKE 1 TABLET BY MOUTH DAILY IN THE EARLY MORNING   metroNIDAZOLE (METROGEL) 0.75 % gel   No No   Sig: Apply topically 2 (two) times a day Apply and rub a thin film twice daily, morning and evening, to entire affected area after face wash   omeprazole (PriLOSEC) 40 MG capsule   No No   Sig: take 1 capsule by mouth once daily   Patient taking differently: 40 mg every morning   pancrelipase, Lip-Prot-Amyl, (CREON) 12,000 units capsule   No No   Sig: Take 2 capsules with meals and 1 capsule with snacks      Facility-Administered Medications: None       Past Medical History:   Diagnosis Date    Endometriosis     GERD (gastroesophageal reflux disease) 2023    Hyperlipidemia     hypothyroidism     Liver disease     hemangioma    Ovarian cyst     Pancreatic insufficiency 2023       Past Surgical History:   Procedure Laterality Date    DILATION AND CURETTAGE OF UTERUS      HYSTERECTOMY      CO ENDOVEN ABLTJ INCMPTNT VEIN XTR LASER 1ST VEIN Left 2020    Procedure: ENDOVASCULAR LASER THERAPY (EVLT) + stab phlebectomies;  Surgeon: Colin Black DO;  Location: [facility];  Service: Vascular    CO LAPAROSCOPY SURG CHOLECYSTECTOMY N/A 3/13/2023    Procedure: CHOLECYSTECTOMY LAPAROSCOPIC;  Surgeon: Robbie Song DO;  Location: [facility];  Service: General    WISDOM TOOTH EXTRACTION         Family History   Problem Relation Age of Onset    Cancer Mother     Hypertension Mother     OBI  disease Mother     Cancer Father     Hypertension Father     Diabetes Father     Cancer Sister      I have reviewed and agree with the history as documented.    E-Cigarette/Vaping    E-Cigarette Use Never User      E-Cigarette/Vaping Substances    Nicotine No     THC No     CBD No     Flavoring No     Other No     Unknown No      Social History     Tobacco Use    Smoking status: Former     Current packs/day: 0.00     Types: Cigarettes     Quit date: 2020     Years since quittin.1    Smokeless tobacco: Never    Tobacco comments:     3 cigarettes per day   Vaping Use    Vaping status: Never Used   Substance Use Topics    Alcohol use: No    Drug use: No       Review of Systems   Constitutional:  Negative for activity change, appetite change, chills, diaphoresis, fatigue and fever.   HENT:  Negative for dental problem, ear pain, sore throat, trouble swallowing and voice change.    Eyes:  Negative for pain and visual disturbance.   Respiratory:  Negative for cough, chest tightness, shortness of breath and wheezing.    Cardiovascular:  Negative for chest pain, palpitations and leg swelling.   Gastrointestinal:  Positive for abdominal pain and nausea. Negative for anal bleeding, blood in stool, diarrhea, rectal pain and vomiting.   Endocrine: Negative for polydipsia, polyphagia and polyuria.   Genitourinary:  Negative for decreased urine volume, difficulty urinating, dysuria, flank pain, frequency, hematuria, menstrual problem, pelvic pain, urgency, vaginal bleeding, vaginal discharge and vaginal pain.   Musculoskeletal:  Negative for back pain, joint swelling, myalgias, neck pain and neck stiffness.   Skin:  Negative for pallor, rash and wound.   Neurological:  Negative for dizziness, facial asymmetry, speech difficulty, weakness, light-headedness, numbness and headaches.   Hematological:  Negative for adenopathy.   Psychiatric/Behavioral:  Negative for agitation. The patient is not nervous/anxious.    All other  systems reviewed and are negative.      Physical Exam  Physical Exam  Vitals and nursing note reviewed.   Constitutional:       General: She is not in acute distress.     Appearance: She is well-developed. She is not ill-appearing, toxic-appearing or diaphoretic.   HENT:      Head: Normocephalic and atraumatic.      Right Ear: External ear normal.      Left Ear: External ear normal.      Nose: Nose normal.      Mouth/Throat:      Pharynx: No oropharyngeal exudate or posterior oropharyngeal erythema.   Eyes:      General: No visual field deficit or scleral icterus.        Right eye: No discharge.         Left eye: No discharge.      Extraocular Movements: Extraocular movements intact.      Conjunctiva/sclera: Conjunctivae normal.      Pupils: Pupils are equal, round, and reactive to light.   Neck:      Thyroid: No thyromegaly.      Vascular: No JVD.      Trachea: No tracheal deviation.   Cardiovascular:      Rate and Rhythm: Normal rate and regular rhythm.      Pulses: Normal pulses.      Heart sounds: Normal heart sounds, S1 normal and S2 normal. No murmur heard.     No friction rub. No gallop.   Pulmonary:      Effort: Pulmonary effort is normal. No accessory muscle usage, respiratory distress or retractions.      Breath sounds: Normal breath sounds and air entry. No stridor or decreased air movement. No decreased breath sounds, wheezing, rhonchi or rales.   Chest:      Chest wall: No tenderness.   Abdominal:      General: Abdomen is flat. There is no distension.      Palpations: Abdomen is soft. There is no mass.      Tenderness: There is abdominal tenderness in the right lower quadrant. There is no right CVA tenderness, left CVA tenderness, guarding or rebound. Positive signs include McBurney's sign. Negative signs include Cabrera's sign.      Hernia: No hernia is present.   Musculoskeletal:         General: No swelling, tenderness or deformity. Normal range of motion.      Cervical back: Normal range of motion  and neck supple.      Right lower leg: No edema.      Left lower leg: No edema.   Lymphadenopathy:      Cervical: No cervical adenopathy.   Skin:     General: Skin is warm and dry.      Capillary Refill: Capillary refill takes less than 2 seconds.      Coloration: Skin is not jaundiced or pale.      Findings: No erythema or rash.   Neurological:      General: No focal deficit present.      Mental Status: She is alert and oriented to person, place, and time.      GCS: GCS eye subscore is 4. GCS verbal subscore is 5. GCS motor subscore is 6.      Cranial Nerves: Cranial nerves 2-12 are intact. No cranial nerve deficit, dysarthria or facial asymmetry.      Sensory: Sensation is intact. No sensory deficit.      Motor: Motor function is intact. No weakness, tremor, atrophy, abnormal muscle tone or seizure activity.      Coordination: Coordination is intact. Coordination normal.      Gait: Gait is intact.      Deep Tendon Reflexes: Reflexes are normal and symmetric. Reflexes normal.   Psychiatric:         Behavior: Behavior normal.         Vital Signs  ED Triage Vitals [06/12/24 1215]   Temperature Pulse Respirations Blood Pressure SpO2   100.1 °F (37.8 °C) 93 17 148/100 98 %      Temp Source Heart Rate Source Patient Position - Orthostatic VS BP Location FiO2 (%)   Temporal Monitor Sitting Right arm --      Pain Score       10 - Worst Possible Pain           Vitals:    06/12/24 1415 06/12/24 1430 06/12/24 1500 06/12/24 1649   BP: 144/63 114/57 115/69 121/62   Pulse: 72 75 78 72   Patient Position - Orthostatic VS: Sitting Sitting Sitting          Visual Acuity      ED Medications  Medications   dicyclomine (BENTYL) tablet 20 mg (has no administration in time range)   fentaNYL injection 50 mcg (50 mcg Intravenous Given 6/12/24 1227)   ondansetron (ZOFRAN) injection 4 mg (4 mg Intravenous Given 6/12/24 1339)   fentaNYL injection 50 mcg (50 mcg Intravenous Given 6/12/24 1339)   multi-electrolyte (ISOLYTE-S PH 7.4) bolus  1,000 mL (0 mL Intravenous Stopped 6/12/24 1439)   ketorolac (TORADOL) injection 15 mg (15 mg Intravenous Given 6/12/24 1339)   iohexol (OMNIPAQUE) 350 MG/ML injection (MULTI-DOSE) 100 mL (100 mL Intravenous Given 6/12/24 1420)   ketorolac (TORADOL) injection 15 mg (15 mg Intravenous Given 6/12/24 1546)       Diagnostic Studies  Results Reviewed       Procedure Component Value Units Date/Time    Comprehensive metabolic panel [[id number]]  (Abnormal) Collected: 06/12/24 1226    Lab Status: Final result Specimen: Blood from Arm, Left Updated: 06/12/24 1252     Sodium 137 mmol/L      Potassium 4.0 mmol/L      Chloride 104 mmol/L      CO2 22 mmol/L      ANION GAP 11 mmol/L      BUN 10 mg/dL      Creatinine 0.75 mg/dL      Glucose 87 mg/dL      Calcium 10.2 mg/dL      AST 15 U/L      ALT 15 U/L      Alkaline Phosphatase 58 U/L      Total Protein 8.5 g/dL      Albumin 5.1 g/dL      Total Bilirubin 0.54 mg/dL      eGFR 108 ml/min/1.73sq m     Narrative:      National Kid[employer] guidelines for Chronic Kidney Disease (CKD):     Stage 1 with normal or high GFR (GFR > 90 mL/min/1.73 square meters)    Stage 2 Mild CKD (GFR = 60-89 mL/min/1.73 square meters)    Stage 3A Moderate CKD (GFR = 45-59 mL/min/1.73 square meters)    Stage 3B Moderate CKD (GFR = 30-44 mL/min/1.73 square meters)    Stage 4 Severe CKD (GFR = 15-29 mL/min/1.73 square meters)    Stage 5 End Stage CKD (GFR <15 mL/min/1.73 square meters)  Note: GFR calculation is accurate only with a steady state creatinine    UA w Reflex to Microscopic w Reflex to Culture [[id number]]  (Abnormal) Collected: 06/12/24 1226    Lab Status: Final result Specimen: Urine, Clean Catch Updated: 06/12/24 1239     Color, UA Colorless     Clarity, UA Clear     Specific Gravity, UA <1.005     pH, UA 7.0     Leukocytes, UA Negative     Nitrite, UA Negative     Protein, UA Negative mg/dl      Glucose, UA Negative mg/dl      Ketones, UA Negative mg/dl      Urobilinogen, UA <2.0  mg/dl      Bilirubin, UA Negative     Occult Blood, UA Negative    CBC and differential [[id number]]  (Abnormal) Collected: 06/12/24 1226    Lab Status: Final result Specimen: Blood from Arm, Left Updated: 06/12/24 1236     WBC 10.48 Thousand/uL      RBC 4.89 Million/uL      Hemoglobin 15.8 g/dL      Hematocrit 46.3 %      MCV 95 fL      MCH 32.3 pg      MCHC 34.1 g/dL      RDW 11.8 %      MPV 9.0 fL      Platelets 399 Thousands/uL      nRBC 0 /100 WBCs      Segmented % 60 %      Immature Grans % 0 %      Lymphocytes % 28 %      Monocytes % 9 %      Eosinophils Relative 2 %      Basophils Relative 1 %      Absolute Neutrophils 6.22 Thousands/µL      Absolute Immature Grans 0.03 Thousand/uL      Absolute Lymphocytes 2.97 Thousands/µL      Absolute Monocytes 0.95 Thousand/µL      Eosinophils Absolute 0.25 Thousand/µL      Basophils Absolute 0.06 Thousands/µL     POCT pregnancy, urine [[id number]]  (Normal) Resulted: 06/12/24 1224    Lab Status: Final result Updated: 06/12/24 1224     EXT Preg Test, Ur Negative     Control Valid                   CT abdomen pelvis with contrast   Final Result by Ga Ta MD (06/12 1629)      No acute findings in the abdomen or pelvis to explain the patient's right lower quadrant pain. Normal appendix.         Resident: JERAMY GRAYSON I, the attending radiologist, have reviewed the images and agree with the final report above.      Workstation performed: [id number]         US appendix   Final Result by Yoni Almodovar MD (06/12 1342)      Although the appendix is not identified, there are no secondary sonographic findings to suggest acute appendicitis.            Workstation performed: [id number]         US pelvis complete w transvaginal   Final Result by Yoni Almodovar MD (06/12 1342)      No signs of right torsion.      Status post hysterectomy and left oophorectomy.                           Workstation performed: [id number]                     Procedures  ECG 12 Lead Documentation Only    Date/Time: 6/12/2024 12:19 PM    Performed by: Luis Dias DO  Authorized by: Luis Dias DO    Indications / Diagnosis:  Abd pain  ECG reviewed by me, the ED Provider: yes    Patient location:  ED  Previous ECG:     Previous ECG:  Unavailable    Comparison to cardiac monitor: Yes    Interpretation:     Interpretation: normal    Rate:     ECG rate:  72    ECG rate assessment: normal    Rhythm:     Rhythm: sinus rhythm    Ectopy:     Ectopy: none    QRS:     QRS axis:  Normal    QRS intervals:  Normal  Conduction:     Conduction: normal    ST segments:     ST segments:  Non-specific  T waves:     T waves: non-specific             ED Course       1700 hours: Patient reassessed.  States pain is much better.  Reviewed imaging and labs with the patient.  Will discharge patient home.  Return precautions.                        SBIRT 20yo+      Flowsheet Row Most Recent Value   Initial Alcohol Screen: US AUDIT-C     1. How often do you have a drink containing alcohol? 0 Filed at: 06/12/2024 1218   2. How many drinks containing alcohol do you have on a typical day you are drinking?  0 Filed at: 06/12/2024 1218   3a. Male UNDER 65: How often do you have five or more drinks on one occasion? 0 Filed at: 06/12/2024 1218   3b. FEMALE Any Age, or MALE 65+: How often do you have 4 or more drinks on one occassion? 0 Filed at: 06/12/2024 1218   Audit-C Score 0 Filed at: 06/12/2024 1218   GAUTAM: How many times in the past year have you...    Used an illegal drug or used a prescription medication for non-medical reasons? Never Filed at: 06/12/2024 1218                      Medical Decision Making  29-year-old female presents for evaluation of sharp, nonradiating right lower quadrant abdominal pain that onset abruptly at 1100 hours this morning while she was curling her hair.  No preceding symptoms.  No injury.  Exacerbated after walking.  No home remedies.   Patient with past medical history of endometriosis status post hysterectomy with left oophorectomy and cholecystectomy.  Most recent pelvic ultrasound March/2024 showed no abnormalities of the right ovary.  Patient with associated nausea without vomiting.  He denies any urinary symptoms or flank pain.  No fever or chills.  No bowel abnormalities reported.  Patient was in usual state of health until the pain onset this morning.  On exam, the patient appears uncomfortable but is not toxic or ill-appearing.    Check basic labs, IV fluids, symptom management, pelvic ultrasound, CT scan abdomen/pelvis as needed, and disposition as appropriate.      Amount and/or Complexity of Data Reviewed  External Data Reviewed: radiology.     Details: Pelvic ultrasound March/2024: Unremarkable right ovary  Labs: ordered. Decision-making details documented in ED Course.  Radiology: ordered. Decision-making details documented in ED Course.  ECG/medicine tests: ordered and independent interpretation performed. Decision-making details documented in ED Course.    Risk  Prescription drug management.             Disposition  Final diagnoses:   Right lower quadrant abdominal pain   Nausea     Time reflects when diagnosis was documented in both MDM as applicable and the Disposition within this note       Time User Action Codes Description Comment    6/12/2024 12:46 PM Luis Dias Add [R10.31] Right lower quadrant abdominal pain     6/12/2024 12:46 PM Luis Dias Add [R11.0] Nausea           ED Disposition       ED Disposition   Discharge    Condition   Stable    Date/Time   Wed Jun 12, 2024 1705    Comment   Shilpa Beil discharge to home/self care.                   Follow-up Information       Follow up With Specialties Details Why Contact Info    [facility]   [address] [place] [id number] [phone]              Patient's Medications   Discharge Prescriptions    No medications on file        No discharge procedures on file.    PDMP Review         Value Time User    PDMP Reviewed  Yes 12/14/2023  3:46 PM NILSON Funez            ED Provider  Electronically Signed by             Luis Dias DO  06/12/24 2495

## 2024-06-12 NOTE — Clinical Note
Shilpa Sweeney was seen and treated in our emergency department on 6/12/2024.                Diagnosis:     Shilpa  may return to work on return date.    She may return on this date: 06/14/2024         If you have any questions or concerns, please don't hesitate to call.      Luis Dias, DO    ______________________________           _______________          _______________  Hospital Representative                              Date                                Time

## 2024-06-13 LAB
ATRIAL RATE: 72 BPM
P AXIS: 60 DEGREES
PR INTERVAL: 126 MS
QRS AXIS: 77 DEGREES
QRSD INTERVAL: 86 MS
QT INTERVAL: 384 MS
QTC INTERVAL: 420 MS
T WAVE AXIS: 30 DEGREES
VENTRICULAR RATE: 72 BPM

## 2024-06-13 PROCEDURE — 93010 ELECTROCARDIOGRAM REPORT: CPT | Performed by: INTERNAL MEDICINE

## 2024-06-29 DIAGNOSIS — R10.9 ABDOMINAL PAIN, UNSPECIFIED ABDOMINAL LOCATION: ICD-10-CM

## 2024-06-29 DIAGNOSIS — K86.89 PANCREATIC INSUFFICIENCY: ICD-10-CM

## 2024-06-29 DIAGNOSIS — R19.5 LOW FECAL ELASTASE LEVEL: ICD-10-CM

## 2024-06-29 DIAGNOSIS — L50.9 URTICARIA: ICD-10-CM

## 2024-06-29 DIAGNOSIS — K21.9 GASTROESOPHAGEAL REFLUX DISEASE WITHOUT ESOPHAGITIS: ICD-10-CM

## 2024-06-29 DIAGNOSIS — R19.7 DIARRHEA, UNSPECIFIED TYPE: ICD-10-CM

## 2024-07-01 RX ORDER — FAMOTIDINE 40 MG/1
40 TABLET, FILM COATED ORAL DAILY
Qty: 90 TABLET | Refills: 1 | Status: SHIPPED | OUTPATIENT
Start: 2024-07-01

## 2024-07-01 RX ORDER — DICYCLOMINE HCL 20 MG
20 TABLET ORAL EVERY 6 HOURS
Qty: 60 TABLET | Refills: 0 | Status: SHIPPED | OUTPATIENT
Start: 2024-07-01

## 2024-07-01 RX ORDER — DIPHENOXYLATE HYDROCHLORIDE AND ATROPINE SULFATE 2.5; .025 MG/1; MG/1
1 TABLET ORAL 4 TIMES DAILY PRN
Qty: 120 TABLET | Refills: 0 | Status: SHIPPED | OUTPATIENT
Start: 2024-07-01

## 2024-07-02 RX ORDER — CETIRIZINE HYDROCHLORIDE 10 MG/1
10 TABLET ORAL DAILY
Qty: 90 TABLET | Refills: 0 | Status: SHIPPED | OUTPATIENT
Start: 2024-07-02

## 2024-07-13 DIAGNOSIS — R10.9 ABDOMINAL PAIN, UNSPECIFIED ABDOMINAL LOCATION: ICD-10-CM

## 2024-07-14 RX ORDER — DICYCLOMINE HCL 20 MG
20 TABLET ORAL EVERY 6 HOURS
Qty: 60 TABLET | Refills: 0 | Status: SHIPPED | OUTPATIENT
Start: 2024-07-14

## 2024-07-28 DIAGNOSIS — R10.9 ABDOMINAL PAIN, UNSPECIFIED ABDOMINAL LOCATION: ICD-10-CM

## 2024-07-29 RX ORDER — DICYCLOMINE HCL 20 MG
20 TABLET ORAL EVERY 6 HOURS
Qty: 60 TABLET | Refills: 5 | Status: SHIPPED | OUTPATIENT
Start: 2024-07-29

## 2024-09-14 DIAGNOSIS — R10.9 ABDOMINAL PAIN, UNSPECIFIED ABDOMINAL LOCATION: ICD-10-CM

## 2024-09-16 RX ORDER — DICYCLOMINE HCL 20 MG
20 TABLET ORAL EVERY 6 HOURS
Qty: 360 TABLET | Refills: 1 | Status: SHIPPED | OUTPATIENT
Start: 2024-09-16

## 2024-09-28 DIAGNOSIS — R11.0 NAUSEA: Primary | ICD-10-CM

## 2024-09-28 RX ORDER — ONDANSETRON 4 MG/1
4 TABLET, FILM COATED ORAL EVERY 8 HOURS PRN
Qty: 45 TABLET | Refills: 2 | Status: SHIPPED | OUTPATIENT
Start: 2024-09-28

## 2024-10-02 ENCOUNTER — HOSPITAL ENCOUNTER (EMERGENCY)
Facility: HOSPITAL | Age: 29
Discharge: HOME | End: 2024-10-02
Attending: EMERGENCY MEDICINE
Payer: COMMERCIAL

## 2024-10-02 VITALS
TEMPERATURE: 98.3 F | DIASTOLIC BLOOD PRESSURE: 77 MMHG | SYSTOLIC BLOOD PRESSURE: 136 MMHG | HEART RATE: 63 BPM | OXYGEN SATURATION: 95 % | RESPIRATION RATE: 16 BRPM

## 2024-10-02 DIAGNOSIS — B34.9 VIRAL SYNDROME: Primary | ICD-10-CM

## 2024-10-02 LAB
FLUAV RNA RESP QL NAA+PROBE: NEGATIVE
FLUBV RNA RESP QL NAA+PROBE: NEGATIVE
RSV RNA RESP QL NAA+PROBE: NEGATIVE
S PYO DNA THROAT QL NAA+PROBE: NOT DETECTED
SARS-COV-2 RNA RESP QL NAA+PROBE: NEGATIVE

## 2024-10-02 PROCEDURE — 96372 THER/PROPH/DIAG INJ SC/IM: CPT

## 2024-10-02 PROCEDURE — 0241U HB NFCT DS VIR RESP RNA 4 TRGT: CPT | Performed by: EMERGENCY MEDICINE

## 2024-10-02 PROCEDURE — 87651 STREP A DNA AMP PROBE: CPT | Performed by: EMERGENCY MEDICINE

## 2024-10-02 PROCEDURE — 99284 EMERGENCY DEPT VISIT MOD MDM: CPT | Performed by: EMERGENCY MEDICINE

## 2024-10-02 PROCEDURE — 99283 EMERGENCY DEPT VISIT LOW MDM: CPT

## 2024-10-02 RX ORDER — METOCLOPRAMIDE 10 MG/1
10 TABLET ORAL ONCE
Status: COMPLETED | OUTPATIENT
Start: 2024-10-02 | End: 2024-10-02

## 2024-10-02 RX ORDER — KETOROLAC TROMETHAMINE 30 MG/ML
20 INJECTION, SOLUTION INTRAMUSCULAR; INTRAVENOUS ONCE
Status: COMPLETED | OUTPATIENT
Start: 2024-10-02 | End: 2024-10-02

## 2024-10-02 RX ADMIN — METOCLOPRAMIDE 10 MG: 10 TABLET ORAL at 16:50

## 2024-10-02 RX ADMIN — KETOROLAC TROMETHAMINE 20 MG: 30 INJECTION, SOLUTION INTRAMUSCULAR; INTRAVENOUS at 16:50

## 2024-10-02 NOTE — Clinical Note
Shilpa Sweeney was seen and treated in our emergency department on 10/2/2024.                Diagnosis:     Shilpa  may return to work on return date.    She may return on this date: 10/07/2024    Ms. Sweeney was seen in the [facility] ER on 2 Oct 2024. She was unable to attend work on 3-4 Oct due to illness but can return on 7 Oct. Thank you.     If you have any questions or concerns, please don't hesitate to call.      Toni Hilliard, DO    ______________________________           _______________          _______________  Hospital Representative                              Date                                Time

## 2024-10-02 NOTE — ED PROVIDER NOTES
Final diagnoses:   Viral syndrome: possible Coxsackie virus infection     ED Disposition       ED Disposition   Discharge    Condition   Stable    Date/Time   Wed Oct 2, 2024  6:09 PM    Comment   Shilpa Beil discharge to home/self care.                   Assessment & Plan       Medical Decision Making  Viral syndrome with some upper/lower respiratory tract infectious symptoms, fever, odynophagia. Recent sick contacts with children with a variety of illnesses including HFMD in particular.  Discussed with patient; from her workplace requirements, it would be helpful to her if to have a specific diagnosis.  Will check COVID/flu/RSV testing as well as strep testing.  Symptomatic management while workup ongoing.    Amount and/or Complexity of Data Reviewed  Labs: ordered. Decision-making details documented in ED Course.    Risk  Prescription drug management.        ED Course as of 10/02/24 1827   Wed Oct 02, 2024   1731 Strep A PCR  Negative   1743 FLU/RSV/COVID - if FLU/RSV clinically relevant (2hr TAT)  Negative   1743 Strep and respiratory viral testing both negative.  Given the recent exposure, higher suspicion for HFMD. Does not have characteristic lesions at present but reports some painful areas on the interior of her lips. I do not identify any vesicular lesions in these areas.  Reviewed typical course of this disease.  Continue symptomatic management as she has been doing.  All questions were answered to her satisfaction prior to discharge.  She expressed understanding and agreed to plan.       Medications   metoclopramide (REGLAN) tablet 10 mg (10 mg Oral Given 10/2/24 1650)   ketorolac (TORADOL) injection 20 mg (20 mg Intramuscular Given 10/2/24 1650)       ED Risk Strat Scores             SBIRT 20yo+      Flowsheet Row Most Recent Value   Initial Alcohol Screen: US AUDIT-C     1. How often do you have a drink containing alcohol? 0 Filed at: 10/02/2024 1626   2. How many drinks containing alcohol do you have  on a typical day you are drinking?  0 Filed at: 10/02/2024 1626   3a. Male UNDER 65: How often do you have five or more drinks on one occasion? 0 Filed at: 10/02/2024 1626   3b. FEMALE Any Age, or MALE 65+: How often do you have 4 or more drinks on one occassion? 0 Filed at: 10/02/2024 1626   Audit-C Score 0 Filed at: 10/02/2024 1626   GAUTAM: How many times in the past year have you...    Used an illegal drug or used a prescription medication for non-medical reasons? 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 Patient complains of full body aches, cough, sore throat, headache and fever. Last fever was today of 101 and patient took tylenol around 1530. Pains 8/10 on arrival.        Past Medical History:   Diagnosis Date    Endometriosis     GERD (gastroesophageal reflux disease) 12/14/2023    Hyperlipidemia     hypothyroidism     Liver disease     hemangioma    Ovarian cyst     Pancreatic insufficiency 12/14/2023      Past Surgical History:   Procedure Laterality Date    DILATION AND CURETTAGE OF UTERUS      HYSTERECTOMY      HI ENDOVEN ABLTJ INCMPTNT VEIN XTR LASER 1ST VEIN Left 09/04/2020    Procedure: ENDOVASCULAR LASER THERAPY (EVLT) + stab phlebectomies;  Surgeon: Colin Black DO;  Location: [facility];  Service: Vascular    HI LAPAROSCOPY SURG CHOLECYSTECTOMY N/A 3/13/2023    Procedure: CHOLECYSTECTOMY LAPAROSCOPIC;  Surgeon: Robbie Song DO;  Location: [facility];  Service: General    WISDOM TOOTH EXTRACTION        Family History   Problem Relation Age of Onset    Cancer Mother     Hypertension Mother     OBI disease Mother     Cancer Father     Hypertension Father     Diabetes Father     Cancer Sister       Social History     Tobacco Use    Smoking status: Former     Current packs/day: 0.00     Types: Cigarettes     Quit date: 5/2/2020      Years since quittin.4    Smokeless tobacco: Never    Tobacco comments:     3 cigarettes per day   Vaping Use    Vaping status: Never Used   Substance Use Topics    Alcohol use: No    Drug use: No      E-Cigarette/Vaping    E-Cigarette Use Never User       E-Cigarette/Vaping Substances    Nicotine No     THC No     CBD No     Flavoring No     Other No     Unknown No       I have reviewed and agree with the history as documented.     28 y/o woman with noted PMH presents to ED with infectious sx beginning yesterday including fever 101F, diffuse myalgias, headache, odynophagia, cough p/o scant green phlegm, and fatigue.  Cough is mild and as noted is productive of scant amounts of green phlegm; other symptoms are notably more intense however.  Sx worsened overnight and generally feels much worse today.  Was otherwise in normal state of health until symptom onset.  Works in a  with extensive contact with sick children; in particular, child with known HFMD sneezed in her face about 2-3d. Multiple other children have been ill with febrile illness/viral syndrome over past several weeks. Another child with HFMD had come to the  about 2 wk prior but was sent home.   No prior head/neck surgery.  No abx use in past 30d.  Has been taking acetaminophen and ondansetron for sx.  No nasal congestion/otalgia/vomiting/abd pain/rash.        History provided by:  Patient and medical records  Generalized Body Aches  Associated symptoms: cough, diarrhea, fatigue, fever, headaches, myalgias, nausea and sore throat    Associated symptoms: no abdominal pain, no chest pain, no congestion, no ear pain, no rash, no rhinorrhea, no shortness of breath and no vomiting        Review of Systems   Constitutional:  Positive for chills, fatigue and fever.   HENT:  Positive for sore throat. Negative for congestion, ear discharge, ear pain, postnasal drip, rhinorrhea, sinus pressure, sinus pain, trouble swallowing and voice change.     Respiratory:  Positive for cough. Negative for chest tightness and shortness of breath.    Cardiovascular: Negative.  Negative for chest pain, palpitations and leg swelling.   Gastrointestinal:  Positive for diarrhea and nausea. Negative for abdominal pain and vomiting.   Musculoskeletal:  Positive for arthralgias and myalgias.   Skin:  Negative for color change, pallor, rash and wound.   Neurological:  Positive for headaches. Negative for syncope and speech difficulty.   Hematological: Negative.            Objective       ED Triage Vitals [10/02/24 1625]   Temperature Pulse Blood Pressure Respirations SpO2 Patient Position - Orthostatic VS   98.3 °F (36.8 °C) 70 148/81 16 95 % --      Temp Source Heart Rate Source BP Location FiO2 (%) Pain Score    Temporal Monitor -- -- 8      Vitals      Date and Time Temp Pulse SpO2 Resp BP Pain Score FACES Pain Rating User   10/02/24 1700 -- 63 95 % -- 136/77 -- -- EZ   10/02/24 1650 -- -- -- -- -- 8 -- AB   10/02/24 1625 98.3 °F (36.8 °C) 70 95 % 16 148/81 8 -- CK            Physical Exam  Vitals and nursing note reviewed.   Constitutional:       General: She is awake. She is not in acute distress.     Appearance: Normal appearance. She is well-developed.   HENT:      Head: Normocephalic and atraumatic.      Right Ear: Hearing and external ear normal.      Left Ear: Hearing and external ear normal.      Nose: Nose normal.      Mouth/Throat:      Lips: Pink.      Mouth: Mucous membranes are moist. No injury or oral lesions.      Tongue: No lesions.      Palate: No mass and lesions.      Pharynx: Oropharynx is clear. Uvula midline. Posterior oropharyngeal erythema present. No oropharyngeal exudate or uvula swelling.      Tonsils: No tonsillar exudate or tonsillar abscesses. 1+ on the right. 1+ on the left.      Comments: Posterior pharyngeal wall erythema without soft tissue swelling or exudate.  No tonsillar erythema/exudate.  Neck:      Trachea: Trachea and phonation normal.    Cardiovascular:      Rate and Rhythm: Normal rate and regular rhythm.      Pulses:           Radial pulses are 2+ on the right side and 2+ on the left side.        Dorsalis pedis pulses are 2+ on the right side and 2+ on the left side.        Posterior tibial pulses are 2+ on the right side and 2+ on the left side.      Heart sounds: Normal heart sounds, S1 normal and S2 normal. No murmur heard.     No friction rub. No gallop.   Pulmonary:      Effort: Pulmonary effort is normal. No respiratory distress.      Breath sounds: Normal breath sounds. No stridor. No decreased breath sounds, wheezing, rhonchi or rales.   Abdominal:      General: There is no distension.      Palpations: There is no mass.      Tenderness: There is no abdominal tenderness. There is no guarding or rebound.   Lymphadenopathy:      Head:      Right side of head: No submental, submandibular, tonsillar or preauricular adenopathy.      Left side of head: No submental, submandibular, tonsillar or preauricular adenopathy.      Cervical: No cervical adenopathy.   Skin:     General: Skin is warm and dry.      Comments: No rashes identified  No HFMD rash in particular   Neurological:      Mental Status: She is alert and oriented to person, place, and time.      GCS: GCS eye subscore is 4. GCS verbal subscore is 5. GCS motor subscore is 6.      Cranial Nerves: No cranial nerve deficit.      Sensory: No sensory deficit.      Motor: No abnormal muscle tone.      Comments: PERRLA; EOMI. Sensation intact to light touch over face in V1-V3 distribution bilaterally. Facial expressions symmetric. Tongue/uvula midline. Shoulder shrug equal bilaterally. Strength 5/5 in UE/LE bilaterally. Sensation intact to light touch in UE/LE bilaterally.         Results Reviewed       Procedure Component Value Units Date/Time    FLU/RSV/COVID - if FLU/RSV clinically relevant (2hr TAT) [[id number]]  (Normal) Collected: 10/02/24 1650    Lab Status: Final result Specimen: Yong  from Nose Updated: 10/02/24 1735     SARS-CoV-2 Negative     INFLUENZA A PCR Negative     INFLUENZA B PCR Negative     RSV PCR Negative    Narrative:      This test has been performed using the CoV-2/Flu/RSV plus assay on the [employer] platform. This test has been validated by the  and verified by the performing laboratory.     This test is designed to amplify and detect the following: nucleocapsid (N), envelope (E), and RNA-dependent RNA polymerase (RdRP) genes of the SARS-CoV-2 genome; matrix (M), basic polymerase (PB2), and acidic protein (PA) segments of the influenza A genome; matrix (M) and non-structural protein (NS) segments of the influenza B genome, and the nucleocapsid genes of RSV A and RSV B.     Positive results are indicative of the presence of Flu A, Flu B, RSV, and/or SARS-CoV-2 RNA. Positive results for SARS-CoV-2 or suspected novel influenza should be reported to state, local, or federal health departments according to local reporting requirements.      All results should be assessed in conjunction with clinical presentation and other laboratory markers for clinical management.     FOR PEDIATRIC PATIENTS - copy/paste COVID Guidelines URL to browser: [email or web address]       Strep A PCR [[id number]]  (Normal) Collected: 10/02/24 1650    Lab Status: Final result Specimen: Throat Updated: 10/02/24 1722     STREP A PCR Not Detected            No orders to display       Procedures    ED Medication and Procedure Management   Prior to Admission Medications   Prescriptions Last Dose Informant Patient Reported? Taking?   Multiple Vitamin (MULTIVITAMIN) capsule  Self Yes No   Sig: Take 1 capsule by mouth daily   albuterol (ProAir HFA) 90 mcg/act inhaler   No No   Sig: Inhale 2 puffs every 6 (six) hours as needed for wheezing (cough)   cetirizine (ZyrTEC) 10 mg tablet   No No   Sig: Take 1 tablet (10 mg total) by mouth daily    dicyclomine (BENTYL) 20 mg tablet   No No   Sig: take 1 tablet by mouth every 6 hours   diphenoxylate-atropine (LOMOTIL) 2.5-0.025 mg per tablet   No No   Sig: Take 1 tablet by mouth 4 (four) times a day as needed for diarrhea   famotidine (PEPCID) 40 MG tablet   No No   Sig: Take 1 tablet (40 mg total) by mouth daily   fluticasone (FLONASE) 50 mcg/act nasal spray   No No   Si spray into each nostril 2 (two) times a day   fluticasone (FLONASE) 50 mcg/act nasal spray   No No   Si spray into each nostril 2 (two) times a day   ibuprofen (MOTRIN) 800 mg tablet   Yes No   Sig: Take 800 mg by mouth every 6 (six) hours as needed   levothyroxine 88 mcg tablet   No No   Sig: TAKE 1 TABLET BY MOUTH DAILY IN THE EARLY MORNING   metroNIDAZOLE (METROGEL) 0.75 % gel   No No   Sig: Apply topically 2 (two) times a day Apply and rub a thin film twice daily, morning and evening, to entire affected area after face wash   omeprazole (PriLOSEC) 40 MG capsule   No No   Sig: take 1 capsule by mouth once daily   Patient taking differently: 40 mg every morning   ondansetron (ZOFRAN) 4 mg tablet   No No   Sig: Take 1 tablet (4 mg total) by mouth every 8 (eight) hours as needed for nausea or vomiting   pancrelipase, Lip-Prot-Amyl, (CREON) 12,000 units capsule   No No   Sig: Take 2 capsules with meals and 1 capsule with snacks      Facility-Administered Medications: None     Discharge Medication List as of 10/2/2024  6:12 PM        CONTINUE these medications which have NOT CHANGED    Details   albuterol (ProAir HFA) 90 mcg/act inhaler Inhale 2 puffs every 6 (six) hours as needed for wheezing (cough), Starting 2024, Normal      cetirizine (ZyrTEC) 10 mg tablet Take 1 tablet (10 mg total) by mouth daily, Starting 2024, Normal      dicyclomine (BENTYL) 20 mg tablet take 1 tablet by mouth every 6 hours, Starting Mon 2024, Normal      diphenoxylate-atropine (LOMOTIL) 2.5-0.025 mg per tablet Take 1 tablet by mouth 4  (four) times a day as needed for diarrhea, Starting Mon 7/1/2024, Normal      famotidine (PEPCID) 40 MG tablet Take 1 tablet (40 mg total) by mouth daily, Starting Mon 7/1/2024, Normal      !! fluticasone (FLONASE) 50 mcg/act nasal spray 1 spray into each nostril 2 (two) times a day, Starting Sun 10/15/2023, Normal      !! fluticasone (FLONASE) 50 mcg/act nasal spray 1 spray into each nostril 2 (two) times a day, Starting Wed 2/14/2024, Normal      ibuprofen (MOTRIN) 800 mg tablet Take 800 mg by mouth every 6 (six) hours as needed, Starting Wed 3/20/2024, Until Thu 3/20/2025 at 2359, Historical Med      levothyroxine 88 mcg tablet TAKE 1 TABLET BY MOUTH DAILY IN THE EARLY MORNING, Starting Tue 2/20/2024, Normal      metroNIDAZOLE (METROGEL) 0.75 % gel Apply topically 2 (two) times a day Apply and rub a thin film twice daily, morning and evening, to entire affected area after face wash, Starting Fri 1/19/2024, Normal      Multiple Vitamin (MULTIVITAMIN) capsule Take 1 capsule by mouth daily, Historical Med      omeprazole (PriLOSEC) 40 MG capsule take 1 capsule by mouth once daily, Normal      ondansetron (ZOFRAN) 4 mg tablet Take 1 tablet (4 mg total) by mouth every 8 (eight) hours as needed for nausea or vomiting, Starting Sat 9/28/2024, Normal      pancrelipase, Lip-Prot-Amyl, (CREON) 12,000 units capsule Take 2 capsules with meals and 1 capsule with snacks, Normal       !! - Potential duplicate medications found. Please discuss with provider.        No discharge procedures on file.  ED SEPSIS DOCUMENTATION   Time reflects when diagnosis was documented in both MDM as applicable and the Disposition within this note       Time User Action Codes Description Comment    10/2/2024  6:09 PM Toni Hilliard Add [B34.9] Viral syndrome     10/2/2024  6:09 PM Toni Hilliard Modify [B34.9] Viral syndrome: possible Coxsackie virus infection                  Toni Hilliard DO  10/02/24 1828

## 2024-10-02 NOTE — DISCHARGE INSTRUCTIONS
The testing today was negative for Covid, influenza, RSV, and Strep.    You have a viral infection, possible Coxsackievirus.    Continue to take Tylenol and Zofran as needed.    You should start to feel better after a few days.

## 2024-12-17 ENCOUNTER — OFFICE VISIT (OUTPATIENT)
Dept: URGENT CARE | Facility: MEDICAL CENTER | Age: 29
End: 2024-12-17
Payer: COMMERCIAL

## 2024-12-17 VITALS
WEIGHT: 181 LBS | HEART RATE: 84 BPM | TEMPERATURE: 98.2 F | SYSTOLIC BLOOD PRESSURE: 110 MMHG | OXYGEN SATURATION: 98 % | BODY MASS INDEX: 27.43 KG/M2 | RESPIRATION RATE: 18 BRPM | DIASTOLIC BLOOD PRESSURE: 70 MMHG | HEIGHT: 68 IN

## 2024-12-17 DIAGNOSIS — H60.391 OTHER INFECTIVE ACUTE OTITIS EXTERNA OF RIGHT EAR: Primary | ICD-10-CM

## 2024-12-17 DIAGNOSIS — H69.91 DISORDER OF RIGHT EUSTACHIAN TUBE: ICD-10-CM

## 2024-12-17 PROCEDURE — S9088 SERVICES PROVIDED IN URGENT: HCPCS | Performed by: STUDENT IN AN ORGANIZED HEALTH CARE EDUCATION/TRAINING PROGRAM

## 2024-12-17 PROCEDURE — 99213 OFFICE O/P EST LOW 20 MIN: CPT | Performed by: STUDENT IN AN ORGANIZED HEALTH CARE EDUCATION/TRAINING PROGRAM

## 2024-12-17 RX ORDER — NEOMYCIN SULFATE, POLYMYXIN B SULFATE AND HYDROCORTISONE 10; 3.5; 1 MG/ML; MG/ML; [USP'U]/ML
4 SUSPENSION/ DROPS AURICULAR (OTIC) 4 TIMES DAILY
Qty: 10 ML | Refills: 0 | Status: SHIPPED | OUTPATIENT
Start: 2024-12-17

## 2024-12-17 RX ORDER — METHYLPREDNISOLONE 4 MG/1
TABLET ORAL
Qty: 21 EACH | Refills: 0 | Status: SHIPPED | OUTPATIENT
Start: 2024-12-17

## 2024-12-17 RX ORDER — CIPROFLOXACIN AND DEXAMETHASONE 3; 1 MG/ML; MG/ML
4 SUSPENSION/ DROPS AURICULAR (OTIC) 2 TIMES DAILY
Qty: 7.5 ML | Refills: 0 | Status: SHIPPED | OUTPATIENT
Start: 2024-12-17 | End: 2024-12-17 | Stop reason: ALTCHOICE

## 2024-12-17 NOTE — PROGRESS NOTES
[facility]        NAME: Shilpa Sweneey is a 29 y.o. female  : 1995    MRN: [id number]  DATE: 2024  TIME: 5:16 PM    Assessment and Orders   Other infective acute otitis externa of right ear [H60.391]  1. Other infective acute otitis externa of right ear  ciprofloxacin-dexamethasone (CIPRODEX) otic suspension      2. Disorder of right eustachian tube  methylPREDNISolone 4 MG tablet therapy pack            Plan and Discussion      Symptoms and exam consistent with otitis externa. Right ear canal is swollen and tender. TM is normal however given patient's increased pressure behind the eardrum secondary to eustachian tube dysfunction and sinus congestion, will treat with short course of oral steroids to minimize inflammation.     Risks and benefits discussed. Patient understands and agrees with the plan.     PATIENT INSTRUCTIONS      If tests have been performed at [facility], our office will contact you with results if changes need to be made to the care plan discussed with you at the visit.  You can review your full results on [facility] MyChart.    Follow up with PCP.     If any of the following occur, please report to your nearest ED for evaluation or call 911.   Difficultly breathing or shortness of breath  Chest pain  Acutely worsening symptoms.         Chief Complaint     Chief Complaint   Patient presents with    Earache     1wk pt has had symptoms of earache in Right ear, nose bleeds, sinus pressure, migraines,coughing w/green mucus, sore throat on Right side,Tylenol & Aleve taken for symptoms           History of Present Illness       Having nose bleeds and sinus pressure    Earache   There is pain in the right ear. This is a new problem. The current episode started in the past 7 days. The problem occurs constantly. There has been no fever. Associated symptoms include coughing, headaches and a sore throat. Pertinent negatives include no ear discharge.       Review of Systems   Review  of Systems   HENT:  Positive for ear pain and sore throat. Negative for ear discharge.    Respiratory:  Positive for cough.    Neurological:  Positive for headaches.         Current Medications       Current Outpatient Medications:     albuterol (ProAir HFA) 90 mcg/act inhaler, Inhale 2 puffs every 6 (six) hours as needed for wheezing (cough), Disp: 8.5 g, Rfl: 0    cetirizine (ZyrTEC) 10 mg tablet, Take 1 tablet (10 mg total) by mouth daily, Disp: 90 tablet, Rfl: 0    ciprofloxacin-dexamethasone (CIPRODEX) otic suspension, Administer 4 drops to the right ear 2 (two) times a day, Disp: 7.5 mL, Rfl: 0    dicyclomine (BENTYL) 20 mg tablet, take 1 tablet by mouth every 6 hours, Disp: 360 tablet, Rfl: 1    diphenoxylate-atropine (LOMOTIL) 2.5-0.025 mg per tablet, Take 1 tablet by mouth 4 (four) times a day as needed for diarrhea, Disp: 120 tablet, Rfl: 0    famotidine (PEPCID) 40 MG tablet, Take 1 tablet (40 mg total) by mouth daily, Disp: 90 tablet, Rfl: 1    fluticasone (FLONASE) 50 mcg/act nasal spray, 1 spray into each nostril 2 (two) times a day, Disp: 16 g, Rfl: 0    fluticasone (FLONASE) 50 mcg/act nasal spray, 1 spray into each nostril 2 (two) times a day, Disp: 16 g, Rfl: 0    ibuprofen (MOTRIN) 800 mg tablet, Take 800 mg by mouth every 6 (six) hours as needed, Disp: , Rfl:     levothyroxine 88 mcg tablet, TAKE 1 TABLET BY MOUTH DAILY IN THE EARLY MORNING, Disp: 90 tablet, Rfl: 3    methylPREDNISolone 4 MG tablet therapy pack, Use as directed on package, Disp: 21 each, Rfl: 0    metroNIDAZOLE (METROGEL) 0.75 % gel, Apply topically 2 (two) times a day Apply and rub a thin film twice daily, morning and evening, to entire affected area after face wash, Disp: 45 g, Rfl: 0    Multiple Vitamin (MULTIVITAMIN) capsule, Take 1 capsule by mouth daily, Disp: , Rfl:     omeprazole (PriLOSEC) 40 MG capsule, take 1 capsule by mouth once daily (Patient taking differently: 40 mg every morning), Disp: 30 capsule, Rfl: 3     "ondansetron (ZOFRAN) 4 mg tablet, Take 1 tablet (4 mg total) by mouth every 8 (eight) hours as needed for nausea or vomiting, Disp: 45 tablet, Rfl: 2    pancrelipase, Lip-Prot-Amyl, (CREON) 12,000 units capsule, Take 2 capsules with meals and 1 capsule with snacks, Disp: 270 capsule, Rfl: 1    Current Allergies     Allergies as of 12/17/2024 - Reviewed 12/17/2024   Allergen Reaction Noted    Pollen extract Hives 12/14/2023            The following portions of the patient's history were reviewed and updated as appropriate: allergies, current medications, past family history, past medical history, past social history, past surgical history and problem list.     Past Medical History:   Diagnosis Date    Endometriosis     GERD (gastroesophageal reflux disease) 12/14/2023    Hyperlipidemia     hypothyroidism     Liver disease     hemangioma    Ovarian cyst     Pancreatic insufficiency 12/14/2023       Past Surgical History:   Procedure Laterality Date    DILATION AND CURETTAGE OF UTERUS      HYSTERECTOMY      SD ENDOVEN ABLTJ INCMPTNT VEIN XTR LASER 1ST VEIN Left 09/04/2020    Procedure: ENDOVASCULAR LASER THERAPY (EVLT) + stab phlebectomies;  Surgeon: Colin Black DO;  [facility];  Service: Vascular    SD LAPAROSCOPY SURG CHOLECYSTECTOMY N/A 3/13/2023    Procedure: CHOLECYSTECTOMY LAPAROSCOPIC;  Surgeon: Robbie Song DO;  Location: [facility];  Service: General    WISDOM TOOTH EXTRACTION         Family History   Problem Relation Age of Onset    Cancer Mother     Hypertension Mother     OBI disease Mother     Cancer Father     Hypertension Father     Diabetes Father     Cancer Sister          Medications have been verified.        Objective   /70   Pulse 84   Temp 98.2 °F (36.8 °C)   Resp 18   Ht 5' 8\" (1.727 m)   Wt 82.1 kg (181 lb)   LMP 01/07/2021 (Approximate)   SpO2 98%   BMI 27.52 kg/m²   Patient's last menstrual period was 01/07/2021 (approximate).       Physical Exam "     Physical Exam  Constitutional:       General: She is not in acute distress.     Appearance: She is not ill-appearing.   HENT:      Right Ear: Tympanic membrane and external ear normal. Swelling and tenderness present. No drainage. Tympanic membrane is not injected, scarred, perforated, erythematous or bulging.      Left Ear: Tympanic membrane and external ear normal.      Nose: Congestion present.      Mouth/Throat:      Pharynx: Posterior oropharyngeal erythema present. No oropharyngeal exudate.   Cardiovascular:      Rate and Rhythm: Normal rate and regular rhythm.   Pulmonary:      Effort: No respiratory distress.      Breath sounds: No wheezing.   Lymphadenopathy:      Cervical: Cervical adenopathy present.   Neurological:      General: No focal deficit present.      Mental Status: She is alert and oriented to person, place, and time.   Psychiatric:         Mood and Affect: Mood normal.         Behavior: Behavior normal.               Kesha Aquino DO

## 2024-12-22 ENCOUNTER — TELEMEDICINE (OUTPATIENT)
Dept: OTHER | Facility: HOSPITAL | Age: 29
End: 2024-12-22
Payer: COMMERCIAL

## 2024-12-22 DIAGNOSIS — J01.90 ACUTE SINUSITIS, RECURRENCE NOT SPECIFIED, UNSPECIFIED LOCATION: Primary | ICD-10-CM

## 2024-12-22 PROCEDURE — 99213 OFFICE O/P EST LOW 20 MIN: CPT | Performed by: NURSE PRACTITIONER

## 2024-12-22 RX ORDER — AMOXICILLIN 875 MG/1
875 TABLET, COATED ORAL 2 TIMES DAILY
Qty: 20 TABLET | Refills: 0 | Status: SHIPPED | OUTPATIENT
Start: 2024-12-22 | End: 2025-01-01

## 2024-12-22 NOTE — PATIENT INSTRUCTIONS
"Rest and drink extra fluids.  Start antibiotic.  Take probiotic.  OTC cough and cold as needed.  Flonase can also be helpful.  Nasal saline flushes or minerva pot can help flush the sinuses.  Follow up with PCP if no improvement.  Go to ER with any worsening symptoms.     Patient Education     Sinusitis in adults   The Basics   Written by the doctors and editors at [facility]   What is sinusitis? -- Sinusitis is a condition that can cause a stuffy nose, pain in the face, and discharge or \"mucus\" from the nose.  The sinuses are hollow areas in the bones of the face (figure 1). They have a thin lining that normally makes a small amount of mucus. When this lining gets irritated or infected, it swells and makes extra mucus. This causes symptoms.  Sinusitis usually happens after a person gets sick with a cold. The germs causing the cold can infect the sinuses, too. Sometimes, other germs can be the cause of the infection. Often, a person feels like their cold is getting better. But then, they get sinusitis and begin to feel sick again.  What are the symptoms of sinusitis? -- Common symptoms of sinusitis include:   Stuffy or blocked nose   Thick white, yellow, or green discharge from the nose   Pain in the teeth   Pain or pressure in the face - This often feels worse when a person bends forward.  People with sinusitis can also have other symptoms, such as:   Fever   Cough   Trouble smelling   Ear pressure or fullness   Headache   Bad breath   Feeling tired  Most of the time, symptoms start to improve in 7 to 10 days.  Should I see a doctor or nurse? -- See your doctor or nurse if your symptoms last more than 10 days, or if your symptoms first get better but then get worse.  Rarely, sinusitis can lead to serious problems. See your doctor or nurse right away (do not wait 10 days) if you have:   Fever higher than 102°F (38.9°C)   Sudden and severe pain in the face and head   Trouble seeing, or seeing double   Trouble thinking " clearly   Swelling or redness around 1 or both eyes   Stiff neck  Is there anything I can do on my own to feel better? -- Yes. To help with your symptoms, you can:   Take an over-the-counter pain reliever to reduce the pain.   Rinse your nose and sinuses with salt water a few times a day - Ask your doctor or nurse about the best way to do this.   Drink plenty of fluids - Staying hydrated might help to thin the mucus and make it drain more easily.  Your doctor might also recommend a steroid nose spray to reduce the swelling in your nose, especially if you have allergies. Talk to your doctor if you are thinking of using a steroid spray.  How is sinusitis treated? -- Most of the time, sinusitis does not need to be treated with antibiotic medicines. This is because most sinusitis is caused by viruses, not bacteria, and antibiotics do not kill viruses. In fact, even sinusitis caused by bacteria will usually get better on its own without antibiotics.  Some people with sinusitis do need treatment with antibiotics. If your symptoms have not improved after 10 days, ask your doctor if you should take antibiotics. They might recommend that you wait 1 more week to see if your symptoms improve. But if you have symptoms such as a fever or a lot of pain, they might prescribe antibiotics. If you do get antibiotics, follow all of your doctor's instructions about taking them.  What if my symptoms do not get better? -- If your symptoms do not get better, talk with your doctor or nurse. They might order tests to figure out why you still have symptoms. These can include:   CT scan or other imaging tests - Imaging tests create pictures of the inside of the body.   A test to look inside the sinuses - For this test, a doctor puts a thin tube with a camera on the end into the nose and up into the sinuses.  Some people get a lot of sinus infections or have symptoms that last at least 3 months. These people can have a different type of  "sinusitis called \"chronic sinusitis.\" Chronic sinusitis can be caused by different things. For example, some people have growths inside their nose or sinuses that are called \"polyps.\" Other people have allergies that cause their symptoms.  Chronic sinusitis can be treated in different ways. If you have chronic sinusitis, talk with your doctor about which treatments are right for you.  All topics are updated as new evidence becomes available and our peer review process is complete.  This topic retrieved from [employer] on: Feb 28, 2024.  Topic 75945 Version 21.0  Release: 32.2.4 - C32.58  © 2024 UpToDate, Inc. and/or its affiliates. All rights reserved.  figure 1: Sinuses of the face     The sinuses are hollow areas in the bones of the face. This drawing shows where the sinuses are, from the side and front views. There are 4 pairs of sinuses, named for the bones around them: sphenoid, frontal, ethmoid, and maxillary.  Graphic 558816 Version 3.0  Consumer Information Use and Disclaimer   Disclaimer: This generalized information is a limited summary of diagnosis, treatment, and/or medication information. It is not meant to be comprehensive and should be used as a tool to help the user understand and/or assess potential diagnostic and treatment options. It does NOT include all information about conditions, treatments, medications, side effects, or risks that may apply to a specific patient. It is not intended to be medical advice or a substitute for the medical advice, diagnosis, or treatment of a health care provider based on the health care provider's examination and assessment of a patient's specific and unique circumstances. Patients must speak with a health care provider for complete information about their health, medical questions, and treatment options, including any risks or benefits regarding use of medications. This information does not endorse any treatments or medications as safe, effective, or approved for " treating a specific patient. UpToDate, Inc. and its affiliates disclaim any warranty or liability relating to this information or the use thereof.The use of this information is governed by the Terms of Use, available at https://www.[employer].com/en/know/clinical-effectiveness-terms. 2024© UpToDate, Inc. and its affiliates and/or licensors. All rights reserved.  Copyright   © 2024 UpToDate, Inc. and/or its affiliates. All rights reserved.

## 2024-12-22 NOTE — PROGRESS NOTES
Virtual Regular Visit  Name: Shilpa Sweeney      : 1995      MRN: [id number]  Encounter Provider: Your Video Visit Provider  Encounter Date: 2024   Encounter department: VIRTUAL CARE       Verification of patient location:  Patient is located at work in the following state in which I hold an active license [place] :  Assessment & Plan  Acute sinusitis, recurrence not specified, unspecified location    Orders:    amoxicillin (AMOXIL) 875 mg tablet; Take 1 tablet (875 mg total) by mouth 2 (two) times a day for 10 days        Encounter provider Your Video Visit Provider    The patient was identified by name and date of birth. Shilpa Sweeney was informed that this is a telemedicine visit and that the visit is being conducted through the Epic Embedded platform. She agrees to proceed..  My office door was closed. No one else was in the room.  She acknowledged consent and understanding of privacy and security of the video platform. The patient has agreed to participate and understands they can discontinue the visit at any time.    Patient is aware this is a billable service.     History was obtained from: History obtained from: patient  History of Present Illness     This is a 29 year old female here today for video visit.  She was seen at urgent care on  and treated for ear infection.  She was started on Ciprodex drops and a oral steroid.  She states she is now losing her voice and continues to have sinus pressure.   She states her Covid was negative.  No chest pain but has some mild sob.  She states she is bringing up green mucous.  She has tried OTC tylenol cold/flu, Ibuprofen.       Review of Systems   Constitutional:  Negative for activity change, chills and fatigue.   HENT:  Positive for congestion, rhinorrhea, sinus pressure and sinus pain. Negative for sore throat.    Respiratory:  Positive for cough.    Neurological: Negative.    Psychiatric/Behavioral: Negative.         Objective   LMP 2021  (Approximate)     Physical Exam  Constitutional:       General: She is not in acute distress.     Appearance: Normal appearance. She is not ill-appearing or toxic-appearing.   HENT:      Head: Normocephalic and atraumatic.      Nose: Congestion and rhinorrhea present.   Pulmonary:      Effort: Pulmonary effort is normal. No respiratory distress.      Comments: No cough or audible wheezing  Neurological:      Mental Status: She is alert and oriented to person, place, and time.   Psychiatric:         Mood and Affect: Mood normal.         Behavior: Behavior normal.         Thought Content: Thought content normal.         Judgment: Judgment normal.         Visit Time  Total Visit Duration: 4 minutes not including the time spent for establishing the audio/video connection.

## 2024-12-28 DIAGNOSIS — K21.9 GASTROESOPHAGEAL REFLUX DISEASE WITHOUT ESOPHAGITIS: ICD-10-CM

## 2024-12-31 RX ORDER — FAMOTIDINE 40 MG/1
40 TABLET, FILM COATED ORAL DAILY
Qty: 90 TABLET | Refills: 0 | Status: SHIPPED | OUTPATIENT
Start: 2024-12-31

## 2025-01-06 ENCOUNTER — TELEMEDICINE (OUTPATIENT)
Dept: OTHER | Facility: HOSPITAL | Age: 30
End: 2025-01-06
Payer: COMMERCIAL

## 2025-01-06 DIAGNOSIS — B37.9 YEAST INFECTION: Primary | ICD-10-CM

## 2025-01-06 PROCEDURE — 99213 OFFICE O/P EST LOW 20 MIN: CPT | Performed by: NURSE PRACTITIONER

## 2025-01-06 RX ORDER — FLUCONAZOLE 150 MG/1
150 TABLET ORAL ONCE
Qty: 1 TABLET | Refills: 0 | Status: SHIPPED | OUTPATIENT
Start: 2025-01-06 | End: 2025-01-06

## 2025-01-06 NOTE — PROGRESS NOTES
Virtual Regular Visit  Name: Shilpa Sweeney      : 1995      MRN: [id number]  Encounter Provider: Your Video Visit Provider  Encounter Date: 2025   Encounter department: VIRTUAL CARE       Verification of patient location:  Patient is located at Home in the following state in which [place] an [place] :  Assessment & Plan  Yeast infection    Orders:    fluconazole (DIFLUCAN) 150 mg tablet; Take 1 tablet (150 mg total) by mouth once for 1 dose        Encounter provider Your Video Visit Provider    The patient was identified by name and date of birth. Shilpa Sweeney was informed that this is a telemedicine visit and that the visit is being conducted through the Epic Embedded platform. She agrees to proceed..  My office door was closed. No one else was in the room.  She acknowledged consent and understanding of privacy and security of the video platform. The patient has agreed to participate and understands they can discontinue the visit at any time.    Patient is aware this is a billable service.     History was obtained from: History obtained from: patient  History of Present Illness     This is a 30 year old female here today for video visit.  She was treated for sinus infection on .  She states she is now having symptoms of a yeast infection.  She states she has been trying OTC cream and it not helping.  She states this morning she notice some white discharge.    She states she is having some burning on the outside.  She states there is some redness.  After the antibiotic she noticed some itchying.  She had the discharge for 4 days ago.  He states she does have some diarrhea and headache.        Review of Systems   Constitutional:  Negative for activity change, chills, fatigue and fever.   Respiratory: Negative.     Cardiovascular: Negative.    Gastrointestinal: Negative.    Genitourinary:  Positive for vaginal discharge.   Psychiatric/Behavioral: Negative.         Objective   LMP 2021  (Approximate)     Physical Exam  Constitutional:       General: She is not in acute distress.     Appearance: Normal appearance. She is not toxic-appearing.   Pulmonary:      Effort: Pulmonary effort is normal.      Breath sounds: Stridor present.   Neurological:      Mental Status: She is alert and oriented to person, place, and time.   Psychiatric:         Mood and Affect: Mood normal.         Behavior: Behavior normal.         Thought Content: Thought content normal.         Judgment: Judgment normal.         Visit Time  Total Visit Duration: 5 minutes not including the time spent for establishing the audio/video connection.

## 2025-01-06 NOTE — PATIENT INSTRUCTIONS
Start diflucan as prescribed. You can repeat in 3 days if symptoms persist.  Wear cotton underwear.  Keep area dry.  Follow up with ob/gyn if no improvement.  Go to ER with any worsening symptoms.     Patient Education     Vaginal Yeast Infection  General Information   It is normal to have yeast in the vagina. But certain things can make yeast grow more than it should. This includes some medicines like antibiotics, stress, or other factors. A yeast infection can cause vaginal itching, burning, and more vaginal discharge. You may need a pill or medicine that you put into your vagina to treat a yeast infection.  What care is needed at home?   Call your regular doctor to let them know you were in the ED. Make a follow-up appointment if you were told to.  Practice good hygiene. Use clear water to wash the skin outside your vagina. Do not wash inside the vagina. Take showers instead of baths. Avoid bubble baths. Pat your skin dry with a clean towel.  Keep moist areas of your body clean, cool, and dry.  Avoid products that may irritate your skin. Do not use douches, feminine sprays, pads, body wash, or bubble bath.  Wear cotton underwear. Avoid tight-fitting pants, leggings, or shorts.  Change your wet bathing suit or damp workout clothes as soon as possible.  When do I need to get emergency help?   Return to the ED if:   You notice bleeding with the yeast infection, and you are not having your period.  You develop pain in your genital or pelvic area during sexual activity.  You have a fever of 100.4°F (38°C) or higher or chills.  When do I need to call the doctor?   Your discharge continues after you have finished the treatment.  You have new or worsening symptoms.  Last Reviewed Date   2021-04-09  Consumer Information Use and Disclaimer   This generalized information is a limited summary of diagnosis, treatment, and/or medication information. It is not meant to be comprehensive and should be used as a tool to help the  user understand and/or assess potential diagnostic and treatment options. It does NOT include all information about conditions, treatments, medications, side effects, or risks that may apply to a specific patient. It is not intended to be medical advice or a substitute for the medical advice, diagnosis, or treatment of a health care provider based on the health care provider's examination and assessment of a patient’s specific and unique circumstances. Patients must speak with a health care provider for complete information about their health, medical questions, and treatment options, including any risks or benefits regarding use of medications. This information does not endorse any treatments or medications as safe, effective, or approved for treating a specific patient. UpToDate, Inc. and its affiliates disclaim any warranty or liability relating to this information or the use thereof. The use of this information is governed by the Terms of Use, available at https://www.[employer].com/en/know/clinical-effectiveness-terms   Copyright   Copyright © 2024 UpToDate, Inc. and its affiliates and/or licensors. All rights reserved.

## 2025-02-06 DIAGNOSIS — K86.89 PANCREATIC INSUFFICIENCY: ICD-10-CM

## 2025-02-06 DIAGNOSIS — R19.5 LOW FECAL ELASTASE LEVEL: ICD-10-CM

## 2025-02-06 DIAGNOSIS — R19.7 DIARRHEA, UNSPECIFIED TYPE: ICD-10-CM

## 2025-02-06 DIAGNOSIS — R11.0 NAUSEA: ICD-10-CM

## 2025-02-07 RX ORDER — IBUPROFEN 800 MG/1
800 TABLET, FILM COATED ORAL EVERY 6 HOURS PRN
Qty: 30 TABLET | OUTPATIENT
Start: 2025-02-07 | End: 2026-02-07

## 2025-02-07 RX ORDER — ONDANSETRON 4 MG/1
4 TABLET, FILM COATED ORAL EVERY 8 HOURS PRN
Qty: 45 TABLET | Refills: 0 | Status: SHIPPED | OUTPATIENT
Start: 2025-02-07

## 2025-02-07 NOTE — TELEPHONE ENCOUNTER
I have never seen this patient, but I'm listed as her PCP. Additionally, she's overdue for an OV if she's going to continue following with us. Please, schedule. Thank you!

## 2025-02-08 DIAGNOSIS — E03.9 HYPOTHYROIDISM, UNSPECIFIED TYPE: ICD-10-CM

## 2025-02-10 DIAGNOSIS — E03.9 HYPOTHYROIDISM, UNSPECIFIED TYPE: ICD-10-CM

## 2025-02-10 RX ORDER — LEVOTHYROXINE SODIUM 88 UG/1
88 TABLET ORAL
Qty: 90 TABLET | Refills: 0 | Status: SHIPPED | OUTPATIENT
Start: 2025-02-10

## 2025-02-10 RX ORDER — LEVOTHYROXINE SODIUM 88 UG/1
88 TABLET ORAL
Qty: 90 TABLET | Refills: 3 | OUTPATIENT
Start: 2025-02-10

## 2025-02-19 ENCOUNTER — OFFICE VISIT (OUTPATIENT)
Dept: URGENT CARE | Facility: MEDICAL CENTER | Age: 30
End: 2025-02-19
Payer: COMMERCIAL

## 2025-02-19 VITALS
SYSTOLIC BLOOD PRESSURE: 100 MMHG | HEART RATE: 66 BPM | OXYGEN SATURATION: 96 % | RESPIRATION RATE: 16 BRPM | DIASTOLIC BLOOD PRESSURE: 80 MMHG | TEMPERATURE: 98.2 F

## 2025-02-19 DIAGNOSIS — B34.9 VIRAL INFECTION: Primary | ICD-10-CM

## 2025-02-19 PROCEDURE — 99213 OFFICE O/P EST LOW 20 MIN: CPT

## 2025-02-19 PROCEDURE — S9088 SERVICES PROVIDED IN URGENT: HCPCS

## 2025-02-19 RX ORDER — TRIAMCINOLONE ACETONIDE 55 UG/1
1 SPRAY, METERED NASAL DAILY
Qty: 16.9 ML | Refills: 0 | Status: SHIPPED | OUTPATIENT
Start: 2025-02-19

## 2025-02-19 RX ORDER — BROMPHENIRAMINE MALEATE, PSEUDOEPHEDRINE HYDROCHLORIDE, AND DEXTROMETHORPHAN HYDROBROMIDE 2; 30; 10 MG/5ML; MG/5ML; MG/5ML
5 SYRUP ORAL 4 TIMES DAILY PRN
Qty: 120 ML | Refills: 0 | Status: SHIPPED | OUTPATIENT
Start: 2025-02-19

## 2025-02-19 NOTE — PATIENT INSTRUCTIONS
Viral symptoms may last for a total of 1-3 weeks. Symptoms typically start with a sore throat and progress to include sinus pain/pressure, runny nose (yellow/green), and congestion/cough. The yellow/green color does not necessarily indicate a bacterial sinus infection. If your sinus symptoms worsen on day 10-14, you may want to consider re-evaluation for a possible secondary bacterial sinus infection. Coughs are typically most bothersome the first 1-2 weeks. Coughs frequently linger for 4-6 weeks. However, have your lungs re-evaluated if you develop sudden worsening cough, shortness or breath or chest discomfort.       Vitamin D3 2000 IU daily  Vitamin C 1000mg twice per day  Some studies suggest that Zinc 12.5-15mg every 2 hours while awake x 5 days may shorten the duration cold symptoms by 1-2 days.   Fluids and rest  Nasal saline spray (Extra Strength) 3 drops in each nostril 4x per day may shorten the duration of illness by 1-2 days and help prevent spread.  Afrin if severe congestion (do not use for more than 3 days)  Over the counter cold medication as needed (EX: Mucinex DM, Sudafed I.e. pseudoephedrine, Tylenol, Flonase)  Sinus Rinses (use distilled water only and carefully follow instructions)  Salt water gargles and chloraseptic spray  Throat Coat Tea (herbal licorice root tea for sore throat-add honey unless diabetic)  Warm compresses over sinuses  Steam treatment (utilize proper safety precautions when in contact with hot water/steam)

## 2025-02-19 NOTE — PROGRESS NOTES
[facility]        NAME: Shilpa Sweeney is a 29 y.o. female  : 1995    MRN: [id number]  DATE: 2025  TIME: 4:41 PM    Assessment and Plan   Viral infection [B34.9]  1. Viral infection  Triamcinolone Acetonide (Nasacort Allergy 24HR) 55 MCG/ACT nasal spray    brompheniramine-pseudoephedrine-DM 30-2-10 MG/5ML syrup        Explained that bacterial sinus infections are not typically considered this early on.  Patient's symptoms of only going on for 4 days and likely she is going through acute viral infection similar to her daughter with potential for acute viral sinusitis as of right now.  Recommended follow-up with PCP at day 10 of symptoms not improving or symptoms worsening.    Patient Instructions     Patient Instructions   Viral symptoms may last for a total of 1-3 weeks. Symptoms typically start with a sore throat and progress to include sinus pain/pressure, runny nose (yellow/green), and congestion/cough. The yellow/green color does not necessarily indicate a bacterial sinus infection. If your sinus symptoms worsen on day 10-14, you may want to consider re-evaluation for a possible secondary bacterial sinus infection. Coughs are typically most bothersome the first 1-2 weeks. Coughs frequently linger for 4-6 weeks. However, have your lungs re-evaluated if you develop sudden worsening cough, shortness or breath or chest discomfort.       Vitamin D3 2000 IU daily  Vitamin C 1000mg twice per day  Some studies suggest that Zinc 12.5-15mg every 2 hours while awake x 5 days may shorten the duration cold symptoms by 1-2 days.   Fluids and rest  Nasal saline spray (Extra Strength) 3 drops in each nostril 4x per day may shorten the duration of illness by 1-2 days and help prevent spread.  Afrin if severe congestion (do not use for more than 3 days)  Over the counter cold medication as needed (EX: Mucinex DM, Sudafed I.e. pseudoephedrine, Tylenol, Flonase)  Sinus Rinses (use distilled water only  and carefully follow instructions)  Salt water gargles and chloraseptic spray  Throat Coat Tea (herbal licorice root tea for sore throat-add honey unless diabetic)  Warm compresses over sinuses  Steam treatment (utilize proper safety precautions when in contact with hot water/steam)      Follow up with PCP in 3-5 days.  Proceed to  ER if symptoms worsen.    Chief Complaint     Chief Complaint   Patient presents with    Earache     Right ear pain and sinus pains, body aches started Sunday          History of Present Illness       29-year-old female presenting with cold-like symptoms x 4 days.  Patient reports primary complaint right now is body aches with worsening sinus pressure and pain.  Symptoms started Sunday with cough, sore throat congestion and feeling rundown but have progressed to this point.  Patient concern for sinus infection or ear infection.  Patient states that she was recently treated for an ear infection within the last 5 weeks and did not know if it fully cleared.  Concerned that she did have a sinus infection in the past and this feels relatively similar to it.  States her daughter is currently sick with somewhat similar symptoms to her.  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  Reports using Flonase, ibuprofen, DayQuil, NyQuil, and saline spray with only minor relief of symptoms.    Earache   Associated symptoms include coughing, headaches and a sore throat. Pertinent negatives include no diarrhea, ear discharge, rhinorrhea or vomiting.       Review of Systems   Review of Systems   Constitutional:  Negative for chills, diaphoresis, fatigue and fever.   HENT:  Positive for congestion, ear pain, postnasal drip, sinus pressure, sinus pain and sore throat. Negative for ear discharge and rhinorrhea.    Respiratory:  Positive for cough. Negative for choking.    Cardiovascular:  Negative for chest pain and palpitations.   Gastrointestinal:   Negative for diarrhea, nausea and vomiting.   Musculoskeletal:  Positive for myalgias. Negative for arthralgias.   Skin:  Negative for color change.   Neurological:  Positive for headaches. Negative for dizziness and light-headedness.         Current Medications       Current Outpatient Medications:     brompheniramine-pseudoephedrine-DM 30-2-10 MG/5ML syrup, Take 5 mL by mouth 4 (four) times a day as needed for cough or congestion, Disp: 120 mL, Rfl: 0    Triamcinolone Acetonide (Nasacort Allergy 24HR) 55 MCG/ACT nasal spray, 1 spray by Each Nare route daily, Disp: 16.9 mL, Rfl: 0    albuterol (ProAir HFA) 90 mcg/act inhaler, Inhale 2 puffs every 6 (six) hours as needed for wheezing (cough), Disp: 8.5 g, Rfl: 0    cetirizine (ZyrTEC) 10 mg tablet, Take 1 tablet (10 mg total) by mouth daily, Disp: 90 tablet, Rfl: 0    dicyclomine (BENTYL) 20 mg tablet, take 1 tablet by mouth every 6 hours, Disp: 360 tablet, Rfl: 1    diphenoxylate-atropine (LOMOTIL) 2.5-0.025 mg per tablet, Take 1 tablet by mouth 4 (four) times a day as needed for diarrhea, Disp: 120 tablet, Rfl: 0    famotidine (PEPCID) 40 MG tablet, take 1 tablet by mouth once daily, Disp: 90 tablet, Rfl: 0    fluticasone (FLONASE) 50 mcg/act nasal spray, 1 spray into each nostril 2 (two) times a day, Disp: 16 g, Rfl: 0    fluticasone (FLONASE) 50 mcg/act nasal spray, 1 spray into each nostril 2 (two) times a day, Disp: 16 g, Rfl: 0    ibuprofen (MOTRIN) 800 mg tablet, Take 800 mg by mouth every 6 (six) hours as needed, Disp: , Rfl:     levothyroxine 88 mcg tablet, Take 1 tablet (88 mcg total) by mouth daily in the early morning, Disp: 90 tablet, Rfl: 0    methylPREDNISolone 4 MG tablet therapy pack, Use as directed on package, Disp: 21 each, Rfl: 0    metroNIDAZOLE (METROGEL) 0.75 % gel, Apply topically 2 (two) times a day Apply and rub a thin film twice daily, morning and evening, to entire affected area after face wash, Disp: 45 g, Rfl: 0    Multiple Vitamin  (MULTIVITAMIN) capsule, Take 1 capsule by mouth daily, Disp: , Rfl:     neomycin-polymyxin-hydrocortisone (CORTISPORIN) 0.35%-10,000 units/mL-1% otic suspension, Administer 4 drops to the right ear 4 (four) times a day, Disp: 10 mL, Rfl: 0    omeprazole (PriLOSEC) 40 MG capsule, take 1 capsule by mouth once daily (Patient taking differently: 40 mg every morning), Disp: 30 capsule, Rfl: 3    ondansetron (ZOFRAN) 4 mg tablet, Take 1 tablet (4 mg total) by mouth every 8 (eight) hours as needed for nausea or vomiting, Disp: 45 tablet, Rfl: 0    pancrelipase, Lip-Prot-Amyl, (CREON) 12,000 units capsule, Take 2 capsules with meals and 1 capsule with snacks, Disp: 270 capsule, Rfl: 0    Current Allergies     Allergies as of 02/19/2025 - Reviewed 02/19/2025   Allergen Reaction Noted    Pollen extract Hives 12/14/2023            The following portions of the patient's history were reviewed and updated as appropriate: allergies, current medications, past family history, past medical history, past social history, past surgical history and problem list.     Past Medical History:   Diagnosis Date    Endometriosis     GERD (gastroesophageal reflux disease) 12/14/2023    Hyperlipidemia     hypothyroidism     Liver disease     hemangioma    Ovarian cyst     Pancreatic insufficiency 12/14/2023       Past Surgical History:   Procedure Laterality Date    DILATION AND CURETTAGE OF UTERUS      HYSTERECTOMY      AK ENDOVEN ABLTJ INCMPTNT VEIN XTR LASER 1ST VEIN Left 09/04/2020    Procedure: ENDOVASCULAR LASER THERAPY (EVLT) + stab phlebectomies;  Surgeon: Colin Black DO;  Location: [facility];  Service: Vascular    AK LAPAROSCOPY SURG CHOLECYSTECTOMY N/A 3/13/2023    Procedure: CHOLECYSTECTOMY LAPAROSCOPIC;  Surgeon: Robbie Song DO;  Location: [facility];  Service: General    WISDOM TOOTH EXTRACTION         Family History   Problem Relation Age of Onset    Cancer Mother     Hypertension Mother     OBI disease Mother      Cancer Father     Hypertension Father     Diabetes Father     Cancer Sister          Medications have been verified.        Objective   /80   Pulse 66   Temp 98.2 °F (36.8 °C)   Resp 16   LMP 01/07/2021 (Approximate)   SpO2 96%        Physical Exam     Physical Exam  Vitals and nursing note reviewed.   Constitutional:       General: She is not in acute distress.     Appearance: She is normal weight.   HENT:      Head: Normocephalic and atraumatic.      Right Ear: Tympanic membrane, ear canal and external ear normal. There is no impacted cerumen.      Left Ear: Tympanic membrane, ear canal and external ear normal. There is no impacted cerumen.      Nose: Congestion present.      Mouth/Throat:      Mouth: Mucous membranes are moist.      Pharynx: Oropharynx is clear. No posterior oropharyngeal erythema.   Eyes:      General:         Right eye: No discharge.         Left eye: No discharge.      Conjunctiva/sclera: Conjunctivae normal.      Pupils: Pupils are equal, round, and reactive to light.   Cardiovascular:      Rate and Rhythm: Normal rate and regular rhythm.      Pulses: Normal pulses.      Heart sounds: Normal heart sounds.   Pulmonary:      Effort: Pulmonary effort is normal.      Breath sounds: Normal breath sounds. No wheezing or rhonchi.   Abdominal:      General: Bowel sounds are normal.      Palpations: Abdomen is soft.      Tenderness: There is no abdominal tenderness.   Lymphadenopathy:      Cervical: No cervical adenopathy.   Skin:     General: Skin is warm and dry.      Capillary Refill: Capillary refill takes less than 2 seconds.   Neurological:      General: No focal deficit present.      Mental Status: She is alert and oriented to person, place, and time.   Psychiatric:         Mood and Affect: Mood normal.         Behavior: Behavior normal.

## 2025-02-28 ENCOUNTER — HOSPITAL ENCOUNTER (EMERGENCY)
Facility: HOSPITAL | Age: 30
Discharge: HOME | End: 2025-02-28
Attending: EMERGENCY MEDICINE
Payer: COMMERCIAL

## 2025-02-28 VITALS
TEMPERATURE: 99.7 F | HEART RATE: 97 BPM | DIASTOLIC BLOOD PRESSURE: 92 MMHG | OXYGEN SATURATION: 98 % | SYSTOLIC BLOOD PRESSURE: 148 MMHG | RESPIRATION RATE: 16 BRPM

## 2025-02-28 DIAGNOSIS — H72.91 TYMPANIC MEMBRANE RUPTURE, RIGHT: Primary | ICD-10-CM

## 2025-02-28 PROCEDURE — 99282 EMERGENCY DEPT VISIT SF MDM: CPT

## 2025-02-28 PROCEDURE — 96372 THER/PROPH/DIAG INJ SC/IM: CPT

## 2025-02-28 PROCEDURE — 99284 EMERGENCY DEPT VISIT MOD MDM: CPT | Performed by: PHYSICIAN ASSISTANT

## 2025-02-28 RX ORDER — OFLOXACIN 3 MG/ML
10 SOLUTION AURICULAR (OTIC) 2 TIMES DAILY
Qty: 7 ML | Refills: 0 | Status: SHIPPED | OUTPATIENT
Start: 2025-02-28 | End: 2025-03-07

## 2025-02-28 RX ORDER — OXYCODONE AND ACETAMINOPHEN 5; 325 MG/1; MG/1
1 TABLET ORAL EVERY 6 HOURS PRN
Qty: 10 TABLET | Refills: 0 | Status: SHIPPED | OUTPATIENT
Start: 2025-02-28 | End: 2025-03-08

## 2025-02-28 RX ORDER — KETOROLAC TROMETHAMINE 30 MG/ML
15 INJECTION, SOLUTION INTRAMUSCULAR; INTRAVENOUS ONCE
Status: COMPLETED | OUTPATIENT
Start: 2025-02-28 | End: 2025-02-28

## 2025-02-28 RX ADMIN — KETOROLAC TROMETHAMINE 15 MG: 30 INJECTION, SOLUTION INTRAMUSCULAR at 16:45

## 2025-02-28 NOTE — ED PROVIDER NOTES
Time reflects when diagnosis was documented in both MDM as applicable and the Disposition within this note       Time User Action Codes Description Comment    2/28/2025  4:40 PM Glen Hernandez Add [H72.91] Tympanic membrane rupture, right           ED Disposition       ED Disposition   Discharge    Condition   Stable    Date/Time   Fri Feb 28, 2025  4:39 PM    Comment   Shilpa Beil discharge to home/self care.                   Assessment & Plan       Medical Decision Making  This is a 29-year-old female patient who has been fighting a cold has been on antibiotics states this morning felt pain and discharge out of her right ear.  There is no effect on the hearing.  No mastoid tenderness.  Differential diagnosis includes not limited to otitis externa, otitis media, eardrum perforation, mastoiditis unlikely    Problems Addressed:  Tympanic membrane rupture, right: acute illness or injury     Details: Patient will be given ofloxacin drops and Percocet received ketorolac in the emergency department will be sent to ear nose and throat for further evaluation    Amount and/or Complexity of Data Reviewed  External Data Reviewed: notes.     Details: I did review PDMP prior prescriptions  Discussion of management or test interpretation with external provider(s): Using joint decision-making patient be discharged home on Percocet, ofloxacin drops told to take Tylenol Motrin over-the-counter for ENT return with any worsening symptoms Emmanuel comes concern verbalized understanding and agreement    Risk  Prescription drug management.             Medications   ketorolac (TORADOL) injection 15 mg (15 mg Intramuscular Given 2/28/25 1645)       ED Risk Strat Scores                            SBIRT 22yo+      Flowsheet Row Most Recent Value   Initial Alcohol Screen: US AUDIT-C     1. How often do you have a drink containing alcohol? 0 Filed at: 02/28/2025 1312   2. How many drinks containing alcohol do you have on a typical day you  "are drinking?  0 Filed at: 2025 1639   3a. Male UNDER 65: How often do you have five or more drinks on one occasion? 0 Filed at: 2025 7521   3b. FEMALE Any Age, or MALE 65+: How often do you have 4 or more drinks on one occassion? 0 Filed at: 2025 9769   Audit-C Score 0 Filed at: 2025 6509                            History of Present Illness       Chief Complaint   Patient presents with    Earache     Pt states he has right ear pain starting this morning. Pt states she felt a \"pop\" in ear earlier today and states she then started with clear ear drainage.        Past Medical History:   Diagnosis Date    Endometriosis     GERD (gastroesophageal reflux disease) 2023    Hyperlipidemia     hypothyroidism     Liver disease     hemangioma    Ovarian cyst     Pancreatic insufficiency 2023      Past Surgical History:   Procedure Laterality Date    DILATION AND CURETTAGE OF UTERUS      HYSTERECTOMY      [place] ENDOVEN ABLTJ INCMPTNT VEIN XTR LASER 1ST VEIN Left 2020    Procedure: ENDOVASCULAR LASER THERAPY (EVLT) + stab phlebectomies;  Surgeon: Colin Black DO;  Location: [facility];  Service: Vascular    [place] LAPAROSCOPY SURG CHOLECYSTECTOMY N/A 3/13/2023    Procedure: CHOLECYSTECTOMY LAPAROSCOPIC;  Surgeon: Robbie Song DO;  Location: [facility];  Service: General    WISDOM TOOTH EXTRACTION        Family History   Problem Relation Age of Onset    Cancer Mother     Hypertension Mother     OBI disease Mother     Cancer Father     Hypertension Father     Diabetes Father     Cancer Sister       Social History     Tobacco Use    Smoking status: Former     Current packs/day: 0.00     Types: Cigarettes     Quit date: 2020     Years since quittin.8    Smokeless tobacco: Never    Tobacco comments:     3 cigarettes per day   Vaping Use    Vaping status: Never Used   Substance Use Topics    Alcohol use: No    Drug use: No      E-Cigarette/Vaping    E-Cigarette Use Never User     "   E-Cigarette/Vaping Substances    Nicotine No     THC No     CBD No     Flavoring No     Other No     Unknown No       I have reviewed and agree with the history as documented.     Is a 29-year-old female patient who is battling a cold has been on antibiotics and this morning when she sneezed she felt a pop and pain in her right ear and then had bloody discharge with clear discharge on her area.  Since then she has had pain in her ear no pain over the mastoid.  No fever no chills no blurred vision or double vision no photophobia no dizziness no off-balance no vertigo.  No cough no chest pain no shortness of breath no nausea fine diarrhea abdominal pain nothing makes it better or worse.  She tried nothing over-the-counter        Review of Systems   Constitutional:  Negative for chills, diaphoresis, fatigue and fever.   HENT:  Positive for ear discharge and ear pain. Negative for congestion, facial swelling, hearing loss, nosebleeds and sore throat.    Eyes:  Negative for photophobia, pain, discharge and visual disturbance.   Respiratory:  Negative for cough, choking, chest tightness, shortness of breath and wheezing.    Cardiovascular:  Negative for chest pain, palpitations and leg swelling.   Gastrointestinal:  Negative for abdominal distention, abdominal pain, diarrhea, nausea and vomiting.   Endocrine: Negative for polydipsia and polyphagia.   Genitourinary:  Negative for dysuria, flank pain, frequency and hematuria.   Musculoskeletal:  Negative for arthralgias, back pain, gait problem and joint swelling.   Skin:  Negative for color change, pallor and rash.   Allergic/Immunologic: Negative for environmental allergies and food allergies.   Neurological:  Negative for dizziness, seizures, syncope and headaches.   Psychiatric/Behavioral:  Negative for agitation, behavioral problems and confusion. The patient is not nervous/anxious.    All other systems reviewed and are negative.          Objective       ED Triage  Vitals   Temperature Pulse Blood Pressure Respirations SpO2 Patient Position - Orthostatic VS   02/28/25 1637 02/28/25 1637 02/28/25 1640 02/28/25 1637 02/28/25 1637 02/28/25 1637   99.7 °F (37.6 °C) 97 148/92 16 98 % Sitting      Temp Source Heart Rate Source BP Location FiO2 (%) Pain Score    02/28/25 1637 02/28/25 1637 02/28/25 1637 -- 02/28/25 1637    Temporal Monitor Right arm  8      Vitals      Date and Time Temp Pulse SpO2 Resp BP Pain Score FACES Pain Rating User   02/28/25 1640 -- -- -- -- 148/92 -- -- MB   02/28/25 1637 99.7 °F (37.6 °C) 97 98 % 16 -- 8 -- MB            Physical Exam  Vitals and nursing note reviewed.   Constitutional:       General: She is not in acute distress.     Appearance: Normal appearance. She is not ill-appearing, toxic-appearing or diaphoretic.   HENT:      Head: Normocephalic and atraumatic.      Right Ear: No decreased hearing noted. Drainage present. No mastoid tenderness. Tympanic membrane is perforated.      Left Ear: Tympanic membrane, ear canal and external ear normal.      Ears:      Comments: No proptosis     Nose: Nose normal. No congestion or rhinorrhea.      Mouth/Throat:      Mouth: Mucous membranes are dry.      Pharynx: Oropharynx is clear. No oropharyngeal exudate or posterior oropharyngeal erythema.   Eyes:      Extraocular Movements: Extraocular movements intact.      Conjunctiva/sclera: Conjunctivae normal.      Pupils: Pupils are equal, round, and reactive to light.   Cardiovascular:      Rate and Rhythm: Normal rate and regular rhythm.   Pulmonary:      Effort: Pulmonary effort is normal. No respiratory distress.      Breath sounds: Normal breath sounds.   Abdominal:      General: Bowel sounds are normal.      Palpations: Abdomen is soft.      Tenderness: There is no abdominal tenderness.   Musculoskeletal:         General: Normal range of motion.      Cervical back: Normal range of motion and neck supple. No rigidity or tenderness.      Right lower leg: No  edema.      Left lower leg: No edema.   Lymphadenopathy:      Cervical: No cervical adenopathy.   Skin:     General: Skin is warm and dry.      Capillary Refill: Capillary refill takes less than 2 seconds.      Findings: No rash.   Neurological:      General: No focal deficit present.      Mental Status: She is alert and oriented to person, place, and time. Mental status is at baseline.   Psychiatric:         Mood and Affect: Mood normal.         Behavior: Behavior normal.         Results Reviewed       None            No orders to display       Procedures    ED Medication and Procedure Management   Prior to Admission Medications   Prescriptions Last Dose Informant Patient Reported? Taking?   Multiple Vitamin (MULTIVITAMIN) capsule  Self Yes No   Sig: Take 1 capsule by mouth daily   Triamcinolone Acetonide (Nasacort Allergy 24HR) 55 MCG/ACT nasal spray   No No   Si spray by Each Nare route daily   albuterol (ProAir HFA) 90 mcg/act inhaler   No No   Sig: Inhale 2 puffs every 6 (six) hours as needed for wheezing (cough)   brompheniramine-pseudoephedrine-DM 30-2-10 MG/5ML syrup   No No   Sig: Take 5 mL by mouth 4 (four) times a day as needed for cough or congestion   cetirizine (ZyrTEC) 10 mg tablet   No No   Sig: Take 1 tablet (10 mg total) by mouth daily   dicyclomine (BENTYL) 20 mg tablet   No No   Sig: take 1 tablet by mouth every 6 hours   diphenoxylate-atropine (LOMOTIL) 2.5-0.025 mg per tablet   No No   Sig: Take 1 tablet by mouth 4 (four) times a day as needed for diarrhea   famotidine (PEPCID) 40 MG tablet   No No   Sig: take 1 tablet by mouth once daily   fluticasone (FLONASE) 50 mcg/act nasal spray   No No   Si spray into each nostril 2 (two) times a day   fluticasone (FLONASE) 50 mcg/act nasal spray   No No   Si spray into each nostril 2 (two) times a day   ibuprofen (MOTRIN) 800 mg tablet   Yes No   Sig: Take 800 mg by mouth every 6 (six) hours as needed   levothyroxine 88 mcg tablet   No No    Sig: Take 1 tablet (88 mcg total) by mouth daily in the early morning   methylPREDNISolone 4 MG tablet therapy pack   No No   Sig: Use as directed on package   metroNIDAZOLE (METROGEL) 0.75 % gel   No No   Sig: Apply topically 2 (two) times a day Apply and rub a thin film twice daily, morning and evening, to entire affected area after face wash   omeprazole (PriLOSEC) 40 MG capsule   No No   Sig: take 1 capsule by mouth once daily   Patient taking differently: 40 mg every morning   ondansetron (ZOFRAN) 4 mg tablet   No No   Sig: Take 1 tablet (4 mg total) by mouth every 8 (eight) hours as needed for nausea or vomiting   pancrelipase, Lip-Prot-Amyl, (CREON) 12,000 units capsule   No No   Sig: Take 2 capsules with meals and 1 capsule with snacks      Facility-Administered Medications: None     Patient's Medications   Discharge Prescriptions    OFLOXACIN (FLOXIN) 0.3 % OTIC SOLUTION    Administer 10 drops into ears 2 (two) times a day for 7 days       Start Date: 2/28/2025 End Date: 3/7/2025       Order Dose: 10 drops       Quantity: 7 mL    Refills: 0    OXYCODONE-ACETAMINOPHEN (PERCOCET) 5-325 MG PER TABLET    Take 1 tablet by mouth every 6 (six) hours as needed for severe pain (Initial therapy) for up to 8 days Max Daily Amount: 4 tablets       Start Date: 2/28/2025 End Date: 3/8/2025       Order Dose: 1 tablet       Quantity: 10 tablet    Refills: 0     No discharge procedures on file.  ED SEPSIS DOCUMENTATION   Time reflects when diagnosis was documented in both MDM as applicable and the Disposition within this note       Time User Action Codes Description Comment    2/28/2025  4:40 PM Glen Britt Add [H72.91] Tympanic membrane rupture, right                  Glen Britt PA-C  02/28/25 1693

## 2025-02-28 NOTE — Clinical Note
Shilpa Sweeney was seen and treated in our emergency department on 2/28/2025.                Diagnosis: Perforated tympanic membrane right    Shilpa  .    She may return on this date: 03/02/2025    No headset to be used at work for 1 week/until ear is healed     If you have any questions or concerns, please don't hesitate to call.      Glen Britt PA-C    ______________________________           _______________          _______________  Hospital Representative                              Date                                Time

## 2025-02-28 NOTE — DISCHARGE INSTRUCTIONS
Return with any worsening symptoms questions comments or concerns    Follow-up with your with the ENT doctor listed for ongoing care on re-evaluation

## 2025-03-10 ENCOUNTER — OFFICE VISIT (OUTPATIENT)
Dept: FAMILY MEDICINE CLINIC | Facility: CLINIC | Age: 30
End: 2025-03-10
Payer: COMMERCIAL

## 2025-03-10 VITALS
TEMPERATURE: 99.3 F | HEIGHT: 68 IN | WEIGHT: 177.4 LBS | BODY MASS INDEX: 26.89 KG/M2 | OXYGEN SATURATION: 97 % | DIASTOLIC BLOOD PRESSURE: 68 MMHG | SYSTOLIC BLOOD PRESSURE: 142 MMHG | RESPIRATION RATE: 18 BRPM | HEART RATE: 71 BPM

## 2025-03-10 DIAGNOSIS — E03.9 ACQUIRED HYPOTHYROIDISM: ICD-10-CM

## 2025-03-10 DIAGNOSIS — K86.89 PANCREATIC INSUFFICIENCY: ICD-10-CM

## 2025-03-10 DIAGNOSIS — Z00.00 ANNUAL PHYSICAL EXAM: Primary | ICD-10-CM

## 2025-03-10 DIAGNOSIS — B37.31 CANDIDA VAGINITIS: ICD-10-CM

## 2025-03-10 DIAGNOSIS — Z11.4 ENCOUNTER FOR SCREENING FOR HIV: ICD-10-CM

## 2025-03-10 DIAGNOSIS — H72.91 PERFORATION OF RIGHT TYMPANIC MEMBRANE: ICD-10-CM

## 2025-03-10 DIAGNOSIS — L03.211 CELLULITIS OF FACE: ICD-10-CM

## 2025-03-10 PROCEDURE — 87070 CULTURE OTHR SPECIMN AEROBIC: CPT | Performed by: NURSE PRACTITIONER

## 2025-03-10 PROCEDURE — T1015 CLINIC SERVICE: HCPCS | Performed by: FAMILY MEDICINE

## 2025-03-10 PROCEDURE — 87205 SMEAR GRAM STAIN: CPT | Performed by: NURSE PRACTITIONER

## 2025-03-10 RX ORDER — DOXYCYCLINE 100 MG/1
100 CAPSULE ORAL EVERY 12 HOURS SCHEDULED
Qty: 14 CAPSULE | Refills: 0 | Status: SHIPPED | OUTPATIENT
Start: 2025-03-10 | End: 2025-03-17

## 2025-03-10 RX ORDER — FLUCONAZOLE 150 MG/1
1 TABLET ORAL DAILY
COMMUNITY
Start: 2025-01-06 | End: 2025-03-10 | Stop reason: SDUPTHER

## 2025-03-10 RX ORDER — FLUCONAZOLE 150 MG/1
150 TABLET ORAL DAILY
Qty: 1 TABLET | Refills: 1 | Status: SHIPPED | OUTPATIENT
Start: 2025-03-10 | End: 2025-03-24

## 2025-03-10 NOTE — ASSESSMENT & PLAN NOTE
Orders:    Comprehensive metabolic panel; Future    TSH, 3rd generation with Free T4 reflex; Future

## 2025-03-10 NOTE — PROGRESS NOTES
Adult Annual Physical  Name: Shilpa Sweeney      : 1995      MRN: [id number]  Encounter Provider: NILSON Rosen  Encounter Date: 3/10/2025   Encounter department: [facility]    Assessment & Plan  Annual physical exam         Cellulitis of face  Culture obtained today  Start doxycycline BID x 7 days; daily yogurt/probiotic at alternate time than abx  Strict RTC precautions rev'd with patient in detail today  Continue supportive care and avoid anything in the ear  Orders:    doxycycline hyclate (VIBRAMYCIN) 100 mg capsule; Take 1 capsule (100 mg total) by mouth every 12 (twelve) hours for 7 days    Ear culture and Gram stain; Future    Perforation of right tympanic membrane    Orders:    Ear culture and Gram stain; Future    Candida vaginitis  Patient endorses vaginal yeast infections w/ abx  Diflucan sent in preparation  Patient aware of risks, benefits, and alternatives  Orders:    fluconazole (DIFLUCAN) 150 mg tablet; Take 1 tablet (150 mg total) by mouth in the morning for 14 days    Encounter for screening for HIV    Orders:    HIV 1/2 AG/AB w Reflex [facility] for 2 yr old and above; Future    Acquired hypothyroidism    Orders:    Comprehensive metabolic panel; Future    TSH, 3rd generation with Free T4 reflex; Future    Pancreatic insufficiency    Orders:    CBC and differential; Future    Comprehensive metabolic panel; Future      Immunizations and preventive care screenings were discussed with patient today. Appropriate education was printed on patient's after visit summary.    Counseling:  Alcohol/drug use: discussed moderation in alcohol intake, the recommendations for healthy alcohol use, and avoidance of illicit drug use.  Dental Health: discussed importance of regular tooth brushing, flossing, and dental visits.  Injury prevention: discussed safety/seat belts, safety helmets, smoke detectors, carbon monoxide detectors, and smoking near bedding or  upholstery.  Sexual health: discussed sexually transmitted diseases, partner selection, use of condoms, avoidance of unintended pregnancy, and contraceptive alternatives.  Exercise: the importance of regular exercise/physical activity was discussed. Recommend exercise 3-5 times per week for at least 30 minutes.          History of Present Illness     Adult Annual Physical:  Patient presents for annual physical. PMH pancreatic insufficiency and vitamin D deficiency presents for AWV. Reports overall not feeling welling with fevers of 100.3, chills, decreased appetite and difficulty sleeping. .     Diet and Physical Activity:  - Diet/Nutrition: well balanced diet and consuming 3-5 servings of fruits/vegetables daily.  - Exercise: 5-7 times a week on average.    General Health:  - Sleep: sleeps well and > 8 hours of sleep on average.  - Hearing: decreased hearing right ear. membrane rupture  - Vision: no vision problems and most recent eye exam < 1 year ago.  - Dental: regular dental visits and brushes teeth twice daily.    /GYN Health:  - Follows with GYN: yes.   - History of STDs: no  - Contraception:. Hysterectomy      Advanced Care Planning:  - Has an advanced directive?: no    - Has a durable medical POA?: no    - ACP document given to patient?: yes      Review of Systems   Constitutional:  Positive for appetite change and fatigue. Negative for activity change.   HENT:  Positive for ear pain (R>L), hearing loss, postnasal drip, sinus pressure and sinus pain. Negative for congestion, ear discharge, nosebleeds, rhinorrhea, sore throat and trouble swallowing.    Respiratory:  Negative for cough, shortness of breath and wheezing.    Cardiovascular:  Negative for chest pain, palpitations and leg swelling.   Gastrointestinal:  Negative for abdominal pain, blood in stool, constipation, diarrhea and nausea.   Genitourinary:  Negative for difficulty urinating, dysuria, frequency, hematuria and urgency.   Musculoskeletal:   Negative for arthralgias and myalgias.   Skin:  Negative for color change and rash.   Neurological:  Positive for dizziness (here and there), light-headedness and headaches. Negative for weakness.   Psychiatric/Behavioral:  Negative for agitation, confusion, self-injury, sleep disturbance and suicidal ideas.      Pertinent Medical History           Medical History Reviewed by provider this encounter:  Tobacco  Allergies  Meds  Problems  Med Hx  Surg Hx  Fam Hx     .  Past Medical History   Past Medical History:   Diagnosis Date    Endometriosis     GERD (gastroesophageal reflux disease) 12/14/2023    Hyperlipidemia     hypothyroidism     Liver disease     hemangioma    Ovarian cyst     Pancreatic insufficiency 12/14/2023     Past Surgical History:   Procedure Laterality Date    DILATION AND CURETTAGE OF UTERUS      HYSTERECTOMY      MA ENDOVEN ABLTJ INCMPTNT VEIN XTR LASER 1ST VEIN Left 09/04/2020    Procedure: ENDOVASCULAR LASER THERAPY (EVLT) + stab phlebectomies;  Surgeon: Colin Black DO;  Location: [facility];  Service: Vascular    MA LAPAROSCOPY SURG CHOLECYSTECTOMY N/A 3/13/2023    Procedure: CHOLECYSTECTOMY LAPAROSCOPIC;  Surgeon: Robbie Song DO;  Location: [facility];  Service: General    WISDOM TOOTH EXTRACTION       Family History   Problem Relation Age of Onset    Cancer Mother     Hypertension Mother     OBI disease Mother     Cancer Father     Hypertension Father     Diabetes Father     Cancer Sister       reports that she quit smoking about 4 years ago. Her smoking use included cigarettes. She has been exposed to tobacco smoke. She has never used smokeless tobacco. She reports that she does not drink alcohol and does not use drugs.  Current Outpatient Medications   Medication Instructions    albuterol (ProAir HFA) 90 mcg/act inhaler 2 puffs, Inhalation, Every 6 hours PRN    cetirizine (ZYRTEC) 10 mg, Oral, Daily    dicyclomine (BENTYL) 20 mg, Oral, Every 6 hours     diphenoxylate-atropine (LOMOTIL) 2.5-0.025 mg per tablet 1 tablet, Oral, 4 times daily PRN    doxycycline hyclate (VIBRAMYCIN) 100 mg, Oral, Every 12 hours scheduled    famotidine (PEPCID) 40 mg, Oral, Daily    fluconazole (DIFLUCAN) 150 mg, Oral, Daily    levothyroxine 88 mcg, Oral, Daily (early morning)    metroNIDAZOLE (METROGEL) 0.75 % gel Topical, 2 times daily, Apply and rub a thin film twice daily, morning and evening, to entire affected area after face wash    Multiple Vitamin (MULTIVITAMIN) capsule 1 capsule, Daily    omeprazole (PriLOSEC) 40 MG capsule take 1 capsule by mouth once daily    ondansetron (ZOFRAN) 4 mg, Oral, Every 8 hours PRN    pancrelipase, Lip-Prot-Amyl, (CREON) 12,000 units capsule Take 2 capsules with meals and 1 capsule with snacks    Triamcinolone Acetonide (Nasacort Allergy 24HR) 55 MCG/ACT nasal spray 1 spray, Each Nare, Daily     Allergies   Allergen Reactions    Pollen Extract Hives      Current Outpatient Medications on File Prior to Visit   Medication Sig Dispense Refill    albuterol (ProAir HFA) 90 mcg/act inhaler Inhale 2 puffs every 6 (six) hours as needed for wheezing (cough) 8.5 g 0    cetirizine (ZyrTEC) 10 mg tablet Take 1 tablet (10 mg total) by mouth daily 90 tablet 0    dicyclomine (BENTYL) 20 mg tablet take 1 tablet by mouth every 6 hours 360 tablet 1    diphenoxylate-atropine (LOMOTIL) 2.5-0.025 mg per tablet Take 1 tablet by mouth 4 (four) times a day as needed for diarrhea 120 tablet 0    famotidine (PEPCID) 40 MG tablet take 1 tablet by mouth once daily 90 tablet 0    levothyroxine 88 mcg tablet Take 1 tablet (88 mcg total) by mouth daily in the early morning 90 tablet 0    metroNIDAZOLE (METROGEL) 0.75 % gel Apply topically 2 (two) times a day Apply and rub a thin film twice daily, morning and evening, to entire affected area after face wash 45 g 0    Multiple Vitamin (MULTIVITAMIN) capsule Take 1 capsule by mouth daily      omeprazole (PriLOSEC) 40 MG capsule  "take 1 capsule by mouth once daily (Patient taking differently: 40 mg every morning) 30 capsule 3    ondansetron (ZOFRAN) 4 mg tablet Take 1 tablet (4 mg total) by mouth every 8 (eight) hours as needed for nausea or vomiting 45 tablet 0    pancrelipase, Lip-Prot-Amyl, (CREON) 12,000 units capsule Take 2 capsules with meals and 1 capsule with snacks 270 capsule 0    Triamcinolone Acetonide (Nasacort Allergy 24HR) 55 MCG/ACT nasal spray 1 spray by Each Nare route daily 16.9 mL 0    [DISCONTINUED] fluconazole (DIFLUCAN) 150 mg tablet Take 1 tablet by mouth in the morning      [DISCONTINUED] brompheniramine-pseudoephedrine-DM 30-2-10 MG/5ML syrup Take 5 mL by mouth 4 (four) times a day as needed for cough or congestion 120 mL 0    [DISCONTINUED] fluticasone (FLONASE) 50 mcg/act nasal spray 1 spray into each nostril 2 (two) times a day (Patient not taking: Reported on 3/10/2025) 16 g 0    [DISCONTINUED] fluticasone (FLONASE) 50 mcg/act nasal spray 1 spray into each nostril 2 (two) times a day 16 g 0    [DISCONTINUED] ibuprofen (MOTRIN) 800 mg tablet Take 800 mg by mouth every 6 (six) hours as needed      [DISCONTINUED] methylPREDNISolone 4 MG tablet therapy pack Use as directed on package 21 each 0     No current facility-administered medications on file prior to visit.      Social History     Tobacco Use    Smoking status: Former     Current packs/day: 0.00     Types: Cigarettes     Quit date: 2020     Years since quittin.8     Passive exposure: Past    Smokeless tobacco: Never    Tobacco comments:     3 cigarettes per day   Vaping Use    Vaping status: Never Used   Substance and Sexual Activity    Alcohol use: No    Drug use: No    Sexual activity: Yes     Partners: Male     Birth control/protection: Other, Female Sterilization       Objective   BP (!) 62/54 (BP Location: Left arm, Patient Position: Sitting, Cuff Size: Large)   Pulse 71   Temp 99.3 °F (37.4 °C) (Tympanic)   Resp 18   Ht 5' 8\" (1.727 m)   " Wt 80.5 kg (177 lb 6.4 oz)   LMP 01/07/2021 (Approximate)   SpO2 97%   BMI 26.97 kg/m²     Physical Exam  Vitals and nursing note reviewed.   Constitutional:       General: She is not in acute distress.     Appearance: Normal appearance. She is well-developed.   HENT:      Head: Normocephalic and atraumatic. No right periorbital erythema.      Jaw: Tenderness and swelling (mild) present.      Right Ear: Decreased hearing noted. Swelling and tenderness present. No mastoid tenderness. Tympanic membrane is perforated.      Left Ear: Hearing, tympanic membrane, ear canal and external ear normal.      Nose: Nose normal.      Mouth/Throat:      Mouth: Mucous membranes are moist.      Pharynx: Oropharynx is clear. No posterior oropharyngeal erythema.      Tonsils: No tonsillar exudate. 2+ on the right. 1+ on the left.   Eyes:      Conjunctiva/sclera: Conjunctivae normal.   Cardiovascular:      Rate and Rhythm: Normal rate and regular rhythm.      Heart sounds: No murmur heard.  Pulmonary:      Effort: Pulmonary effort is normal. No respiratory distress.      Breath sounds: Normal breath sounds.   Abdominal:      Palpations: Abdomen is soft.      Tenderness: There is no abdominal tenderness.   Genitourinary:     Comments: Exam deferred  Musculoskeletal:         General: No swelling.   Lymphadenopathy:      Head:      Right side of head: Preauricular and posterior auricular adenopathy present.      Cervical: Cervical adenopathy present.   Skin:     General: Skin is warm and dry.      Capillary Refill: Capillary refill takes less than 2 seconds.   Neurological:      Mental Status: She is alert and oriented to person, place, and time.   Psychiatric:         Mood and Affect: Mood normal.

## 2025-03-10 NOTE — PATIENT INSTRUCTIONS
"Patient Education     Routine physical for adults   The Basics   Written by the doctors and editors at [facility]   What is a physical? -- A physical is a routine visit, or \"check-up,\" with your doctor. You might also hear it called a \"wellness visit\" or \"preventive visit.\"  During each visit, the doctor will:   Ask about your physical and mental health   Ask about your habits, behaviors, and lifestyle   Do an exam   Give you vaccines if needed   Talk to you about any medicines you take   Give advice about your health   Answer your questions  Getting regular check-ups is an important part of taking care of your health. It can help your doctor find and treat any problems you have. But it's also important for preventing health problems.  A routine physical is different from a \"sick visit.\" A sick visit is when you see a doctor because of a health concern or problem. Since physicals are scheduled ahead of time, you can think about what you want to ask the doctor.  How often should I get a physical? -- It depends on your age and health. In general, for people age 21 years and older:   If you are younger than 50 years, you might be able to get a physical every 3 years.   If you are 50 years or older, your doctor might recommend a physical every year.  If you have an ongoing health condition, like diabetes or high blood pressure, your doctor will probably want to see you more often.  What happens during a physical? -- In general, each visit will include:   Physical exam - The doctor or nurse will check your height, weight, heart rate, and blood pressure. They will also look at your eyes and ears. They will ask about how you are feeling and whether you have any symptoms that bother you.   Medicines - It's a good idea to bring a list of all the medicines you take to each doctor visit. Your doctor will talk to you about your medicines and answer any questions. Tell them if you are having any side effects that bother you. You " "should also tell them if you are having trouble paying for any of your medicines.   Habits and behaviors - This includes:   Your diet   Your exercise habits   Whether you smoke, drink alcohol, or use drugs   Whether you are sexually active   Whether you feel safe at home  Your doctor will talk to you about things you can do to improve your health and lower your risk of health problems. They will also offer help and support. For example, if you want to quit smoking, they can give you advice and might prescribe medicines. If you want to improve your diet or get more physical activity, they can help you with this, too.   Lab tests, if needed - The tests you get will depend on your age and situation. For example, your doctor might want to check your:   Cholesterol   Blood sugar   Iron level   Vaccines - The recommended vaccines will depend on your age, health, and what vaccines you already had. Vaccines are very important because they can prevent certain serious or deadly infections.   Discussion of screening - \"Screening\" means checking for diseases or other health problems before they cause symptoms. Your doctor can recommend screening based on your age, risk, and preferences. This might include tests to check for:   Cancer, such as breast, prostate, cervical, ovarian, colorectal, prostate, lung, or skin cancer   Sexually transmitted infections, such as chlamydia and gonorrhea   Mental health conditions like depression and anxiety  Your doctor will talk to you about the different types of screening tests. They can help you decide which screenings to have. They can also explain what the results might mean.   Answering questions - The physical is a good time to ask the doctor or nurse questions about your health. If needed, they can refer you to other doctors or specialists, too.  Adults older than 65 years often need other care, too. As you get older, your doctor will talk to you about:   How to prevent falling at " home   Hearing or vision tests   Memory testing   How to take your medicines safely   Making sure that you have the help and support you need at home  All topics are updated as new evidence becomes available and our peer review process is complete.  This topic retrieved from [employer] on: May 02, 2024.  Topic 052240 Version 1.0  Release: 32.4.3 - C32.122  © 2024 UpToDate, Inc. and/or its affiliates. All rights reserved.  Consumer Information Use and Disclaimer   Disclaimer: This generalized information is a limited summary of diagnosis, treatment, and/or medication information. It is not meant to be comprehensive and should be used as a tool to help the user understand and/or assess potential diagnostic and treatment options. It does NOT include all information about conditions, treatments, medications, side effects, or risks that may apply to a specific patient. It is not intended to be medical advice or a substitute for the medical advice, diagnosis, or treatment of a health care provider based on the health care provider's examination and assessment of a patient's specific and unique circumstances. Patients must speak with a health care provider for complete information about their health, medical questions, and treatment options, including any risks or benefits regarding use of medications. This information does not endorse any treatments or medications as safe, effective, or approved for treating a specific patient. UpToDate, Inc. and its affiliates disclaim any warranty or liability relating to this information or the use thereof.The use of this information is governed by the Terms of Use, available at https://www.woltersEbrun.comuwer.com/en/know/clinical-effectiveness-terms. 2024© UpToDate, Inc. and its affiliates and/or licensors. All rights reserved.  Copyright   © 2024 UpToDate, Inc. and/or its affiliates. All rights reserved.

## 2025-03-11 ENCOUNTER — RESULTS FOLLOW-UP (OUTPATIENT)
Dept: FAMILY MEDICINE CLINIC | Facility: CLINIC | Age: 30
End: 2025-03-11

## 2025-03-13 ENCOUNTER — OFFICE VISIT (OUTPATIENT)
Dept: FAMILY MEDICINE CLINIC | Facility: CLINIC | Age: 30
End: 2025-03-13
Payer: COMMERCIAL

## 2025-03-13 ENCOUNTER — TELEPHONE (OUTPATIENT)
Dept: FAMILY MEDICINE CLINIC | Facility: CLINIC | Age: 30
End: 2025-03-13

## 2025-03-13 VITALS
OXYGEN SATURATION: 97 % | HEART RATE: 70 BPM | BODY MASS INDEX: 26.83 KG/M2 | RESPIRATION RATE: 18 BRPM | DIASTOLIC BLOOD PRESSURE: 78 MMHG | SYSTOLIC BLOOD PRESSURE: 114 MMHG | WEIGHT: 177 LBS | TEMPERATURE: 99 F | HEIGHT: 68 IN

## 2025-03-13 DIAGNOSIS — L03.211 CELLULITIS OF FACE: Primary | ICD-10-CM

## 2025-03-13 LAB
BACTERIA EAR AEROBE CULT: NO GROWTH
GRAM STN SPEC: NORMAL

## 2025-03-13 PROCEDURE — T1015 CLINIC SERVICE: HCPCS | Performed by: STUDENT IN AN ORGANIZED HEALTH CARE EDUCATION/TRAINING PROGRAM

## 2025-03-13 NOTE — PROGRESS NOTES
Name: Shilpa Sweeney      : 1995      MRN: [id number]  Encounter Provider: NILSON Rosen  Encounter Date: 3/13/2025   Encounter department: [facility] [place]  :  Assessment & Plan  Cellulitis of face  Greatly improving after 48 hrs doxycycline  Continue supportive care  Take abx w/o calcium-containing products  RTC if alarm s/s as rev'd              History of Present Illness   PMH pancreatic insufficiency and vitamin D deficiency presents for recheck cellulitis of face/right ear. Ear culture negative. Reports pain in right ear improving.  to palpation on right side of face and neck. Reports headaches, but improving and sore throat on right side. Continuing doxycycline, alternating acetaminophen and ibuprofen, and moist heat for pain relief. She endorses taking doxycycline with milk. Reinforced need to separate antibiotic from calcium-containing products.    Earache   There is pain in the right ear. This is a new problem. The current episode started in the past 7 days. The problem occurs constantly. The problem has been gradually improving. The pain is at a severity of 6/10. The pain is moderate. Associated symptoms include headaches (improving) and neck pain. Pertinent negatives include no abdominal pain, coughing, diarrhea, ear discharge, hearing loss, rash, rhinorrhea, sore throat (a little) or vomiting. She has tried antibiotics, acetaminophen, NSAIDs and heat packs for the symptoms. The treatment provided mild relief.     Review of Systems   HENT:  Positive for ear pain. Negative for ear discharge, facial swelling, hearing loss, rhinorrhea and sore throat (a little).    Respiratory:  Negative for cough.    Gastrointestinal:  Negative for abdominal pain, diarrhea and vomiting.   Musculoskeletal:  Positive for neck pain.   Skin:  Negative for rash.   Neurological:  Positive for headaches (improving).       Objective   LMP 2021 (Approximate)       Physical Exam  Vitals and nursing note reviewed.   Constitutional:       General: She is not in acute distress.     Appearance: Normal appearance. She is well-developed.   HENT:      Head: Normocephalic and atraumatic.      Jaw: No tenderness.      Right Ear: Tenderness present. No drainage. No mastoid tenderness. Tympanic membrane is perforated.      Left Ear: Hearing, tympanic membrane, ear canal and external ear normal.      Nose: Nose normal. No congestion.      Mouth/Throat:      Mouth: Mucous membranes are moist.      Pharynx: Oropharynx is clear. Posterior oropharyngeal erythema present. No oropharyngeal exudate.      Tonsils: No tonsillar exudate. 1+ on the right. 1+ on the left.   Eyes:      Conjunctiva/sclera: Conjunctivae normal.   Cardiovascular:      Rate and Rhythm: Normal rate and regular rhythm.      Heart sounds: No murmur heard.  Pulmonary:      Effort: Pulmonary effort is normal. No respiratory distress.      Breath sounds: Normal breath sounds.   Abdominal:      Palpations: Abdomen is soft.      Tenderness: There is no abdominal tenderness.   Genitourinary:     Comments: Exam deferred  Lymphadenopathy:      Head:      Right side of head: Preauricular and posterior auricular adenopathy present.      Cervical: No cervical adenopathy.   Skin:     General: Skin is warm and dry.      Capillary Refill: Capillary refill takes less than 2 seconds.   Neurological:      Mental Status: She is alert and oriented to person, place, and time.   Psychiatric:         Mood and Affect: Mood normal.

## 2025-03-21 DIAGNOSIS — B37.31 CANDIDA VAGINITIS: ICD-10-CM

## 2025-03-24 ENCOUNTER — OFFICE VISIT (OUTPATIENT)
Dept: FAMILY MEDICINE CLINIC | Facility: CLINIC | Age: 30
End: 2025-03-24
Payer: COMMERCIAL

## 2025-03-24 VITALS
HEIGHT: 68 IN | TEMPERATURE: 98 F | RESPIRATION RATE: 18 BRPM | OXYGEN SATURATION: 98 % | DIASTOLIC BLOOD PRESSURE: 76 MMHG | WEIGHT: 177.8 LBS | HEART RATE: 79 BPM | SYSTOLIC BLOOD PRESSURE: 122 MMHG | BODY MASS INDEX: 26.95 KG/M2

## 2025-03-24 DIAGNOSIS — J02.8 BACTERIAL PHARYNGITIS: Primary | ICD-10-CM

## 2025-03-24 DIAGNOSIS — B96.89 BACTERIAL PHARYNGITIS: Primary | ICD-10-CM

## 2025-03-24 DIAGNOSIS — B37.31 CANDIDA VAGINITIS: ICD-10-CM

## 2025-03-24 PROCEDURE — T1015 CLINIC SERVICE: HCPCS | Performed by: FAMILY MEDICINE

## 2025-03-24 RX ORDER — FLUCONAZOLE 150 MG/1
150 TABLET ORAL DAILY
Qty: 1 TABLET | Refills: 0 | OUTPATIENT
Start: 2025-03-24 | End: 2025-04-07

## 2025-03-24 RX ORDER — FLUCONAZOLE 150 MG/1
150 TABLET ORAL DAILY
Qty: 1 TABLET | Refills: 1 | Status: SHIPPED | OUTPATIENT
Start: 2025-03-24 | End: 2025-03-25

## 2025-03-24 RX ORDER — FLUCONAZOLE 150 MG/1
TABLET ORAL
COMMUNITY
Start: 2025-03-24 | End: 2025-03-24

## 2025-03-24 NOTE — PROGRESS NOTES
Name: Shilpa Sweeney      : 1995      MRN: [id number]  Encounter Provider: NILSON Rosen  Encounter Date: 3/24/2025   Encounter department: [facility] [place]  :  Assessment & Plan  Bacterial pharyngitis  Antibiotic BID as ordered  Supportive care rev'd  Throw away toothbrush after 28 hrs on antibiotic and replace with new one  RTC if no improvement in 48 hrs or if Alarm s/s for RTC as rev'd  Orders:    amoxicillin-clavulanate (AUGMENTIN) 875-125 mg per tablet; Take 1 tablet by mouth every 12 (twelve) hours for 7 days    Candida vaginitis  For anticipated vaginal yeast infection r/t antibiotic use  Orders:    fluconazole (DIFLUCAN) 150 mg tablet; Take 1 tablet (150 mg total) by mouth in the morning for 14 days           History of Present Illness   PMH pancreatic insufficiency and vitamin D deficiency presents for ear pain. Temp 100.2 today prior to taking aleve.     Earache   There is pain in the right ear. This is a recurrent problem. The pain is at a severity of 8/10. Associated symptoms include coughing (dry), diarrhea, headaches, hearing loss and a sore throat. Pertinent negatives include no abdominal pain, ear discharge, neck pain, rash, rhinorrhea or vomiting. She has tried NSAIDs for the symptoms. The treatment provided moderate relief.     Review of Systems   Constitutional:  Positive for activity change, appetite change, chills and fatigue.   HENT:  Positive for ear pain, hearing loss, sinus pressure, sinus pain and sore throat. Negative for congestion, ear discharge, rhinorrhea, trouble swallowing and voice change.    Respiratory:  Positive for cough (dry). Negative for choking, shortness of breath and wheezing.    Cardiovascular:  Negative for chest pain and palpitations.   Gastrointestinal:  Positive for diarrhea and nausea. Negative for abdominal pain and vomiting.   Genitourinary:  Negative for difficulty urinating.   Musculoskeletal:  Positive for arthralgias  "and myalgias. Negative for neck pain.   Skin:  Negative for rash.   Neurological:  Positive for headaches. Negative for dizziness and weakness.   Psychiatric/Behavioral:  Negative for sleep disturbance.        Objective   /76 (BP Location: Left arm, Patient Position: Sitting, Cuff Size: Large)   Pulse 79   Temp 98 °F (36.7 °C) (Tympanic)   Resp 18   Ht 5' 8\" (1.727 m)   Wt 80.6 kg (177 lb 12.8 oz)   LMP 01/07/2021 (Approximate)   SpO2 98%   BMI 27.03 kg/m²      Physical Exam  Vitals and nursing note reviewed.   Constitutional:       General: She is not in acute distress.     Appearance: She is well-developed.   HENT:      Head: Normocephalic and atraumatic.      Right Ear: Tenderness present. No drainage. No mastoid tenderness. Tympanic membrane is scarred.      Left Ear: Tympanic membrane, ear canal and external ear normal.      Nose: Congestion present.      Mouth/Throat:      Mouth: Mucous membranes are moist.      Pharynx: Oropharynx is clear. Posterior oropharyngeal erythema present.   Eyes:      Conjunctiva/sclera: Conjunctivae normal.   Cardiovascular:      Rate and Rhythm: Normal rate and regular rhythm.      Heart sounds: No murmur heard.  Pulmonary:      Effort: Pulmonary effort is normal. No respiratory distress.      Breath sounds: Normal breath sounds.   Abdominal:      General: Bowel sounds are normal.      Palpations: Abdomen is soft.      Tenderness: There is no abdominal tenderness.   Genitourinary:     Comments: Exam deferred  Lymphadenopathy:      Head:      Right side of head: Tonsillar adenopathy present. No preauricular or posterior auricular adenopathy.      Left side of head: Tonsillar adenopathy present.      Cervical: No cervical adenopathy.   Skin:     General: Skin is warm and dry.      Capillary Refill: Capillary refill takes less than 2 seconds.   Neurological:      Mental Status: She is alert and oriented to person, place, and time.   Psychiatric:         Mood and Affect: " Mood normal.

## 2025-03-25 RX ORDER — FLUCONAZOLE 150 MG/1
150 TABLET ORAL DAILY
Qty: 1 TABLET | Refills: 0 | Status: SHIPPED | OUTPATIENT
Start: 2025-03-25 | End: 2025-03-26

## 2025-03-26 ENCOUNTER — TELEPHONE (OUTPATIENT)
Dept: FAMILY MEDICINE CLINIC | Facility: CLINIC | Age: 30
End: 2025-03-26

## 2025-03-26 NOTE — TELEPHONE ENCOUNTER
----- Message from NILSON Elliott sent at 3/26/2025 10:44 AM EDT -----  Regarding: Follow up  Hi,    Would anyone please call this patient to see how her ear is feeling? Thanks!

## 2025-03-30 DIAGNOSIS — K21.9 GASTROESOPHAGEAL REFLUX DISEASE WITHOUT ESOPHAGITIS: ICD-10-CM

## 2025-03-30 DIAGNOSIS — R10.9 ABDOMINAL PAIN, UNSPECIFIED ABDOMINAL LOCATION: ICD-10-CM

## 2025-04-01 RX ORDER — FAMOTIDINE 40 MG/1
40 TABLET, FILM COATED ORAL DAILY
Qty: 90 TABLET | Refills: 0 | OUTPATIENT
Start: 2025-04-01

## 2025-04-01 RX ORDER — DICYCLOMINE HCL 20 MG
20 TABLET ORAL EVERY 6 HOURS
Qty: 360 TABLET | Refills: 1 | OUTPATIENT
Start: 2025-04-01

## 2025-04-09 DIAGNOSIS — K21.9 GASTROESOPHAGEAL REFLUX DISEASE WITHOUT ESOPHAGITIS: ICD-10-CM

## 2025-04-10 RX ORDER — FAMOTIDINE 40 MG/1
40 TABLET, FILM COATED ORAL DAILY
Qty: 90 TABLET | Refills: 0 | Status: SHIPPED | OUTPATIENT
Start: 2025-04-10 | End: 2025-04-15

## 2025-04-12 ENCOUNTER — APPOINTMENT (OUTPATIENT)
Dept: LAB | Facility: MEDICAL CENTER | Age: 30
End: 2025-04-12
Attending: NURSE PRACTITIONER
Payer: COMMERCIAL

## 2025-04-12 DIAGNOSIS — Z11.4 ENCOUNTER FOR SCREENING FOR HIV: ICD-10-CM

## 2025-04-12 DIAGNOSIS — E03.9 ACQUIRED HYPOTHYROIDISM: ICD-10-CM

## 2025-04-12 DIAGNOSIS — K86.89 PANCREATIC INSUFFICIENCY: ICD-10-CM

## 2025-04-12 LAB
ALBUMIN SERPL BCG-MCNC: 4.6 G/DL (ref 3.5–5)
ALP SERPL-CCNC: 50 U/L (ref 34–104)
ALT SERPL W P-5'-P-CCNC: 16 U/L (ref 7–52)
ANION GAP SERPL CALCULATED.3IONS-SCNC: 10 MMOL/L (ref 4–13)
AST SERPL W P-5'-P-CCNC: 17 U/L (ref 13–39)
BASOPHILS # BLD AUTO: 0.06 THOUSANDS/ÂΜL (ref 0–0.1)
BASOPHILS NFR BLD AUTO: 1 % (ref 0–1)
BILIRUB SERPL-MCNC: 0.54 MG/DL (ref 0.2–1)
BUN SERPL-MCNC: 11 MG/DL (ref 5–25)
CALCIUM SERPL-MCNC: 9.4 MG/DL (ref 8.4–10.2)
CHLORIDE SERPL-SCNC: 104 MMOL/L (ref 96–108)
CO2 SERPL-SCNC: 25 MMOL/L (ref 21–32)
CREAT SERPL-MCNC: 0.62 MG/DL (ref 0.6–1.3)
EOSINOPHIL # BLD AUTO: 0.25 THOUSAND/ÂΜL (ref 0–0.61)
EOSINOPHIL NFR BLD AUTO: 3 % (ref 0–6)
ERYTHROCYTE [DISTWIDTH] IN BLOOD BY AUTOMATED COUNT: 12 % (ref 11.6–15.1)
GFR SERPL CREATININE-BSD FRML MDRD: 123 ML/MIN/1.73SQ M
GLUCOSE P FAST SERPL-MCNC: 82 MG/DL (ref 65–99)
HCT VFR BLD AUTO: 42.9 % (ref 34.8–46.1)
HGB BLD-MCNC: 14.1 G/DL (ref 11.5–15.4)
IMM GRANULOCYTES # BLD AUTO: 0.03 THOUSAND/UL (ref 0–0.2)
IMM GRANULOCYTES NFR BLD AUTO: 0 % (ref 0–2)
LYMPHOCYTES # BLD AUTO: 2.15 THOUSANDS/ÂΜL (ref 0.6–4.47)
LYMPHOCYTES NFR BLD AUTO: 28 % (ref 14–44)
MCH RBC QN AUTO: 32.1 PG (ref 26.8–34.3)
MCHC RBC AUTO-ENTMCNC: 32.9 G/DL (ref 31.4–37.4)
MCV RBC AUTO: 98 FL (ref 82–98)
MONOCYTES # BLD AUTO: 0.77 THOUSAND/ÂΜL (ref 0.17–1.22)
MONOCYTES NFR BLD AUTO: 10 % (ref 4–12)
NEUTROPHILS # BLD AUTO: 4.31 THOUSANDS/ÂΜL (ref 1.85–7.62)
NEUTS SEG NFR BLD AUTO: 58 % (ref 43–75)
NRBC BLD AUTO-RTO: 0 /100 WBCS
PLATELET # BLD AUTO: 366 THOUSANDS/UL (ref 149–390)
PMV BLD AUTO: 9.9 FL (ref 8.9–12.7)
POTASSIUM SERPL-SCNC: 4.1 MMOL/L (ref 3.5–5.3)
PROT SERPL-MCNC: 7.3 G/DL (ref 6.4–8.4)
RBC # BLD AUTO: 4.39 MILLION/UL (ref 3.81–5.12)
SODIUM SERPL-SCNC: 139 MMOL/L (ref 135–147)
TSH SERPL DL<=0.05 MIU/L-ACNC: 3.21 UIU/ML (ref 0.45–4.5)
WBC # BLD AUTO: 7.57 THOUSAND/UL (ref 4.31–10.16)

## 2025-04-12 PROCEDURE — 36415 COLL VENOUS BLD VENIPUNCTURE: CPT

## 2025-04-12 PROCEDURE — 85025 COMPLETE CBC W/AUTO DIFF WBC: CPT

## 2025-04-12 PROCEDURE — 84443 ASSAY THYROID STIM HORMONE: CPT

## 2025-04-12 PROCEDURE — 87389 HIV-1 AG W/HIV-1&-2 AB AG IA: CPT

## 2025-04-12 PROCEDURE — 80053 COMPREHEN METABOLIC PANEL: CPT

## 2025-04-13 LAB — HIV 1+2 AB+HIV1 P24 AG SERPL QL IA: NORMAL

## 2025-04-15 ENCOUNTER — OFFICE VISIT (OUTPATIENT)
Dept: GASTROENTEROLOGY | Facility: CLINIC | Age: 30
End: 2025-04-15
Payer: COMMERCIAL

## 2025-04-15 VITALS
BODY MASS INDEX: 27.58 KG/M2 | SYSTOLIC BLOOD PRESSURE: 147 MMHG | HEART RATE: 88 BPM | DIASTOLIC BLOOD PRESSURE: 76 MMHG | WEIGHT: 182 LBS | OXYGEN SATURATION: 97 % | TEMPERATURE: 97.6 F | HEIGHT: 68 IN

## 2025-04-15 DIAGNOSIS — R19.5 LOW FECAL ELASTASE LEVEL: ICD-10-CM

## 2025-04-15 DIAGNOSIS — R10.9 ABDOMINAL PAIN, UNSPECIFIED ABDOMINAL LOCATION: ICD-10-CM

## 2025-04-15 DIAGNOSIS — R19.7 DIARRHEA, UNSPECIFIED TYPE: ICD-10-CM

## 2025-04-15 DIAGNOSIS — K86.89 PANCREATIC INSUFFICIENCY: Primary | ICD-10-CM

## 2025-04-15 DIAGNOSIS — R11.0 NAUSEA: ICD-10-CM

## 2025-04-15 DIAGNOSIS — K21.9 GASTROESOPHAGEAL REFLUX DISEASE WITHOUT ESOPHAGITIS: ICD-10-CM

## 2025-04-15 PROBLEM — G89.29 CHRONIC RIGHT UPPER QUADRANT PAIN: Status: RESOLVED | Noted: 2023-01-25 | Resolved: 2025-04-15

## 2025-04-15 PROBLEM — R10.11 CHRONIC RIGHT UPPER QUADRANT PAIN: Status: RESOLVED | Noted: 2023-01-25 | Resolved: 2025-04-15

## 2025-04-15 PROCEDURE — 99214 OFFICE O/P EST MOD 30 MIN: CPT | Performed by: NURSE PRACTITIONER

## 2025-04-15 RX ORDER — DICYCLOMINE HCL 20 MG
20 TABLET ORAL EVERY 6 HOURS PRN
Qty: 90 TABLET | Refills: 5 | Status: SHIPPED | OUTPATIENT
Start: 2025-04-15

## 2025-04-15 RX ORDER — OMEPRAZOLE 20 MG/1
20 CAPSULE, DELAYED RELEASE ORAL DAILY
Qty: 90 CAPSULE | Refills: 3 | Status: SHIPPED | OUTPATIENT
Start: 2025-04-15

## 2025-04-15 RX ORDER — ONDANSETRON 4 MG/1
4 TABLET, FILM COATED ORAL EVERY 8 HOURS PRN
Qty: 60 TABLET | Refills: 5 | Status: SHIPPED | OUTPATIENT
Start: 2025-04-15

## 2025-04-15 NOTE — PROGRESS NOTES
Name: Shilpa Sweeney      : 1995      MRN: [id number]  Encounter Provider: NILSON Funez  Encounter Date: 4/15/2025   Encounter department: [facility] [place]  :  Assessment & Plan  Pancreatic insufficiency  Improved/stable    Continue Creon as prescribed.  Continue to try to avoid trigger foods is much as possible.  Continue to try to drink at least 32 to 64 ounces of water daily.  Orders:  •  pancrelipase, Lip-Prot-Amyl, (CREON) 12,000 units capsule; Take 2 capsules with meals and 1 capsule with snacks    Abdominal pain, unspecified abdominal location  Can continue dicyclomine as needed for abdominal pain and spasms.  Orders:  •  dicyclomine (BENTYL) 20 mg tablet; Take 1 tablet (20 mg total) by mouth every 6 (six) hours as needed (for abd pain/spasms, loose stools)    Diarrhea, unspecified type  Orders:  •  pancrelipase, Lip-Prot-Amyl, (CREON) 12,000 units capsule; Take 2 capsules with meals and 1 capsule with snacks    Low fecal elastase level  Orders:  •  pancrelipase, Lip-Prot-Amyl, (CREON) 12,000 units capsule; Take 2 capsules with meals and 1 capsule with snacks    Gastroesophageal reflux disease without esophagitis  Improved/Stable    Continue omeprazole as prescribed.  Continue to avoid acidic or trigger foods much as possible.  Orders:  •  omeprazole (PriLOSEC) 20 mg delayed release capsule; Take 1 capsule (20 mg total) by mouth daily    Nausea  Continue to use Zofran as needed for nausea and vomiting.  Orders:  •  ondansetron (ZOFRAN) 4 mg tablet; Take 1 tablet (4 mg total) by mouth every 8 (eight) hours as needed for nausea or vomiting    The patient is to schedule a follow up office visit in 1 year, but understands to call or contact our offices with any issues before then or as needed.     History of Present Illness   Shilpa Sweeney is a 29 y.o. female who presents today for a follow-up office visit regarding her pancreatic insufficiency, chronic abdominal pain,  diarrhea, GERD symptoms, and nausea.  The patient reports that since her last office visit and starting on the Creon there has been at least moderate and stable overall improvement in her chronic abdominal pain and diarrhea.  The patient reports that she still has intermittent bouts of diarrhea and loose stools, especially if she becomes anxious which she knows just makes her irritable bowel syndrome symptoms worse.  She reports that if her symptoms get too severe she does have the Lomotil on hand and uses that on a very sparing as needed basis, but reports that she has not needed to use that very much since starting the Creon.  The patient reports that many times after taking the Creon she is somewhat nauseous and still uses the Zofran as needed but does not take it every day and even that has somewhat improved and is stable.  The patient is very pleased with the current amount of relief and stabilization as she understands that this may be the best that things can be with the underlying pancreatic insufficiency.    The patient reports that because she has also been trying to change her diet habits she feels her GERD symptoms have significantly improved and was able to successfully decrease her omeprazole from 40 mg daily down to just 20 mg daily with complete and significant control of her chronic GERD symptoms.    The patient currently denies any reflux, nausea, dysphagia, vomiting, decreased appetite, unplanned weight loss, or abdominal pain. Water Intake: Adequate amoutns per day.    The patient currently reports that they have a BM 1-2 x daily and reports that it is usually relieving, without any consistent diarrhea, constipation, straining, melena or bloody stools. [place]: 1-5. Last BM: This morning. Flatus: Yes.    GI Meds: Omeprazole 20 mg daily, Creon 12,000 units, 2 capsules 3 times daily with meals and 1 capsule with snacks, dicyclomine 20 mg 4 times daily as needed for abdominal pain and spasms,  Lomotil as needed for loose stools and diarrhea.  Daily NSAID Use: Denied    Tobacco: Denied  ETOH: Denied  Marijuana: Denied  Illicit Drug Use: Denied    Imaging: (None):     Endoscopy History: EGD: (4/12/22): Small sliding hiatal hernia. Path: Normal.     COLONOSCOPY: (4/12/22): Normal with recommendation to proceed with a repeat colonoscopy at the age of 46.     DUE: At the age of 46.    HPI  History obtained from: patient  Review of Systems A complete review of systems is negative other than that noted above in the HPI.      Current Outpatient Medications   Medication Sig Dispense Refill   • cetirizine (ZyrTEC) 10 mg tablet Take 1 tablet (10 mg total) by mouth daily 90 tablet 0   • dicyclomine (BENTYL) 20 mg tablet Take 1 tablet (20 mg total) by mouth every 6 (six) hours as needed (for abd pain/spasms, loose stools) 90 tablet 5   • diphenoxylate-atropine (LOMOTIL) 2.5-0.025 mg per tablet Take 1 tablet by mouth 4 (four) times a day as needed for diarrhea 120 tablet 0   • levothyroxine 88 mcg tablet Take 1 tablet (88 mcg total) by mouth daily in the early morning 90 tablet 0   • Multiple Vitamin (MULTIVITAMIN) capsule Take 1 capsule by mouth daily     • omeprazole (PriLOSEC) 20 mg delayed release capsule Take 1 capsule (20 mg total) by mouth daily 90 capsule 3   • ondansetron (ZOFRAN) 4 mg tablet Take 1 tablet (4 mg total) by mouth every 8 (eight) hours as needed for nausea or vomiting 60 tablet 5   • pancrelipase, Lip-Prot-Amyl, (CREON) 12,000 units capsule Take 2 capsules with meals and 1 capsule with snacks 270 capsule 11   • Triamcinolone Acetonide (Nasacort Allergy 24HR) 55 MCG/ACT nasal spray 1 spray by Each Nare route daily 16.9 mL 0   • albuterol (ProAir HFA) 90 mcg/act inhaler Inhale 2 puffs every 6 (six) hours as needed for wheezing (cough) (Patient not taking: Reported on 4/15/2025) 8.5 g 0     No current facility-administered medications for this visit.     Objective   /76 (BP Location: Left arm,  "Patient Position: Sitting, Cuff Size: Standard)   Pulse 88   Temp 97.6 °F (36.4 °C) (Temporal)   Ht 5' 8\" (1.727 m)   Wt 82.6 kg (182 lb)   LMP 01/07/2021 (Approximate)   SpO2 97%   BMI 27.67 kg/m²     Physical Exam   Abdomen is soft, nondistended, with mild tenderness on upon exam in the bilateral lower quadrants, without any guarding or rebound tenderness on upon exam, with hypoactive bowel sounds x 4. No edema noted of the b/l lower extremities upon exam today. Skin is non-icteric.     Lab Results: I personally reviewed relevant lab results.       Results for orders placed during the hospital encounter of 04/12/22    EGD    Impression  The esophagus appeared normal.  Z-line 42 cm from the incisors  Small sliding hiatal hernia (type I hiatal hernia)  The stomach and duodenum appeared normal.  Performed biopsies to rule out celiac disease and H. pylori in the stomach and duodenum    RECOMMENDATION:  Await pathology results        Jeremy Khan MD        "

## 2025-04-15 NOTE — ASSESSMENT & PLAN NOTE
Orders:  •  pancrelipase, Lip-Prot-Amyl, (CREON) 12,000 units capsule; Take 2 capsules with meals and 1 capsule with snacks

## 2025-04-15 NOTE — ASSESSMENT & PLAN NOTE
Improved/Stable    Continue omeprazole as prescribed.  Continue to avoid acidic or trigger foods much as possible.  Orders:  •  omeprazole (PriLOSEC) 20 mg delayed release capsule; Take 1 capsule (20 mg total) by mouth daily

## 2025-04-15 NOTE — ASSESSMENT & PLAN NOTE
Can continue dicyclomine as needed for abdominal pain and spasms.  Orders:  •  dicyclomine (BENTYL) 20 mg tablet; Take 1 tablet (20 mg total) by mouth every 6 (six) hours as needed (for abd pain/spasms, loose stools)

## 2025-05-06 DIAGNOSIS — E03.9 HYPOTHYROIDISM, UNSPECIFIED TYPE: ICD-10-CM

## 2025-05-06 RX ORDER — LEVOTHYROXINE SODIUM 88 UG/1
TABLET ORAL
Qty: 90 TABLET | Refills: 0 | Status: SHIPPED | OUTPATIENT
Start: 2025-05-06

## 2025-05-09 DIAGNOSIS — R10.9 ABDOMINAL PAIN, UNSPECIFIED ABDOMINAL LOCATION: ICD-10-CM

## 2025-05-09 RX ORDER — DICYCLOMINE HCL 20 MG
TABLET ORAL
Qty: 360 TABLET | Refills: 1 | Status: SHIPPED | OUTPATIENT
Start: 2025-05-09

## 2025-05-12 ENCOUNTER — TELEPHONE (OUTPATIENT)
Dept: GASTROENTEROLOGY | Facility: CLINIC | Age: 30
End: 2025-05-12

## 2025-05-15 ENCOUNTER — OFFICE VISIT (OUTPATIENT)
Dept: FAMILY MEDICINE CLINIC | Facility: CLINIC | Age: 30
End: 2025-05-15
Payer: COMMERCIAL

## 2025-05-15 VITALS
TEMPERATURE: 97.2 F | DIASTOLIC BLOOD PRESSURE: 80 MMHG | WEIGHT: 179.2 LBS | SYSTOLIC BLOOD PRESSURE: 128 MMHG | HEIGHT: 68 IN | RESPIRATION RATE: 16 BRPM | OXYGEN SATURATION: 98 % | BODY MASS INDEX: 27.16 KG/M2 | HEART RATE: 71 BPM

## 2025-05-15 DIAGNOSIS — H66.90 RECURRENT AOM (ACUTE OTITIS MEDIA): Primary | ICD-10-CM

## 2025-05-15 DIAGNOSIS — R22.0 FACIAL SWELLING: ICD-10-CM

## 2025-05-15 DIAGNOSIS — J30.2 SEASONAL ALLERGIES: ICD-10-CM

## 2025-05-15 DIAGNOSIS — M54.2 NECK PAIN: ICD-10-CM

## 2025-05-15 DIAGNOSIS — H92.01 RIGHT EAR PAIN: ICD-10-CM

## 2025-05-15 PROCEDURE — T1015 CLINIC SERVICE: HCPCS | Performed by: FAMILY MEDICINE

## 2025-05-15 RX ORDER — FLUTICASONE PROPIONATE 50 MCG
2 SPRAY, SUSPENSION (ML) NASAL DAILY
Qty: 16 G | Refills: 2 | Status: SHIPPED | OUTPATIENT
Start: 2025-05-15

## 2025-05-15 NOTE — PROGRESS NOTES
Name: Shilpa Sweeney      : 1995      MRN: [id number]  Encounter Provider: NILSON Rosen  Encounter Date: 5/15/2025   Encounter department: [facility] [place]  :  Assessment & Plan  Recurrent AOM (acute otitis media)  With ear pain, otorrhea, neck pain  ENT appt > 1 month away  Augmentin BID x 10 days  CT head and neck due to severe pain and recurrent infections  Alarm s/s for RTC rev'd  Orders:    amoxicillin-clavulanate (AUGMENTIN) 875-125 mg per tablet; Take 1 tablet by mouth every 12 (twelve) hours for 10 days    CTA head and neck w wo contrast; Future    Right ear pain    Orders:    amoxicillin-clavulanate (AUGMENTIN) 875-125 mg per tablet; Take 1 tablet by mouth every 12 (twelve) hours for 10 days    CTA head and neck w wo contrast; Future    Neck pain    Orders:    amoxicillin-clavulanate (AUGMENTIN) 875-125 mg per tablet; Take 1 tablet by mouth every 12 (twelve) hours for 10 days    CTA head and neck w wo contrast; Future    Seasonal allergies    Orders:    fluticasone (FLONASE) 50 mcg/act nasal spray; 2 sprays into each nostril daily    Facial swelling    Orders:    CTA head and neck w wo contrast; Future           History of Present Illness   PMH pancreatic insufficiency and vitamin D deficiency presents for right ear pain. She has had yellow, malodorous drainage from her right ear.    Earache   There is pain in the right ear. This is a new problem. The current episode started in the past 7 days. The problem has been gradually worsening. There has been no fever. The pain is at a severity of 10/10. Associated symptoms include ear discharge, headaches, hearing loss (muffled), neck pain, rhinorrhea and a sore throat. Pertinent negatives include no abdominal pain, coughing, diarrhea, rash or vomiting. She has tried NSAIDs and acetaminophen for the symptoms. The treatment provided no relief. Her past medical history is significant for a chronic ear infection.     Review  "of Systems   Constitutional:  Positive for fatigue. Negative for activity change, appetite change and fever.   HENT:  Positive for congestion, ear discharge, ear pain, hearing loss (muffled), postnasal drip, rhinorrhea, sinus pressure, sinus pain, sneezing and sore throat. Negative for trouble swallowing.    Respiratory:  Negative for cough and shortness of breath.    Cardiovascular:  Negative for chest pain.   Gastrointestinal:  Negative for abdominal pain, diarrhea and vomiting.   Genitourinary:  Negative for difficulty urinating.   Musculoskeletal:  Positive for neck pain.   Skin:  Negative for rash.   Neurological:  Positive for headaches. Negative for dizziness and weakness.   Psychiatric/Behavioral:  Positive for sleep disturbance. Negative for self-injury and suicidal ideas.        Objective   /80   Pulse 71   Temp (!) 97.2 °F (36.2 °C)   Resp 16   Ht 5' 8\" (1.727 m)   Wt 81.3 kg (179 lb 3.2 oz)   LMP 01/07/2021 (Approximate)   SpO2 98%   BMI 27.25 kg/m²      Physical Exam  Vitals and nursing note reviewed.   Constitutional:       General: She is not in acute distress.     Appearance: She is well-developed. She is ill-appearing.   HENT:      Head: Normocephalic and atraumatic.      Right Ear: Decreased hearing noted. Tenderness present. A middle ear effusion is present. No mastoid tenderness. Tympanic membrane is not erythematous.      Nose: Congestion present.      Right Turbinates: Swollen.      Left Turbinates: Swollen.      Right Sinus: Maxillary sinus tenderness present.      Mouth/Throat:      Mouth: Mucous membranes are moist.      Pharynx: Posterior oropharyngeal erythema and postnasal drip present.      Tonsils: No tonsillar exudate. 2+ on the right. 1+ on the left.     Eyes:      Conjunctiva/sclera: Conjunctivae normal.       Cardiovascular:      Rate and Rhythm: Normal rate and regular rhythm.      Heart sounds: No murmur heard.  Pulmonary:      Effort: Pulmonary effort is normal. No " respiratory distress.      Breath sounds: Normal breath sounds.   Abdominal:      Palpations: Abdomen is soft.      Tenderness: There is no abdominal tenderness.   Genitourinary:     Comments: Exam deferred  Lymphadenopathy:      Head:      Right side of head: Tonsillar, preauricular and posterior auricular adenopathy present.      Cervical: Cervical adenopathy present.     Skin:     General: Skin is warm and dry.      Capillary Refill: Capillary refill takes less than 2 seconds.     Neurological:      Mental Status: She is alert and oriented to person, place, and time.     Psychiatric:         Mood and Affect: Mood normal.

## 2025-05-29 DIAGNOSIS — R19.5 LOW FECAL ELASTASE LEVEL: ICD-10-CM

## 2025-05-29 DIAGNOSIS — L50.9 URTICARIA: ICD-10-CM

## 2025-05-29 DIAGNOSIS — R19.7 DIARRHEA, UNSPECIFIED TYPE: ICD-10-CM

## 2025-05-29 DIAGNOSIS — K86.89 PANCREATIC INSUFFICIENCY: ICD-10-CM

## 2025-05-29 DIAGNOSIS — R11.0 NAUSEA: ICD-10-CM

## 2025-05-29 DIAGNOSIS — K21.9 GASTROESOPHAGEAL REFLUX DISEASE WITHOUT ESOPHAGITIS: ICD-10-CM

## 2025-05-30 DIAGNOSIS — R19.7 DIARRHEA, UNSPECIFIED TYPE: ICD-10-CM

## 2025-05-30 RX ORDER — CETIRIZINE HYDROCHLORIDE 10 MG/1
10 TABLET ORAL DAILY
Qty: 90 TABLET | Refills: 0 | Status: SHIPPED | OUTPATIENT
Start: 2025-05-30

## 2025-05-30 RX ORDER — ONDANSETRON 4 MG/1
4 TABLET, FILM COATED ORAL EVERY 8 HOURS PRN
Qty: 60 TABLET | Refills: 1 | Status: SHIPPED | OUTPATIENT
Start: 2025-05-30

## 2025-05-30 RX ORDER — DIPHENOXYLATE HYDROCHLORIDE AND ATROPINE SULFATE 2.5; .025 MG/1; MG/1
1 TABLET ORAL 4 TIMES DAILY PRN
Qty: 120 TABLET | Refills: 1 | Status: SHIPPED | OUTPATIENT
Start: 2025-05-30

## 2025-05-30 RX ORDER — OMEPRAZOLE 20 MG/1
20 CAPSULE, DELAYED RELEASE ORAL DAILY
Qty: 90 CAPSULE | Refills: 1 | Status: SHIPPED | OUTPATIENT
Start: 2025-05-30

## 2025-06-03 ENCOUNTER — TELEPHONE (OUTPATIENT)
Dept: FAMILY MEDICINE CLINIC | Facility: CLINIC | Age: 30
End: 2025-06-03

## 2025-06-09 ENCOUNTER — CLINICAL SUPPORT (OUTPATIENT)
Dept: FAMILY MEDICINE CLINIC | Facility: CLINIC | Age: 30
End: 2025-06-09

## 2025-06-09 DIAGNOSIS — Z01.10 ENCOUNTER FOR HEARING SCREENING WITHOUT ABNORMAL FINDINGS: Primary | ICD-10-CM

## 2025-06-09 DIAGNOSIS — Z01.00 ENCOUNTER FOR VISION SCREENING: ICD-10-CM

## 2025-06-09 NOTE — PROGRESS NOTES
Hearing/Vision test completed.   No issues or concerns.   Patient asked if we can fax form to:  Child Development  Attn: Farzana Rodriguez  Fax # [phone]

## 2025-06-11 ENCOUNTER — TELEPHONE (OUTPATIENT)
Dept: FAMILY MEDICINE CLINIC | Facility: CLINIC | Age: 30
End: 2025-06-11

## 2025-06-11 DIAGNOSIS — Z01.84 IMMUNITY STATUS TESTING: Primary | ICD-10-CM

## 2025-06-11 NOTE — TELEPHONE ENCOUNTER
Spoke with patient and made her aware of the message below. Patient does not have any immunization records from any other providers nor immunization records from childhood. Patient would like to get labs to check for immunity. She states she does not need a Tb test at the moment. Please advise.    ----- Message from NILSON Elliott sent at 6/10/2025  4:29 PM EDT -----  Regarding: Health Assessment form  Hi,I have a staff health assessment for this patient. I don't see the full Hepatitis B or varicella series on her records. Would you please check if you see them? If not, patient will need labs to document immunity. Also, does she need TB testing? If so, please, schedule her for nurse visits (Not on Thursday for initial visit). Thanks!Sincerely,NILSON Sweeney

## 2025-06-12 ENCOUNTER — HOSPITAL ENCOUNTER (EMERGENCY)
Facility: HOSPITAL | Age: 30
Discharge: HOME | End: 2025-06-12
Attending: EMERGENCY MEDICINE | Admitting: EMERGENCY MEDICINE
Payer: COMMERCIAL

## 2025-06-12 ENCOUNTER — APPOINTMENT (EMERGENCY)
Dept: ULTRASOUND IMAGING | Facility: HOSPITAL | Age: 30
End: 2025-06-12
Payer: COMMERCIAL

## 2025-06-12 ENCOUNTER — APPOINTMENT (EMERGENCY)
Dept: CT IMAGING | Facility: HOSPITAL | Age: 30
End: 2025-06-12
Payer: COMMERCIAL

## 2025-06-12 VITALS
SYSTOLIC BLOOD PRESSURE: 137 MMHG | DIASTOLIC BLOOD PRESSURE: 65 MMHG | HEART RATE: 60 BPM | RESPIRATION RATE: 18 BRPM | TEMPERATURE: 98.6 F | OXYGEN SATURATION: 97 %

## 2025-06-12 DIAGNOSIS — K52.9 ENTERITIS: Primary | ICD-10-CM

## 2025-06-12 LAB
ALBUMIN SERPL BCG-MCNC: 5 G/DL (ref 3.5–5)
ALP SERPL-CCNC: 47 U/L (ref 34–104)
ALT SERPL W P-5'-P-CCNC: 14 U/L (ref 7–52)
ANION GAP SERPL CALCULATED.3IONS-SCNC: 10 MMOL/L (ref 4–13)
AST SERPL W P-5'-P-CCNC: 28 U/L (ref 13–39)
BACTERIA UR QL AUTO: ABNORMAL /HPF
BASOPHILS # BLD AUTO: 0.05 THOUSANDS/ÂΜL (ref 0–0.1)
BASOPHILS NFR BLD AUTO: 1 % (ref 0–1)
BILIRUB SERPL-MCNC: 0.48 MG/DL (ref 0.2–1)
BILIRUB UR QL STRIP: NEGATIVE
BUN SERPL-MCNC: 10 MG/DL (ref 5–25)
CALCIUM SERPL-MCNC: 9.5 MG/DL (ref 8.4–10.2)
CHLORIDE SERPL-SCNC: 104 MMOL/L (ref 96–108)
CLARITY UR: ABNORMAL
CO2 SERPL-SCNC: 26 MMOL/L (ref 21–32)
COLOR UR: YELLOW
CREAT SERPL-MCNC: 0.85 MG/DL (ref 0.6–1.3)
EOSINOPHIL # BLD AUTO: 0.14 THOUSAND/ÂΜL (ref 0–0.61)
EOSINOPHIL NFR BLD AUTO: 1 % (ref 0–6)
ERYTHROCYTE [DISTWIDTH] IN BLOOD BY AUTOMATED COUNT: 11.7 % (ref 11.6–15.1)
GFR SERPL CREATININE-BSD FRML MDRD: 92 ML/MIN/1.73SQ M
GLUCOSE SERPL-MCNC: 84 MG/DL (ref 65–140)
GLUCOSE UR STRIP-MCNC: NEGATIVE MG/DL
HCT VFR BLD AUTO: 40 % (ref 34.8–46.1)
HGB BLD-MCNC: 13.9 G/DL (ref 11.5–15.4)
HGB UR QL STRIP.AUTO: NEGATIVE
IMM GRANULOCYTES # BLD AUTO: 0.03 THOUSAND/UL (ref 0–0.2)
IMM GRANULOCYTES NFR BLD AUTO: 0 % (ref 0–2)
KETONES UR STRIP-MCNC: ABNORMAL MG/DL
LACTATE SERPL-SCNC: 1 MMOL/L (ref 0.5–2)
LEUKOCYTE ESTERASE UR QL STRIP: NEGATIVE
LIPASE SERPL-CCNC: 11 U/L (ref 11–82)
LYMPHOCYTES # BLD AUTO: 3.1 THOUSANDS/ÂΜL (ref 0.6–4.47)
LYMPHOCYTES NFR BLD AUTO: 31 % (ref 14–44)
MCH RBC QN AUTO: 33.3 PG (ref 26.8–34.3)
MCHC RBC AUTO-ENTMCNC: 34.8 G/DL (ref 31.4–37.4)
MCV RBC AUTO: 96 FL (ref 82–98)
MONOCYTES # BLD AUTO: 0.83 THOUSAND/ÂΜL (ref 0.17–1.22)
MONOCYTES NFR BLD AUTO: 8 % (ref 4–12)
MUCOUS THREADS UR QL AUTO: ABNORMAL
NEUTROPHILS # BLD AUTO: 5.96 THOUSANDS/ÂΜL (ref 1.85–7.62)
NEUTS SEG NFR BLD AUTO: 59 % (ref 43–75)
NITRITE UR QL STRIP: NEGATIVE
NON-SQ EPI CELLS URNS QL MICRO: ABNORMAL /HPF
NRBC BLD AUTO-RTO: 0 /100 WBCS
PH UR STRIP.AUTO: 6 [PH]
PLATELET # BLD AUTO: 351 THOUSANDS/UL (ref 149–390)
PMV BLD AUTO: 9.4 FL (ref 8.9–12.7)
POTASSIUM SERPL-SCNC: 4.8 MMOL/L (ref 3.5–5.3)
PROT SERPL-MCNC: 8.3 G/DL (ref 6.4–8.4)
PROT UR STRIP-MCNC: ABNORMAL MG/DL
RBC # BLD AUTO: 4.18 MILLION/UL (ref 3.81–5.12)
RBC #/AREA URNS AUTO: ABNORMAL /HPF
SODIUM SERPL-SCNC: 140 MMOL/L (ref 135–147)
SP GR UR STRIP.AUTO: >=1.03 (ref 1–1.03)
UROBILINOGEN UR STRIP-ACNC: <2 MG/DL
WBC # BLD AUTO: 10.11 THOUSAND/UL (ref 4.31–10.16)
WBC #/AREA URNS AUTO: ABNORMAL /HPF

## 2025-06-12 PROCEDURE — 96361 HYDRATE IV INFUSION ADD-ON: CPT

## 2025-06-12 PROCEDURE — 99285 EMERGENCY DEPT VISIT HI MDM: CPT | Performed by: EMERGENCY MEDICINE

## 2025-06-12 PROCEDURE — 83690 ASSAY OF LIPASE: CPT | Performed by: PHYSICIAN ASSISTANT

## 2025-06-12 PROCEDURE — 81001 URINALYSIS AUTO W/SCOPE: CPT | Performed by: PHYSICIAN ASSISTANT

## 2025-06-12 PROCEDURE — 99284 EMERGENCY DEPT VISIT MOD MDM: CPT

## 2025-06-12 PROCEDURE — 36415 COLL VENOUS BLD VENIPUNCTURE: CPT | Performed by: PHYSICIAN ASSISTANT

## 2025-06-12 PROCEDURE — 76830 TRANSVAGINAL US NON-OB: CPT

## 2025-06-12 PROCEDURE — 83605 ASSAY OF LACTIC ACID: CPT | Performed by: PHYSICIAN ASSISTANT

## 2025-06-12 PROCEDURE — 74177 CT ABD & PELVIS W/CONTRAST: CPT

## 2025-06-12 PROCEDURE — 85025 COMPLETE CBC W/AUTO DIFF WBC: CPT | Performed by: PHYSICIAN ASSISTANT

## 2025-06-12 PROCEDURE — 96374 THER/PROPH/DIAG INJ IV PUSH: CPT

## 2025-06-12 PROCEDURE — 80053 COMPREHEN METABOLIC PANEL: CPT | Performed by: PHYSICIAN ASSISTANT

## 2025-06-12 PROCEDURE — 96375 TX/PRO/DX INJ NEW DRUG ADDON: CPT

## 2025-06-12 PROCEDURE — 76856 US EXAM PELVIC COMPLETE: CPT

## 2025-06-12 RX ORDER — HYDROMORPHONE HCL/PF 1 MG/ML
1 SYRINGE (ML) INJECTION ONCE
Refills: 0 | Status: COMPLETED | OUTPATIENT
Start: 2025-06-12 | End: 2025-06-12

## 2025-06-12 RX ORDER — HYDROMORPHONE HCL/PF 1 MG/ML
1 SYRINGE (ML) INJECTION ONCE
Refills: 0 | Status: DISCONTINUED | OUTPATIENT
Start: 2025-06-12 | End: 2025-06-12

## 2025-06-12 RX ORDER — ONDANSETRON 2 MG/ML
4 INJECTION INTRAMUSCULAR; INTRAVENOUS ONCE
Status: COMPLETED | OUTPATIENT
Start: 2025-06-12 | End: 2025-06-12

## 2025-06-12 RX ORDER — KETOROLAC TROMETHAMINE 30 MG/ML
15 INJECTION, SOLUTION INTRAMUSCULAR; INTRAVENOUS ONCE
Status: COMPLETED | OUTPATIENT
Start: 2025-06-12 | End: 2025-06-12

## 2025-06-12 RX ADMIN — SODIUM CHLORIDE 1000 ML: 0.9 INJECTION, SOLUTION INTRAVENOUS at 19:04

## 2025-06-12 RX ADMIN — IOHEXOL 100 ML: 350 INJECTION, SOLUTION INTRAVENOUS at 20:01

## 2025-06-12 RX ADMIN — HYDROMORPHONE HYDROCHLORIDE 1 MG: 1 INJECTION, SOLUTION INTRAMUSCULAR; INTRAVENOUS; SUBCUTANEOUS at 19:22

## 2025-06-12 RX ADMIN — KETOROLAC TROMETHAMINE 15 MG: 30 INJECTION, SOLUTION INTRAMUSCULAR at 20:54

## 2025-06-12 RX ADMIN — ONDANSETRON 4 MG: 2 INJECTION INTRAMUSCULAR; INTRAVENOUS at 19:22

## 2025-06-12 NOTE — ED PROVIDER NOTES
Time reflects when diagnosis was documented in both MDM as applicable and the Disposition within this note       Time User Action Codes Description Comment    6/12/2025  8:54 PM Dandy Jin Add [K52.9] Enteritis           ED Disposition       ED Disposition   Discharge    Condition   Stable    Date/Time   Thu Jun 12, 2025  9:50 PM    Comment   Shilpa Beil discharge to home/self care.                   Assessment & Plan       Medical Decision Making  30-year-old female here for evaluation of abdominal pain.  See HPI for further details differential diagnosis includes UTI, ovarian torsion, appendicitis, bowel obstruction pyelonephritis.    CT scan is grossly unremarkable there is some enlarged bowel loops consistent with enteritis.  Will order pelvic ultrasound to rule out any right ovarian mass or torsion.    Flow noted to R ovary without mass, vitals normal, stable for dc home with return precautions.     Amount and/or Complexity of Data Reviewed  Labs: ordered.  Radiology: ordered.    Risk  Prescription drug management.        ED Course as of 06/12/25 2151   u Jun 12, 2025 2047 Blood Pressure(!): 171/102   2047 Blood Pressure: 119/67       Medications   sodium chloride 0.9 % bolus 1,000 mL (1,000 mL Intravenous New Bag 6/12/25 1904)   HYDROmorphone (DILAUDID) injection 1 mg (1 mg Intravenous Given 6/12/25 1922)   ondansetron (ZOFRAN) injection 4 mg (4 mg Intravenous Given 6/12/25 1922)   iohexol (OMNIPAQUE) 350 MG/ML injection (MULTI-DOSE) 100 mL (100 mL Intravenous Given 6/12/25 2001)   ketorolac (TORADOL) injection 15 mg (15 mg Intravenous Given 6/12/25 2054)       ED Risk Strat Scores                    No data recorded        SBIRT 20yo+      Flowsheet Row Most Recent Value   Initial Alcohol Screen: US AUDIT-C     1. How often do you have a drink containing alcohol? 0 Filed at: 06/12/2025 9972   2. How many drinks containing alcohol do you have on a typical day you are drinking?  0 Filed at:  06/12/2025 1834   3a. Male UNDER 65: How often do you have five or more drinks on one occasion? 0 Filed at: 06/12/2025 1834   3b. FEMALE Any Age, or MALE 65+: How often do you have 4 or more drinks on one occassion? 0 Filed at: 06/12/2025 1834   Audit-C Score 0 Filed at: 06/12/2025 1834   GAUTAM: How many times in the past year have you...    Used an illegal drug or used a prescription medication for non-medical reasons? Never Filed at: 06/12/2025 1834                            History of Present Illness       Chief Complaint   Patient presents with    Abdominal Pain     Pt states that around 4am she got pain in her RLQ that woke her up out of her sleep. Pt states that the pain is worse when she is walking and feels nauseous with it. Denies vomiting and diarrhea. Pt seen here for same in the past.        Past Medical History[1]   Past Surgical History[2]   Family History[3]   Social History[4]   E-Cigarette/Vaping    E-Cigarette Use Never User       E-Cigarette/Vaping Substances    Nicotine No     THC No     CBD No     Flavoring No     Other No     Unknown No       I have reviewed and agree with the history as documented.     This is a 30-year-old female presenting to the emergency department today for evaluation of lower abdominal pain associated with voiding small amounts of urine and urinary frequency and bilateral low back pain.  Denies hematuria.  No fever.  No vomiting.  She has a surgical history positive for partial hysterectomy secondary to endometriosis as well as a left oophorectomy and cholecystectomy.  Initial vital signs reviewed mildly hypertensive however no fever hypoxia or tachycardia.      Abdominal Pain  Associated symptoms: no chest pain, no chills, no cough, no dysuria, no fever, no hematuria, no shortness of breath, no sore throat and no vomiting        Review of Systems   Constitutional:  Negative for chills and fever.   HENT:  Negative for ear pain and sore throat.    Eyes:  Negative for pain  and visual disturbance.   Respiratory:  Negative for cough and shortness of breath.    Cardiovascular:  Negative for chest pain and palpitations.   Gastrointestinal:  Positive for abdominal pain. Negative for vomiting.   Genitourinary:  Positive for difficulty urinating and frequency. Negative for dysuria and hematuria.   Musculoskeletal:  Positive for back pain. Negative for arthralgias.   Skin:  Negative for color change and rash.   Neurological:  Negative for seizures and syncope.   All other systems reviewed and are negative.          Objective       ED Triage Vitals [06/12/25 1831]   Temperature Pulse Blood Pressure Respirations SpO2 Patient Position - Orthostatic VS   98.6 °F (37 °C) 86 (!) 171/102 18 95 % Lying      Temp Source Heart Rate Source BP Location FiO2 (%) Pain Score    Temporal Monitor Right arm -- 8      Vitals      Date and Time Temp Pulse SpO2 Resp BP Pain Score FACES Pain Rating User   06/12/25 2100 -- 62 96 % 18 131/88 6 -- BW   06/12/25 2054 -- -- -- -- -- 6 -- BW   06/12/25 1930 -- 72 97 % 18 119/67 9 -- BW   06/12/25 1922 -- -- -- -- -- 9 -- BW   06/12/25 1831 98.6 °F (37 °C) 86 95 % 18 171/102 8 -- AB            Physical Exam  Constitutional:       General: She is not in acute distress.     Appearance: She is well-developed. She is not ill-appearing, toxic-appearing or diaphoretic.   HENT:      Right Ear: External ear normal. No swelling. Tympanic membrane is not bulging.      Left Ear: External ear normal. No swelling. Tympanic membrane is not bulging.      Nose: Nose normal.      Mouth/Throat:      Pharynx: No oropharyngeal exudate.     Eyes:      General: Lids are normal.      Conjunctiva/sclera: Conjunctivae normal.      Pupils: Pupils are equal, round, and reactive to light.     Neck:      Thyroid: No thyromegaly.      Vascular: No JVD.      Trachea: No tracheal deviation.     Cardiovascular:      Rate and Rhythm: Normal rate and regular rhythm.      Pulses: Normal pulses.      Heart  sounds: Normal heart sounds. No murmur heard.     No friction rub. No gallop.   Pulmonary:      Effort: Pulmonary effort is normal. No respiratory distress.      Breath sounds: Normal breath sounds. No stridor. No wheezing or rales.   Chest:      Chest wall: No tenderness.   Abdominal:      General: Bowel sounds are normal. There is no distension.      Palpations: Abdomen is soft. There is no mass.      Tenderness: There is abdominal tenderness in the suprapubic area. There is no guarding or rebound.      Hernia: No hernia is present.     Musculoskeletal:         General: Normal range of motion.      Cervical back: Normal range of motion and neck supple. No edema. Normal range of motion.   Lymphadenopathy:      Cervical: No cervical adenopathy.     Skin:     General: Skin is warm and dry.      Coloration: Skin is not pale.      Findings: No erythema or rash.     Neurological:      Mental Status: She is alert and oriented to person, place, and time.      GCS: GCS eye subscore is 4. GCS verbal subscore is 5. GCS motor subscore is 6.      Cranial Nerves: No cranial nerve deficit.      Sensory: No sensory deficit.      Deep Tendon Reflexes: Reflexes are normal and symmetric.     Psychiatric:         Speech: Speech normal.         Behavior: Behavior normal.         Results Reviewed       Procedure Component Value Units Date/Time    Comprehensive metabolic panel [[id number]] Collected: 06/12/25 1901    Lab Status: Final result Specimen: Blood from Arm, Right Updated: 06/12/25 1946     Sodium 140 mmol/L      Potassium 4.8 mmol/L      Chloride 104 mmol/L      CO2 26 mmol/L      ANION GAP 10 mmol/L      BUN 10 mg/dL      Creatinine 0.85 mg/dL      Glucose 84 mg/dL      Calcium 9.5 mg/dL      AST 28 U/L      ALT 14 U/L      Alkaline Phosphatase 47 U/L      Total Protein 8.3 g/dL      Albumin 5.0 g/dL      Total Bilirubin 0.48 mg/dL      eGFR 92 ml/min/1.73sq m     Narrative:      [employer] guidelines  for Chronic Kidney Disease (CKD):     Stage 1 with normal or high GFR (GFR > 90 mL/min/1.73 square meters)    Stage 2 Mild CKD (GFR = 60-89 mL/min/1.73 square meters)    Stage 3A Moderate CKD (GFR = 45-59 mL/min/1.73 square meters)    Stage 3B Moderate CKD (GFR = 30-44 mL/min/1.73 square meters)    Stage 4 Severe CKD (GFR = 15-29 mL/min/1.73 square meters)    Stage 5 End Stage CKD (GFR <15 mL/min/1.73 square meters)  Note: GFR calculation is accurate only with a steady state creatinine    Lipase [[id number]]  (Normal) Collected: 06/12/25 1901    Lab Status: Final result Specimen: Blood from Arm, Right Updated: 06/12/25 1946     Lipase 11 u/L     Lactic acid, plasma (w/reflex if result > 2.0) [[id number]]  (Normal) Collected: 06/12/25 1901    Lab Status: Final result Specimen: Blood from Arm, Right Updated: 06/12/25 1931     LACTIC ACID 1.0 mmol/L     Narrative:      Result may be elevated if tourniquet was used during collection.    CBC and differential [[id number]] Collected: 06/12/25 1901    Lab Status: Final result Specimen: Blood from Arm, Right Updated: 06/12/25 1918     WBC 10.11 Thousand/uL      RBC 4.18 Million/uL      Hemoglobin 13.9 g/dL      Hematocrit 40.0 %      MCV 96 fL      MCH 33.3 pg      MCHC 34.8 g/dL      RDW 11.7 %      MPV 9.4 fL      Platelets 351 Thousands/uL      nRBC 0 /100 WBCs      Segmented % 59 %      Immature Grans % 0 %      Lymphocytes % 31 %      Monocytes % 8 %      Eosinophils Relative 1 %      Basophils Relative 1 %      Absolute Neutrophils 5.96 Thousands/µL      Absolute Immature Grans 0.03 Thousand/uL      Absolute Lymphocytes 3.10 Thousands/µL      Absolute Monocytes 0.83 Thousand/µL      Eosinophils Absolute 0.14 Thousand/µL      Basophils Absolute 0.05 Thousands/µL     Urine Microscopic [[id number]]  (Abnormal) Collected: 06/12/25 1840    Lab Status: Final result Specimen: Urine, Clean Catch Updated: 06/12/25 1908     RBC, UA 0-1 /hpf      WBC, UA 0-1 /hpf      Epithelial  Cells Moderate /hpf      Bacteria, UA Occasional /hpf      MUCUS THREADS Moderate    UA w Reflex to Microscopic w Reflex to Culture [[id number]]  (Abnormal) Collected: 25    Lab Status: Final result Specimen: Urine, Clean Catch Updated: 25     Color, UA Yellow     Clarity, UA Slightly Cloudy     Specific Gravity, UA >=1.030     pH, UA 6.0     Leukocytes, UA Negative     Nitrite, UA Negative     Protein, UA 30 (1+) mg/dl      Glucose, UA Negative mg/dl      Ketones, UA Trace mg/dl      Urobilinogen, UA <2.0 mg/dl      Bilirubin, UA Negative     Occult Blood, UA Negative            CT abdomen pelvis with contrast   Final Interpretation by Nico Ledesma MD (2038)      Fluid-filled mildly dilated loops of distal small bowel, a nonspecific diarrheal illness/enteritis could be considered.      Grossly unremarkable CT appearance of the right ovary.   If there is clinical concern for torsion, ultrasound should be obtained.      Appendix unremarkable.      The study was marked in EPIC for immediate notification.      Workstation performed: [id number]         US pelvis complete w transvaginal    (Results Pending)       Procedures    ED Medication and Procedure Management   Prior to Admission Medications   Prescriptions Last Dose Informant Patient Reported? Taking?   Multiple Vitamin (MULTIVITAMIN) capsule  Self Yes No   Sig: Take 1 capsule by mouth daily   Triamcinolone Acetonide (Nasacort Allergy 24HR) 55 MCG/ACT nasal spray  Self No No   Si spray by Each Nare route daily   albuterol (ProAir HFA) 90 mcg/act inhaler  Self No No   Sig: Inhale 2 puffs every 6 (six) hours as needed for wheezing (cough)   Patient not taking: Reported on 4/15/2025   cetirizine (ZyrTEC) 10 mg tablet   No No   Sig: Take 1 tablet (10 mg total) by mouth daily   dicyclomine (BENTYL) 20 mg tablet   No No   Sig: take 1 tablet by mouth 6 hours if needed for abdominal pain SPASM(S) LOOSE STOOLS   diphenoxylate-atropine  (LOMOTIL) 2.5-0.025 mg per tablet   No No   Sig: Take 1 tablet by mouth 4 (four) times a day as needed for diarrhea   fluticasone (FLONASE) 50 mcg/act nasal spray   No No   Si sprays into each nostril daily   levothyroxine 88 mcg tablet   No No   Sig: take 1 tablet by mouth IN THE EARLY MORNING   omeprazole (PriLOSEC) 20 mg delayed release capsule   No No   Sig: Take 1 capsule (20 mg total) by mouth daily   ondansetron (ZOFRAN) 4 mg tablet   No No   Sig: Take 1 tablet (4 mg total) by mouth every 8 (eight) hours as needed for nausea or vomiting   pancrelipase, Lip-Prot-Amyl, (CREON) 12,000 units capsule   No No   Sig: Take 2 capsules with meals and 1 capsule with snacks      Facility-Administered Medications: None     Patient's Medications   Discharge Prescriptions    No medications on file     No discharge procedures on file.  ED SEPSIS DOCUMENTATION   Time reflects when diagnosis was documented in both MDM as applicable and the Disposition within this note       Time User Action Codes Description Comment    2025  8:54 PM Dandy Jin Add [K52.9] Enteritis                      [1]   Past Medical History:  Diagnosis Date    Endometriosis     GERD (gastroesophageal reflux disease) 2023    Hyperlipidemia     hypothyroidism     Liver disease     hemangioma    Ovarian cyst     Pancreatic insufficiency 2023   [2]   Past Surgical History:  Procedure Laterality Date    DILATION AND CURETTAGE OF UTERUS      HYSTERECTOMY      AK ENDOVEN ABLTJ INCMPTNT VEIN XTR LASER 1ST VEIN Left 2020    Procedure: ENDOVASCULAR LASER THERAPY (EVLT) + stab phlebectomies;  Surgeon: Colin Black DO;  Location: [facility];  Service: Vascular    AK LAPAROSCOPY SURG CHOLECYSTECTOMY N/A 3/13/2023    Procedure: CHOLECYSTECTOMY LAPAROSCOPIC;  Surgeon: Robbie Song DO;  Location: [facility];  Service: General    WISDOM TOOTH EXTRACTION     [3]   Family History  Problem Relation Name Age of Onset    Cancer  Mother      Hypertension Mother      OBI disease Mother      Cancer Father      Hypertension Father      Diabetes Father      Cancer Sister     [4]   Social History  Tobacco Use    Smoking status: Former     Current packs/day: 0.00     Types: Cigarettes     Quit date: 2020     Years since quittin.1     Passive exposure: Past    Smokeless tobacco: Never    Tobacco comments:     3 cigarettes per day   Vaping Use    Vaping status: Never Used   Substance Use Topics    Alcohol use: No    Drug use: No        Danyd Jin PA-C  25 2202

## 2025-06-17 ENCOUNTER — TELEPHONE (OUTPATIENT)
Dept: PULMONOLOGY | Facility: CLINIC | Age: 30
End: 2025-06-17

## 2025-07-21 DIAGNOSIS — E03.9 HYPOTHYROIDISM, UNSPECIFIED TYPE: ICD-10-CM

## 2025-07-21 RX ORDER — LEVOTHYROXINE SODIUM 88 UG/1
88 TABLET ORAL DAILY
Qty: 100 TABLET | Refills: 3 | Status: SHIPPED | OUTPATIENT
Start: 2025-07-21

## 2025-08-01 ENCOUNTER — APPOINTMENT (OUTPATIENT)
Dept: RADIOLOGY | Facility: MEDICAL CENTER | Age: 30
End: 2025-08-01
Payer: COMMERCIAL

## 2025-08-01 ENCOUNTER — NURSE TRIAGE (OUTPATIENT)
Age: 30
End: 2025-08-01

## 2025-08-01 DIAGNOSIS — R19.7 DIARRHEA OF PRESUMED INFECTIOUS ORIGIN: Primary | ICD-10-CM

## 2025-08-01 DIAGNOSIS — R19.7 DIARRHEA OF PRESUMED INFECTIOUS ORIGIN: ICD-10-CM

## 2025-08-01 PROCEDURE — 74018 RADEX ABDOMEN 1 VIEW: CPT

## 2025-08-02 ENCOUNTER — APPOINTMENT (OUTPATIENT)
Dept: LAB | Facility: MEDICAL CENTER | Age: 30
End: 2025-08-02
Payer: COMMERCIAL

## 2025-08-02 DIAGNOSIS — R19.7 DIARRHEA OF PRESUMED INFECTIOUS ORIGIN: ICD-10-CM

## 2025-08-02 PROCEDURE — 87505 NFCT AGENT DETECTION GI: CPT

## 2025-08-03 LAB — C DIFF TOX GENS STL QL NAA+PROBE: NEGATIVE

## 2025-08-08 LAB
C COLI+JEJUNI TUF STL QL NAA+PROBE: NEGATIVE
EC STX1+STX2 GENES STL QL NAA+PROBE: POSITIVE
SALMONELLA SP SPAO STL QL NAA+PROBE: NEGATIVE
SHIGELLA SP+EIEC IPAH STL QL NAA+PROBE: NEGATIVE

## 2025-08-16 ENCOUNTER — APPOINTMENT (OUTPATIENT)
Dept: LAB | Facility: MEDICAL CENTER | Age: 30
End: 2025-08-16
Attending: NURSE PRACTITIONER
Payer: COMMERCIAL

## 2025-08-16 DIAGNOSIS — A09 DIARRHEA OF INFECTIOUS ORIGIN: ICD-10-CM

## 2025-08-16 DIAGNOSIS — Z01.84 IMMUNITY STATUS TESTING: ICD-10-CM

## 2025-08-16 PROCEDURE — 86735 MUMPS ANTIBODY: CPT

## 2025-08-16 PROCEDURE — 87505 NFCT AGENT DETECTION GI: CPT

## 2025-08-16 PROCEDURE — 86787 VARICELLA-ZOSTER ANTIBODY: CPT

## 2025-08-16 PROCEDURE — 86706 HEP B SURFACE ANTIBODY: CPT

## 2025-08-16 PROCEDURE — 86765 RUBEOLA ANTIBODY: CPT

## 2025-08-16 PROCEDURE — 86762 RUBELLA ANTIBODY: CPT

## 2025-08-16 PROCEDURE — 36415 COLL VENOUS BLD VENIPUNCTURE: CPT

## 2025-08-18 ENCOUNTER — RESULTS FOLLOW-UP (OUTPATIENT)
Age: 30
End: 2025-08-18

## 2025-08-18 DIAGNOSIS — Z23 ENCOUNTER FOR IMMUNIZATION: Primary | ICD-10-CM

## 2025-08-18 LAB
C COLI+JEJUNI TUF STL QL NAA+PROBE: NEGATIVE
EC STX1+STX2 GENES STL QL NAA+PROBE: POSITIVE
HBV SURFACE AB SER-ACNC: 4.28 MIU/ML
MEV IGG SER IA-ACNC: 31.3 AU/ML
MUV IGG SER IA-ACNC: 168 AU/ML
RUBV IGG SERPL IA-ACNC: 2.62 INDEX
SALMONELLA SP SPAO STL QL NAA+PROBE: NEGATIVE
SHIGELLA SP+EIEC IPAH STL QL NAA+PROBE: NEGATIVE
VZV IGG SER QL IA: 0.95 S/CO
VZV IGG SER QL IA: NEGATIVE

## 2025-08-19 ENCOUNTER — APPOINTMENT (OUTPATIENT)
Dept: LAB | Facility: HOSPITAL | Age: 30
End: 2025-08-19
Payer: COMMERCIAL

## 2025-08-19 DIAGNOSIS — A09 DIARRHEA OF INFECTIOUS ORIGIN: Primary | ICD-10-CM

## 2025-08-19 DIAGNOSIS — B96.23 SHIGA TOXIN 1 AND SHIGA TOXIN 2 DETECTED: ICD-10-CM

## 2025-08-19 DIAGNOSIS — A09 DIARRHEA OF INFECTIOUS ORIGIN: ICD-10-CM

## 2025-08-19 PROCEDURE — 87505 NFCT AGENT DETECTION GI: CPT

## 2025-08-20 ENCOUNTER — TELEPHONE (OUTPATIENT)
Age: 30
End: 2025-08-20

## 2025-08-21 ENCOUNTER — TELEPHONE (OUTPATIENT)
Dept: GASTROENTEROLOGY | Facility: CLINIC | Age: 30
End: 2025-08-21

## (undated) DEVICE — 3M™ STERI-STRIP™ REINFORCED ADHESIVE SKIN CLOSURES, R1547, 1/2 IN X 4 IN (12 MM X 100 MM), 6 STRIPS/ENVELOPE: Brand: 3M™ STERI-STRIP™

## (undated) DEVICE — COBAN 4 IN STERILE

## (undated) DEVICE — GLOVE SRG BIOGEL ECLIPSE 7

## (undated) DEVICE — ELECTRODE LAP L WIRE E-Z CLEAN 33CM -0100

## (undated) DEVICE — TROCARS: Brand: KII® BALLOON BLUNT TIP SYSTEM

## (undated) DEVICE — GAUZE SPONGES,16 PLY: Brand: CURITY

## (undated) DEVICE — SCD SEQUENTIAL COMPRESSION COMFORT SLEEVE MEDIUM KNEE LENGTH: Brand: KENDALL SCD

## (undated) DEVICE — TROCAR: Brand: KII SLEEVE

## (undated) DEVICE — SPONGE LAP 18 X 18 IN

## (undated) DEVICE — FIBER PROC KIT GOLD TIP 21G 45CM NEVERTOUCH

## (undated) DEVICE — METZENBAUM ADTEC SINGLE USE DISSECTING SCISSORS, SHAFT ONLY, MONOPOLAR, CURVED TO LEFT, WORKING LENGTH: 12 1/4", (310 MM), DIAM. 5 MM, INSULATED, DOUBLE ACTION, STERILE, DISPOSABLE, PACKAGE OF 10 PIECES: Brand: AESCULAP

## (undated) DEVICE — 5 MM CURVED DISSECTORS WITH MONOPOLAR CAUTERY: Brand: ENDOPATH

## (undated) DEVICE — GLOVE INDICATOR PI UNDERGLOVE SZ 8 BLUE

## (undated) DEVICE — DRAPE SHEET X-LG

## (undated) DEVICE — SUT VICRYL 0 UR-6 27 IN J603H

## (undated) DEVICE — KERLIX BANDAGE ROLL: Brand: KERLIX

## (undated) DEVICE — Device

## (undated) DEVICE — ACE WRAP 6 IN UNSTERILE

## (undated) DEVICE — TROCAR: Brand: KII FIOS FIRST ENTRY

## (undated) DEVICE — LIGAMAX 5 MM ENDOSCOPIC MULTIPLE CLIP APPLIER: Brand: LIGAMAX

## (undated) DEVICE — ENDOPATH 5 MM GRASPERS WITH RATCHET HANDLES: Brand: ENDOPATH

## (undated) DEVICE — INTENDED FOR TISSUE SEPARATION, AND OTHER PROCEDURES THAT REQUIRE A SHARP SURGICAL BLADE TO PUNCTURE OR CUT.: Brand: BARD-PARKER SAFETY BLADES SIZE 11, STERILE

## (undated) DEVICE — LIGHT GLOVE GREEN

## (undated) DEVICE — UTILITY MARKER,BLACK WITH LABELS: Brand: DEVON

## (undated) DEVICE — COVER PROBE INTRAOPERATIVE 6 X 96 IN

## (undated) DEVICE — NEEDLE 25G X 1 1/2

## (undated) DEVICE — PENCIL ELECTROSURG E-Z CLEAN -0035H

## (undated) DEVICE — GENERAL ENDOSCOPY PACK: Brand: CONVERTORS

## (undated) DEVICE — ANTI-FOG SOLUTION WITH FOAM PAD: Brand: DEVON

## (undated) DEVICE — ACE WRAP 4 IN UNSTERILE

## (undated) DEVICE — [HIGH FLOW INSUFFLATOR,  DO NOT USE IF PACKAGE IS DAMAGED,  KEEP DRY,  KEEP AWAY FROM SUNLIGHT,  PROTECT FROM HEAT AND RADIOACTIVE SOURCES.]: Brand: PNEUMOSURE

## (undated) DEVICE — CHLORAPREP HI-LITE 26ML ORANGE

## (undated) DEVICE — PAD GROUNDING ADULT

## (undated) DEVICE — IRRIG ENDO FLO TUBING

## (undated) DEVICE — 1820 FOAM BLOCK NEEDLE COUNTER: Brand: DEVON

## (undated) DEVICE — TUBING SMOKE EVAC W/FILTRATION DEVICE PLUMEPORT ACTIV

## (undated) DEVICE — GLOVE PI ULTRA TOUCH SZ.8.0

## (undated) DEVICE — BETHLEHEM UNIVERSAL MINOR GEN: Brand: CARDINAL HEALTH

## (undated) DEVICE — BAG DECANTER

## (undated) DEVICE — 3M™ TEGADERM™ TRANSPARENT FILM DRESSING FRAME STYLE, 1624W, 2-3/8 IN X 2-3/4 IN (6 CM X 7 CM), 100/CT 4CT/CASE: Brand: 3M™ TEGADERM™

## (undated) DEVICE — GLOVE INDICATOR PI UNDERGLOVE SZ 7 BLUE

## (undated) DEVICE — 3M™ STERI-STRIP™ REINFORCED ADHESIVE SKIN CLOSURES, R1546, 1/4 IN X 4 IN (6 MM X 100 MM), 10 STRIPS/ENVELOPE: Brand: 3M™ STERI-STRIP™

## (undated) DEVICE — SUT MONOCRYL 4-0 PS-2 27 IN Y426H

## (undated) DEVICE — ULTRASOUND GEL STERILE FOIL PK

## (undated) DEVICE — SYRINGE 10ML LL

## (undated) DEVICE — GLOVE SRG BIOGEL 7.5

## (undated) DEVICE — LIGHT HANDLE COVER SLEEVE DISP BLUE STELLAR

## (undated) DEVICE — SWABSTCK, BENZOIN TINCTURE, 1/PK, STRL: Brand: APLICARE

## (undated) DEVICE — GAUZE SPONGES,8 PLY: Brand: CURITY

## (undated) DEVICE — TIBURON SPLIT SHEET: Brand: CONVERTORS

## (undated) DEVICE — TRANSPOSAL ULTRAFLEX DUO/QUAD ULTRA CART MANIFOLD

## (undated) DEVICE — LAPAROSCOPIC SMOKE EVAC TUBING

## (undated) DEVICE — BASIC SINGLE BASIN 2-LF: Brand: MEDLINE INDUSTRIES, INC.

## (undated) DEVICE — PLUMEPEN PRO 10FT

## (undated) DEVICE — STRL COTTON TIP APPLCTR 6IN PK: Brand: CARDINAL HEALTH

## (undated) DEVICE — ENDOPOUCH RETRIEVER SPECIMEN RETRIEVAL BAGS: Brand: ENDOPOUCH RETRIEVER

## (undated) DEVICE — TONGUE DEPRESSOR STERILE

## (undated) DEVICE — DRAPE EQUIPMENT RF WAND

## (undated) DEVICE — INTENDED FOR TISSUE SEPARATION, AND OTHER PROCEDURES THAT REQUIRE A SHARP SURGICAL BLADE TO PUNCTURE OR CUT.: Brand: BARD-PARKER SAFETY BLADES SIZE 15, STERILE

## (undated) DEVICE — DRAPE SHEET THREE QUARTER